# Patient Record
Sex: FEMALE | Race: BLACK OR AFRICAN AMERICAN | NOT HISPANIC OR LATINO | Employment: OTHER | URBAN - METROPOLITAN AREA
[De-identification: names, ages, dates, MRNs, and addresses within clinical notes are randomized per-mention and may not be internally consistent; named-entity substitution may affect disease eponyms.]

---

## 2017-01-09 ENCOUNTER — OFFICE VISIT (OUTPATIENT)
Dept: OPHTHALMOLOGY | Facility: CLINIC | Age: 72
End: 2017-01-09
Payer: MEDICARE

## 2017-01-09 DIAGNOSIS — H52.7 REFRACTIVE ERROR: ICD-10-CM

## 2017-01-09 DIAGNOSIS — Z13.5 GLAUCOMA SCREENING: ICD-10-CM

## 2017-01-09 DIAGNOSIS — H25.013 CATARACT CORTICAL, SENILE, BILATERAL: Primary | ICD-10-CM

## 2017-01-09 PROCEDURE — 92014 COMPRE OPH EXAM EST PT 1/>: CPT | Mod: S$GLB,,, | Performed by: OPTOMETRIST

## 2017-01-09 PROCEDURE — 99999 PR PBB SHADOW E&M-EST. PATIENT-LVL I: CPT | Mod: PBBFAC,,, | Performed by: OPTOMETRIST

## 2017-01-09 PROCEDURE — 92015 DETERMINE REFRACTIVE STATE: CPT | Mod: S$GLB,,, | Performed by: OPTOMETRIST

## 2017-01-09 NOTE — PROGRESS NOTES
HPI     Last MLC 12/29/2015  Cataract, nuclear, bilateral  Cataract cortical, senile, bilateral  Screening for glaucoma  RE  No visual complaints         Last edited by Fred Estrella MA on 1/9/2017  9:01 AM.         Assessment /Plan     For exam results, see Encounter Report.    Cataract cortical, senile, bilateral    Glaucoma screening    Refractive error      Mild to moderate cortical cataracts OU without complaint = discussed and will follow.  OH OK OU otherwise.  Spec Rx given.  RTC one year.

## 2017-01-12 ENCOUNTER — HOSPITAL ENCOUNTER (OUTPATIENT)
Dept: RADIOLOGY | Facility: HOSPITAL | Age: 72
Discharge: HOME | End: 2017-01-12
Attending: PODIATRIST
Payer: MEDICARE

## 2017-01-12 ENCOUNTER — OFFICE VISIT (OUTPATIENT)
Dept: PODIATRY | Facility: CLINIC | Age: 72
End: 2017-01-12
Payer: MEDICARE

## 2017-01-12 VITALS
DIASTOLIC BLOOD PRESSURE: 78 MMHG | HEART RATE: 80 BPM | SYSTOLIC BLOOD PRESSURE: 137 MMHG | BODY MASS INDEX: 38.2 KG/M2 | WEIGHT: 223.75 LBS | HEIGHT: 64 IN

## 2017-01-12 DIAGNOSIS — M76.821 POSTERIOR TIBIAL TENDON DYSFUNCTION, RIGHT: ICD-10-CM

## 2017-01-12 DIAGNOSIS — M79.671 RIGHT FOOT PAIN: ICD-10-CM

## 2017-01-12 DIAGNOSIS — G89.29 CHRONIC PAIN OF RIGHT ANKLE: ICD-10-CM

## 2017-01-12 DIAGNOSIS — M25.571 CHRONIC PAIN OF RIGHT ANKLE: ICD-10-CM

## 2017-01-12 DIAGNOSIS — M25.571 CHRONIC PAIN OF RIGHT ANKLE: Primary | ICD-10-CM

## 2017-01-12 DIAGNOSIS — G89.29 CHRONIC PAIN OF RIGHT ANKLE: Primary | ICD-10-CM

## 2017-01-12 PROCEDURE — 99203 OFFICE O/P NEW LOW 30 MIN: CPT | Mod: S$GLB,,, | Performed by: PODIATRIST

## 2017-01-12 PROCEDURE — 99999 PR PBB SHADOW E&M-EST. PATIENT-LVL III: CPT | Mod: PBBFAC,,, | Performed by: PODIATRIST

## 2017-01-12 PROCEDURE — 1160F RVW MEDS BY RX/DR IN RCRD: CPT | Mod: S$GLB,,, | Performed by: PODIATRIST

## 2017-01-12 PROCEDURE — 1157F ADVNC CARE PLAN IN RCRD: CPT | Mod: S$GLB,,, | Performed by: PODIATRIST

## 2017-01-12 PROCEDURE — 73610 X-RAY EXAM OF ANKLE: CPT | Mod: 26,RT,, | Performed by: RADIOLOGY

## 2017-01-12 PROCEDURE — 73630 X-RAY EXAM OF FOOT: CPT | Mod: TC,PO,RT

## 2017-01-12 PROCEDURE — 1125F AMNT PAIN NOTED PAIN PRSNT: CPT | Mod: S$GLB,,, | Performed by: PODIATRIST

## 2017-01-12 PROCEDURE — 1159F MED LIST DOCD IN RCRD: CPT | Mod: S$GLB,,, | Performed by: PODIATRIST

## 2017-01-12 PROCEDURE — 3078F DIAST BP <80 MM HG: CPT | Mod: S$GLB,,, | Performed by: PODIATRIST

## 2017-01-12 PROCEDURE — 3075F SYST BP GE 130 - 139MM HG: CPT | Mod: S$GLB,,, | Performed by: PODIATRIST

## 2017-01-12 PROCEDURE — 73630 X-RAY EXAM OF FOOT: CPT | Mod: 26,RT,, | Performed by: RADIOLOGY

## 2017-01-12 PROCEDURE — 73610 X-RAY EXAM OF ANKLE: CPT | Mod: TC,PO,RT

## 2017-01-12 RX ORDER — TRAMADOL HYDROCHLORIDE 50 MG/1
50 TABLET ORAL EVERY 6 HOURS PRN
Qty: 30 TABLET | Refills: 0 | Status: SHIPPED | OUTPATIENT
Start: 2017-01-12 | End: 2017-01-22

## 2017-01-12 RX ORDER — METHYLPREDNISOLONE 4 MG/1
4 TABLET ORAL DAILY
Qty: 1 PACKAGE | Refills: 0 | Status: SHIPPED | OUTPATIENT
Start: 2017-01-12 | End: 2017-02-16

## 2017-01-12 NOTE — MR AVS SNAPSHOT
[facility]  [address] [place] [address]  Phone: [phone]  Fax: [phone]                  Nathalie Membreno   2017 4:40 PM   Office Visit    Description:  Female : 1945   Provider:  Yaw Morales DPM   Department[facility]  [facility] - [facility]           Reason for Visit     Ankle Pain           Diagnoses this Visit        Comments    Chronic pain of right ankle    -  Primary     Right foot pain         Posterior tibial tendon dysfunction, right                To Do List           Future Appointments        Provider Department Dept Phone    2017 5:30 PM [facility] XR2 [facility] [phone]    2017 4:40 PM aYw Morales DPM [facility] [phone]    5/10/2017 8:20 AM Alice Booker MD [facility] [phone]      Goals (5 Years of Data)     None       These Medications        Disp Refills Start End    methylPREDNISolone (MEDROL DOSEPACK) 4 mg tablet 1 Package 0 2017     Take 1 tablet (4 mg total) by mouth once daily. Use as instructed on dose pack - Oral    Pharmacy: [facility] [address] [place], [place] [address] - [address] AT [facility] Ph #: [phone]       tramadol (ULTRAM) 50 mg tablet 30 tablet 0 2017    Take 1 tablet (50 mg total) by mouth every 6 (six) hours as needed for Pain. - Oral    Pharmacy: [facility] [id number]  [facility] [address] AT [facility] Ph #: [phone]         [facility]     [facility] Care [facility] -  Assistance  Registered nurses in the [facility] provide clinical advisement, health education, appointment booking, and other advisory services.  Call for this free service at [phone].             Medications           Message regarding Medications     Verify the changes and/or additions to your medication regime listed below are the same as discussed with your clinician today.  If any of these changes or  "additions are incorrect, please notify your healthcare provider.        START taking these NEW medications        Refills    methylPREDNISolone (MEDROL DOSEPACK) 4 mg tablet 0    Sig: Take 1 tablet (4 mg total) by mouth once daily. Use as instructed on dose pack    Class: Normal    Route: Oral    tramadol (ULTRAM) 50 mg tablet 0    Sig: Take 1 tablet (50 mg total) by mouth every 6 (six) hours as needed for Pain.    Class: Print    Route: Oral           Verify that the below list of medications is an accurate representation of the medications you are currently taking.  If none reported, the list may be blank. If incorrect, please contact your healthcare provider. Carry this list with you in case of emergency.           Current Medications     albuterol 90 mcg/actuation inhaler Inhale 2 puffs into the lungs every 4 (four) hours as needed for Wheezing.    ergocalciferol (VITAMIN D2) 50,000 unit Cap Take 1 capsule (50,000 Units total) by mouth every 7 days.    losartan (COZAAR) 100 MG tablet Take 1 tablet (100 mg total) by mouth once daily.    meloxicam (MOBIC) 7.5 MG tablet Take 1 tablet (7.5 mg total) by mouth daily as needed for Pain.    methylPREDNISolone (MEDROL DOSEPACK) 4 mg tablet Take 1 tablet (4 mg total) by mouth once daily. Use as instructed on dose pack    tramadol (ULTRAM) 50 mg tablet Take 1 tablet (50 mg total) by mouth every 6 (six) hours as needed for Pain.           Clinical Reference Information           Vital Signs - Last Recorded  Most recent update: 1/12/2017  4:51 PM by Lidia Snyder LPN    BP Pulse Ht Wt BMI    137/78 (BP Location: Right arm, Patient Position: Sitting, BP Method: Automatic) 80 5' 4" (1.626 m) 101.5 kg (223 lb 12.3 oz) 38.41 kg/m2      Blood Pressure          Most Recent Value    BP  137/78      Allergies as of 1/12/2017     Penicillins      Immunizations Administered on Date of Encounter - 1/12/2017     None      Orders Placed During Today's Visit     Future " Labs/Procedures Expected by Expires    X-Ray Ankle Complete Left  1/12/2017 1/12/2018    X-Ray Foot Complete Right  1/12/2017 1/12/2018      [facility] Sign-Up     Activating your [facility] account is as easy as 1-2-3!     1) Visit [email or web address], select Sign Up Now, enter this activation code and your date of birth, then select Next.  [email or web address]  Expires: 2/26/2017  5:13 PM      2) Create a username and password to use when you visit [facility] in the future and select a security question in case you lose your password and select Next.    3) Enter your e-mail address and click Sign Up!    Additional Information  If you have questions, please e-mail [email or web address] or call [phone] to talk to our [facility] staff. Remember, [facility] is NOT to be used for urgent needs. For medical emergencies, dial 911.

## 2017-01-18 NOTE — PROGRESS NOTES
SUBJECTIVE: This 71 year old female presents today complaing of painful swollen right foot.  Patient states pain and swelling just started in November. Patient states she went to ER and they told her it may be gout. Patient states she takes gout medication but this feel different from gout. Patient denies any injury to the foot. When asked were to indicate where the pain is, patient points to entire right ankle. Patient has no other pedial complaints at this time.     Chief Complaint   Patient presents with    Ankle Pain     Right ankle. Patient states the ankle is swollen and rates the current pain 7/10. Patient states she sprang her ankle in November and she went to the [facility] room but the pain is still present.     PCP:Dr. DANIEL Booker    Patient Active Problem List   Diagnosis    Essential hypertension    Pure hypercholesterolemia    GERD (gastroesophageal reflux disease)    Allergy    DDD (degenerative disc disease), lumbar    Vitamin D deficiency    Wrist pain    Tortuous aorta    Obesity, Class II, BMI 35-39.9, with comorbidity       Current Outpatient Prescriptions on File Prior to Visit   Medication Sig Dispense Refill    albuterol 90 mcg/actuation inhaler Inhale 2 puffs into the lungs every 4 (four) hours as needed for Wheezing. 1 each 0    ergocalciferol (VITAMIN D2) 50,000 unit Cap Take 1 capsule (50,000 Units total) by mouth every 7 days. 4 capsule 11    losartan (COZAAR) 100 MG tablet Take 1 tablet (100 mg total) by mouth once daily. 30 tablet 11    meloxicam (MOBIC) 7.5 MG tablet Take 1 tablet (7.5 mg total) by mouth daily as needed for Pain. 30 tablet 0     No current facility-administered medications on file prior to visit.        Review of patient's allergies indicates:   Allergen Reactions    Penicillins      Other reaction(s): Swelling       Past Surgical History   Procedure Laterality Date    Tubal ligation      Carpal tunnel release       b/l    Trigger finger release       "Foot surgery Right 05/02/2013     wart removal       Family History   Problem Relation Age of Onset    Hypertension Mother     Hypertension Father     Cancer Father      prostate    Cataracts Father     Glaucoma Father     Diabetes Father     Hypertension Sister     Diabetes Sister     Breast cancer Neg Hx     Colon cancer Neg Hx     Ovarian cancer Neg Hx     Thrombophilia Neg Hx        Social History     Social History    Marital status:      Spouse name: N/A    Number of children: 3    Years of education: N/A     Occupational History    Resident [facility] [employer]      Social History Main Topics    Smoking status: Never Smoker    Smokeless tobacco: Never Used    Alcohol use No    Drug use: No    Sexual activity: Not Currently     Other Topics Concern    Not on file     Social History Narrative    Live alone       Vitals:    01/12/17 1645   BP: 137/78   Pulse: 80   Weight: 101.5 kg (223 lb 12.3 oz)   Height: 5' 4" (1.626 m)   PainSc:   7   PainLoc: Ankle       Review of Systems   Constitution: Negative for chills, fever, weakness and malaise/fatigue.   Cardiovascular: Negative for chest pain, claudication and leg swelling.   Respiratory: Negative for cough and shortness of breath.   Skin: Negative for nail changes. Negative for dry skin, itching and rash.   Musculoskeletal: Positive for arthritis, joint pain and muscle weakness. Negative for back pain and muscle cramps.   Gastrointestinal: Negative for nausea and vomiting.   Neurological: Negative for numbness and paresthesias.   Psychiatric/Behavioral: Negative for altered mental status.     OBJECTIVE:   PHYSICAL EXAM: Apperance: Alert and orient in no distress,well developed, and with good attention to grooming and body habits  Patient presents ambulating in tennis shoes and ace wrap on right ankle.   LOWER EXTREMITIES EXAM:   VASCULAR: Dorsalis pedis pulses 2/4 bilateral and Posterior Tibial pulses 2/4 bilateral. Capillary fill " time <4 seconds bilateral. Mild edema observed right. Varicosities absent bilateral. Skin temperature of the lower extremities is warm to warm, proximal to distal. Hair growth WNL bilateral.  DERMATOLOGICAL: No skin rashes, subcutaneous nodules, lesions, or ulcers observed bilateral. (+) edema, (--) erythema, (--) increased temperature noted to right ankle/foot. Webspaces 1-4 clean, dry and without evidence of break in skin integrity right.   NEUROLOGICAL: Light touch, sharp-dull, proprioception all present and equal bilaterally.  MUSCULOSKELETAL: Muscle strength is 5/5 for foot inverters, everters, plantarflexors, and dorsiflexors. Muscle tone is normal. Pain on palpation of right medial and lateral ankle.     ASSESSMENT:  1. Posterior tibial dysfunction, right  2. Chronic ankle pain, right    TREATMENT/PLAN:  1. The patient was counseled regarding findings of my examination, my impressions, treatment options, results of diagnostic tests, and usual treatment plan.    2. I explained to the patient that etiologies and treatment options for gout including rest, elevation, diet control, NSAID's, long term gout medication, and/or injection therapy.   3. Ordered right foot/ankle x-ray.  4. Prescribed Medrol Dosepak to be taken as directed on package.   5. Patient instructed on adequate icing techniques. Patient should ice the affected area at least once per day x 10 minutes for 10 days . I advised the  patient that extra icing would also be beneficial to ensure adequate anti inflammatory effect.   6. Patient was fitted with short walking boot and instructed on proper usage. This should be worn daily for a minimum of 2 weeks at all times when ambulating. Patient instructed not to drive with walking boot.   7. Patient to return in 1 month.

## 2017-02-05 RX ORDER — LOSARTAN POTASSIUM 100 MG/1
TABLET ORAL
Qty: 30 TABLET | Refills: 11 | Status: SHIPPED | OUTPATIENT
Start: 2017-02-05 | End: 2017-02-16 | Stop reason: SDUPTHER

## 2017-02-16 ENCOUNTER — OFFICE VISIT (OUTPATIENT)
Dept: PODIATRY | Facility: CLINIC | Age: 72
End: 2017-02-16
Payer: MEDICARE

## 2017-02-16 VITALS
HEIGHT: 64 IN | SYSTOLIC BLOOD PRESSURE: 159 MMHG | DIASTOLIC BLOOD PRESSURE: 78 MMHG | WEIGHT: 220.25 LBS | HEART RATE: 75 BPM | BODY MASS INDEX: 37.6 KG/M2

## 2017-02-16 DIAGNOSIS — G89.29 CHRONIC PAIN OF RIGHT ANKLE: ICD-10-CM

## 2017-02-16 DIAGNOSIS — M76.821 POSTERIOR TIBIAL TENDON DYSFUNCTION, RIGHT: Primary | ICD-10-CM

## 2017-02-16 DIAGNOSIS — M25.571 CHRONIC PAIN OF RIGHT ANKLE: ICD-10-CM

## 2017-02-16 PROCEDURE — 1159F MED LIST DOCD IN RCRD: CPT | Mod: S$GLB,,, | Performed by: PODIATRIST

## 2017-02-16 PROCEDURE — 99213 OFFICE O/P EST LOW 20 MIN: CPT | Mod: S$GLB,,, | Performed by: PODIATRIST

## 2017-02-16 PROCEDURE — 1125F AMNT PAIN NOTED PAIN PRSNT: CPT | Mod: S$GLB,,, | Performed by: PODIATRIST

## 2017-02-16 PROCEDURE — 1160F RVW MEDS BY RX/DR IN RCRD: CPT | Mod: S$GLB,,, | Performed by: PODIATRIST

## 2017-02-16 PROCEDURE — 3078F DIAST BP <80 MM HG: CPT | Mod: S$GLB,,, | Performed by: PODIATRIST

## 2017-02-16 PROCEDURE — 3077F SYST BP >= 140 MM HG: CPT | Mod: S$GLB,,, | Performed by: PODIATRIST

## 2017-02-16 PROCEDURE — 99999 PR PBB SHADOW E&M-EST. PATIENT-LVL III: CPT | Mod: PBBFAC,,, | Performed by: PODIATRIST

## 2017-02-16 PROCEDURE — 1157F ADVNC CARE PLAN IN RCRD: CPT | Mod: S$GLB,,, | Performed by: PODIATRIST

## 2017-02-16 RX ORDER — ATORVASTATIN CALCIUM 20 MG/1
20 TABLET, FILM COATED ORAL DAILY
COMMUNITY
End: 2017-02-21 | Stop reason: SDUPTHER

## 2017-02-16 RX ORDER — DICLOFENAC SODIUM 10 MG/G
2 GEL TOPICAL 2 TIMES DAILY
Qty: 60 G | Refills: 1 | Status: SHIPPED | OUTPATIENT
Start: 2017-02-16 | End: 2024-02-02

## 2017-02-16 RX ORDER — LOSARTAN POTASSIUM 50 MG/1
50 TABLET ORAL DAILY
COMMUNITY
End: 2017-02-17

## 2017-02-16 RX ORDER — ASPIRIN 325 MG
325 TABLET ORAL DAILY
COMMUNITY
End: 2019-02-27

## 2017-02-16 NOTE — MR AVS SNAPSHOT
[facility]  [address] [facility] [facility] [place] [address]  Phone: [phone]  Fax: [phone]                  Nathalie Membreno   2017 4:40 PM   Office Visit    Description:  Female : 1945   Provider:  Yaw Morales DPM   [facility]  [facility] - [facility]           Reason for Visit     Ankle Pain           Diagnoses this Visit        Comments    Posterior tibial tendon dysfunction, right    -  Primary            To Do List           Future Appointments        Provider Department Dept Phone    2017 10:40 AM SHERLY Guevara [facility] [phone]    2017 4:40 PM Yaw Morales DPM [facility] [phone]    5/10/2017 8:20 AM Alice Booker MD [facility] [phone]      Goals (5 Years of Data)     None       These Medications        Disp Refills Start End    diclofenac sodium (VOLTAREN) 1 % Gel 60 g 1 2017     Apply 2 g topically 2 (two) times daily. - Topical    Pharmacy: [facility] [address] [place] [address] [place] [id number] [facility] Ph #: [phone]         [facility]     [facility] Care [facility] -  Assistance  Registered nurses in the [facility] provide clinical advisement, health education, appointment booking, and other advisory services.  Call for this free service at [phone].             Medications           Message regarding Medications     Verify the changes and/or additions to your medication regime listed below are the same as discussed with your clinician today.  If any of these changes or additions are incorrect, please notify your healthcare provider.        START taking these NEW medications        Refills    diclofenac sodium (VOLTAREN) 1 % Gel 1    Sig: Apply 2 g topically 2 (two) times daily.    Class: Normal    Route: Topical      STOP taking these medications     methylPREDNISolone (MEDROL DOSEPACK) 4 mg tablet Take 1 tablet (4 mg total) by mouth  "once daily. Use as instructed on dose pack           Verify that the below list of medications is an accurate representation of the medications you are currently taking.  If none reported, the list may be blank. If incorrect, please contact your healthcare provider. Carry this list with you in case of emergency.           Current Medications     albuterol 90 mcg/actuation inhaler Inhale 2 puffs into the lungs every 4 (four) hours as needed for Wheezing.    aspirin 325 MG tablet Take 325 mg by mouth once daily.    atorvastatin (LIPITOR) 20 MG tablet Take 20 mg by mouth once daily.    ergocalciferol (VITAMIN D2) 50,000 unit Cap Take 1 capsule (50,000 Units total) by mouth every 7 days.    losartan (COZAAR) 100 MG tablet Take 1 tablet (100 mg total) by mouth once daily.    losartan (COZAAR) 50 MG tablet Take 50 mg by mouth once daily.    meloxicam (MOBIC) 7.5 MG tablet Take 1 tablet (7.5 mg total) by mouth daily as needed for Pain.    diclofenac sodium (VOLTAREN) 1 % Gel Apply 2 g topically 2 (two) times daily.           Clinical Reference Information           Your Vitals Were     BP Pulse Height Weight BMI    159/78 (BP Location: Right arm, Patient Position: Sitting, BP Method: Automatic) 75 5' 4" (1.626 m) 99.9 kg (220 lb 3.8 oz) 37.8 kg/m2      Blood Pressure          Most Recent Value    BP  (!)  159/78      Allergies as of 2/16/2017     Penicillins      Immunizations Administered on Date of Encounter - 2/16/2017     None      [facility] Sign-Up     Activating your [facility] account is as easy as 1-2-3!     1) Visit [email or web address], select Sign Up Now, enter this activation code and your date of birth, then select Next.  [email or web address]  Expires: 2/26/2017  5:13 PM      2) Create a username and password to use when you visit [facility] in the future and select a security question in case you lose your password and select Next.    3) Enter your e-mail address and click Sign Up!    Additional Information  If you have " questions, please e-mail [email or web address] or call [phone] to talk to our [facility] staff. Remember, [facility] is NOT to be used for urgent needs. For medical emergencies, dial 911.         Language Assistance Services     ATTENTION: Language assistance services are available, free of charge. Please call [phone].      ATENCIÓN: Si habla español, tiene a newby disposición servicios gratuitos de asistencia lingüística. Llame al [phone].     CHÚ Ý: N?u b?n nói Ti?ng Vi?t, có các d?ch v? h? tr? ngôn ng? mi?n phí dành cho b?n. G?i s? [phone].         [facility] complies with applicable Federal civil rights laws and does not discriminate on the basis of race, color, national origin, age, disability, or sex.

## 2017-02-17 ENCOUNTER — HOSPITAL ENCOUNTER (OUTPATIENT)
Dept: RADIOLOGY | Facility: HOSPITAL | Age: 72
Discharge: HOME | End: 2017-02-17
Attending: INTERNAL MEDICINE
Payer: MEDICARE

## 2017-02-17 ENCOUNTER — OFFICE VISIT (OUTPATIENT)
Dept: INTERNAL MEDICINE | Facility: CLINIC | Age: 72
End: 2017-02-17
Payer: MEDICARE

## 2017-02-17 ENCOUNTER — TELEPHONE (OUTPATIENT)
Dept: INTERNAL MEDICINE | Facility: CLINIC | Age: 72
End: 2017-02-17

## 2017-02-17 VITALS
BODY MASS INDEX: 36.96 KG/M2 | OXYGEN SATURATION: 97 % | HEART RATE: 79 BPM | DIASTOLIC BLOOD PRESSURE: 98 MMHG | SYSTOLIC BLOOD PRESSURE: 138 MMHG | TEMPERATURE: 97 F | HEIGHT: 65 IN | WEIGHT: 221.81 LBS

## 2017-02-17 DIAGNOSIS — E55.9 VITAMIN D DEFICIENCY: ICD-10-CM

## 2017-02-17 DIAGNOSIS — R06.02 SOB (SHORTNESS OF BREATH): ICD-10-CM

## 2017-02-17 DIAGNOSIS — E78.00 PURE HYPERCHOLESTEROLEMIA: ICD-10-CM

## 2017-02-17 DIAGNOSIS — I10 ESSENTIAL HYPERTENSION: ICD-10-CM

## 2017-02-17 DIAGNOSIS — R51.9 HEADACHE, UNSPECIFIED HEADACHE TYPE: ICD-10-CM

## 2017-02-17 DIAGNOSIS — R42 DIZZINESS: ICD-10-CM

## 2017-02-17 DIAGNOSIS — G45.9 TRANSIENT CEREBRAL ISCHEMIA, UNSPECIFIED TYPE: Primary | ICD-10-CM

## 2017-02-17 DIAGNOSIS — G45.9 TRANSIENT CEREBRAL ISCHEMIA, UNSPECIFIED TYPE: ICD-10-CM

## 2017-02-17 PROCEDURE — 99499 UNLISTED E&M SERVICE: CPT | Mod: S$GLB,,, | Performed by: PHYSICIAN ASSISTANT

## 2017-02-17 PROCEDURE — 70450 CT HEAD/BRAIN W/O DYE: CPT | Mod: TC,PO

## 2017-02-17 PROCEDURE — 99214 OFFICE O/P EST MOD 30 MIN: CPT | Mod: S$GLB,,, | Performed by: PHYSICIAN ASSISTANT

## 2017-02-17 PROCEDURE — 99999 PR PBB SHADOW E&M-EST. PATIENT-LVL III: CPT | Mod: PBBFAC,,, | Performed by: PHYSICIAN ASSISTANT

## 2017-02-17 PROCEDURE — 70450 CT HEAD/BRAIN W/O DYE: CPT | Mod: 26,,, | Performed by: RADIOLOGY

## 2017-02-17 NOTE — MR AVS SNAPSHOT
[facility]  [address] [facility] [place] [address]  Phone: [phone]  Fax: [phone]                  Nathalie Membreno   2017 10:40 AM   Office Visit    Description:  Female : 1945   Provider:  SHERLY Guevara   Department:  [facility] Internal Medicine           Reason for Visit     Follow-up     Transient Ischemic Attack           Diagnoses this Visit        Comments    Transient cerebral ischemia, unspecified type    -  Primary     Pure hypercholesterolemia         Essential hypertension         Vitamin D deficiency         Headache, unspecified headache type         Dizziness         SOB (shortness of breath)                To Do List           Future Appointments        Provider Department Dept Phone    2017 1:10 PM [facility] CT1 LIMIT 500 LBS [facility] [phone]    2017 3:00 PM Alice Booker MD [facility] [phone]    2017 4:40 PM Yaw Morales DPM [facility] Pod[facility] [phone]    5/3/2017 8:10 AM LABORATORY, [facility] [phone]    5/10/2017 8:20 AM Alice Booker MD [facility] [phone]      Goals (5 Years of Data)     None      [facility]     [facility] Nurse Care Line -  Assistance  Registered nurses in the [facility] provide clinical advisement, health education, appointment booking, and other advisory services.  Call for this free service at [phone].             Medications           Message regarding Medications     Verify the changes and/or additions to your medication regime listed below are the same as discussed with your clinician today.  If any of these changes or additions are incorrect, please notify your healthcare provider.             Verify that the below list of medications is an accurate representation of the medications you are currently taking.  If none reported, the list may be blank. If incorrect, please  "contact your healthcare provider. Carry this list with you in case of emergency.           Current Medications     albuterol 90 mcg/actuation inhaler Inhale 2 puffs into the lungs every 4 (four) hours as needed for Wheezing.    aspirin 325 MG tablet Take 325 mg by mouth once daily.    atorvastatin (LIPITOR) 20 MG tablet Take 20 mg by mouth once daily.    diclofenac sodium (VOLTAREN) 1 % Gel Apply 2 g topically 2 (two) times daily.    ergocalciferol (VITAMIN D2) 50,000 unit Cap Take 1 capsule (50,000 Units total) by mouth every 7 days.    meloxicam (MOBIC) 7.5 MG tablet Take 1 tablet (7.5 mg total) by mouth daily as needed for Pain.    losartan (COZAAR) 100 MG tablet Take 1 tablet (100 mg total) by mouth once daily.           Clinical Reference Information           Your Vitals Were     BP Pulse Temp Height    138/98 (BP Location: Right arm, Patient Position: Sitting, BP Method: Manual) 79 96.8 °F (36 °C) (Tympanic) 5' 5" (1.651 m)    Weight SpO2 BMI    100.6 kg (221 lb 12.5 oz) 97% 36.91 kg/m2      Blood Pressure          Most Recent Value    BP  (!)  138/98      Allergies as of 2/17/2017     Penicillins      Immunizations Administered on Date of Encounter - 2/17/2017     None      Orders Placed During Today's Visit     Future Labs/Procedures Expected by Expires    CT Head Without Contrast  2/17/2017 2/17/2018    ALT (SGPT)  5/8/2017 4/18/2018    Lipid panel  5/8/2017 4/18/2018      [facility] Sign-Up     Activating your [facility] account is as easy as 1-2-3!     1) Visit [email or web address], select Sign Up Now, enter this activation code and your date of birth, then select Next.  [email or web address]  Expires: 2/26/2017  5:13 PM      2) Create a username and password to use when you visit [facility] in the future and select a security question in case you lose your password and select Next.    3) Enter your e-mail address and click Sign Up!    Additional Information  If you have questions, please e-mail [email or web address] or call [phone] to talk to our [facility] staff. Remember, [facility] is NOT to be used for urgent needs. For medical emergencies, dial 911.         Language Assistance Services     ATTENTION: Language assistance services are available, free of charge. Please call [phone].      ATENCIÓN: Si habla theresaañol, tiene a newby disposición servicios gratuitos de asistencia lingüística. Llame al [phone].     CHÚ Ý: N?u b?n nói Ti?ng Vi?t, có các d?ch v? h? tr? ngôn ng? mi?n phí dành cho b?n. G?i s? [phone].         [facility] - Internal Medicine complies with applicable Federal civil rights laws and does not discriminate on the basis of race, color, national origin, age, disability, or sex.

## 2017-02-17 NOTE — PROGRESS NOTES
"Transitional Care Note  Subjective:       Patient ID: Nathalie Membreno is a 71 y.o. female.  Chief Complaint: Follow-up and Transient Ischemic Attack    Family and/or Caretaker present at visit?  Yes, sister.  Diagnostic tests reviewed/disposition: I have reviewed all completed as well as pending diagnostic tests at the time of discharge.  Disease/illness education: Discussed with pt and sister.  Home health/community services discussion/referrals: Patient does not have home health established from hospital visit.  They do not need home health.  If needed, we will set up home health for the patient.   Establishment or re-establishment of referral orders for community resources: No other necessary community resources.   Discussion with other health care providers: Discussed case with pt's PCP Dr. Booker.     HPI Comments: 71 year old female presents to clinic with sister for [facility] f/u for TIA. She experienced L leg and L arm paralysis, L facial droop, and slurred speech 2/9/17. She reports her son is living with her currently (due to his house being flooded) and she was taken to [facility] via ambulance. Pt's sister lives about 5 minutes away from her as well. She reports being at [facility] 2/9/17 - 2/10/17 and hospital records were reviewed. Pt had an echocardiogram (normal EF), negative head CT, head and neck CTA that was neg for any flow-limiting stenosis or aneurysm. Pt also had normal head MRI and clear CXR. Telemetry found no arrhythmias and pt is currently wearing a cardiac monitor (X 2 weeks) and is scheduled to f/u with a cardiologist after she completes this. Pt was started on Lipitor 20mg QD and aspirin 325mg QD. Losartan was decreased from 100mg to 50mg QD due to "normal and only slightly elevated" BP at [facility]. Pt has not checked her BP since being discharged from [facility]. Pt's neurological status returned to baseline prior to hospital discharge. She reports onset of feeling weak, exertional " ""light-headed" dizziness, intermittent L posterior headache (lasts seconds each time), intermittent L eye blurriness, and random SOB since being discharged from the hospital. Pt reports no fever, chills, known injury, syncope, confusion, numbness/tingling, return of muscular weakness, current HA or vision changes, current SOB, or other medical complaints. Pt reports her mother passed away earlier this month and she had to miss the  due to being in the hospital. Pt also says her weekly vit D is expensive and would like to change this if possible.     Past Medical History:    Allergy                                                       Anemia                                                        Arthritis                                                       Comment:knees, hands, back    Cataract                                                      Dysphagia, unspecified(787.20)                                GERD (gastroesophageal reflux disease)                        History of carpal tunnel release of both wrists 3/10/2016     Hyperlipidemia                                                Hypertension                                                  LGSIL (low grade squamous intraepithelial dysp*                 Comment:HPV +    Obesity                                                       Stroke                                          2017        Review of Systems   Constitutional: Negative for chills and fever.   Eyes: Positive for visual disturbance.   Respiratory: Positive for shortness of breath. Negative for cough.    Cardiovascular: Negative for chest pain, palpitations and leg swelling.   Gastrointestinal: Negative for abdominal pain, nausea and vomiting.   Musculoskeletal: Negative for neck pain.   Skin: Negative for rash.   Neurological: Positive for dizziness and headaches. Negative for syncope, facial asymmetry, speech difficulty, weakness and numbness.   Psychiatric/Behavioral: Negative " for confusion.       Objective:      Physical Exam   Constitutional: She is oriented to person, place, and time. She appears well-developed and well-nourished. No distress.   HENT:   Head: Normocephalic and atraumatic.   Eyes: EOM are normal. No scleral icterus.   Neck: Neck supple.   Cardiovascular: Normal rate and regular rhythm.    Pulmonary/Chest: Effort normal and breath sounds normal. No respiratory distress. She has no decreased breath sounds. She has no wheezes. She has no rhonchi. She has no rales.   Musculoskeletal: Normal range of motion. She exhibits no edema.   Neurological: She is alert and oriented to person, place, and time. She has normal strength. No cranial nerve deficit or sensory deficit. She displays a negative Romberg sign. Coordination and gait normal.   NV fully intact   Skin: Skin is warm and dry. No rash noted.   Psychiatric: She has a normal mood and affect. Her speech is normal and behavior is normal. Thought content normal.       Assessment:       1. Transient cerebral ischemia, unspecified type    2. Pure hypercholesterolemia    3. Essential hypertension    4. Vitamin D deficiency    5. Headache, unspecified headache type    6. Dizziness    7. SOB (shortness of breath)        Plan:         1. Discussed case with pt's PCP Dr. Booker. Stat head CT today with review following.  2. Continue daily aspirin. Increase losartan to 100mg QD. Monitor BP and keep record. Continue aspirin and statin. May stop weekly Rx vit D and take OTC vit D3 2000U QD instead - pt to check prices.  3. ALT and lipid panel prior to upcoming May 2017 PCP visit.  4. Monitor sxs. RTC in one week for recheck sxs and BP. ER immediately if sxs worsen.

## 2017-02-17 NOTE — TELEPHONE ENCOUNTER
----- Message from SHERLY Guevara sent at 2/17/2017  2:00 PM CST -----  Head CT does not show any acute stroke.

## 2017-02-21 ENCOUNTER — OFFICE VISIT (OUTPATIENT)
Dept: INTERNAL MEDICINE | Facility: CLINIC | Age: 72
End: 2017-02-21
Payer: MEDICARE

## 2017-02-21 VITALS
TEMPERATURE: 97 F | WEIGHT: 220.88 LBS | OXYGEN SATURATION: 96 % | BODY MASS INDEX: 36.8 KG/M2 | DIASTOLIC BLOOD PRESSURE: 86 MMHG | HEART RATE: 80 BPM | HEIGHT: 65 IN | SYSTOLIC BLOOD PRESSURE: 120 MMHG

## 2017-02-21 DIAGNOSIS — E78.00 PURE HYPERCHOLESTEROLEMIA: Primary | ICD-10-CM

## 2017-02-21 DIAGNOSIS — I77.1 TORTUOUS AORTA: ICD-10-CM

## 2017-02-21 DIAGNOSIS — I10 ESSENTIAL HYPERTENSION: ICD-10-CM

## 2017-02-21 DIAGNOSIS — G45.9 TRANSIENT CEREBRAL ISCHEMIA, UNSPECIFIED TYPE: ICD-10-CM

## 2017-02-21 DIAGNOSIS — R51.9 HEADACHE, UNSPECIFIED HEADACHE TYPE: ICD-10-CM

## 2017-02-21 DIAGNOSIS — F51.01 PRIMARY INSOMNIA: ICD-10-CM

## 2017-02-21 PROCEDURE — 99213 OFFICE O/P EST LOW 20 MIN: CPT | Mod: S$GLB,,, | Performed by: INTERNAL MEDICINE

## 2017-02-21 PROCEDURE — 1159F MED LIST DOCD IN RCRD: CPT | Mod: S$GLB,,, | Performed by: INTERNAL MEDICINE

## 2017-02-21 PROCEDURE — 3074F SYST BP LT 130 MM HG: CPT | Mod: S$GLB,,, | Performed by: INTERNAL MEDICINE

## 2017-02-21 PROCEDURE — 1157F ADVNC CARE PLAN IN RCRD: CPT | Mod: S$GLB,,, | Performed by: INTERNAL MEDICINE

## 2017-02-21 PROCEDURE — 1160F RVW MEDS BY RX/DR IN RCRD: CPT | Mod: S$GLB,,, | Performed by: INTERNAL MEDICINE

## 2017-02-21 PROCEDURE — 99999 PR PBB SHADOW E&M-EST. PATIENT-LVL III: CPT | Mod: PBBFAC,,, | Performed by: INTERNAL MEDICINE

## 2017-02-21 PROCEDURE — 3079F DIAST BP 80-89 MM HG: CPT | Mod: S$GLB,,, | Performed by: INTERNAL MEDICINE

## 2017-02-21 PROCEDURE — 99499 UNLISTED E&M SERVICE: CPT | Mod: S$GLB,,, | Performed by: INTERNAL MEDICINE

## 2017-02-21 RX ORDER — HYDROXYZINE HYDROCHLORIDE 25 MG/1
25-50 TABLET, FILM COATED ORAL NIGHTLY PRN
Qty: 40 TABLET | Refills: 3 | Status: SHIPPED | OUTPATIENT
Start: 2017-02-21 | End: 2017-09-14

## 2017-02-21 RX ORDER — ATORVASTATIN CALCIUM 20 MG/1
20 TABLET, FILM COATED ORAL DAILY
Qty: 30 TABLET | Refills: 11 | Status: SHIPPED | OUTPATIENT
Start: 2017-02-21 | End: 2018-02-26 | Stop reason: SDUPTHER

## 2017-02-21 NOTE — PROGRESS NOTES
Patient, Nathalie Membreno (MRN #[id number]), presented with a recorded BMI of 36.76 kg/m^2 and a documented comorbidity(s):  - Hypertension  - Hyperlipidemia  to which the severe obesity is a contributing factor. This is consistent with the definition of severe obesity (BMI 35.0-35.9) with comorbidity (ICD-10 E66.01, Z68.35). The patient's severe obesity was monitored, evaluated, addressed and/or treated. This addendum to the medical record is made on 02/21/2017.

## 2017-02-21 NOTE — MR AVS SNAPSHOT
[facility]  [address] [place] [address]  Phone: [phone]  Fax: [phone]                  Nathalie Membreno   2017 3:00 PM   Office Visit    Description:  Female : 1945   Provider:  Alice Booker MD   Department:  [facility]           Reason for Visit     Follow-up           Diagnoses this Visit        Comments    Pure hypercholesterolemia    -  Primary     Essential hypertension         Tortuous aorta         Transient cerebral ischemia, unspecified type         Headache, unspecified headache type         Primary insomnia                To Do List           Future Appointments        Provider Department Dept Phone    3/21/2017 8:25 AM [facility], [facility] [phone]    3/28/2017 2:40 PM Alice Booker MD [facility] [phone]    2017 4:40 PM Yaw Morales DPM [facility] [phone]    5/3/2017 8:10 AM LAB[facility]ATORY, [facility] [phone]    5/10/2017 8:20 AM Alice Booker MD [facility] [phone]      Goals (5 Years of Data)     None      Follow-Up and Disposition     Return in about 1 month (around 3/21/2017).       These Medications        Disp Refills Start End    hydrOXYzine HCl (ATARAX) 25 MG tablet 40 tablet 3 2017     Take 1-2 tablets (25-50 mg total) by mouth nightly as needed (sleep). - Oral    Pharmacy: [facility] [address] [address] [place] - [address] AT [facility] Ph #: [phone]       atorvastatin (LIPITOR) 20 MG tablet 30 tablet 11 2017     Take 1 tablet (20 mg total) by mouth once daily. - Oral    Pharmacy: [facility] [address] [address] [place] - [email or web address] [facility] Ph #: [phone]         [facility] [place] On Call     [facility] -  Assistance  Registered nurses in the [facility] provide clinical advisement, health education,  appointment booking, and other advisory services.  Call for this free service at [phone].             Medications           Message regarding Medications     Verify the changes and/or additions to your medication regime listed below are the same as discussed with your clinician today.  If any of these changes or additions are incorrect, please notify your healthcare provider.        START taking these NEW medications        Refills    hydrOXYzine HCl (ATARAX) 25 MG tablet 3    Sig: Take 1-2 tablets (25-50 mg total) by mouth nightly as needed (sleep).    Class: Normal    Route: Oral      CHANGE how you are taking these medications     Start Taking Instead of    atorvastatin (LIPITOR) 20 MG tablet atorvastatin (LIPITOR) 20 MG tablet    Dosage:  Take 1 tablet (20 mg total) by mouth once daily. Dosage:  Take 20 mg by mouth once daily.    Reason for Change:  Reorder       STOP taking these medications     meloxicam (MOBIC) 7.5 MG tablet Take 1 tablet (7.5 mg total) by mouth daily as needed for Pain.           Verify that the below list of medications is an accurate representation of the medications you are currently taking.  If none reported, the list may be blank. If incorrect, please contact your healthcare provider. Carry this list with you in case of emergency.           Current Medications     albuterol 90 mcg/actuation inhaler Inhale 2 puffs into the lungs every 4 (four) hours as needed for Wheezing.    aspirin 325 MG tablet Take 325 mg by mouth once daily.    atorvastatin (LIPITOR) 20 MG tablet Take 1 tablet (20 mg total) by mouth once daily.    diclofenac sodium (VOLTAREN) 1 % Gel Apply 2 g topically 2 (two) times daily.    ergocalciferol (VITAMIN D2) 50,000 unit Cap Take 1 capsule (50,000 Units total) by mouth every 7 days.    losartan (COZAAR) 100 MG tablet Take 1 tablet (100 mg total) by mouth once daily.    hydrOXYzine HCl (ATARAX) 25 MG tablet Take 1-2 tablets (25-50 mg total) by mouth nightly as  "needed (sleep).           Clinical Reference Information           Your Vitals Were     BP Pulse Temp Height Weight SpO2    120/86 (BP Location: Right arm) 80 96.9 °F (36.1 °C) (Tympanic) 5' 5" (1.651 m) 100.2 kg (220 lb 14.4 oz) 96%    BMI                36.76 kg/m2          Blood Pressure          Most Recent Value    BP  120/86      Allergies as of 2/21/2017     Penicillins      Immunizations Administered on Date of Encounter - 2/21/2017     None      Orders Placed During Today's Visit     Future Labs/Procedures Expected by Expires    ALT (SGPT)  2/21/2017 4/22/2018    Lipid panel  2/21/2017 2/21/2018      [facility] Sign-Up     Activating your [facility] account is as easy as 1-2-3!     1) Visit [email or web address], select Sign Up Now, enter this activation code and your date of birth, then select Next.  [email or web address]  Expires: 2/26/2017  5:13 PM      2) Create a username and password to use when you visit [facility] in the future and select a security question in case you lose your password and select Next.    3) Enter your e-mail address and click Sign Up!    Additional Information  If you have questions, please e-mail [email or web address] or call [phone] to talk to our [facility] staff. Remember, [facility] is NOT to be used for urgent needs. For medical emergencies, dial 911.         Language Assistance Services     ATTENTION: Language assistance services are available, free of charge. Please call [phone].      ATENCIÓN: Si habla español, tiene a newby disposición servicios gratuitos de asistencia lingüística. Llame al [phone].     CHÚ Ý: N?u b?n nói Ti?ng Vi?t, có các d?ch v? h? tr? ngôn ng? mi?n phí dành cho b?n. G?i s? [phone].         [facility] complies with applicable Federal civil rights laws and does not discriminate on the basis of race, color, national origin, age, disability, or sex.        "

## 2017-02-21 NOTE — PROGRESS NOTES
"Subjective:       Patient ID: Nathalie Membreno is a 71 y.o. female.    Chief Complaint: Follow-up    HPI Comments: Here for follow up of medical problems and recent TIA.  17 CT head here normal.  Mom  17.  Missed her  due to TIA.  Sx were that she couldn't see and left arm numbness.  In hospital statin was started, ASA also.  No stomach pain.  Not doing significant exercise at this time.  Having trouble sleeping since mother passed.  Feels low and upset.  No cp/sob/palp.  BMs normal.    Updated/ annual due :  HM:  fluvax, 6/15 [id number],  booster [id number],  TDaP,  BMD rep 5y,  Cscope rep 5y, 10/14 MMG,  Eye Dr. Schrader,  HCV neg.        Review of Systems   Constitutional: Negative for chills, diaphoresis and fever.   Respiratory: Negative for cough and shortness of breath.    Cardiovascular: Negative for chest pain, palpitations and leg swelling.   Gastrointestinal: Negative for blood in stool, constipation, diarrhea, nausea and vomiting.   Genitourinary: Negative for dysuria, frequency and hematuria.   Psychiatric/Behavioral: The patient is nervous/anxious.        Objective:     Visit Vitals    /86 (BP Location: Right arm)    Pulse 80    Temp 96.9 °F (36.1 °C) (Tympanic)    Ht 5' 5" (1.651 m)    Wt 100.2 kg (220 lb 14.4 oz)    SpO2 96%    BMI 36.76 kg/m2       Physical Exam   Constitutional: She is oriented to person, place, and time. She appears well-developed.   HENT:   Mouth/Throat: Oropharynx is clear and moist.   Neck: Neck supple. Carotid bruit is not present. No thyroid mass present.   Cardiovascular: Normal rate, regular rhythm and intact distal pulses.  Exam reveals no gallop and no friction rub.    No murmur heard.  Pulmonary/Chest: Effort normal and breath sounds normal. She has no wheezes. She has no rales.   Abdominal: Soft. Bowel sounds are normal. She exhibits no mass. There is no hepatosplenomegaly. There is no tenderness. "   Musculoskeletal: She exhibits no edema.   Lymphadenopathy:     She has no cervical adenopathy.   Neurological: She is alert and oriented to person, place, and time.   Psychiatric: She has a normal mood and affect.       Assessment:       1. Pure hypercholesterolemia    2. Essential hypertension    3. Tortuous aorta    4. Transient cerebral ischemia, unspecified type    5. Headache, unspecified headache type    6. Primary insomnia        Plan:       Nathalie was seen today for follow-up.    Diagnoses and all orders for this visit:    Pure hypercholesterolemia- recheck lab 1 mo.  -     Lipid panel; Future  -     ALT (SGPT); Future  -     atorvastatin (LIPITOR) 20 MG tablet; Take 1 tablet (20 mg total) by mouth once daily.    Essential hypertension- monitor BPs at home for next visit.    Tortuous aorta- on statin and asa.    Transient cerebral ischemia, unspecified type- on statin and asa, to f/u with Card with heart monitor results.    Headache, unspecified headache type- now resolved.    Obesity- will need to work on diet and weight loss once anx/depression due to loss of mother is improved.    Primary insomnia since loss of mother:  -     hydrOXYzine HCl (ATARAX) 25 MG tablet; Take 1-2 tablets (25-50 mg total) by mouth nightly as needed (sleep).    RTC 1 mo.

## 2017-02-28 NOTE — PROGRESS NOTES
Subjective:     Patient ID: Nathalie Membreno is a 71 y.o. female.    Chief Complaint: Ankle Pain (right ankle, pt. is accompanied by her sister, pt. c/o her ankle huurting and being numb, pt. rated current pain at a 6)    Nathalie is a 71 y.o. female who presents to the podiatry clinic  with complaint of  right foot/ankle pain. Onset of the symptoms was several months ago. Precipitating event: none known. Current symptoms include: ability to bear weight, but with some pain and stiffness. Aggravating factors: standing and walking. Symptoms have progressed to a point and plateaued. Patient has had no prior foot problems. Patient states pain and swelling just started in November. Patient states she went to ER and they told her it may be gout. When asked were to indicate where the pain is, patient points to entire right ankle. Patients rates pain 6/10 on pain scale. Patient states she has has a slight stroke since her last visit. Patient states she is to see PCP with pending therapy for stroke. Patient states the walking boot did help until she had her stroke and has not been able to wear it due to the stroke.       Patient Active Problem List   Diagnosis    Essential hypertension    Pure hypercholesterolemia    GERD (gastroesophageal reflux disease)    Allergy    DDD (degenerative disc disease), lumbar    Vitamin D deficiency    Wrist pain    Tortuous aorta    Obesity, Class II, BMI 35-39.9, with comorbidity       Medication List with Changes/Refills   New Medications    DICLOFENAC SODIUM (VOLTAREN) 1 % GEL    Apply 2 g topically 2 (two) times daily.    HYDROXYZINE HCL (ATARAX) 25 MG TABLET    Take 1-2 tablets (25-50 mg total) by mouth nightly as needed (sleep).   Current Medications    ALBUTEROL 90 MCG/ACTUATION INHALER    Inhale 2 puffs into the lungs every 4 (four) hours as needed for Wheezing.    ASPIRIN 325 MG TABLET    Take 325 mg by mouth once daily.    ERGOCALCIFEROL (VITAMIN D2) 50,000 UNIT CAP     Take 1 capsule (50,000 Units total) by mouth every 7 days.    LOSARTAN (COZAAR) 100 MG TABLET    Take 1 tablet (100 mg total) by mouth once daily.   Changed and/or Refilled Medications    Modified Medication Previous Medication    ATORVASTATIN (LIPITOR) 20 MG TABLET atorvastatin (LIPITOR) 20 MG tablet       Take 1 tablet (20 mg total) by mouth once daily.    Take 20 mg by mouth once daily.   Discontinued Medications    LOSARTAN (COZAAR) 100 MG TABLET    TAKE 1 TABLET BY MOUTH EVERY DAY    LOSARTAN (COZAAR) 50 MG TABLET    Take 50 mg by mouth once daily.    MELOXICAM (MOBIC) 7.5 MG TABLET    Take 1 tablet (7.5 mg total) by mouth daily as needed for Pain.    METHYLPREDNISOLONE (MEDROL DOSEPACK) 4 MG TABLET    Take 1 tablet (4 mg total) by mouth once daily. Use as instructed on dose pack       Review of patient's allergies indicates:   Allergen Reactions    Penicillins      Other reaction(s): Swelling       Past Surgical History:   Procedure Laterality Date    CARPAL TUNNEL RELEASE      b/l    FOOT SURGERY Right 05/02/2013    wart removal    TRIGGER FINGER RELEASE      TUBAL LIGATION         Family History   Problem Relation Age of Onset    Hypertension Mother     Hypertension Father     Cancer Father      prostate    Cataracts Father     Glaucoma Father     Diabetes Father     Hypertension Sister     Diabetes Sister     Breast cancer Neg Hx     Colon cancer Neg Hx     Ovarian cancer Neg Hx     Thrombophilia Neg Hx        Social History     Social History    Marital status:      Spouse name: N/A    Number of children: 3    Years of education: N/A     Occupational History    [facility]ident [facility]      Social History Main Topics    Smoking status: Never Smoker    Smokeless tobacco: Never Used    Alcohol use No    Drug use: No    Sexual activity: Not Currently     Other Topics Concern    Not on file     Social History Narrative    Live alone       Vitals:    02/16/17 1646   BP:  "(!) 159/78   Pulse: 75   Weight: 99.9 kg (220 lb 3.8 oz)   Height: 5' 4" (1.626 m)   PainSc:   6     Review of Systems   Constitutional: Negative for chills and fever.   Respiratory: Negative for shortness of breath.    Cardiovascular: Negative for chest pain, palpitations, orthopnea, claudication and leg swelling.   Gastrointestinal: Negative for diarrhea, nausea and vomiting.   Musculoskeletal: Negative for joint pain.   Skin: Negative for rash.   Neurological: Positive for tingling and focal weakness. Negative for dizziness, sensory change and weakness.   Psychiatric/Behavioral: Negative.          Objective:      PHYSICAL EXAM: Apperance: Alert and orient in no distress,well developed, and with good attention to grooming and body habits  Patient presents ambulating in tennis shoes and ace wrap on right ankle.   LOWER EXTREMITIES EXAM:   VASCULAR: Dorsalis pedis pulses 2/4 bilateral and Posterior Tibial pulses 2/4 bilateral. Capillary fill time <4 seconds bilateral. Mild edema observed right. Varicosities absent bilateral. Skin temperature of the lower extremities is warm to warm, proximal to distal. Hair growth WNL bilateral.  DERMATOLOGICAL: No skin rashes, subcutaneous nodules, lesions, or ulcers observed bilateral. (+) edema, (--) erythema, (--) increased temperature noted to right ankle/foot. Webspaces 1-4 clean, dry and without evidence of break in skin integrity right.   NEUROLOGICAL: Light touch, sharp-dull, proprioception all present and equal bilaterally.  MUSCULOSKELETAL: Muscle strength is 5/5 for foot inverters, everters, plantarflexors, and dorsiflexors. Muscle tone is normal. Pain on palpation of right medial and lateral ankle.         Assessment:       Encounter Diagnoses   Name Primary?    Posterior tibial tendon dysfunction, right Yes    Chronic pain of right ankle          Plan:   Posterior tibial tendon dysfunction, right  -     diclofenac sodium (VOLTAREN) 1 % Gel; Apply 2 g topically 2 " (two) times daily.  Dispense: 60 g; Refill: 1    Chronic pain of right ankle      I counseled the patient on her conditions, their implications and medical management.  Discussed physical therapy for the right ankle to coincide with therapy for stroke. Patient argrees for conservative treatment such as physical therapy.   Prescription written for Voltaren gel to be applied to area twice daily.  The patient and I reviewed the types of shoes she should be wearing, my recommendation includes generally the best time of the day for a shoe fitting is the afternoon, shoes with a wide toe box, very good cushion, and tennis shoes with removable inner soles. The patient and I reviewed my recommendations for over-the-counter orthotic inserts.   Patient toe return in 6-8 weeks.               Yaw Morales DPM  [facility]

## 2017-03-21 ENCOUNTER — LAB VISIT (OUTPATIENT)
Dept: LAB | Facility: HOSPITAL | Age: 72
End: 2017-03-21
Attending: INTERNAL MEDICINE
Payer: MEDICARE

## 2017-03-21 DIAGNOSIS — E78.00 PURE HYPERCHOLESTEROLEMIA: ICD-10-CM

## 2017-03-21 LAB
ALT SERPL W/O P-5'-P-CCNC: 9 U/L
CHOLEST/HDLC SERPL: 2.7 {RATIO}
HDL/CHOLESTEROL RATIO: 37.2 %
HDLC SERPL-MCNC: 180 MG/DL
HDLC SERPL-MCNC: 67 MG/DL
LDLC SERPL CALC-MCNC: 100 MG/DL
NONHDLC SERPL-MCNC: 113 MG/DL
TRIGL SERPL-MCNC: 65 MG/DL

## 2017-03-21 PROCEDURE — 36415 COLL VENOUS BLD VENIPUNCTURE: CPT | Mod: PO

## 2017-03-21 PROCEDURE — 80061 LIPID PANEL: CPT

## 2017-03-21 PROCEDURE — 84460 ALANINE AMINO (ALT) (SGPT): CPT

## 2017-03-28 ENCOUNTER — OFFICE VISIT (OUTPATIENT)
Dept: INTERNAL MEDICINE | Facility: CLINIC | Age: 72
End: 2017-03-28
Payer: MEDICARE

## 2017-03-28 VITALS
HEART RATE: 84 BPM | TEMPERATURE: 98 F | BODY MASS INDEX: 37.61 KG/M2 | DIASTOLIC BLOOD PRESSURE: 80 MMHG | OXYGEN SATURATION: 97 % | WEIGHT: 225.75 LBS | HEIGHT: 65 IN | SYSTOLIC BLOOD PRESSURE: 132 MMHG

## 2017-03-28 DIAGNOSIS — I48.0 PAF (PAROXYSMAL ATRIAL FIBRILLATION): ICD-10-CM

## 2017-03-28 DIAGNOSIS — I10 ESSENTIAL HYPERTENSION: ICD-10-CM

## 2017-03-28 DIAGNOSIS — E55.9 VITAMIN D DEFICIENCY: Primary | ICD-10-CM

## 2017-03-28 DIAGNOSIS — E78.00 PURE HYPERCHOLESTEROLEMIA: ICD-10-CM

## 2017-03-28 DIAGNOSIS — F51.01 PRIMARY INSOMNIA: ICD-10-CM

## 2017-03-28 PROCEDURE — 3075F SYST BP GE 130 - 139MM HG: CPT | Mod: S$GLB,,, | Performed by: INTERNAL MEDICINE

## 2017-03-28 PROCEDURE — 3079F DIAST BP 80-89 MM HG: CPT | Mod: S$GLB,,, | Performed by: INTERNAL MEDICINE

## 2017-03-28 PROCEDURE — 99499 UNLISTED E&M SERVICE: CPT | Mod: S$GLB,,, | Performed by: INTERNAL MEDICINE

## 2017-03-28 PROCEDURE — 99999 PR PBB SHADOW E&M-EST. PATIENT-LVL III: CPT | Mod: PBBFAC,,, | Performed by: INTERNAL MEDICINE

## 2017-03-28 PROCEDURE — 99214 OFFICE O/P EST MOD 30 MIN: CPT | Mod: S$GLB,,, | Performed by: INTERNAL MEDICINE

## 2017-03-28 PROCEDURE — 1159F MED LIST DOCD IN RCRD: CPT | Mod: S$GLB,,, | Performed by: INTERNAL MEDICINE

## 2017-03-28 PROCEDURE — 1160F RVW MEDS BY RX/DR IN RCRD: CPT | Mod: S$GLB,,, | Performed by: INTERNAL MEDICINE

## 2017-03-28 PROCEDURE — 1157F ADVNC CARE PLAN IN RCRD: CPT | Mod: S$GLB,,, | Performed by: INTERNAL MEDICINE

## 2017-03-28 NOTE — MR AVS SNAPSHOT
[facility]  [address] [place] [address]  Phone: [phone]  Fax: [phone]                  Nathalie Membreno   3/28/2017 2:40 PM   Office Visit    Description:  Female : 1945   Provider:  Alice Booker MD   Department:  [facility]           Reason for Visit     Follow-up           Diagnoses this Visit        Comments    Vitamin D deficiency    -  Primary     Pure hypercholesterolemia         Essential hypertension         PAF (paroxysmal atrial fibrillation)         Primary insomnia                To Do List           Future Appointments        Provider Department Dept Phone    2017 4:40 PM Yaw Morales DPM [facility] [phone]    2017 1:20 PM Alice Booker MD [facility] [phone]      Goals (5 Years of Data)     None      Follow-Up and Disposition     Return in about 3 months (around 2017).    Follow-up and Disposition History      [facility]     [facility] On Call Nurse Care Line -  Assistance  Registered nurses in the [facility] On Call Center provide clinical advisement, health education, appointment booking, and other advisory services.  Call for this free service at [phone].             Medications           Message regarding Medications     Verify the changes and/or additions to your medication regime listed below are the same as discussed with your clinician today.  If any of these changes or additions are incorrect, please notify your healthcare provider.             Verify that the below list of medications is an accurate representation of the medications you are currently taking.  If none reported, the list may be blank. If incorrect, please contact your healthcare provider. Carry this list with you in case of emergency.           Current Medications     albuterol 90 mcg/actuation inhaler Inhale 2 puffs into the lungs every 4 (four) hours as needed for Wheezing.    aspirin  "325 MG tablet Take 325 mg by mouth once daily.    atorvastatin (LIPITOR) 20 MG tablet Take 1 tablet (20 mg total) by mouth once daily.    diclofenac sodium (VOLTAREN) 1 % Gel Apply 2 g topically 2 (two) times daily.    ergocalciferol (VITAMIN D2) 50,000 unit Cap Take 1 capsule (50,000 Units total) by mouth every 7 days.    hydrOXYzine HCl (ATARAX) 25 MG tablet Take 1-2 tablets (25-50 mg total) by mouth nightly as needed (sleep).    losartan (COZAAR) 100 MG tablet Take 1 tablet (100 mg total) by mouth once daily.           Clinical Reference Information           Your Vitals Were     BP Pulse Temp Height Weight SpO2    132/80 84 98.1 °F (36.7 °C) (Tympanic) 5' 5" (1.651 m) 102.4 kg (225 lb 12 oz) 97%    BMI                37.57 kg/m2          Blood Pressure          Most Recent Value    BP  132/80      Allergies as of 3/28/2017     Penicillins      Immunizations Administered on Date of Encounter - 3/28/2017     None      [facility] Sign-Up     Activating your [facility] account is as easy as 1-2-3!     1) Visit [email or web address], select Sign Up Now, enter this activation code and your date of birth, then select Next.  [email or web address]  Expires: 5/12/2017  2:51 PM      2) Create a username and password to use when you visit [facility] in the future and select a security question in case you lose your password and select Next.    3) Enter your e-mail address and click Sign Up!    Additional Information  If you have questions, please e-mail [email or web address] or call [phone] to talk to our [facility] staff. Remember, [facility] is NOT to be used for urgent needs. For medical emergencies, dial 911.         Language Assistance Services     ATTENTION: Language assistance services are available, free of charge. Please call [phone].      ATENCIÓN: Si habla español, tiene a newby disposición servicios gratuitos de asistencia lingüística. Llame al [phone].     DENAE Ý: N?u b?n nói Ti?ng Vi?t, có các d?ch v? h? tr? " tammie eaton? mi?n phí dành cho b?n. G?i s? [phone].         [facility] complies with applicable Federal civil rights laws and does not discriminate on the basis of race, color, national origin, age, disability, or sex.

## 2017-03-28 NOTE — PROGRESS NOTES
"Subjective:       Patient ID: Nathalie Membreno is a 71 y.o. female.    Chief Complaint: Follow-up    HPI Comments: Here for follow up of medical problems.  Sleeping well with hydroxyzine prn.  Stress better now.  Cards Dr. Weiss has referred to Dr. Nuno for EP treatment of Afib.  Tolerating lipitor.  Taking 2K vit D3 daily.  No cp/sob/palp.    Updated/ annual due 11/17:  HM: 11/16 fluvax, 6/15 [id number], 11/16 booster [id number], 9/14 TDaP, 7/09 BMD rep 5y, 11/14 Cscope rep 5y, 10/14 MMG, 1/17 Eye Dr. Schrader, 11/16 HCV neg.        Review of Systems   Constitutional: Negative for chills, diaphoresis and fever.   Respiratory: Negative for cough and shortness of breath.    Cardiovascular: Negative for chest pain, palpitations and leg swelling.   Gastrointestinal: Negative for blood in stool, constipation, diarrhea, nausea and vomiting.   Genitourinary: Negative for dysuria, frequency and hematuria.   Psychiatric/Behavioral: The patient is not nervous/anxious.        Objective:   /80  Pulse 84  Temp 98.1 °F (36.7 °C) (Tympanic)   Ht 5' 5" (1.651 m)  Wt 102.4 kg (225 lb 12 oz)  SpO2 97%  BMI 37.57 kg/m2    Physical Exam   Constitutional: She is oriented to person, place, and time. She appears well-developed.   HENT:   Mouth/Throat: Oropharynx is clear and moist.   Neck: Neck supple. Carotid bruit is not present. No thyroid mass present.   Cardiovascular: Normal rate, regular rhythm and intact distal pulses.  Exam reveals no gallop and no friction rub.    No murmur heard.  Pulmonary/Chest: Effort normal and breath sounds normal. She has no wheezes. She has no rales.   Abdominal: Soft. Bowel sounds are normal. She exhibits no mass. There is no hepatosplenomegaly. There is no tenderness.   Musculoskeletal: She exhibits no edema.   Lymphadenopathy:     She has no cervical adenopathy.   Neurological: She is alert and oriented to person, place, and time.   Psychiatric: She has a normal mood and affect.     " Results for KEYSHA ORTIZ (MRN [id number]) as of 3/28/2017 14:18   Ref. Range 11/9/2016 13:50 12/6/2016 15:53 1/12/2017 17:58 2/17/2017 13:10 3/21/2017 08:08   ALT Latest Ref Range: 10 - 44 U/L 12    9 (L)   Triglycerides Latest Ref Range: 30 - 150 mg/dL 104    65   Cholesterol Latest Ref Range: 120 - 199 mg/dL 236 (H)    180   HDL Latest Ref Range: 40 - 75 mg/dL 69    67   LDL Cholesterol Latest Ref Range: 63.0 - 159.0 mg/dL 146.2    100.0   Total Cholesterol/HDL Ratio Latest Ref Range: 2.0 - 5.0  3.4    2.7     Assessment:       1. Vitamin D deficiency    2. Pure hypercholesterolemia    3. Essential hypertension    4. PAF (paroxysmal atrial fibrillation)    5. Primary insomnia        Plan:       Keysha was seen today for follow-up.    Diagnoses and all orders for this visit:    Vitamin D deficiency- cont supplement.    Pure hypercholesterolemia- doing well on statin, cont.  Fax lab to Dr. Weiss.    Essential hypertension- stable at home.    Insomnia- doing well on rx, cont prn.    PAF (paroxysmal atrial fibrillation)- normal rhythm now- per EP.     RTC 3mo.

## 2017-05-09 ENCOUNTER — LAB VISIT (OUTPATIENT)
Dept: LAB | Facility: HOSPITAL | Age: 72
End: 2017-05-09
Attending: INTERNAL MEDICINE
Payer: MEDICARE

## 2017-05-09 DIAGNOSIS — E78.00 PURE HYPERCHOLESTEROLEMIA: ICD-10-CM

## 2017-05-09 LAB
ALT SERPL W/O P-5'-P-CCNC: 11 U/L
CHOLEST/HDLC SERPL: 2.9 {RATIO}
HDL/CHOLESTEROL RATIO: 34.3 %
HDLC SERPL-MCNC: 210 MG/DL
HDLC SERPL-MCNC: 72 MG/DL
LDLC SERPL CALC-MCNC: 119.6 MG/DL
NONHDLC SERPL-MCNC: 138 MG/DL
TRIGL SERPL-MCNC: 92 MG/DL

## 2017-05-09 PROCEDURE — 80061 LIPID PANEL: CPT | Mod: PO

## 2017-05-09 PROCEDURE — 84460 ALANINE AMINO (ALT) (SGPT): CPT | Mod: PO

## 2017-05-09 PROCEDURE — 36415 COLL VENOUS BLD VENIPUNCTURE: CPT | Mod: PO

## 2017-09-14 ENCOUNTER — OFFICE VISIT (OUTPATIENT)
Dept: INTERNAL MEDICINE | Facility: CLINIC | Age: 72
End: 2017-09-14
Payer: MEDICARE

## 2017-09-14 VITALS
WEIGHT: 230.38 LBS | BODY MASS INDEX: 38.38 KG/M2 | SYSTOLIC BLOOD PRESSURE: 132 MMHG | HEIGHT: 65 IN | DIASTOLIC BLOOD PRESSURE: 76 MMHG | OXYGEN SATURATION: 97 % | HEART RATE: 82 BPM

## 2017-09-14 DIAGNOSIS — I48.0 PAF (PAROXYSMAL ATRIAL FIBRILLATION): ICD-10-CM

## 2017-09-14 DIAGNOSIS — I77.1 TORTUOUS AORTA: ICD-10-CM

## 2017-09-14 DIAGNOSIS — M51.36 DDD (DEGENERATIVE DISC DISEASE), LUMBAR: ICD-10-CM

## 2017-09-14 DIAGNOSIS — Z86.73 HISTORY OF STROKE: ICD-10-CM

## 2017-09-14 DIAGNOSIS — E66.01 SEVERE OBESITY WITH BODY MASS INDEX (BMI) OF 35.0 TO 39.9 WITH COMORBIDITY: ICD-10-CM

## 2017-09-14 DIAGNOSIS — K21.9 GASTROESOPHAGEAL REFLUX DISEASE WITHOUT ESOPHAGITIS: ICD-10-CM

## 2017-09-14 DIAGNOSIS — I10 ESSENTIAL HYPERTENSION: ICD-10-CM

## 2017-09-14 DIAGNOSIS — G56.03 BILATERAL CARPAL TUNNEL SYNDROME: ICD-10-CM

## 2017-09-14 DIAGNOSIS — E78.00 PURE HYPERCHOLESTEROLEMIA: ICD-10-CM

## 2017-09-14 DIAGNOSIS — E55.9 VITAMIN D DEFICIENCY: ICD-10-CM

## 2017-09-14 DIAGNOSIS — Z00.00 ENCOUNTER FOR PREVENTIVE HEALTH EXAMINATION: Primary | ICD-10-CM

## 2017-09-14 PROCEDURE — 99499 UNLISTED E&M SERVICE: CPT | Mod: S$GLB,,, | Performed by: PHYSICIAN ASSISTANT

## 2017-09-14 PROCEDURE — 99999 PR PBB SHADOW E&M-EST. PATIENT-LVL III: CPT | Mod: PBBFAC,,, | Performed by: PHYSICIAN ASSISTANT

## 2017-09-14 PROCEDURE — G0439 PPPS, SUBSEQ VISIT: HCPCS | Mod: S$GLB,,, | Performed by: PHYSICIAN ASSISTANT

## 2017-09-14 RX ORDER — ACETAMINOPHEN 500 MG
1 TABLET ORAL DAILY
COMMUNITY

## 2017-09-14 NOTE — PROGRESS NOTES
"Nathalie Membreno presented for a  Medicare AWV and comprehensive Health Risk Assessment today. The following components were reviewed and updated:    · Medical history  · Family History  · Social history  · Allergies and Current Medications  · Health Risk Assessment  · Health Maintenance  · Care Team     ** See Completed Assessments for Annual Wellness Visit within the encounter summary.**       The following assessments were completed:  · Living Situation  · CAGE  · Depression Screening  · Timed Get Up and Go  · Whisper Test  · Cognitive Function Screening  · Nutrition Screening  · ADL Screening  · PAQ Screening    Vitals:    09/14/17 1003   BP: 132/76   BP Location: Left arm   Patient Position: Sitting   BP Method: X-Large (Manual)   Pulse: 82   SpO2: 97%   Weight: 104.5 kg (230 lb 6.1 oz)   Height: 5' 5" (1.651 m)     Body mass index is 38.34 kg/m².  Physical Exam   Constitutional: She appears well-nourished. No distress.   HENT:   Head: Normocephalic and atraumatic.   Eyes: Conjunctivae and EOM are normal. Right eye exhibits no discharge. Left eye exhibits no discharge.   Neck: Normal range of motion. Neck supple. No tracheal deviation present. No thyromegaly present.   Cardiovascular: Normal rate, regular rhythm and normal heart sounds.    No murmur heard.  Pulses:       Radial pulses are 2+ on the right side, and 2+ on the left side.   Pulmonary/Chest: Effort normal and breath sounds normal. No respiratory distress. She has no wheezes.   Abdominal: Soft. Bowel sounds are normal. She exhibits no distension. There is no tenderness. There is no rebound and no guarding.   Musculoskeletal: Normal range of motion. She exhibits no edema or tenderness.   Neurological: No cranial nerve deficit.   Grasp equal both hands, No tremors, or muscle fasciculations noted. Toes downgoing, Sensation intact to soft touch. Gait: No ataxia.    Skin: Skin is warm and dry. No rash noted. She is not diaphoretic. No erythema. No " pallor.   Psychiatric: She has a normal mood and affect. Her behavior is normal. Judgment and thought content normal.   Nursing note and vitals reviewed.        Diagnoses and health risks identified today and associated recommendations/orders:    1. Encounter for preventive health examination  Completed today    2. Essential hypertension  Stable. On Losartan. Continue current treatment plan as previously prescribed with your PCP.    3. PAF (paroxysmal atrial fibrillation)  Stable. Follows Dr. Mansfield, Dr. Nuno. Have defibrillator. Continue current treatment plan as previously prescribed with your cardiology.    4. Severe obesity with body mass index (BMI) of 35.0 to 39.9 with comorbidity  This problem is currently not controlled. Encourage wt loss, diet/ exercise.  Please follow up with your PCP as planned to discuss adjustments to your treatment plan.    5. DDD (degenerative disc disease), lumbar  L-MRI 8/4/15- Multilevel degenerative disk disease and facet arthropathy as above, most severe at the levels of L4-5 and L5-S1.  Followed Dr. Corona in the past with ESIs. Stable.  Continue to follow with your PCP.    6. Vitamin D deficiency  Stable. Takes Vit D. Continue current treatment plan as previously prescribed with your PCP    7. Pure hypercholesterolemia  Stable. On Atorvastatin. Continue current treatment plan as previously prescribed with your PCP.    8. History of stroke  Hx of CVA 2/2017. [facility] ED. CT head 2/17/17- There is no gross evidence to suggest an acute infarct.There is some basal ganglia calcification noted on the right.  There is an old lacunar type infarct seen involving the right basal ganglia.  Mild generalized cortical atrophy is noted. Continue follow PCP and cardiology.    9. 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 Pt reports not bother her. Continue follow with PCP, ortho as necessary.    10. Tortuous aorta  Colonoscopy 11/7/14- polyp, diverticulosis noted  Stable. Discussed need to continue control BP, lipids, glucose, diet/ exercise.    11. Gastroesophageal reflux disease without esophagitis  Stable. Continue current treatment plan as previously prescribed with your PCP    Provided Nathalie with a 5-10 year written screening schedule and personal prevention plan. Recommendations were developed using the USPSTF age appropriate recommendations. Education, counseling, and referrals were provided as needed. After Visit Summary printed and given to patient which includes a list of additional screenings\tests needed.  Continue to follow with your PCP as scheduled or sooner if necessary.    Andre Randhawa PA-C

## 2017-09-14 NOTE — PATIENT INSTRUCTIONS
Counseling and Referral of Other Preventative  (Italic type indicates deductible and co-insurance are waived)    Patient Name: Nathalie Membreno  Today's Date: 9/14/2017      SERVICE LIMITATIONS RECOMMENDATION    Vaccines    · Pneumococcal (once after 65)    · Influenza (annually)    · Hepatitis B (if medium/high risk)    · Prevnar 13      Hepatitis B medium/high risk factors:       - End-stage renal disease       - Hemophiliacs who received Factor VII or         IX concentrates       - Clients of institutions for the mentally             retarded       - Persons who live in the same house as          a HepB carrier       - Homosexual men       - Illicit injectable drug abusers     Pneumococcal: done 11/2016, 10/2011     Influenza:11/9/2016 Done, repeat in one year     Hepatitis B: N/A     Prevnar 13: done 6/2015    Mammogram (biennial age 50-74)  Annually (age 40 or over)  Last done 12/2016, recommend to repeat every 1  years    Pap (up to age 70 and after 70 if unknown history or abnormal study last 10 years)    N/A     The USPSTF recommends against screening for cervical cancer in women older than age 65 years who have had adequate prior screening and are not otherwise at high risk for cervical cancer.      Colorectal cancer screening (to age 75)    · Fecal occult blood test (annual)  · Flexible sigmoidoscopy (5y)  · Screening colonoscopy (10y)  · Barium enema   done 11/2014. Follow PCP and GI.    Diabetes self-management training (no USPSTF recommendations)  Requires referral by treating physician for patient with diabetes or renal disease. 10 hours of initial DSMT sessions of no less than 30 minutes each in a continuous 12-month period. 2 hours of follow-up DSMT in subsequent years.  N/A    Bone mass measurements (age 65 & older, biennial)  Requires diagnosis related to osteoporosis or estrogen deficiency. Biennial benefit unless patient has history of long-term glucocorticoid  Last done 10/2014, recommend  to repeat every 5  years    Glaucoma screening (no USPSTF recommendation)  Diabetes mellitus, family history   , age 50 or over    American, age 65 or over  Done this year, repeat every year. Dr. LOYD Schrader    Medical nutrition therapy for diabetes or renal disease (no recommended schedule)  Requires referral by treating physician for patient with diabetes or renal disease or kidney transplant within the past 3 years.  Can be provided in same year as diabetes self-management training (DSMT), and CMS recommends medical nutrition therapy take place after DSMT. Up to 3 hours for initial year and 2 hours in subsequent years.  N/A    Cardiovascular screening blood tests (every 5 years)  · Fasting lipid panel  Order as a panel if possible  Done this year, repeat every year    Diabetes screening tests (at least every 3 years, Medicare covers annually or at 6-month intervals for prediabetic patients)  · Fasting blood sugar (FBS) or glucose tolerance test (GTT)  Patient must be diagnosed with one of the following:       - Hypertension       - Dyslipidemia       - Obesity (BMI 30kg/m2)       - Previous elevated impaired FBS or GTT       ... or any two of the following:       - Overweight (BMI 25 but <30)       - Family history of diabetes       - Age 65 or older       - History of gestational diabetes or birth of baby weighing more than 9 pounds  follow PCP    HIV screening (annually for increased risk patients)  · HIV-1 and HIV-2 by EIA, or TYRA, rapid antibody test or oral mucosa transudate  Patients must be at increased risk for HIV infection per USPSTF guidelines or pregnant. Tests covered annually for patient at increased risk or as requested by the patient. Pregnant patients may receive up to 3 tests during pregnancy.  Risks discussed, screening is not recommended    Smoking cessation counseling (up to 8 sessions per year)  Patients must be asymptomatic of tobacco-related conditions to receive as  a preventative service.  Non-smoker    Subsequent annual wellness visit  At least 12 months since last AWV  Return in one year     The following information is provided to all patients.  This information is to help you find resources for any of the problems found today that may be affecting your health:                Living healthy guide: www.[facility].[place].[facility]      Understanding Diabetes: www.diabetes.org      Eating healthy: www.cdc.gov/healthyweight      CDC home safety checklist: www.cdc.gov/steadi/patient.html      Agency on Aging: www.goea.[place].[facility]      Alcoholics anonymous (AA): www.aa.org      Physical Activity: www.rogerio.nih.gov/nv8ydlu      Tobacco use: www.quitwithusla.org

## 2017-11-07 ENCOUNTER — OFFICE VISIT (OUTPATIENT)
Dept: INTERNAL MEDICINE | Facility: CLINIC | Age: 72
End: 2017-11-07
Payer: MEDICARE

## 2017-11-07 ENCOUNTER — TELEPHONE (OUTPATIENT)
Dept: INTERNAL MEDICINE | Facility: CLINIC | Age: 72
End: 2017-11-07

## 2017-11-07 ENCOUNTER — HOSPITAL ENCOUNTER (OUTPATIENT)
Dept: RADIOLOGY | Facility: HOSPITAL | Age: 72
Discharge: HOME | End: 2017-11-07
Attending: PHYSICIAN ASSISTANT
Payer: MEDICARE

## 2017-11-07 ENCOUNTER — OFFICE VISIT (OUTPATIENT)
Dept: PAIN MEDICINE | Facility: CLINIC | Age: 72
End: 2017-11-07
Payer: MEDICARE

## 2017-11-07 VITALS
HEIGHT: 65 IN | WEIGHT: 230.81 LBS | BODY MASS INDEX: 38.45 KG/M2 | SYSTOLIC BLOOD PRESSURE: 136 MMHG | TEMPERATURE: 97 F | HEART RATE: 85 BPM | DIASTOLIC BLOOD PRESSURE: 86 MMHG | OXYGEN SATURATION: 98 %

## 2017-11-07 VITALS
WEIGHT: 230 LBS | HEIGHT: 65 IN | DIASTOLIC BLOOD PRESSURE: 81 MMHG | BODY MASS INDEX: 38.32 KG/M2 | HEART RATE: 80 BPM | SYSTOLIC BLOOD PRESSURE: 147 MMHG | RESPIRATION RATE: 17 BRPM

## 2017-11-07 DIAGNOSIS — M51.26 HERNIATED LUMBAR INTERVERTEBRAL DISC: ICD-10-CM

## 2017-11-07 DIAGNOSIS — R20.2 BILATERAL LEG PARESTHESIA: ICD-10-CM

## 2017-11-07 DIAGNOSIS — I48.0 PAF (PAROXYSMAL ATRIAL FIBRILLATION): ICD-10-CM

## 2017-11-07 DIAGNOSIS — I10 ESSENTIAL HYPERTENSION: ICD-10-CM

## 2017-11-07 DIAGNOSIS — M54.5 CHRONIC LOW BACK PAIN, UNSPECIFIED BACK PAIN LATERALITY, WITH SCIATICA PRESENCE UNSPECIFIED: ICD-10-CM

## 2017-11-07 DIAGNOSIS — Z86.73 HISTORY OF STROKE: ICD-10-CM

## 2017-11-07 DIAGNOSIS — G57.93 NEUROPATHIC PAIN OF BOTH LEGS: ICD-10-CM

## 2017-11-07 DIAGNOSIS — R20.2 PARESTHESIA OF BOTH LOWER EXTREMITIES: ICD-10-CM

## 2017-11-07 DIAGNOSIS — G89.29 CHRONIC LOW BACK PAIN, UNSPECIFIED BACK PAIN LATERALITY, WITH SCIATICA PRESENCE UNSPECIFIED: ICD-10-CM

## 2017-11-07 DIAGNOSIS — R20.2 BILATERAL LEG PARESTHESIA: Primary | ICD-10-CM

## 2017-11-07 DIAGNOSIS — M47.816 LUMBAR SPONDYLOSIS: Primary | ICD-10-CM

## 2017-11-07 PROCEDURE — 99999 PR PBB SHADOW E&M-EST. PATIENT-LVL III: CPT | Mod: PBBFAC,,, | Performed by: ANESTHESIOLOGY

## 2017-11-07 PROCEDURE — 99999 PR PBB SHADOW E&M-EST. PATIENT-LVL V: CPT | Mod: PBBFAC,,, | Performed by: PHYSICIAN ASSISTANT

## 2017-11-07 PROCEDURE — 72110 X-RAY EXAM L-2 SPINE 4/>VWS: CPT | Mod: 26,,, | Performed by: RADIOLOGY

## 2017-11-07 PROCEDURE — 99499 UNLISTED E&M SERVICE: CPT | Mod: S$GLB,,, | Performed by: PHYSICIAN ASSISTANT

## 2017-11-07 PROCEDURE — 99214 OFFICE O/P EST MOD 30 MIN: CPT | Mod: S$GLB,,, | Performed by: PHYSICIAN ASSISTANT

## 2017-11-07 PROCEDURE — 72110 X-RAY EXAM L-2 SPINE 4/>VWS: CPT | Mod: TC,PO

## 2017-11-07 PROCEDURE — 99204 OFFICE O/P NEW MOD 45 MIN: CPT | Mod: S$GLB,,, | Performed by: ANESTHESIOLOGY

## 2017-11-07 RX ORDER — GABAPENTIN 300 MG/1
CAPSULE ORAL
Qty: 90 CAPSULE | Refills: 0 | Status: SHIPPED | OUTPATIENT
Start: 2017-11-07 | End: 2017-12-09

## 2017-11-07 NOTE — LETTER
November 7, 2017      SHERLY Guevara  [address] [place] [address]           [facility]  [address] [place] [address]  Phone: [phone]  Fax: [phone]          Patient: Nathalie Membreno   MR Number: [id number]   YOB: 1945   Date of Visit: 11/7/2017       Dear Cleo Lowe:    Thank you for referring Nathalie Membreno to me for evaluation. Attached you will find relevant portions of my assessment and plan of care.    If you have questions, please do not hesitate to call me. I look forward to following Nathalie Membreno along with you.    Sincerely,    Brayan Soriano MD    Enclosure  CC:  No Recipients    If you would like to receive this communication electronically, please contact [email or web address] or [phone] to request more information on [facility] access.    For providers and/or their staff who would like to refer a patient to [facility], please contact us through our one-stop-shop provider referral line, [facility], at [phone].    If you feel you have received this communication in error or would no longer like to receive these types of communications, please e-mail [email or web address]

## 2017-11-07 NOTE — PROGRESS NOTES
Subjective:       Patient ID: Nathalie Membreno is a 72 y.o. female.    Chief Complaint: Leg Problem (B)    72 year old female c/o intermittent discomfort to BLE X one week. Pt reports feeling as though something is crawling through her legs, mostly when she sits still and rests. She reports intermittent tingling/numbness to mid feet regions. She reports some lower back pain at times and reports seeing different spine doctors in the past with lower back injections. She also saw Dr. Corona for this, last time in 2015. She reports stopping caffeine with questionable change in sxs. She reports possible chills yesterday. She reports no fever, N/V, abd pain, tremors, incontinence, known injury, leg pain/swelling, rash, muscular weakness, CP, SOB, exertional sxs, claudication sxs, dizziness, headaches, or other medical complaints. Pt had head CT 2/2017 without acute pathology at that time. Pt has defibrillator and unable to have MRI. She is taking her prescribed medication as directed.    PCP: Dr. Booker    Patient Active Problem List:     Essential hypertension     Pure hypercholesterolemia     GERD (gastroesophageal reflux disease)     Allergy     DDD (degenerative disc disease), lumbar     Vitamin D deficiency     Bilateral carpal tunnel syndrome     Tortuous aorta     Severe obesity with body mass index (BMI) of 35.0 to 39.9 with comorbidity     PAF (paroxysmal atrial fibrillation)     History of stroke      Review of Systems   Constitutional: Negative for chills and fever.   HENT: Negative for trouble swallowing.    Eyes: Negative for visual disturbance.   Respiratory: Negative for cough and shortness of breath.    Cardiovascular: Negative for chest pain, palpitations and leg swelling.   Gastrointestinal: Negative for abdominal pain, nausea and vomiting.   Genitourinary: Negative for difficulty urinating.   Musculoskeletal: Positive for back pain. Negative for neck pain.   Skin: Negative for rash.  "  Neurological: Negative for dizziness, tremors, syncope, facial asymmetry, speech difficulty, weakness, numbness and headaches.   Psychiatric/Behavioral: Negative for confusion.       Objective:       Vitals:    11/07/17 0821   BP: 136/86   BP Location: Right arm   Patient Position: Sitting   BP Method: Large (Manual)   Pulse: 85   Temp: 96.8 °F (36 °C)   TempSrc: Tympanic   SpO2: 98%   Weight: 104.7 kg (230 lb 13.2 oz)   Height: 5' 5" (1.651 m)     Physical Exam   Constitutional: She is oriented to person, place, and time. She appears well-developed and well-nourished. No distress.   HENT:   Head: Normocephalic and atraumatic.   Eyes: EOM are normal. No scleral icterus.   Neck: Neck supple.   Cardiovascular: Normal rate and regular rhythm.    Pulmonary/Chest: Effort normal and breath sounds normal. No respiratory distress. She has no decreased breath sounds. She has no wheezes. She has no rhonchi. She has no rales.   Musculoskeletal: Normal range of motion. She exhibits no edema.   FROM spine without midline step-offs; diffuse mild lumbar TTP, worse on R; FROM BLEs; NV intact; 5+ strength; no rash or swelling   Neurological: She is alert and oriented to person, place, and time. No cranial nerve deficit.   Skin: Skin is warm and dry. No rash noted.   Psychiatric: She has a normal mood and affect. Her speech is normal and behavior is normal. Thought content normal.       L-spine MRI 8/2015: "Multilevel degenerative disk disease and facet arthropathy as above, most severe at the levels of L4-5 and L5-S1."  Assessment:       1. Bilateral leg paresthesia    2. Essential hypertension    3. Herniated lumbar intervertebral disc    4. Chronic low back pain, unspecified back pain laterality, with sciatica presence unspecified    5. History of stroke    6. PAF (paroxysmal atrial fibrillation)        Plan:         Nathalie was seen today for leg problem.    Diagnoses and all orders for this visit:    Bilateral leg " paresthesia  -     CBC auto differential; Future  -     Comprehensive metabolic panel; Future  -     TSH; Future  -     Vitamin B12; Future  -     Ambulatory referral to Pain Clinic  -     X-Ray Lumbar Spine Complete 5 View; Future  Monitor for new or worsening sxs. Recommend eval by pain management physician - appt scheduled for pt. ER if severe sxs occur.    Essential hypertension  -     Comprehensive metabolic panel; Future  Monitor BP and f/u with PCP as recommended.    Herniated lumbar intervertebral disc, Chronic low back pain  -     Ambulatory referral to Pain Clinic  -     X-Ray Lumbar Spine Complete 5 View; Future    History of stroke, PAF (paroxysmal atrial fibrillation)  Pt on aspirin and statin. F/u with cardiology as recommended. Currently asymptomatic.    F/u with PCP as scheduled in one month for health management. RTC sooner if needed.

## 2017-11-07 NOTE — TELEPHONE ENCOUNTER
----- Message from SHERLY Guevara sent at 11/7/2017 10:53 AM CST -----  Blood work normal so far. Thyroid and B12 levels still pending. Lower spine xrays show moderate to severe changes that may be exacerbating her leg sxs - f/u with pain clinic as discussed.

## 2017-11-07 NOTE — PROGRESS NOTES
Chief Pain Complaint:  Tingling in bilateral calf, shin and dorsum of foot      History of Present Illness:   Nathalie Membreno is a 72 y.o. female  who is presenting with a chief complaint of Tingling in bilateral calf, shin and dorsum of foot . The patient began experiencing this problem abruptly, and the pain has been gradually worsening over the past 2 week(s). The pain is described as tingling, electrical, as if ants are crawling beneath her skin and is located in the bilateral calf, shin and dorsum of foot. Pain is constant and lasts hours. The pain is nonradiating. The patient rates her pain a 6 out of ten and interferes with activities of daily living a 8 out of ten. Pain is exacerbated by nothing specific but worse at night, and is improved by nothing. Patient reports no prior trauma, no prior spinal surgery     - pertinent negatives: No fever, No chills, No weight loss, No bladder dysfunction, No bowel dysfunction, No extremity weakness, No saddle anesthesia  - pertinent positives: none    - medications, other therapies tried (physical therapy, injections):     >> NSAIDs and Tylenol    >> Has NOT previously undergone Physical Therapy    >> Has previously undergone spinal injection/s She had Left L4-L5, L5-S1 TFESI with Dr Corona on 9/2015. But she has not had her radiculopathic pain since. Current discomfort is not similar.        Imaging / Labs / Studies (reviewed on 11/7/2017):    Results for orders placed during the hospital encounter of 08/04/15   MRI Lumbar Spine Without Contrast    Narrative MRI LUMBAR SPINE    TECHNIQUE: MRI lumbar spine was performed without contrast on a 1.5T magnet. The following sequences were obtained: Localizer; sagittal T1, T2, STIR; axial T1 and T2.    COMPARISON: Radiographs dated 7/31/2015    FINDINGS:    There are 5 lumbar vertebrae.  There is minimal grade 1 anterolisthesis of L5 on S1 and L4 on L5.  Vertebral body heights are well-maintained.  There is disk  desiccation from L2-3 through L5-S1 with mild associated disk height loss. Marrow signal is   preserved.    Conus terminates at L1 and appears unremarkable. Limited evaluation of posterior abdominal structures demonstrates multiple fibroids within the uterus.  Paraspinal musculature is within normal limits.  Evaluation of sacroiliac joints is unremarkable.    L1-L2: No spinal canal stenosis or neuroforaminal narrowing.    L2-L3: Mild diffuse disk bulge.  Mild bilateral facet arthropathy with thickening of the ligamentum flavum.Mild effacement of the thecal sac. Moderate right and mild left neuroforaminal narrowing.    L3-L4: Diffuse disk bulge.  Mild bilateral facet arthropathy and ligamentum flavum thickening.Mild bilateral neural foraminal narrowing.  No spinal canal stenosis.    L4-L5: Severe bilateral facet arthropathy.  Slight uncovering of the intervertebral disk with superimposed left paracentral extrusion measuring approximately 6 mm AP with slight superior migration..  Mild thickening of the ligamentum flavum.There is mild   crowding of the left lateral recess with possible mass effect on the descending left L5 nerve root.  Mild left neural foraminal narrowing.    L5-S1: Severe bilateral facet arthropathy.  Slight uncovering of the intervertebral disk.Moderate left and mild right neuroforaminal narrowing.  No spinal canal stenosis.    Impression    Multilevel degenerative disk disease and facet arthropathy as above, most severe at the levels of L4-5 and L5-S1.      Electronically signed by: KENNETH ARZATE MD  Date:     08/04/15  Time:    09:34      .  Results for orders placed during the hospital encounter of 11/07/17   X-Ray Lumbar Spine Complete 5 View    Narrative Five views of the lumbar spine    Findings: The vertebral bodies demonstrate normal height.  There is mild grade 1 spondylo-listhesis of L4 on L5.  There is more moderate grade 1 spondylolisthesis of L5 on S1. Moderate disc space narrowing and  spondylosis present at the L4-L5 and L5-S1 levels. Moderate to severe facet arthropathy noted at L4-L5 and L5-S1 levels. No pars defects.    Impression  As above      Electronically signed by: LILI WALLACE D.O.  Date:     11/07/17  Time:    09:41        Results for orders placed during the hospital encounter of 06/30/15   X-Ray Lumbar Spine Ap And Lateral    Narrative Three views of the lumbar spine    Findings: The vertebral bodies demonstrate normal height.  There is a grade 1 spondylolisthesis of L4 on L5 and L5 on S1. There is mild disk space narrowing noted from the L3-4 through the L5-S1 levels. Prominent facet arthropathy is noted at L4-5 and   L5-S1 levels.    Impression  As above      Electronically signed by: LILI WALLACE D.O.  Date:     06/30/15  Time:    09:43      Results for orders placed during the hospital encounter of 07/31/15   X-Ray Lumbar Spine Ap Lateral w/Flex Ext    Narrative Findings: AP and lateral views with flexion and extension.  There is multilevel degenerative vertebral endplate spurring and facet arthropathy with narrowing of the L2-3 through L5-S1 disk spaces.  Mild degree of grade 1 anterolisthesis observed at L4-5   and L5-S1.  No compression fracture.    Impression  As above.      Electronically signed by: MEL COOK MD  Date:     07/31/15  Time:    09:22          Review of Systems:  CONSTITUTIONAL: patient denies any fever, chills, or weight loss  SKIN: patient denies any rash or itching  RESPIRATORY: patient denies having any shortness of breath  GASTROINTESTINAL: patient denies having any diarrhea, constipation, or bowel incontinence  GENITOURINARY: patient denies having any abnormal bladder function    MUSCULOSKELETAL:  - patient complains of the above noted pain/s (see chief pain complaint)    NEUROLOGICAL:   - pain as above  - strength in Lower extremities is intact, BILATERALLY  - sensation in Lower extremities is intact, BILATERALLY  - patient denies any loss  "of bowel or bladder control      PSYCHIATRIC: patient denies any change in mood    Other:  All other systems reviewed and are negative      Physical Exam:  BP (!) 147/81 (BP Location: Right arm, Patient Position: Sitting)   Pulse 80   Resp 17   Ht 5' 5" (1.651 m)   Wt 104.3 kg (230 lb)   BMI 38.27 kg/m²  (reviewed on 11/7/2017)  General: Alert and oriented, in no apparent distress.  Gait: normal gait.  Skin: No rashes, No discoloration, No obvious lesions  HEENT: Normocephalic, atraumatic. Pupils equal and round.  Cardiovascular: Regular rate and rhythm , no significant peripheral edema present  Respiratory: Without audible wheezing, without use of accessory muscles of respiration.    Musculoskeletal:      Lumbar Spine    - Pain on flexion of lumbar spine Absent  - Straight Leg Raise:  Absent    - Pain on extension of lumbar spine Present  - TTP over the lumbar facet joints Present L5-S1  - Lumbar facet loading Present Present    -Pain on palpation over the SI joint  Absent  - ARLEY: Absent      Neuro:    Strength:  LE R/L: HF: 5/5, HE: 5/5, KF: 5/5; KE: 5/5; FE: 5/5; FF: 5/5    Extremity Reflexes: Brisk and symmetric throughout.      Extremity Sensory: Sensation to pinprick and temperature symmetric. Proprioception intact.      Psych:  Mood and affect is appropriate      Assessment:    Nathalie Membreno is a 72 y.o. year old female who is presenting with   Encounter Diagnoses   Name Primary?    Lumbar spondylosis Yes    Neuropathic pain of both legs     Paresthesia of both lower extremities      The tingling in bilateral calf, shin and dorsum of foot is unlikely coming for the Lumbar spine as is does not follow a dermatomal pattern.     Plan:    1. Interventional: None at this time. Consider L3, L4, L5 MBB if axial lumbar pain worsens.     2. Pharmacologic: Start Gabapentin 300 mg PO Q hs with up titration     3. Diagnostic: Primary investigating source of bilateral  Paresthesias.     4. Follow up: 4 " weeks    30  minutes were spent in this encounter with more than 50% of the time used for counseling and review of the plan.  Imaging / studies reviewed, detailed above.  I discussed in detail the risks, benefits, and alternatives to any and all potential treatment options.  All questions and concerns were fully addressed today in clinic. Medical decision making moderate.    Thank you for the opportunity to assist in the care of this patient.    Best wishes,    Signed:    Brayan Soriano MD          Disclaimer:  This note may have been prepared using voice recognition software, it may have not been extensively proofed, as such there could be errors within the text such as sound alike errors.

## 2017-11-29 ENCOUNTER — TELEPHONE (OUTPATIENT)
Dept: INTERNAL MEDICINE | Facility: CLINIC | Age: 72
End: 2017-11-29

## 2017-11-29 DIAGNOSIS — Z12.39 BREAST CANCER SCREENING: Primary | ICD-10-CM

## 2017-12-09 ENCOUNTER — OFFICE VISIT (OUTPATIENT)
Dept: PAIN MEDICINE | Facility: CLINIC | Age: 72
End: 2017-12-09
Payer: MEDICARE

## 2017-12-09 ENCOUNTER — HOSPITAL ENCOUNTER (OUTPATIENT)
Dept: RADIOLOGY | Facility: HOSPITAL | Age: 72
Discharge: HOME | End: 2017-12-09
Attending: INTERNAL MEDICINE
Payer: MEDICARE

## 2017-12-09 VITALS
BODY MASS INDEX: 38.32 KG/M2 | BODY MASS INDEX: 38.32 KG/M2 | RESPIRATION RATE: 17 BRPM | WEIGHT: 230 LBS | SYSTOLIC BLOOD PRESSURE: 158 MMHG | DIASTOLIC BLOOD PRESSURE: 84 MMHG | HEIGHT: 65 IN | HEART RATE: 73 BPM | WEIGHT: 230 LBS | HEIGHT: 65 IN

## 2017-12-09 DIAGNOSIS — M79.2 NEUROPATHIC PAIN: ICD-10-CM

## 2017-12-09 DIAGNOSIS — R20.2 PARESTHESIA OF BOTH LOWER EXTREMITIES: ICD-10-CM

## 2017-12-09 DIAGNOSIS — Z12.39 BREAST CANCER SCREENING: ICD-10-CM

## 2017-12-09 DIAGNOSIS — M47.816 LUMBAR SPONDYLOSIS: Primary | ICD-10-CM

## 2017-12-09 PROCEDURE — 77067 SCR MAMMO BI INCL CAD: CPT | Mod: TC,PO

## 2017-12-09 PROCEDURE — 99213 OFFICE O/P EST LOW 20 MIN: CPT | Mod: S$GLB,,, | Performed by: ANESTHESIOLOGY

## 2017-12-09 PROCEDURE — 77067 SCR MAMMO BI INCL CAD: CPT | Mod: 26,,, | Performed by: RADIOLOGY

## 2017-12-09 PROCEDURE — 99999 PR PBB SHADOW E&M-EST. PATIENT-LVL III: CPT | Mod: PBBFAC,,, | Performed by: ANESTHESIOLOGY

## 2017-12-09 RX ORDER — GABAPENTIN 300 MG/1
300 CAPSULE ORAL NIGHTLY
Qty: 30 CAPSULE | Refills: 2 | Status: SHIPPED | OUTPATIENT
Start: 2017-12-09 | End: 2019-06-21 | Stop reason: SDUPTHER

## 2017-12-09 NOTE — PROGRESS NOTES
Chief Pain Complaint:  Tingling in bilateral calf, shin and dorsum of foot    Interval History: The patient was last seen 11/7/2017. At that visit the plan was to Start Gabapentin 300 mg PO Q hs. The patient reports that  she is/was better following the last visit. The changes lasted 2 weeks. The changes have continued through this visit. He tingling is much better. She is tolerating Gabapentin well. No sedation, or grogginess.     History of Present Illness:   Nathalie Membreno is a 72 y.o. female  who is presenting with a chief complaint of Tingling in bilateral calf, shin and dorsum of foot . The patient began experiencing this problem abruptly, and the pain has been gradually worsening over the past 2 week(s). The pain is described as tingling, electrical, as if ants are crawling beneath her skin and is located in the bilateral calf, shin and dorsum of foot. Pain is constant and lasts hours. The pain is nonradiating. The patient rates her pain a 6 out of ten and interferes with activities of daily living a 8 out of ten. Pain is exacerbated by nothing specific but worse at night, and is improved by nothing. Patient reports no prior trauma, no prior spinal surgery     - pertinent negatives: No fever, No chills, No weight loss, No bladder dysfunction, No bowel dysfunction, No extremity weakness, No saddle anesthesia  - pertinent positives: none    - medications, other therapies tried (physical therapy, injections):     >> NSAIDs and Tylenol    >> Has NOT previously undergone Physical Therapy    >> Has previously undergone spinal injection/s She had Left L4-L5, L5-S1 TFESI with Dr Corona on 9/2015. But she has not had her radiculopathic pain since. Current discomfort is not similar.        Imaging / Labs / Studies (reviewed on 12/9/2017):    Results for orders placed during the hospital encounter of 08/04/15   MRI Lumbar Spine Without Contrast    Narrative MRI LUMBAR SPINE    TECHNIQUE: MRI lumbar spine was  performed without contrast on a 1.5T magnet. The following sequences were obtained: Localizer; sagittal T1, T2, STIR; axial T1 and T2.    COMPARISON: Radiographs dated 7/31/2015    FINDINGS:    There are 5 lumbar vertebrae.  There is minimal grade 1 anterolisthesis of L5 on S1 and L4 on L5.  Vertebral body heights are well-maintained.  There is disk desiccation from L2-3 through L5-S1 with mild associated disk height loss. Marrow signal is   preserved.    Conus terminates at L1 and appears unremarkable. Limited evaluation of posterior abdominal structures demonstrates multiple fibroids within the uterus.  Paraspinal musculature is within normal limits.  Evaluation of sacroiliac joints is unremarkable.    L1-L2: No spinal canal stenosis or neuroforaminal narrowing.    L2-L3: Mild diffuse disk bulge.  Mild bilateral facet arthropathy with thickening of the ligamentum flavum.Mild effacement of the thecal sac. Moderate right and mild left neuroforaminal narrowing.    L3-L4: Diffuse disk bulge.  Mild bilateral facet arthropathy and ligamentum flavum thickening.Mild bilateral neural foraminal narrowing.  No spinal canal stenosis.    L4-L5: Severe bilateral facet arthropathy.  Slight uncovering of the intervertebral disk with superimposed left paracentral extrusion measuring approximately 6 mm AP with slight superior migration..  Mild thickening of the ligamentum flavum.There is mild   crowding of the left lateral recess with possible mass effect on the descending left L5 nerve root.  Mild left neural foraminal narrowing.    L5-S1: Severe bilateral facet arthropathy.  Slight uncovering of the intervertebral disk.Moderate left and mild right neuroforaminal narrowing.  No spinal canal stenosis.    Impression    Multilevel degenerative disk disease and facet arthropathy as above, most severe at the levels of L4-5 and L5-S1.      Electronically signed by: KENNETH ARZATE MD  Date:     08/04/15  Time:    09:34      .  Results  for orders placed during the hospital encounter of 11/07/17   X-Ray Lumbar Spine Complete 5 View    Narrative Five views of the lumbar spine    Findings: The vertebral bodies demonstrate normal height.  There is mild grade 1 spondylo-listhesis of L4 on L5.  There is more moderate grade 1 spondylolisthesis of L5 on S1. Moderate disc space narrowing and spondylosis present at the L4-L5 and L5-S1 levels. Moderate to severe facet arthropathy noted at L4-L5 and L5-S1 levels. No pars defects.    Impression  As above      Electronically signed by: LILI WALLACE D.O.  Date:     11/07/17  Time:    09:41        Results for orders placed during the hospital encounter of 06/30/15   X-Ray Lumbar Spine Ap And Lateral    Narrative Three views of the lumbar spine    Findings: The vertebral bodies demonstrate normal height.  There is a grade 1 spondylolisthesis of L4 on L5 and L5 on S1. There is mild disk space narrowing noted from the L3-4 through the L5-S1 levels. Prominent facet arthropathy is noted at L4-5 and   L5-S1 levels.    Impression  As above      Electronically signed by: LILI WALLACE D.O.  Date:     06/30/15  Time:    09:43      Results for orders placed during the hospital encounter of 07/31/15   X-Ray Lumbar Spine Ap Lateral w/Flex Ext    Narrative Findings: AP and lateral views with flexion and extension.  There is multilevel degenerative vertebral endplate spurring and facet arthropathy with narrowing of the L2-3 through L5-S1 disk spaces.  Mild degree of grade 1 anterolisthesis observed at L4-5   and L5-S1.  No compression fracture.    Impression  As above.      Electronically signed by: MEL COOK MD  Date:     07/31/15  Time:    09:22          Review of Systems:  CONSTITUTIONAL: patient denies any fever, chills, or weight loss  SKIN: patient denies any rash or itching  RESPIRATORY: patient denies having any shortness of breath  GASTROINTESTINAL: patient denies having any diarrhea, constipation, or  "bowel incontinence  GENITOURINARY: patient denies having any abnormal bladder function    MUSCULOSKELETAL:  - patient complains of the above noted pain/s (see chief pain complaint)    NEUROLOGICAL:   - pain as above  - strength in Lower extremities is intact, BILATERALLY  - sensation in Lower extremities is intact, BILATERALLY  - patient denies any loss of bowel or bladder control      PSYCHIATRIC: patient denies any change in mood    Other:  All other systems reviewed and are negative      Physical Exam:  BP (!) 158/84 (BP Location: Right arm, Patient Position: Sitting)   Pulse 73   Resp 17   Ht 5' 5" (1.651 m)   Wt 104.3 kg (230 lb)   BMI 38.27 kg/m²  (reviewed on 12/9/2017)  General: Alert and oriented, in no apparent distress.  Gait: normal gait.  Skin: No rashes, No discoloration, No obvious lesions  HEENT: Normocephalic, atraumatic. Pupils equal and round.  Cardiovascular: Regular rate and rhythm , no significant peripheral edema present  Respiratory: Without audible wheezing, without use of accessory muscles of respiration.    Musculoskeletal:      Lumbar Spine    - Pain on flexion of lumbar spine Absent  - Straight Leg Raise:  Absent    - Pain on extension of lumbar spine Present  - TTP over the lumbar facet joints Present L5-S1  - Lumbar facet loading Present Present    -Pain on palpation over the SI joint  Absent  - ARLEY: Absent      Neuro:    Strength:  LE R/L: HF: 5/5, HE: 5/5, KF: 5/5; KE: 5/5; FE: 5/5; FF: 5/5    Extremity Reflexes: Brisk and symmetric throughout.      Extremity Sensory: Sensation to pinprick and temperature symmetric. Proprioception intact.      Psych:  Mood and affect is appropriate      Assessment:    Nathalie Membreno is a 72 y.o. year old female who is presenting with   Encounter Diagnoses   Name Primary?    Lumbar spondylosis Yes    Neuropathic pain     Paresthesia of both lower extremities      The tingling in bilateral calf, shin and dorsum of foot is unlikely " coming for the Lumbar spine as is does not follow a dermatomal pattern.     Plan:    1. Interventional: None at this time.    2. Pharmacologic: Continue Gabapentin 300 mg PO Q hs.     3. Diagnostic: Primary investigating source of bilateral paresthesias.     4. Follow up: PRN.     30  minutes were spent in this encounter with more than 50% of the time used for counseling and review of the plan.  Imaging / studies reviewed, detailed above.  I discussed in detail the risks, benefits, and alternatives to any and all potential treatment options.  All questions and concerns were fully addressed today in clinic. Medical decision making moderate.    Thank you for the opportunity to assist in the care of this patient.    Best wishes,    Signed:    Brayan Soriano MD          Disclaimer:  This note may have been prepared using voice recognition software, it may have not been extensively proofed, as such there could be errors within the text such as sound alike errors.

## 2017-12-21 ENCOUNTER — OFFICE VISIT (OUTPATIENT)
Dept: INTERNAL MEDICINE | Facility: CLINIC | Age: 72
End: 2017-12-21
Payer: MEDICARE

## 2017-12-21 VITALS
DIASTOLIC BLOOD PRESSURE: 82 MMHG | SYSTOLIC BLOOD PRESSURE: 138 MMHG | HEART RATE: 84 BPM | OXYGEN SATURATION: 96 % | BODY MASS INDEX: 38.09 KG/M2 | HEIGHT: 65 IN | TEMPERATURE: 96 F | WEIGHT: 228.63 LBS

## 2017-12-21 DIAGNOSIS — E66.01 SEVERE OBESITY WITH BODY MASS INDEX (BMI) OF 35.0 TO 39.9 WITH COMORBIDITY: ICD-10-CM

## 2017-12-21 DIAGNOSIS — Z78.0 ASYMPTOMATIC POSTMENOPAUSAL STATE: ICD-10-CM

## 2017-12-21 DIAGNOSIS — K59.00 CONSTIPATION, UNSPECIFIED CONSTIPATION TYPE: ICD-10-CM

## 2017-12-21 DIAGNOSIS — E55.9 VITAMIN D DEFICIENCY: ICD-10-CM

## 2017-12-21 DIAGNOSIS — Z00.00 ENCOUNTER FOR PREVENTIVE HEALTH EXAMINATION: Primary | ICD-10-CM

## 2017-12-21 DIAGNOSIS — I10 ESSENTIAL HYPERTENSION: ICD-10-CM

## 2017-12-21 DIAGNOSIS — I77.1 TORTUOUS AORTA: ICD-10-CM

## 2017-12-21 DIAGNOSIS — I48.0 PAF (PAROXYSMAL ATRIAL FIBRILLATION): ICD-10-CM

## 2017-12-21 DIAGNOSIS — K21.9 GASTROESOPHAGEAL REFLUX DISEASE WITHOUT ESOPHAGITIS: ICD-10-CM

## 2017-12-21 DIAGNOSIS — E78.00 PURE HYPERCHOLESTEROLEMIA: ICD-10-CM

## 2017-12-21 PROCEDURE — 99397 PER PM REEVAL EST PAT 65+ YR: CPT | Mod: S$GLB,,, | Performed by: INTERNAL MEDICINE

## 2017-12-21 PROCEDURE — 99999 PR PBB SHADOW E&M-EST. PATIENT-LVL III: CPT | Mod: PBBFAC,,, | Performed by: INTERNAL MEDICINE

## 2017-12-21 PROCEDURE — 99499 UNLISTED E&M SERVICE: CPT | Mod: S$GLB,,, | Performed by: INTERNAL MEDICINE

## 2017-12-21 RX ORDER — ALBUTEROL SULFATE 90 UG/1
2 AEROSOL, METERED RESPIRATORY (INHALATION) EVERY 4 HOURS PRN
Qty: 1 EACH | Refills: 6 | Status: SHIPPED | OUTPATIENT
Start: 2017-12-21

## 2017-12-21 RX ORDER — POLYETHYLENE GLYCOL 3350 17 G/17G
17 POWDER, FOR SOLUTION ORAL DAILY
Qty: 238 G | Refills: 6 | Status: SHIPPED | OUTPATIENT
Start: 2017-12-21 | End: 2019-01-30

## 2017-12-21 NOTE — PROGRESS NOTES
"Subjective:       Patient ID: Nathalie Membreno is a 72 y.o. female.    Chief Complaint: Follow-up    Here for f/u medical problems and preventive exam.  Energy good.  BP at home 135-140/82.  Walking daily. Taking vit D.  No f/c/sw/cough.  No cp/sob/palp.  BMs slow, dulcolax prn.  Urine normal.    HM: 12/17 today fluvax, 6/15 [id number], 11/16 booster [id number], 9/14 TDaP, 7/09 BMD rep 5y, 11/14 Cscope rep 5y, 12/17 MMG, 1/17 Eye Dr. Schrader, 11/16 HCV neg.            Review of Systems   Constitutional: Negative for appetite change, chills, diaphoresis and fever.   HENT: Negative for congestion, ear pain, rhinorrhea, sinus pressure and sore throat.    Respiratory: Negative for cough, chest tightness and shortness of breath.    Cardiovascular: Negative for chest pain and palpitations.   Gastrointestinal: Negative for blood in stool, constipation, diarrhea, nausea and vomiting.   Genitourinary: Negative for dysuria, frequency, hematuria, menstrual problem, urgency and vaginal discharge.   Musculoskeletal: Negative for arthralgias.   Skin: Negative for rash.   Neurological: Negative for dizziness and headaches.   Psychiatric/Behavioral: Negative for sleep disturbance. The patient is not nervous/anxious.        Objective:   /82   Pulse 84   Temp 96.1 °F (35.6 °C) (Tympanic)   Ht 5' 5" (1.651 m)   Wt 103.7 kg (228 lb 9.9 oz)   SpO2 96%   BMI 38.04 kg/m²     Physical Exam   Constitutional: She is oriented to person, place, and time. She appears well-developed and well-nourished.   HENT:   Right Ear: External ear normal. Tympanic membrane is not injected.   Left Ear: External ear normal. Tympanic membrane is not injected.   Mouth/Throat: Oropharynx is clear and moist.   Eyes: Conjunctivae are normal.   Neck: Normal range of motion. Neck supple. No thyromegaly present.   Cardiovascular: Normal rate, regular rhythm and intact distal pulses.  Exam reveals no gallop and no friction rub.    No murmur " heard.  Pulmonary/Chest: Effort normal and breath sounds normal. She has no wheezes. She has no rales. Right breast exhibits no mass, no nipple discharge, no skin change and no tenderness. Left breast exhibits no mass, no nipple discharge, no skin change and no tenderness.   Abdominal: Soft. Bowel sounds are normal. She exhibits no mass. There is no tenderness.   Musculoskeletal: She exhibits no edema.   Lymphadenopathy:     She has no cervical adenopathy.        Right axillary: No lateral adenopathy present.        Left axillary: No lateral adenopathy present.  Neurological: She is alert and oriented to person, place, and time.   Skin: Skin is warm. No rash noted.   Psychiatric: She has a normal mood and affect.         Results for orders placed or performed in visit on 11/07/17   CBC auto differential   Result Value Ref Range    WBC 5.48 3.90 - 12.70 K/uL    RBC 4.32 4.00 - 5.40 M/uL    Hemoglobin 13.3 12.0 - 16.0 g/dL    Hematocrit 40.8 37.0 - 48.5 %    MCV 94 82 - 98 fL    MCH 30.8 27.0 - 31.0 pg    MCHC 32.6 32.0 - 36.0 g/dL    RDW 14.3 11.5 - 14.5 %    Platelets 237 150 - 350 K/uL    MPV 12.1 9.2 - 12.9 fL    Gran # 2.7 1.8 - 7.7 K/uL    Lymph # 2.2 1.0 - 4.8 K/uL    Mono # 0.3 0.3 - 1.0 K/uL    Eos # 0.3 0.0 - 0.5 K/uL    Baso # 0.01 0.00 - 0.20 K/uL    Gran% 48.8 38.0 - 73.0 %    Lymph% 40.5 18.0 - 48.0 %    Mono% 5.8 4.0 - 15.0 %    Eosinophil% 4.7 0.0 - 8.0 %    Basophil% 0.2 0.0 - 1.9 %    Differential Method Automated    Comprehensive metabolic panel   Result Value Ref Range    Sodium 141 136 - 145 mmol/L    Potassium 4.4 3.5 - 5.1 mmol/L    Chloride 107 95 - 110 mmol/L    CO2 25 23 - 29 mmol/L    Glucose 82 70 - 110 mg/dL    BUN, Bld 17 8 - 23 mg/dL    Creatinine 0.9 0.5 - 1.4 mg/dL    Calcium 9.4 8.7 - 10.5 mg/dL    Total Protein 7.8 6.0 - 8.4 g/dL    Albumin 3.5 3.5 - 5.2 g/dL    Total Bilirubin 0.5 0.1 - 1.0 mg/dL    Alkaline Phosphatase 102 55 - 135 U/L    AST 16 10 - 40 U/L    ALT 11 10 - 44 U/L     Anion Gap 9 8 - 16 mmol/L    eGFR if African American >60 >60 mL/min/1.73 m^2    eGFR if non African American >60 >60 mL/min/1.73 m^2   TSH   Result Value Ref Range    TSH 1.314 0.400 - 4.000 uIU/mL   Vitamin B12   Result Value Ref Range    Vitamin B-12 633 210 - 950 pg/mL     Results for KEYSHA ORTIZ (MRN [id number]) as of 12/21/2017 08:40   Ref. Range 5/9/2017 10:39   Cholesterol Latest Ref Range: 120 - 199 mg/dL 210 (H)   HDL Latest Ref Range: 40 - 75 mg/dL 72   LDL Cholesterol Latest Ref Range: 63.0 - 159.0 mg/dL 119.6   Total Cholesterol/HDL Ratio Latest Ref Range: 2.0 - 5.0  2.9   Triglycerides Latest Ref Range: 30 - 150 mg/dL 92     Assessment:       1. Encounter for preventive health examination    2. Essential hypertension    3. Gastroesophageal reflux disease without esophagitis    4. PAF (paroxysmal atrial fibrillation)    5. Pure hypercholesterolemia    6. Severe obesity with body mass index (BMI) of 35.0 to 39.9 with comorbidity    7. Tortuous aorta    8. Vitamin D deficiency    9. Asymptomatic postmenopausal state    10. Constipation, unspecified constipation type        Plan:       Keysha was seen today for follow-up.    Diagnoses and all orders for this visit:    Encounter for preventive health examination- fluvax.  -     DXA Bone Density Spine And Hip; Future    Essential hypertension- stable on rx.    Gastroesophageal reflux disease without esophagitis    PAF (paroxysmal atrial fibrillation)/ Pure hypercholesterolemia/Tortuous aorta - on statin and ASA, sees outside Cardiologist.    Severe obesity with body mass index (BMI) of 35.0 to 39.9 with comorbidity- continue exercise.    Vitamin D deficiency- cont supplement.    Constipation, unspecified constipation type  -     polyethylene glycol (GLYCOLAX) 17 gram/dose powder; Take 17 g by mouth once daily.    RTC 6mo.

## 2018-01-10 RX ORDER — LOSARTAN POTASSIUM 100 MG/1
TABLET ORAL
Qty: 90 TABLET | Refills: 7 | Status: SHIPPED | OUTPATIENT
Start: 2018-01-10 | End: 2019-02-25 | Stop reason: SDUPTHER

## 2018-02-26 DIAGNOSIS — E78.00 PURE HYPERCHOLESTEROLEMIA: ICD-10-CM

## 2018-02-26 RX ORDER — ATORVASTATIN CALCIUM 20 MG/1
TABLET, FILM COATED ORAL
Qty: 30 TABLET | Refills: 11 | Status: SHIPPED | OUTPATIENT
Start: 2018-02-26 | End: 2019-05-31

## 2018-04-25 ENCOUNTER — PES CALL (OUTPATIENT)
Dept: ADMINISTRATIVE | Facility: CLINIC | Age: 73
End: 2018-04-25

## 2018-05-09 ENCOUNTER — OFFICE VISIT (OUTPATIENT)
Dept: OBSTETRICS AND GYNECOLOGY | Facility: CLINIC | Age: 73
End: 2018-05-09
Payer: MEDICARE

## 2018-05-09 VITALS
DIASTOLIC BLOOD PRESSURE: 76 MMHG | BODY MASS INDEX: 38.75 KG/M2 | HEIGHT: 65 IN | SYSTOLIC BLOOD PRESSURE: 144 MMHG | WEIGHT: 232.56 LBS

## 2018-05-09 DIAGNOSIS — N76.0 ACUTE VAGINITIS: ICD-10-CM

## 2018-05-09 DIAGNOSIS — N89.8 VAGINAL DISCHARGE: Primary | ICD-10-CM

## 2018-05-09 DIAGNOSIS — L29.2 VULVAR ITCHING: ICD-10-CM

## 2018-05-09 PROCEDURE — 3078F DIAST BP <80 MM HG: CPT | Mod: CPTII,S$GLB,, | Performed by: OBSTETRICS & GYNECOLOGY

## 2018-05-09 PROCEDURE — 99999 PR PBB SHADOW E&M-EST. PATIENT-LVL III: CPT | Mod: PBBFAC,,, | Performed by: OBSTETRICS & GYNECOLOGY

## 2018-05-09 PROCEDURE — 99204 OFFICE O/P NEW MOD 45 MIN: CPT | Mod: S$GLB,,, | Performed by: OBSTETRICS & GYNECOLOGY

## 2018-05-09 PROCEDURE — 87210 SMEAR WET MOUNT SALINE/INK: CPT | Mod: QW,S$GLB,, | Performed by: OBSTETRICS & GYNECOLOGY

## 2018-05-09 PROCEDURE — 87491 CHLMYD TRACH DNA AMP PROBE: CPT

## 2018-05-09 PROCEDURE — 87070 CULTURE OTHR SPECIMN AEROBIC: CPT

## 2018-05-09 PROCEDURE — 3077F SYST BP >= 140 MM HG: CPT | Mod: CPTII,S$GLB,, | Performed by: OBSTETRICS & GYNECOLOGY

## 2018-05-09 RX ORDER — NYSTATIN AND TRIAMCINOLONE ACETONIDE 100000; 1 [USP'U]/G; MG/G
CREAM TOPICAL
Qty: 30 G | Refills: 1 | Status: SHIPPED | OUTPATIENT
Start: 2018-05-09 | End: 2019-05-09

## 2018-05-09 RX ORDER — CHOLECALCIFEROL (VITAMIN D3) 25 MCG
TABLET ORAL
COMMUNITY
End: 2019-05-31

## 2018-05-09 RX ORDER — HYDROXYZINE PAMOATE 25 MG/1
25 CAPSULE ORAL
COMMUNITY
End: 2024-02-02

## 2018-05-09 NOTE — PROGRESS NOTES
"  Subjective:       Patient ID: Nathalie Membreno is a 72 y.o. female.    Chief Complaint:  Vaginal Discharge      History of Present Illness  HPI  73 yo  presents as new pt c/o vaginal discharge & vulvar itching, often in inguinal creases. No bleeding/pain. Not sexually active but reports lives w/ a "friend" & unsure if he did any thing to her    Past Medical History:   Diagnosis Date    Allergy     Anemia     Arthritis     knees, hands, back    Cataract     Dysphagia, unspecified(967.20)     GERD (gastroesophageal reflux disease)     History of carpal tunnel release of both wrists 3/10/2016    Hyperlipidemia     Hypertension     LGSIL (low grade squamous intraepithelial dysplasia)     HPV +    Obesity     PAF (paroxysmal atrial fibrillation) 3/28/2017    Stroke 2017       Past Surgical History:   Procedure Laterality Date    CARDIAC DEFIBRILLATOR PLACEMENT  2017    Dr. Nuno    CARPAL TUNNEL RELEASE      b/l    FOOT SURGERY Right 2013    wart removal    TRIGGER FINGER RELEASE      TUBAL LIGATION         Family History   Problem Relation Age of Onset    Hypertension Mother     Hypertension Father     Cancer Father         prostate    Cataracts Father     Glaucoma Father     Diabetes Father     Hypertension Sister     Diabetes Sister     Breast cancer Neg Hx     Colon cancer Neg Hx     Ovarian cancer Neg Hx     Thrombophilia Neg Hx        Social History     Social History    Marital status:      Spouse name: N/A    Number of children: 3    Years of education: N/A     Occupational History    Resident [facility] [employer]      Social History Main Topics    Smoking status: Never Smoker    Smokeless tobacco: Never Used    Alcohol use No    Drug use: No    Sexual activity: Not Currently     Partners: Male     Birth control/ protection: None     Other Topics Concern    None     Social History Narrative    Live alone       Current Outpatient " "Prescriptions   Medication Sig Dispense Refill    albuterol 90 mcg/actuation inhaler Inhale 2 puffs into the lungs every 4 (four) hours as needed for Wheezing. 1 each 6    aspirin 325 MG tablet Take 325 mg by mouth once daily.      atorvastatin (LIPITOR) 20 MG tablet TAKE 1 TABLET(20 MG) BY MOUTH EVERY DAY 30 tablet 11    cholecalciferol, vitamin D3, (VITAMIN D3) 2,000 unit Cap Take 1 capsule by mouth once daily.      diclofenac sodium (VOLTAREN) 1 % Gel Apply 2 g topically 2 (two) times daily. 60 g 1    hydrOXYzine pamoate (VISTARIL) 25 MG Cap Take 25 mg by mouth.      polyethylene glycol (GLYCOLAX) 17 gram/dose powder Take 17 g by mouth once daily. 238 g 6    vitamin D 1000 units Tab Take by mouth.      gabapentin (NEURONTIN) 300 MG capsule Take 1 capsule (300 mg total) by mouth every evening. Take 45 min to 1 hour before bed as may cause drowsiness 30 capsule 2    nystatin-triamcinolone (MYCOLOG II) cream Apply to affected area 2 times daily 30 g 1     No current facility-administered medications for this visit.        Review of patient's allergies indicates:   Allergen Reactions    Penicillins Swelling     Generalized swelling  Other reaction(s): Swelling     Vitals:    18 1536   BP: (!) 144/76   Weight: 105.5 kg (232 lb 9.4 oz)   Height: 5' 5" (1.651 m)     GYN & OB History  No LMP recorded. Patient is postmenopausal.   Date of Last Pap: 2014    OB History    Para Term  AB Living   4 3 3   1 3   SAB TAB Ectopic Multiple Live Births   1              # Outcome Date GA Lbr Gar[place]/2nd Weight Sex Delivery Anes PTL Lv   4 SAB            3 Term            2 Term            1 Term                   Review of Systems  Review of Systems   All other systems reviewed and are negative.         Objective:    Physical Exam:   Constitutional: She is oriented to person, place, and time. She appears well-developed and well-nourished.        Pulmonary/Chest: Effort normal.        Abdominal: Soft. " She exhibits no distension. There is no tenderness.     Genitourinary: Vagina normal and uterus normal.   Genitourinary Comments: Normal external genitalia. Left buttock with excoriation. No hyperkeratosis. Atrophic vaginal mucosa, normal cervix, normal bimanual               Neurological: She is alert and oriented to person, place, and time.    Skin: Skin is warm and dry.    Psychiatric: She has a normal mood and affect. Her behavior is normal.        wet prep wbcs    Assessment:        1. Vaginal discharge    2. Vulvar itching    3. Acute vaginitis          Plan:      1. Genital culture, geovani/chlam  2. mycolog

## 2018-05-13 LAB
C TRACH DNA SPEC QL NAA+PROBE: NOT DETECTED
N GONORRHOEA DNA SPEC QL NAA+PROBE: NOT DETECTED

## 2018-05-14 LAB — BACTERIA GENITAL AEROBE CULT: NORMAL

## 2018-05-21 ENCOUNTER — APPOINTMENT (OUTPATIENT)
Dept: RADIOLOGY | Facility: CLINIC | Age: 73
End: 2018-05-21
Attending: INTERNAL MEDICINE
Payer: MEDICARE

## 2018-05-21 DIAGNOSIS — Z78.0 ASYMPTOMATIC POSTMENOPAUSAL STATE: ICD-10-CM

## 2018-05-21 DIAGNOSIS — Z00.00 ENCOUNTER FOR PREVENTIVE HEALTH EXAMINATION: ICD-10-CM

## 2018-05-21 PROCEDURE — 77080 DXA BONE DENSITY AXIAL: CPT | Mod: 26,,, | Performed by: RADIOLOGY

## 2018-05-21 PROCEDURE — 77080 DXA BONE DENSITY AXIAL: CPT | Mod: TC,PO

## 2018-06-19 NOTE — PROGRESS NOTES
"Subjective:      Patient ID: Nathalie Membreno is a 72 y.o. female.    Chief Complaint: Follow-up (6 month)      HPI  Here for follow up of medical problems.  BPs at home 130-140/ 73-83.  BMs good with miralax.  Walking regularly.  No f/c/sw/cough.  No cp/sob/palp.  Taking vit D.  Takes albuterol prn during winter months only.  5/18 BMD normal.      Updated/ annual due 12/18:  HM: 12/17 fluvax, 6/15 [id number], 11/16 booster [id number], 9/14 TDaP, 5/18 BMD rep 5y, 11/14 Cscope rep 5y, 12/17 MMG/ 5/18 Gyn Dr. Gomes, 1/17 Eye Dr. Schrader, 11/16 HCV neg, Card Dr. Garcia? At [facility].     Review of Systems   Constitutional: Negative for chills, diaphoresis and fever.   Respiratory: Negative for cough and shortness of breath.    Cardiovascular: Negative for chest pain, palpitations and leg swelling.   Gastrointestinal: Negative for blood in stool, constipation, diarrhea, nausea and vomiting.   Genitourinary: Negative for dysuria, frequency and hematuria.   Psychiatric/Behavioral: The patient is not nervous/anxious.          Objective:   /78   Pulse 86   Temp 98 °F (36.7 °C) (Tympanic)   Ht 5' 5" (1.651 m)   Wt 107.2 kg (236 lb 5.3 oz)   SpO2 98%   BMI 39.33 kg/m²     Physical Exam   Constitutional: She is oriented to person, place, and time. She appears well-developed.   HENT:   Mouth/Throat: Oropharynx is clear and moist.   Neck: Neck supple. Carotid bruit is not present. No thyroid mass present.   Cardiovascular: Normal rate, regular rhythm and intact distal pulses.  Exam reveals no gallop and no friction rub.    No murmur heard.  Pulmonary/Chest: Effort normal and breath sounds normal. She has no wheezes. She has no rales.   Abdominal: Soft. Bowel sounds are normal. She exhibits no mass. There is no hepatosplenomegaly. There is no tenderness.   Musculoskeletal: She exhibits no edema.   Lymphadenopathy:     She has no cervical adenopathy.   Neurological: She is alert and oriented to person, place, and time. "   Psychiatric: She has a normal mood and affect.           Assessment:       1. Essential hypertension    2. History of stroke    3. PAF (paroxysmal atrial fibrillation)    4. Tortuous aorta    5. Vitamin D deficiency    6. Constipation, unspecified constipation type    7. Preventive measure    8. Pure hypercholesterolemia          Plan:     Essential hypertension- stable at home, cont to monitor.    History of stroke- on statin and ASA.    PAF (paroxysmal atrial fibrillation)- on ASA only.      Tortuous aorta- on statin and ASA.    Vitamin D deficiency- recheck 6mo.  -     Vitamin D; Future    Constipation, unspecified constipation type- doing well on miralax.    Preventive measure- due in 6mo.  -     CBC auto differential; Future; Expected date: 06/22/2018  -     Comprehensive metabolic panel; Future; Expected date: 06/22/2018  -     Lipid panel; Future; Expected date: 06/22/2018  -     TSH; Future; Expected date: 06/22/2018  -     Vitamin D; Future    Pure hypercholesterolemia- on statin.

## 2018-06-22 ENCOUNTER — OFFICE VISIT (OUTPATIENT)
Dept: INTERNAL MEDICINE | Facility: CLINIC | Age: 73
End: 2018-06-22
Payer: MEDICARE

## 2018-06-22 VITALS
BODY MASS INDEX: 39.37 KG/M2 | HEART RATE: 86 BPM | TEMPERATURE: 98 F | SYSTOLIC BLOOD PRESSURE: 135 MMHG | DIASTOLIC BLOOD PRESSURE: 78 MMHG | OXYGEN SATURATION: 98 % | HEIGHT: 65 IN | WEIGHT: 236.31 LBS

## 2018-06-22 DIAGNOSIS — E78.00 PURE HYPERCHOLESTEROLEMIA: ICD-10-CM

## 2018-06-22 DIAGNOSIS — Z29.9 PREVENTIVE MEASURE: ICD-10-CM

## 2018-06-22 DIAGNOSIS — Z86.73 HISTORY OF STROKE: ICD-10-CM

## 2018-06-22 DIAGNOSIS — I48.0 PAF (PAROXYSMAL ATRIAL FIBRILLATION): ICD-10-CM

## 2018-06-22 DIAGNOSIS — I77.1 TORTUOUS AORTA: ICD-10-CM

## 2018-06-22 DIAGNOSIS — E55.9 VITAMIN D DEFICIENCY: ICD-10-CM

## 2018-06-22 DIAGNOSIS — K59.00 CONSTIPATION, UNSPECIFIED CONSTIPATION TYPE: ICD-10-CM

## 2018-06-22 DIAGNOSIS — I10 ESSENTIAL HYPERTENSION: Primary | ICD-10-CM

## 2018-06-22 PROCEDURE — 3075F SYST BP GE 130 - 139MM HG: CPT | Mod: CPTII,S$GLB,, | Performed by: INTERNAL MEDICINE

## 2018-06-22 PROCEDURE — 99499 UNLISTED E&M SERVICE: CPT | Mod: HCNC,S$GLB,, | Performed by: INTERNAL MEDICINE

## 2018-06-22 PROCEDURE — 99214 OFFICE O/P EST MOD 30 MIN: CPT | Mod: S$GLB,,, | Performed by: INTERNAL MEDICINE

## 2018-06-22 PROCEDURE — 3078F DIAST BP <80 MM HG: CPT | Mod: CPTII,S$GLB,, | Performed by: INTERNAL MEDICINE

## 2018-06-22 PROCEDURE — 99999 PR PBB SHADOW E&M-EST. PATIENT-LVL III: CPT | Mod: PBBFAC,,, | Performed by: INTERNAL MEDICINE

## 2018-08-15 ENCOUNTER — TELEPHONE (OUTPATIENT)
Dept: INTERNAL MEDICINE | Facility: CLINIC | Age: 73
End: 2018-08-15

## 2018-08-15 NOTE — TELEPHONE ENCOUNTER
----- Message from Joellen Cheney sent at 8/15/2018 10:47 AM CDT -----  Contact: Pt  Pt called and stated she needed to speak to the nurse. She stated that she needs an order for a nebulizer machine. She can be reached at [phone].    Thanks,  TF

## 2018-12-19 ENCOUNTER — LAB VISIT (OUTPATIENT)
Dept: LAB | Facility: HOSPITAL | Age: 73
End: 2018-12-19
Attending: INTERNAL MEDICINE
Payer: MEDICARE

## 2018-12-19 DIAGNOSIS — E78.00 PURE HYPERCHOLESTEROLEMIA: ICD-10-CM

## 2018-12-19 DIAGNOSIS — Z29.9 PREVENTIVE MEASURE: ICD-10-CM

## 2018-12-19 DIAGNOSIS — E55.9 VITAMIN D DEFICIENCY: ICD-10-CM

## 2018-12-19 LAB
25(OH)D3+25(OH)D2 SERPL-MCNC: 17 NG/ML
ALBUMIN SERPL BCP-MCNC: 3.2 G/DL
ALP SERPL-CCNC: 89 U/L
ALT SERPL W/O P-5'-P-CCNC: 13 U/L
ANION GAP SERPL CALC-SCNC: 6 MMOL/L
AST SERPL-CCNC: 16 U/L
BASOPHILS # BLD AUTO: 0.03 K/UL
BASOPHILS NFR BLD: 0.4 %
BILIRUB SERPL-MCNC: 0.4 MG/DL
BUN SERPL-MCNC: 18 MG/DL
CALCIUM SERPL-MCNC: 9.3 MG/DL
CHLORIDE SERPL-SCNC: 107 MMOL/L
CHOLEST SERPL-MCNC: 208 MG/DL
CHOLEST/HDLC SERPL: 3.5 {RATIO}
CO2 SERPL-SCNC: 26 MMOL/L
CREAT SERPL-MCNC: 1 MG/DL
DIFFERENTIAL METHOD: ABNORMAL
EOSINOPHIL # BLD AUTO: 0.2 K/UL
EOSINOPHIL NFR BLD: 3.5 %
ERYTHROCYTE [DISTWIDTH] IN BLOOD BY AUTOMATED COUNT: 14.6 %
EST. GFR  (AFRICAN AMERICAN): >60 ML/MIN/1.73 M^2
EST. GFR  (NON AFRICAN AMERICAN): 56.4 ML/MIN/1.73 M^2
GLUCOSE SERPL-MCNC: 101 MG/DL
HCT VFR BLD AUTO: 39.4 %
HDLC SERPL-MCNC: 59 MG/DL
HDLC SERPL: 28.4 %
HGB BLD-MCNC: 12.5 G/DL
IMM GRANULOCYTES # BLD AUTO: 0.02 K/UL
IMM GRANULOCYTES NFR BLD AUTO: 0.3 %
LDLC SERPL CALC-MCNC: 132.2 MG/DL
LYMPHOCYTES # BLD AUTO: 2.3 K/UL
LYMPHOCYTES NFR BLD: 33.8 %
MCH RBC QN AUTO: 31 PG
MCHC RBC AUTO-ENTMCNC: 31.7 G/DL
MCV RBC AUTO: 98 FL
MONOCYTES # BLD AUTO: 0.5 K/UL
MONOCYTES NFR BLD: 7.5 %
NEUTROPHILS # BLD AUTO: 3.7 K/UL
NEUTROPHILS NFR BLD: 54.5 %
NONHDLC SERPL-MCNC: 149 MG/DL
NRBC BLD-RTO: 0 /100 WBC
PLATELET # BLD AUTO: 211 K/UL
PMV BLD AUTO: 13.7 FL
POTASSIUM SERPL-SCNC: 4.6 MMOL/L
PROT SERPL-MCNC: 7.3 G/DL
RBC # BLD AUTO: 4.03 M/UL
SODIUM SERPL-SCNC: 139 MMOL/L
TRIGL SERPL-MCNC: 84 MG/DL
TSH SERPL DL<=0.005 MIU/L-ACNC: 1.4 UIU/ML
WBC # BLD AUTO: 6.83 K/UL

## 2018-12-19 PROCEDURE — 36415 COLL VENOUS BLD VENIPUNCTURE: CPT | Mod: HCNC,PO

## 2018-12-19 PROCEDURE — 85025 COMPLETE CBC W/AUTO DIFF WBC: CPT | Mod: HCNC

## 2018-12-19 PROCEDURE — 80053 COMPREHEN METABOLIC PANEL: CPT | Mod: HCNC

## 2018-12-19 PROCEDURE — 84443 ASSAY THYROID STIM HORMONE: CPT | Mod: HCNC

## 2018-12-19 PROCEDURE — 82306 VITAMIN D 25 HYDROXY: CPT | Mod: HCNC

## 2018-12-19 PROCEDURE — 80061 LIPID PANEL: CPT | Mod: HCNC

## 2019-01-16 NOTE — PROGRESS NOTES
"Subjective:      Patient ID: Nathalie Membreno is a 73 y.o. female.    Chief Complaint: Follow-up (6mo )      HPI  Here for f/u medical problems and preventive exam.  Not checking BPs lately at home.  BMs slow unless eats oatmeal.  No f/c/sw/cough.  No cp/sob/palp.  Urine normal.  Taking vit D.  Sleeping ok.    HM: 10/18 fluvax, 6/15 [id number], 11/16 booster [id number], 9/14 TDaP, 5/18 BMD rep 5y, 11/14 Cscope rep 5y, 12/17 MMG, 5/18 Gyn Dr. Gomes, 1/17 Eye Dr. Schrader, 11/16 HCV neg, Card Dr. Garcia? At [facility].     Review of Systems   Constitutional: Negative for appetite change, chills, diaphoresis and fever.   HENT: Negative for congestion, ear pain, rhinorrhea, sinus pressure and sore throat.    Respiratory: Negative for cough, chest tightness and shortness of breath.    Cardiovascular: Negative for chest pain and palpitations.   Gastrointestinal: Negative for blood in stool, constipation, diarrhea, nausea and vomiting.   Genitourinary: Negative for dysuria, frequency, hematuria, menstrual problem, urgency and vaginal discharge.   Musculoskeletal: Negative for arthralgias.   Skin: Negative for rash.   Neurological: Negative for dizziness and headaches.   Psychiatric/Behavioral: Negative for sleep disturbance. The patient is not nervous/anxious.          Objective:   BP (!) 142/88 (BP Location: Right arm, Patient Position: Sitting, BP Method: Medium (Manual))   Pulse 80   Temp 96.8 °F (36 °C) (Tympanic)   Ht 5' 5" (1.651 m)   Wt 105.8 kg (233 lb 4 oz)   SpO2 99%   BMI 38.81 kg/m²     Physical Exam   Constitutional: She is oriented to person, place, and time. She appears well-developed and well-nourished.   HENT:   Right Ear: External ear normal. Tympanic membrane is not injected.   Left Ear: External ear normal. Tympanic membrane is not injected.   Mouth/Throat: Oropharynx is clear and moist.   Eyes: Conjunctivae are normal.   Neck: Normal range of motion. Neck supple. No thyromegaly present. "   Cardiovascular: Normal rate, regular rhythm and intact distal pulses. Exam reveals no gallop and no friction rub.   No murmur heard.  Pulmonary/Chest: Effort normal and breath sounds normal. She has no wheezes. She has no rales. Right breast exhibits no mass, no nipple discharge, no skin change and no tenderness. Left breast exhibits no mass, no nipple discharge, no skin change and no tenderness.   Abdominal: Soft. Bowel sounds are normal. She exhibits no mass. There is no tenderness.   Musculoskeletal: She exhibits no edema.   Lymphadenopathy:     She has no cervical adenopathy.        Right axillary: No lateral adenopathy present.        Left axillary: No lateral adenopathy present.  Neurological: She is alert and oriented to person, place, and time.   Skin: Skin is warm. No rash noted.   Psychiatric: She has a normal mood and affect.       Results for orders placed or performed in visit on 12/19/18   CBC auto differential   Result Value Ref Range    WBC 6.83 3.90 - 12.70 K/uL    RBC 4.03 4.00 - 5.40 M/uL    Hemoglobin 12.5 12.0 - 16.0 g/dL    Hematocrit 39.4 37.0 - 48.5 %    MCV 98 82 - 98 fL    MCH 31.0 27.0 - 31.0 pg    MCHC 31.7 (L) 32.0 - 36.0 g/dL    RDW 14.6 (H) 11.5 - 14.5 %    Platelets 211 150 - 350 K/uL    MPV 13.7 (H) 9.2 - 12.9 fL    Immature Granulocytes 0.3 0.0 - 0.5 %    Gran # (ANC) 3.7 1.8 - 7.7 K/uL    Immature Grans (Abs) 0.02 0.00 - 0.04 K/uL    Lymph # 2.3 1.0 - 4.8 K/uL    Mono # 0.5 0.3 - 1.0 K/uL    Eos # 0.2 0.0 - 0.5 K/uL    Baso # 0.03 0.00 - 0.20 K/uL    nRBC 0 0 /100 WBC    Gran% 54.5 38.0 - 73.0 %    Lymph% 33.8 18.0 - 48.0 %    Mono% 7.5 4.0 - 15.0 %    Eosinophil% 3.5 0.0 - 8.0 %    Basophil% 0.4 0.0 - 1.9 %    Differential Method Automated    Comprehensive metabolic panel   Result Value Ref Range    Sodium 139 136 - 145 mmol/L    Potassium 4.6 3.5 - 5.1 mmol/L    Chloride 107 95 - 110 mmol/L    CO2 26 23 - 29 mmol/L    Glucose 101 70 - 110 mg/dL    BUN, Bld 18 8 - 23 mg/dL     Creatinine 1.0 0.5 - 1.4 mg/dL    Calcium 9.3 8.7 - 10.5 mg/dL    Total Protein 7.3 6.0 - 8.4 g/dL    Albumin 3.2 (L) 3.5 - 5.2 g/dL    Total Bilirubin 0.4 0.1 - 1.0 mg/dL    Alkaline Phosphatase 89 55 - 135 U/L    AST 16 10 - 40 U/L    ALT 13 10 - 44 U/L    Anion Gap 6 (L) 8 - 16 mmol/L    eGFR if African American >60.0 >60 mL/min/1.73 m^2    eGFR if non  56.4 (A) >60 mL/min/1.73 m^2   Lipid panel   Result Value Ref Range    Cholesterol 208 (H) 120 - 199 mg/dL    Triglycerides 84 30 - 150 mg/dL    HDL 59 40 - 75 mg/dL    LDL Cholesterol 132.2 63.0 - 159.0 mg/dL    HDL/Chol Ratio 28.4 20.0 - 50.0 %    Total Cholesterol/HDL Ratio 3.5 2.0 - 5.0    Non-HDL Cholesterol 149 mg/dL   TSH   Result Value Ref Range    TSH 1.400 0.400 - 4.000 uIU/mL   Vitamin D   Result Value Ref Range    Vit D, 25-Hydroxy 17 (L) 30 - 96 ng/mL         Assessment:       1. Encounter for preventive health examination    2. Vitamin D deficiency    3. Tortuous aorta    4. Severe obesity with body mass index (BMI) of 35.0 to 39.9 with comorbidity    5. Pure hypercholesterolemia    6. PAF (paroxysmal atrial fibrillation)    7. History of stroke    8. Essential hypertension    9. Encounter for screening mammogram for malignant neoplasm of breast           Plan:     Encounter for preventive health examination- Discussed pt needs to get Shingrix vaccination at pharmacy.  -     Mammo Digital Screening Bilat; Future; Expected date: 01/30/2019    Vitamin D deficiency - cont supplement.    Severe obesity with body mass index (BMI) of 35.0 to 39.9 with comorbidity    Pure hypercholesterolemia- cont statin.    PAF (paroxysmal atrial fibrillation)- on Dig, BB, xarelto.    History of stroke, Tortuous aorta- cont statin and ASA.    Essential hypertension- monitor BPs for RTC 1mo.    RTC 6 mo.

## 2019-01-30 ENCOUNTER — OFFICE VISIT (OUTPATIENT)
Dept: INTERNAL MEDICINE | Facility: CLINIC | Age: 74
End: 2019-01-30
Payer: MEDICARE

## 2019-01-30 VITALS
HEIGHT: 65 IN | OXYGEN SATURATION: 99 % | SYSTOLIC BLOOD PRESSURE: 142 MMHG | HEART RATE: 80 BPM | WEIGHT: 233.25 LBS | DIASTOLIC BLOOD PRESSURE: 88 MMHG | TEMPERATURE: 97 F | BODY MASS INDEX: 38.86 KG/M2

## 2019-01-30 DIAGNOSIS — E78.00 PURE HYPERCHOLESTEROLEMIA: ICD-10-CM

## 2019-01-30 DIAGNOSIS — E55.9 VITAMIN D DEFICIENCY: ICD-10-CM

## 2019-01-30 DIAGNOSIS — I77.1 TORTUOUS AORTA: ICD-10-CM

## 2019-01-30 DIAGNOSIS — I48.0 PAF (PAROXYSMAL ATRIAL FIBRILLATION): ICD-10-CM

## 2019-01-30 DIAGNOSIS — E66.01 SEVERE OBESITY WITH BODY MASS INDEX (BMI) OF 35.0 TO 39.9 WITH COMORBIDITY: ICD-10-CM

## 2019-01-30 DIAGNOSIS — I10 ESSENTIAL HYPERTENSION: ICD-10-CM

## 2019-01-30 DIAGNOSIS — Z00.00 ENCOUNTER FOR PREVENTIVE HEALTH EXAMINATION: Primary | ICD-10-CM

## 2019-01-30 DIAGNOSIS — Z12.31 ENCOUNTER FOR SCREENING MAMMOGRAM FOR MALIGNANT NEOPLASM OF BREAST: ICD-10-CM

## 2019-01-30 DIAGNOSIS — Z86.73 HISTORY OF STROKE: ICD-10-CM

## 2019-01-30 PROCEDURE — 99999 PR PBB SHADOW E&M-EST. PATIENT-LVL IV: CPT | Mod: PBBFAC,HCNC,, | Performed by: INTERNAL MEDICINE

## 2019-01-30 PROCEDURE — 99397 PER PM REEVAL EST PAT 65+ YR: CPT | Mod: HCNC,S$GLB,, | Performed by: INTERNAL MEDICINE

## 2019-01-30 PROCEDURE — 3079F DIAST BP 80-89 MM HG: CPT | Mod: HCNC,CPTII,S$GLB, | Performed by: INTERNAL MEDICINE

## 2019-01-30 PROCEDURE — 3079F PR MOST RECENT DIASTOLIC BLOOD PRESSURE 80-89 MM HG: ICD-10-PCS | Mod: HCNC,CPTII,S$GLB, | Performed by: INTERNAL MEDICINE

## 2019-01-30 PROCEDURE — 3077F SYST BP >= 140 MM HG: CPT | Mod: HCNC,CPTII,S$GLB, | Performed by: INTERNAL MEDICINE

## 2019-01-30 PROCEDURE — 99397 PR PREVENTIVE VISIT,EST,65 & OVER: ICD-10-PCS | Mod: HCNC,S$GLB,, | Performed by: INTERNAL MEDICINE

## 2019-01-30 PROCEDURE — 3077F PR MOST RECENT SYSTOLIC BLOOD PRESSURE >= 140 MM HG: ICD-10-PCS | Mod: HCNC,CPTII,S$GLB, | Performed by: INTERNAL MEDICINE

## 2019-01-30 PROCEDURE — 99999 PR PBB SHADOW E&M-EST. PATIENT-LVL IV: ICD-10-PCS | Mod: PBBFAC,HCNC,, | Performed by: INTERNAL MEDICINE

## 2019-01-30 RX ORDER — RIVAROXABAN 20 MG/1
TABLET, FILM COATED ORAL
Refills: 3 | COMMUNITY
Start: 2018-10-23 | End: 2019-09-16

## 2019-01-30 RX ORDER — LOSARTAN POTASSIUM 100 MG/1
TABLET ORAL
Refills: 4 | COMMUNITY
Start: 2018-11-28 | End: 2019-05-31

## 2019-01-30 RX ORDER — DIGOXIN 125 UG/1
TABLET ORAL
Refills: 11 | COMMUNITY
Start: 2018-10-27 | End: 2019-05-31

## 2019-01-30 RX ORDER — METOPROLOL SUCCINATE 50 MG/1
50 TABLET, EXTENDED RELEASE ORAL
COMMUNITY
Start: 2018-07-29 | End: 2019-02-27 | Stop reason: DRUGHIGH

## 2019-02-25 RX ORDER — LOSARTAN POTASSIUM 100 MG/1
TABLET ORAL
Qty: 90 TABLET | Refills: 0 | Status: SHIPPED | OUTPATIENT
Start: 2019-02-25 | End: 2019-05-31

## 2019-02-27 ENCOUNTER — HOSPITAL ENCOUNTER (OUTPATIENT)
Dept: RADIOLOGY | Facility: HOSPITAL | Age: 74
Discharge: HOME | End: 2019-02-27
Attending: INTERNAL MEDICINE
Payer: MEDICARE

## 2019-02-27 ENCOUNTER — OFFICE VISIT (OUTPATIENT)
Dept: INTERNAL MEDICINE | Facility: CLINIC | Age: 74
End: 2019-02-27
Payer: MEDICARE

## 2019-02-27 VITALS
OXYGEN SATURATION: 98 % | SYSTOLIC BLOOD PRESSURE: 160 MMHG | BODY MASS INDEX: 38.05 KG/M2 | HEART RATE: 62 BPM | HEIGHT: 65 IN | DIASTOLIC BLOOD PRESSURE: 82 MMHG | TEMPERATURE: 99 F | WEIGHT: 228.38 LBS

## 2019-02-27 VITALS — WEIGHT: 233 LBS | BODY MASS INDEX: 38.82 KG/M2 | HEIGHT: 65 IN

## 2019-02-27 DIAGNOSIS — I10 ESSENTIAL HYPERTENSION: Primary | ICD-10-CM

## 2019-02-27 DIAGNOSIS — Z86.73 HISTORY OF STROKE: ICD-10-CM

## 2019-02-27 DIAGNOSIS — Z12.31 ENCOUNTER FOR SCREENING MAMMOGRAM FOR MALIGNANT NEOPLASM OF BREAST: ICD-10-CM

## 2019-02-27 DIAGNOSIS — E78.00 PURE HYPERCHOLESTEROLEMIA: ICD-10-CM

## 2019-02-27 DIAGNOSIS — I48.92 ATRIAL FLUTTER WITH RAPID VENTRICULAR RESPONSE: ICD-10-CM

## 2019-02-27 DIAGNOSIS — E55.9 VITAMIN D DEFICIENCY: ICD-10-CM

## 2019-02-27 DIAGNOSIS — I77.1 TORTUOUS AORTA: ICD-10-CM

## 2019-02-27 DIAGNOSIS — Z00.00 ENCOUNTER FOR PREVENTIVE HEALTH EXAMINATION: ICD-10-CM

## 2019-02-27 PROCEDURE — 3077F SYST BP >= 140 MM HG: CPT | Mod: HCNC,CPTII,S$GLB, | Performed by: PHYSICIAN ASSISTANT

## 2019-02-27 PROCEDURE — 77067 SCR MAMMO BI INCL CAD: CPT | Mod: 26,HCNC,, | Performed by: RADIOLOGY

## 2019-02-27 PROCEDURE — 77067 SCR MAMMO BI INCL CAD: CPT | Mod: TC,HCNC

## 2019-02-27 PROCEDURE — 3077F PR MOST RECENT SYSTOLIC BLOOD PRESSURE >= 140 MM HG: ICD-10-PCS | Mod: HCNC,CPTII,S$GLB, | Performed by: PHYSICIAN ASSISTANT

## 2019-02-27 PROCEDURE — 99213 OFFICE O/P EST LOW 20 MIN: CPT | Mod: HCNC,S$GLB,, | Performed by: PHYSICIAN ASSISTANT

## 2019-02-27 PROCEDURE — 3079F PR MOST RECENT DIASTOLIC BLOOD PRESSURE 80-89 MM HG: ICD-10-PCS | Mod: HCNC,CPTII,S$GLB, | Performed by: PHYSICIAN ASSISTANT

## 2019-02-27 PROCEDURE — 77067 MAMMO DIGITAL SCREENING BILAT WITH CAD: ICD-10-PCS | Mod: 26,HCNC,, | Performed by: RADIOLOGY

## 2019-02-27 PROCEDURE — 99999 PR PBB SHADOW E&M-EST. PATIENT-LVL IV: CPT | Mod: PBBFAC,HCNC,, | Performed by: PHYSICIAN ASSISTANT

## 2019-02-27 PROCEDURE — 3079F DIAST BP 80-89 MM HG: CPT | Mod: HCNC,CPTII,S$GLB, | Performed by: PHYSICIAN ASSISTANT

## 2019-02-27 PROCEDURE — 99999 PR PBB SHADOW E&M-EST. PATIENT-LVL IV: ICD-10-PCS | Mod: PBBFAC,HCNC,, | Performed by: PHYSICIAN ASSISTANT

## 2019-02-27 PROCEDURE — 99213 PR OFFICE/OUTPT VISIT, EST, LEVL III, 20-29 MIN: ICD-10-PCS | Mod: HCNC,S$GLB,, | Performed by: PHYSICIAN ASSISTANT

## 2019-02-27 PROCEDURE — 1101F PT FALLS ASSESS-DOCD LE1/YR: CPT | Mod: HCNC,CPTII,S$GLB, | Performed by: PHYSICIAN ASSISTANT

## 2019-02-27 PROCEDURE — 1101F PR PT FALLS ASSESS DOC 0-1 FALLS W/OUT INJ PAST YR: ICD-10-PCS | Mod: HCNC,CPTII,S$GLB, | Performed by: PHYSICIAN ASSISTANT

## 2019-02-27 RX ORDER — METOPROLOL SUCCINATE 100 MG/1
100 TABLET, EXTENDED RELEASE ORAL DAILY
Qty: 90 TABLET | Refills: 0 | Status: SHIPPED | OUTPATIENT
Start: 2019-02-27 | End: 2019-05-31

## 2019-02-27 NOTE — PROGRESS NOTES
"Subjective:       Patient ID: Nathalie Membreno is a 73 y.o. female.    Chief Complaint: Follow-up (BP)    73 year old female presents to clinic for one month f/u from last PCP visit for HTN. BP at last PCP visit was 142/88. BP at last cardiology appt one month ago was 152/88. Pt reports she has been taking her losartan 100mg QD and metoprolol 50mg QD as prescribed, and home BP since last PCP visit has been 145-161/80s. She reports no CP, SOB, edema, exertional sxs, or other medical complaints.    PCP: Dr. Booker    Patient Active Problem List:     Essential hypertension     Pure hypercholesterolemia     GERD (gastroesophageal reflux disease)     Allergy     DDD (degenerative disc disease), lumbar     Vitamin D deficiency     Bilateral carpal tunnel syndrome     Tortuous aorta     Severe obesity with body mass index (BMI) of 35.0 to 39.9 with comorbidity     PAF (paroxysmal atrial fibrillation)     History of stroke      Review of Systems   Constitutional: Negative for chills and fever.   Respiratory: Negative for cough and shortness of breath.    Cardiovascular: Negative for chest pain, palpitations and leg swelling.   Gastrointestinal: Negative for nausea and vomiting.   Skin: Negative for rash.   Neurological: Negative for dizziness, weakness, numbness and headaches.   Psychiatric/Behavioral: Negative for confusion.       Objective:       Vitals:    02/27/19 1010   BP: (!) 160/82   BP Location: Left arm   Patient Position: Sitting   BP Method: Large (Manual)   Pulse: 62   Temp: 98.8 °F (37.1 °C)   TempSrc: Tympanic   SpO2: 98%   Weight: 103.6 kg (228 lb 6.3 oz)   Height: 5' 5" (1.651 m)     Physical Exam   Constitutional: She is oriented to person, place, and time. She appears well-developed and well-nourished. No distress.   HENT:   Head: Normocephalic and atraumatic.   Eyes: EOM are normal. No scleral icterus.   Neck: Neck supple.   Cardiovascular: Normal rate and regular rhythm.   Pulmonary/Chest: Effort " normal and breath sounds normal. No respiratory distress. She has no decreased breath sounds. She has no wheezes. She has no rhonchi. She has no rales.   Musculoskeletal: Normal range of motion. She exhibits no edema.   Neurological: She is alert and oriented to person, place, and time. No cranial nerve deficit.   Skin: Skin is warm and dry. No rash noted.   Psychiatric: She has a normal mood and affect. Her speech is normal and behavior is normal. Thought content normal.       Component      Latest Ref Rng & Units 12/19/2018   WBC      3.90 - 12.70 K/uL 6.83   RBC      4.00 - 5.40 M/uL 4.03   Hemoglobin      12.0 - 16.0 g/dL 12.5   Hematocrit      37.0 - 48.5 % 39.4   MCV      82 - 98 fL 98   MCH      27.0 - 31.0 pg 31.0   MCHC      32.0 - 36.0 g/dL 31.7 (L)   RDW      11.5 - 14.5 % 14.6 (H)   Platelets      150 - 350 K/uL 211   MPV      9.2 - 12.9 fL 13.7 (H)   Immature Granulocytes      0.0 - 0.5 % 0.3   Gran # (ANC)      1.8 - 7.7 K/uL 3.7   Immature Grans (Abs)      0.00 - 0.04 K/uL 0.02   Lymph #      1.0 - 4.8 K/uL 2.3   Mono #      0.3 - 1.0 K/uL 0.5   Eos #      0.0 - 0.5 K/uL 0.2   Baso #      0.00 - 0.20 K/uL 0.03   nRBC      0 /100 WBC 0   Gran%      38.0 - 73.0 % 54.5   Lymph%      18.0 - 48.0 % 33.8   Mono%      4.0 - 15.0 % 7.5   Eosinophil%      0.0 - 8.0 % 3.5   Basophil%      0.0 - 1.9 % 0.4   Differential Method       Automated   Sodium      136 - 145 mmol/L 139   Potassium      3.5 - 5.1 mmol/L 4.6   Chloride      95 - 110 mmol/L 107   CO2      23 - 29 mmol/L 26   Glucose      70 - 110 mg/dL 101   BUN, Bld      8 - 23 mg/dL 18   Creatinine      0.5 - 1.4 mg/dL 1.0   Calcium      8.7 - 10.5 mg/dL 9.3   Total Protein      6.0 - 8.4 g/dL 7.3   Albumin      3.5 - 5.2 g/dL 3.2 (L)   Total Bilirubin      0.1 - 1.0 mg/dL 0.4   Alkaline Phosphatase      55 - 135 U/L 89   AST      10 - 40 U/L 16   ALT      10 - 44 U/L 13   Anion Gap      8 - 16 mmol/L 6 (L)   eGFR if African American      >60 mL/min/1.73  m:2 >60.0   eGFR if non African American      >60 mL/min/1.73 m:2 56.4 (A)   Cholesterol      120 - 199 mg/dL 208 (H)   Triglycerides      30 - 150 mg/dL 84   HDL      40 - 75 mg/dL 59   LDL Cholesterol      63.0 - 159.0 mg/dL 132.2   HDL/Chol Ratio      20.0 - 50.0 % 28.4   Total Cholesterol/HDL Ratio      2.0 - 5.0 3.5   Non-HDL Cholesterol      mg/dL 149   TSH      0.400 - 4.000 uIU/mL 1.400   Vit D, 25-Hydroxy      30 - 96 ng/mL 17 (L)     Assessment:       1. Essential hypertension    2. Pure hypercholesterolemia    3. Atrial flutter with rapid ventricular response    4. Tortuous aorta    5. History of stroke    6. Vitamin D deficiency        Plan:         Nathalie was seen today for follow-up.    Diagnoses and all orders for this visit:    Essential hypertension  -     metoprolol succinate (TOPROL-XL) 100 MG 24 hr tablet; Take 1 tablet (100 mg total) by mouth once daily.  -     NURSING COMMUNICATION: Create [employer] Account  -     Hypertension Digital Medicine (HDMP) Enrollment Order  -     Hypertension Digital Medicine ([facility]): Assign Onboarding Questionnaires  Continue losartan 100mg QD. Increase metoprolol from 50mg QD to 100mg QD. Monitor BP and heart rate closely as discussed. Pt to consider registering for Digital Medicine Program. RTC in one month, sooner if needed.    Pure hypercholesterolemia    Atrial flutter with rapid ventricular response    Tortuous aorta    History of stroke    Vitamin D deficiency    Follow-up with your PCP as scheduled in 5 months and specialists as recommended for health management.

## 2019-03-20 NOTE — PROGRESS NOTES
"Subjective:      Patient ID: Nathalie Membreno is a 73 y.o. female.    Chief Complaint: Follow-up (1 month follow up )      HPI  Here for follow up of medical problems.  Didn't check BPs.  Walking more lately, lost 5# purposefully.  Off sodas.  No cp/sob/palp.    Updated/ annual due 1/20:  HM: 10/18 fluvax, 6/15 [id number], 11/16 booster [id number], 9/14 TDaP, 5/18 BMD rep 5y, 11/14 Cscope rep 5y, 2/19 MMG, 5/18 Gyn Dr. Gomes, 1/17 Eye Dr. Schrader, 11/16 HCV neg, Card Dr. Garcia? At [facility].     Review of Systems   Constitutional: Negative for chills, diaphoresis and fever.   Respiratory: Negative for cough and shortness of breath.    Cardiovascular: Negative for chest pain, palpitations and leg swelling.   Gastrointestinal: Negative for blood in stool, constipation, diarrhea, nausea and vomiting.   Genitourinary: Negative for dysuria, frequency and hematuria.   Psychiatric/Behavioral: The patient is not nervous/anxious.          Objective:   BP (!) 160/90 (BP Location: Right arm, Patient Position: Sitting, BP Method: Medium (Manual))   Pulse 71   Temp 98.2 °F (36.8 °C) (Oral)   Ht 5' 5" (1.651 m)   Wt 103.6 kg (228 lb 6.3 oz)   SpO2 97%   BMI 38.01 kg/m²     Physical Exam   Constitutional: She is oriented to person, place, and time. She appears well-developed.   HENT:   Mouth/Throat: Oropharynx is clear and moist.   Neck: Neck supple. Carotid bruit is not present. No thyroid mass present.   Cardiovascular: Normal rate, regular rhythm and intact distal pulses. Exam reveals no gallop and no friction rub.   No murmur heard.  Pulmonary/Chest: Effort normal and breath sounds normal. She has no wheezes. She has no rales.   Abdominal: Soft. Bowel sounds are normal. She exhibits no mass. There is no hepatosplenomegaly. There is no tenderness.   Musculoskeletal: She exhibits no edema.   Lymphadenopathy:     She has no cervical adenopathy.   Neurological: She is alert and oriented to person, place, and time. "   Psychiatric: She has a normal mood and affect.           Assessment:       1. Essential hypertension    2. Atrial flutter with rapid ventricular response          Plan:     Essential hypertension- question control.  Get sister's cuff and monitor.  RTC 2mo.    Atrial flutter with rapid ventricular response- on Dig, BB, carlos manuel.

## 2019-04-02 ENCOUNTER — OFFICE VISIT (OUTPATIENT)
Dept: INTERNAL MEDICINE | Facility: CLINIC | Age: 74
End: 2019-04-02
Payer: MEDICARE

## 2019-04-02 VITALS
SYSTOLIC BLOOD PRESSURE: 146 MMHG | DIASTOLIC BLOOD PRESSURE: 94 MMHG | OXYGEN SATURATION: 97 % | WEIGHT: 230.19 LBS | HEART RATE: 67 BPM | BODY MASS INDEX: 38.35 KG/M2 | HEIGHT: 65 IN | TEMPERATURE: 98 F

## 2019-04-02 DIAGNOSIS — I77.1 TORTUOUS AORTA: ICD-10-CM

## 2019-04-02 DIAGNOSIS — E78.00 PURE HYPERCHOLESTEROLEMIA: ICD-10-CM

## 2019-04-02 DIAGNOSIS — Z91.89 POTENTIAL FOR COGNITIVE IMPAIRMENT: ICD-10-CM

## 2019-04-02 DIAGNOSIS — I48.92 ATRIAL FLUTTER WITH RAPID VENTRICULAR RESPONSE: ICD-10-CM

## 2019-04-02 DIAGNOSIS — K57.30 DIVERTICULOSIS OF LARGE INTESTINE WITHOUT HEMORRHAGE: ICD-10-CM

## 2019-04-02 DIAGNOSIS — Z86.73 HISTORY OF STROKE: ICD-10-CM

## 2019-04-02 DIAGNOSIS — Z00.00 ENCOUNTER FOR PREVENTIVE HEALTH EXAMINATION: Primary | ICD-10-CM

## 2019-04-02 DIAGNOSIS — E66.01 SEVERE OBESITY WITH BODY MASS INDEX (BMI) OF 35.0 TO 39.9 WITH COMORBIDITY: ICD-10-CM

## 2019-04-02 DIAGNOSIS — E55.9 VITAMIN D DEFICIENCY: ICD-10-CM

## 2019-04-02 DIAGNOSIS — I10 ESSENTIAL HYPERTENSION: ICD-10-CM

## 2019-04-02 DIAGNOSIS — M51.36 DDD (DEGENERATIVE DISC DISEASE), LUMBAR: ICD-10-CM

## 2019-04-02 DIAGNOSIS — R20.2 PARESTHESIA OF BOTH LOWER EXTREMITIES: ICD-10-CM

## 2019-04-02 DIAGNOSIS — Z86.010 HISTORY OF COLON POLYPS: ICD-10-CM

## 2019-04-02 DIAGNOSIS — M79.2 NEUROPATHIC PAIN: ICD-10-CM

## 2019-04-02 PROBLEM — Z86.0100 HISTORY OF COLON POLYPS: Status: ACTIVE | Noted: 2019-04-02

## 2019-04-02 PROCEDURE — 3080F PR MOST RECENT DIASTOLIC BLOOD PRESSURE >= 90 MM HG: ICD-10-PCS | Mod: HCNC,CPTII,S$GLB, | Performed by: PHYSICIAN ASSISTANT

## 2019-04-02 PROCEDURE — G0439 PR MEDICARE ANNUAL WELLNESS SUBSEQUENT VISIT: ICD-10-PCS | Mod: HCNC,S$GLB,, | Performed by: PHYSICIAN ASSISTANT

## 2019-04-02 PROCEDURE — 3080F DIAST BP >= 90 MM HG: CPT | Mod: HCNC,CPTII,S$GLB, | Performed by: PHYSICIAN ASSISTANT

## 2019-04-02 PROCEDURE — 99499 RISK ADDL DX/OHS AUDIT: ICD-10-PCS | Mod: HCNC,S$GLB,, | Performed by: PHYSICIAN ASSISTANT

## 2019-04-02 PROCEDURE — G0439 PPPS, SUBSEQ VISIT: HCPCS | Mod: HCNC,S$GLB,, | Performed by: PHYSICIAN ASSISTANT

## 2019-04-02 PROCEDURE — 3077F SYST BP >= 140 MM HG: CPT | Mod: HCNC,CPTII,S$GLB, | Performed by: PHYSICIAN ASSISTANT

## 2019-04-02 PROCEDURE — 99999 PR PBB SHADOW E&M-EST. PATIENT-LVL IV: CPT | Mod: PBBFAC,HCNC,, | Performed by: PHYSICIAN ASSISTANT

## 2019-04-02 PROCEDURE — 99499 UNLISTED E&M SERVICE: CPT | Mod: HCNC,S$GLB,, | Performed by: PHYSICIAN ASSISTANT

## 2019-04-02 PROCEDURE — 3077F PR MOST RECENT SYSTOLIC BLOOD PRESSURE >= 140 MM HG: ICD-10-PCS | Mod: HCNC,CPTII,S$GLB, | Performed by: PHYSICIAN ASSISTANT

## 2019-04-02 PROCEDURE — 99999 PR PBB SHADOW E&M-EST. PATIENT-LVL IV: ICD-10-PCS | Mod: PBBFAC,HCNC,, | Performed by: PHYSICIAN ASSISTANT

## 2019-04-02 NOTE — PROGRESS NOTES
"Nathalie Membreno presented for a  Medicare AWV and comprehensive Health Risk Assessment today. The following components were reviewed and updated:    · Medical history  · Family History  · Social history  · Allergies and Current Medications  · Health Risk Assessment  · Health Maintenance  · Care Team     ** See Completed Assessments for Annual Wellness Visit within the encounter summary.**       The following assessments were completed:  · Living Situation  · CAGE  · Depression Screening  · Timed Get Up and Go  · Whisper Test  · Cognitive Function Screening  · Nutrition Screening  · ADL Screening  · PAQ Screening    Vitals:    04/02/19 0913   BP: (!) 146/94   BP Location: Left arm   Patient Position: Sitting   BP Method: Large (Manual)   Pulse: 67   Temp: 97.9 °F (36.6 °C)   TempSrc: Tympanic   SpO2: 97%   Weight: 104.4 kg (230 lb 2.6 oz)   Height: 5' 5" (1.651 m)     Body mass index is 38.3 kg/m².     Physical Exam   Constitutional: She is oriented to person, place, and time. She appears well-developed and well-nourished. No distress.   HENT:   Head: Normocephalic and atraumatic.   Eyes: EOM are normal. No scleral icterus.   Neck: Neck supple.   Cardiovascular: Normal rate and regular rhythm.   Pulmonary/Chest: Effort normal and breath sounds normal. No stridor. No respiratory distress. She has no decreased breath sounds. She has no wheezes. She has no rhonchi. She has no rales.   Musculoskeletal: Normal range of motion.   Neurological: She is alert and oriented to person, place, and time.   Skin: Skin is warm and dry. No rash noted.   Psychiatric: She has a normal mood and affect. Her speech is normal and behavior is normal. Thought content normal.         Diagnoses and health risks identified today and associated recommendations/orders:    1. Encounter for preventive health examination    2. Atrial flutter with rapid ventricular response  Stable. Pt taking digoxin, metoprolol, and Xarelto. Continue current " treatment plan as prescribed by your PCP and cardiologists Glen[facility] Castle and f/u with them for further management.    3. Tortuous aorta  Stable. CXR 7/9/10. Pt taking Lipitor and Xarelto. Continue current treatment plan as prescribed by your PCP and cardiologists Glen[facility] Castle and f/u with them for further management.    4. Severe obesity with body mass index (BMI) of 35.0 to 39.9 with comorbidity  Not currently controlled. F/u with your PCP to discuss adjustments to your treatment plan.    5. DDD (degenerative disc disease), lumbar; 6. Neuropathic pain; 7. Paresthesia of both lower extremities  Stable. L-spine xrays 11/7/17. Pt s/p JANET. Pt taking Neurontin. Continue current treatment plan as prescribed by your PCP and pain management Dr. Soriano and f/u with them for further management.    8. Essential hypertension  BP elevated today. Pt taking metoprolol and losartan. F/u with PCP and cardiologists for management.    9. Pure hypercholesterolemia  Stable. Pt taking Lipitor as well as Xarelto. Continue current treatment plan as prescribed by your PCP and cardiologists Glen[facility] Castle and f/u with them for further management.  Component      Latest Ref Rng & Units 12/19/2018 5/9/2017   Cholesterol      120 - 199 mg/dL 208 (H) 210 (H)   Triglycerides      30 - 150 mg/dL 84 92   HDL      40 - 75 mg/dL 59 72   LDL Cholesterol      63.0 - 159.0 mg/dL 132.2 119.6   HDL/Chol Ratio      20.0 - 50.0 % 28.4 34.3   Total Cholesterol/HDL Ratio      2.0 - 5.0 3.5 2.9   Non-HDL Cholesterol      mg/dL 149 138     10. History of stroke  Stable. CVA 2/2017 - was seen at [facility] [place]. Pt taking Lipitor and Xarelto. Continue current treatment plan as prescribed by your PCP and cardiologists Glen[facility] Castle and f/u with them for further management.    11. Vitamin D deficiency  Not currently controlled. Pt taking vit D supplement. F/u with your PCP for further management.  Component      Latest Ref Rng &  Units 12/19/2018 11/9/2016   Vit D, 25-Hydroxy      30 - 96 ng/mL 17 (L) 27 (L)     12. Diverticulosis of large intestine without hemorrhage  Stable. Colonoscopy 11/7/14. Pt without sxs currently. Continue current treatment plan as prescribed by your PCP and f/u with your PCP for further management.    13. History of colon polyps  Stable. Colonoscopy 11/7/14. Continue current treatment plan as prescribed by your PCP and f/u with your PCP for further management.    14. Potential for cognitive impairment  Pt with abnormal score of 4 on Cognitive Function Screening today - pt antonia clock correctly but could not recall any of the 3 words. This a new problem identified during clinic visit today. Follow-up with PCP for evaluation and treatment plan.    Please obtain any medical records from past / present outside medical providers and give to PCP for review and further medical recommendations.    Provided Nathalie with a 5-10 year written screening schedule and personal prevention plan. Recommendations were developed using the USPSTF age appropriate recommendations. Education, counseling, and referrals were provided as needed. After Visit Summary printed and given to patient which includes a list of additional screenings\tests needed.    Follow up in about 1 year (around 4/2/2020) for HRA. F/u with PCP Dr. Booker as scheduled 4/3/19 and specialists as recommended for health management.    SHERLY Lerner

## 2019-04-02 NOTE — PATIENT INSTRUCTIONS
Counseling and Referral of Other Preventative  (Italic type indicates deductible and co-insurance are waived)    Patient Name: Nathalie Membreno  Today's Date: 4/2/2019    Health Maintenance       Date Due Completion Date    Zoster Vaccine 07/18/2006 ---    Influenza Vaccine 08/01/2018 12/21/2017    Override on 11/26/2012: Done    Colonoscopy 11/07/2019 11/7/2014    Lipid Panel 12/19/2019 12/19/2018    High Dose Statin 01/30/2020 1/30/2019    Mammogram 02/27/2021 2/27/2019    Override on 3/12/2012: Done    DEXA SCAN 05/21/2023 5/21/2018    Override on 7/21/2009: Done (normal; repeat in 5 years)    TETANUS VACCINE 09/23/2024 9/23/2014        No orders of the defined types were placed in this encounter.    The following information is provided to all patients.  This information is to help you find resources for any of the problems found today that may be affecting your health:                Living healthy guide: www.[facility].[place].gov      Understanding Diabetes: www.diabetes.org      Eating healthy: www.cdc.gov/healthyweight      CDC home safety checklist: www.cdc.gov/steadi/patient.html      Agency on Aging: www.goea.[place].gov      Alcoholics anonymous (AA): www.aa.org      Physical Activity: www.rogerio.nih.gov/tl9bwmc      Tobacco use: www.quitwithusla.org

## 2019-04-03 ENCOUNTER — OFFICE VISIT (OUTPATIENT)
Dept: INTERNAL MEDICINE | Facility: CLINIC | Age: 74
End: 2019-04-03
Payer: MEDICARE

## 2019-04-03 VITALS
HEIGHT: 65 IN | OXYGEN SATURATION: 97 % | BODY MASS INDEX: 38.05 KG/M2 | HEART RATE: 71 BPM | DIASTOLIC BLOOD PRESSURE: 90 MMHG | WEIGHT: 228.38 LBS | SYSTOLIC BLOOD PRESSURE: 160 MMHG | TEMPERATURE: 98 F

## 2019-04-03 DIAGNOSIS — I48.92 ATRIAL FLUTTER WITH RAPID VENTRICULAR RESPONSE: ICD-10-CM

## 2019-04-03 DIAGNOSIS — I10 ESSENTIAL HYPERTENSION: Primary | ICD-10-CM

## 2019-04-03 PROCEDURE — 1101F PR PT FALLS ASSESS DOC 0-1 FALLS W/OUT INJ PAST YR: ICD-10-PCS | Mod: HCNC,CPTII,S$GLB, | Performed by: INTERNAL MEDICINE

## 2019-04-03 PROCEDURE — 99213 OFFICE O/P EST LOW 20 MIN: CPT | Mod: HCNC,S$GLB,, | Performed by: INTERNAL MEDICINE

## 2019-04-03 PROCEDURE — 3080F DIAST BP >= 90 MM HG: CPT | Mod: HCNC,CPTII,S$GLB, | Performed by: INTERNAL MEDICINE

## 2019-04-03 PROCEDURE — 3077F PR MOST RECENT SYSTOLIC BLOOD PRESSURE >= 140 MM HG: ICD-10-PCS | Mod: HCNC,CPTII,S$GLB, | Performed by: INTERNAL MEDICINE

## 2019-04-03 PROCEDURE — 3080F PR MOST RECENT DIASTOLIC BLOOD PRESSURE >= 90 MM HG: ICD-10-PCS | Mod: HCNC,CPTII,S$GLB, | Performed by: INTERNAL MEDICINE

## 2019-04-03 PROCEDURE — 99213 PR OFFICE/OUTPT VISIT, EST, LEVL III, 20-29 MIN: ICD-10-PCS | Mod: HCNC,S$GLB,, | Performed by: INTERNAL MEDICINE

## 2019-04-03 PROCEDURE — 1101F PT FALLS ASSESS-DOCD LE1/YR: CPT | Mod: HCNC,CPTII,S$GLB, | Performed by: INTERNAL MEDICINE

## 2019-04-03 PROCEDURE — 3077F SYST BP >= 140 MM HG: CPT | Mod: HCNC,CPTII,S$GLB, | Performed by: INTERNAL MEDICINE

## 2019-04-03 PROCEDURE — 99999 PR PBB SHADOW E&M-EST. PATIENT-LVL III: ICD-10-PCS | Mod: PBBFAC,HCNC,, | Performed by: INTERNAL MEDICINE

## 2019-04-03 PROCEDURE — 99999 PR PBB SHADOW E&M-EST. PATIENT-LVL III: CPT | Mod: PBBFAC,HCNC,, | Performed by: INTERNAL MEDICINE

## 2019-05-25 RX ORDER — LOSARTAN POTASSIUM 100 MG/1
TABLET ORAL
Qty: 90 TABLET | Refills: 0 | Status: SHIPPED | OUTPATIENT
Start: 2019-05-25 | End: 2019-08-28 | Stop reason: SDUPTHER

## 2019-05-30 DIAGNOSIS — I10 ESSENTIAL HYPERTENSION: ICD-10-CM

## 2019-05-31 RX ORDER — DIGOXIN 125 MCG
125 TABLET ORAL
COMMUNITY
Start: 2018-07-29 | End: 2019-07-29

## 2019-05-31 RX ORDER — ALBUTEROL SULFATE 0.83 MG/ML
2.5 SOLUTION RESPIRATORY (INHALATION) EVERY 4 HOURS PRN
COMMUNITY
Start: 2018-07-29 | End: 2021-01-28 | Stop reason: SDUPTHER

## 2019-05-31 RX ORDER — METOPROLOL SUCCINATE 50 MG/1
50 TABLET, EXTENDED RELEASE ORAL
COMMUNITY
Start: 2018-07-29 | End: 2019-06-21

## 2019-05-31 RX ORDER — ATORVASTATIN CALCIUM 20 MG/1
20 TABLET, FILM COATED ORAL
COMMUNITY
End: 2019-09-16

## 2019-05-31 RX ORDER — METOPROLOL SUCCINATE 100 MG/1
TABLET, EXTENDED RELEASE ORAL
Qty: 90 TABLET | Refills: 3 | Status: SHIPPED | OUTPATIENT
Start: 2019-05-31 | End: 2020-07-11 | Stop reason: SDUPTHER

## 2019-06-04 ENCOUNTER — TELEPHONE (OUTPATIENT)
Dept: FAMILY MEDICINE | Facility: CLINIC | Age: 74
End: 2019-06-04

## 2019-06-04 NOTE — TELEPHONE ENCOUNTER
----- Message from Jo aNir sent at 6/4/2019 11:54 AM CDT -----  Contact: self/[phone]  Type:  Patient Returning Call    Who Called:Nathalie Membreno    Who Left Message for Patient:nurse  Does the patient know what this is regarding?:change appt  Would the patient rather a call back or a response via [facility]? Call back   Best Call Back Number:[phone]  Additional Information:

## 2019-06-21 ENCOUNTER — OFFICE VISIT (OUTPATIENT)
Dept: FAMILY MEDICINE | Facility: CLINIC | Age: 74
End: 2019-06-21
Payer: MEDICARE

## 2019-06-21 VITALS
OXYGEN SATURATION: 97 % | HEART RATE: 81 BPM | TEMPERATURE: 98 F | HEIGHT: 65 IN | WEIGHT: 226.63 LBS | BODY MASS INDEX: 37.76 KG/M2 | SYSTOLIC BLOOD PRESSURE: 170 MMHG | DIASTOLIC BLOOD PRESSURE: 92 MMHG

## 2019-06-21 DIAGNOSIS — K59.01 SLOW TRANSIT CONSTIPATION: ICD-10-CM

## 2019-06-21 DIAGNOSIS — I10 ESSENTIAL HYPERTENSION: Primary | ICD-10-CM

## 2019-06-21 DIAGNOSIS — M51.36 DDD (DEGENERATIVE DISC DISEASE), LUMBAR: ICD-10-CM

## 2019-06-21 DIAGNOSIS — Z29.9 PREVENTIVE MEASURE: ICD-10-CM

## 2019-06-21 DIAGNOSIS — I48.92 ATRIAL FLUTTER WITH RAPID VENTRICULAR RESPONSE: ICD-10-CM

## 2019-06-21 PROCEDURE — 99999 PR PBB SHADOW E&M-EST. PATIENT-LVL III: CPT | Mod: PBBFAC,HCNC,, | Performed by: INTERNAL MEDICINE

## 2019-06-21 PROCEDURE — 1101F PR PT FALLS ASSESS DOC 0-1 FALLS W/OUT INJ PAST YR: ICD-10-PCS | Mod: HCNC,CPTII,S$GLB, | Performed by: INTERNAL MEDICINE

## 2019-06-21 PROCEDURE — 90632 HEPATITIS A VACCINE ADULT IM: ICD-10-PCS | Mod: HCNC,S$GLB,, | Performed by: INTERNAL MEDICINE

## 2019-06-21 PROCEDURE — 99213 PR OFFICE/OUTPT VISIT, EST, LEVL III, 20-29 MIN: ICD-10-PCS | Mod: HCNC,25,S$GLB, | Performed by: INTERNAL MEDICINE

## 2019-06-21 PROCEDURE — 3078F DIAST BP <80 MM HG: CPT | Mod: HCNC,CPTII,S$GLB, | Performed by: INTERNAL MEDICINE

## 2019-06-21 PROCEDURE — 90471 IMMUNIZATION ADMIN: CPT | Mod: HCNC,S$GLB,, | Performed by: INTERNAL MEDICINE

## 2019-06-21 PROCEDURE — 1101F PT FALLS ASSESS-DOCD LE1/YR: CPT | Mod: HCNC,CPTII,S$GLB, | Performed by: INTERNAL MEDICINE

## 2019-06-21 PROCEDURE — 99213 OFFICE O/P EST LOW 20 MIN: CPT | Mod: HCNC,25,S$GLB, | Performed by: INTERNAL MEDICINE

## 2019-06-21 PROCEDURE — 3077F SYST BP >= 140 MM HG: CPT | Mod: HCNC,CPTII,S$GLB, | Performed by: INTERNAL MEDICINE

## 2019-06-21 PROCEDURE — 90632 HEPA VACCINE ADULT IM: CPT | Mod: HCNC,S$GLB,, | Performed by: INTERNAL MEDICINE

## 2019-06-21 PROCEDURE — 3078F PR MOST RECENT DIASTOLIC BLOOD PRESSURE < 80 MM HG: ICD-10-PCS | Mod: HCNC,CPTII,S$GLB, | Performed by: INTERNAL MEDICINE

## 2019-06-21 PROCEDURE — 3077F PR MOST RECENT SYSTOLIC BLOOD PRESSURE >= 140 MM HG: ICD-10-PCS | Mod: HCNC,CPTII,S$GLB, | Performed by: INTERNAL MEDICINE

## 2019-06-21 PROCEDURE — 90471 HEPATITIS A VACCINE ADULT IM: ICD-10-PCS | Mod: HCNC,S$GLB,, | Performed by: INTERNAL MEDICINE

## 2019-06-21 PROCEDURE — 99999 PR PBB SHADOW E&M-EST. PATIENT-LVL III: ICD-10-PCS | Mod: PBBFAC,HCNC,, | Performed by: INTERNAL MEDICINE

## 2019-06-21 RX ORDER — POLYETHYLENE GLYCOL 3350 17 G/17G
17 POWDER, FOR SOLUTION ORAL DAILY
Qty: 510 G | Refills: 6 | Status: SHIPPED | OUTPATIENT
Start: 2019-06-21 | End: 2020-07-11 | Stop reason: SDUPTHER

## 2019-06-21 RX ORDER — AMLODIPINE BESYLATE 5 MG/1
5 TABLET ORAL DAILY
Qty: 30 TABLET | Refills: 11 | Status: SHIPPED | OUTPATIENT
Start: 2019-06-21 | End: 2020-06-11 | Stop reason: SDUPTHER

## 2019-06-21 RX ORDER — POLYETHYLENE GLYCOL 3350 17 G/17G
POWDER, FOR SOLUTION ORAL DAILY
COMMUNITY

## 2019-06-21 RX ORDER — GABAPENTIN 300 MG/1
300 CAPSULE ORAL NIGHTLY
Qty: 90 CAPSULE | Refills: 3 | Status: SHIPPED | OUTPATIENT
Start: 2019-06-21 | End: 2020-04-13

## 2019-06-21 NOTE — PROGRESS NOTES
"Subjective:      Patient ID: Nathalie Membreno is a 73 y.o. female.    Chief Complaint: Follow-up      HPI  Here for follow up of medical problems.  BPs 160-175/80-90.  Has no smartphone for HTN dig program.  Gabapentin helps back pain, but out of it lately.  BMs better with miralax, but takes on and off.  No b/b.  No cp/sob/palp.    Updated/ annual due 1/20:  HM: 10/18 fluvax, 6/19 today HAV, 6/15 [id number], 11/16 booster [id number], 9/14 TDaP, 5/18 BMD rep 5y, 11/14 Cscope rep 5y, 2/19 MMG, 5/18 Gyn Dr. Gomes, 1/17 Eye Dr. Schrader, 11/16 HCV neg, Card Dr. Garcia? At [facility].     Review of Systems   Constitutional: Negative for chills, diaphoresis and fever.   Respiratory: Negative for cough and shortness of breath.    Cardiovascular: Negative for chest pain, palpitations and leg swelling.   Gastrointestinal: Negative for blood in stool, constipation, diarrhea, nausea and vomiting.   Genitourinary: Negative for dysuria, frequency and hematuria.   Psychiatric/Behavioral: The patient is not nervous/anxious.          Objective:   BP (!) 170/92   Pulse 81   Temp 97.7 °F (36.5 °C) (Oral)   Ht 5' 5" (1.651 m)   Wt 102.8 kg (226 lb 10.1 oz)   SpO2 97%   BMI 37.71 kg/m²     Physical Exam   Constitutional: She is oriented to person, place, and time. She appears well-developed.   HENT:   Mouth/Throat: Oropharynx is clear and moist.   Neck: Neck supple. Carotid bruit is not present. No thyroid mass present.   Cardiovascular: Normal rate, regular rhythm and intact distal pulses. Exam reveals no gallop and no friction rub.   No murmur heard.  Pulmonary/Chest: Effort normal and breath sounds normal. She has no wheezes. She has no rales.   Abdominal: Soft. Bowel sounds are normal. She exhibits no mass. There is no hepatosplenomegaly. There is no tenderness.   Musculoskeletal: She exhibits no edema.   Lymphadenopathy:     She has no cervical adenopathy.   Neurological: She is alert and oriented to person, place, and time. "   Psychiatric: She has a normal mood and affect.           Assessment:       1. Essential hypertension    2. Atrial flutter with rapid ventricular response    3. DDD (degenerative disc disease), lumbar    4. Preventive measure    5. Slow transit constipation          Plan:     Essential hypertension- cont losartan, atenolol, add amlodipine 5mg daily, poss add hctz next visit.  RTC 1mo.  -     amLODIPine (NORVASC) 5 MG tablet; Take 1 tablet (5 mg total) by mouth once daily.  Dispense: 30 tablet; Refill: 11    Atrial flutter with rapid ventricular response- stable on Dig/bb.    DDD (degenerative disc disease), lumbar  -     gabapentin (NEURONTIN) 300 MG capsule; Take 1 capsule (300 mg total) by mouth every evening. Take 45 min to 1 hour before bed as may cause drowsiness  Dispense: 90 capsule; Refill: 3    Preventive measure  -     Hepatitis A Vaccine (Adult) (IM)    Other orders/ constipation.  -     polyethylene glycol (GLYCOLAX) 17 gram/dose powder; Take 17 g by mouth once daily.  Dispense: 510 g; Refill: 6

## 2019-07-08 ENCOUNTER — OFFICE VISIT (OUTPATIENT)
Dept: OPHTHALMOLOGY | Facility: CLINIC | Age: 74
End: 2019-07-08
Payer: MEDICARE

## 2019-07-08 DIAGNOSIS — H52.4 PRESBYOPIA: ICD-10-CM

## 2019-07-08 DIAGNOSIS — H52.203 HYPEROPIC ASTIGMATISM OF BOTH EYES: ICD-10-CM

## 2019-07-08 DIAGNOSIS — H25.13 CATARACT, NUCLEAR SCLEROTIC SENILE, BILATERAL: ICD-10-CM

## 2019-07-08 DIAGNOSIS — H25.013 CATARACT CORTICAL, SENILE, BILATERAL: Primary | ICD-10-CM

## 2019-07-08 DIAGNOSIS — Z13.5 GLAUCOMA SCREENING: ICD-10-CM

## 2019-07-08 PROCEDURE — 92014 PR EYE EXAM, EST PATIENT,COMPREHESV: ICD-10-PCS | Mod: HCNC,S$GLB,, | Performed by: OPTOMETRIST

## 2019-07-08 PROCEDURE — 92015 DETERMINE REFRACTIVE STATE: CPT | Mod: HCNC,S$GLB,, | Performed by: OPTOMETRIST

## 2019-07-08 PROCEDURE — 92015 PR REFRACTION: ICD-10-PCS | Mod: HCNC,S$GLB,, | Performed by: OPTOMETRIST

## 2019-07-08 PROCEDURE — 99999 PR PBB SHADOW E&M-EST. PATIENT-LVL II: ICD-10-PCS | Mod: PBBFAC,HCNC,, | Performed by: OPTOMETRIST

## 2019-07-08 PROCEDURE — 99999 PR PBB SHADOW E&M-EST. PATIENT-LVL II: CPT | Mod: PBBFAC,HCNC,, | Performed by: OPTOMETRIST

## 2019-07-08 PROCEDURE — 92014 COMPRE OPH EXAM EST PT 1/>: CPT | Mod: HCNC,S$GLB,, | Performed by: OPTOMETRIST

## 2019-07-08 NOTE — PROGRESS NOTES
HPI     Last [facility] exam 01/09/2017  Cataract cortical senile, bilateral  Screening for glaucoma  RE  Needs updated Rx glasses broke     Last edited by Fred Estrella MA on 7/8/2019 10:08 AM. (History)            Assessment /Plan     For exam results, see Encounter Report.    Cataract cortical, senile, bilateral    Cataract, nuclear sclerotic senile, bilateral    Glaucoma screening    Hyperopic astigmatism of both eyes    Presbyopia      Mild to moderate NS/cortical cataracts OU without complaint = discussed and will follow.  OH OK OU otherwise.  Spec Rx given.  RTC one year.

## 2019-08-22 LAB
CHOLEST SERPL-MSCNC: 224 MG/DL (ref 0–200)
HDLC SERPL-MCNC: 63 MG/DL
LDLC SERPL CALC-MCNC: 142 MG/DL
NON HDL CHOL (CALC): 161
TRIGLYCERIDE (LIPID PAN): 96

## 2019-08-28 RX ORDER — LOSARTAN POTASSIUM 100 MG/1
TABLET ORAL
Qty: 90 TABLET | Refills: 3 | Status: SHIPPED | OUTPATIENT
Start: 2019-08-28 | End: 2020-08-10 | Stop reason: SDUPTHER

## 2019-09-13 ENCOUNTER — PATIENT OUTREACH (OUTPATIENT)
Dept: ADMINISTRATIVE | Facility: HOSPITAL | Age: 74
End: 2019-09-13

## 2019-09-13 NOTE — PROGRESS NOTES
Health maintenance reviewed.  Care Everywhere checked. Immunizations reviewed and reconciled.  Labs from Care Everywhere enter edited.

## 2019-09-16 RX ORDER — DIPHENHYDRAMINE HCL 25 MG
25 CAPSULE ORAL EVERY 6 HOURS PRN
COMMUNITY
End: 2023-08-01

## 2019-09-16 RX ORDER — DIGOXIN 125 MCG
TABLET ORAL
COMMUNITY
Start: 2019-08-07 | End: 2021-05-05 | Stop reason: SDUPTHER

## 2019-09-16 RX ORDER — ATORVASTATIN CALCIUM 40 MG/1
TABLET, FILM COATED ORAL
Refills: 5 | COMMUNITY
Start: 2019-08-30 | End: 2020-07-28 | Stop reason: SDUPTHER

## 2019-09-26 ENCOUNTER — TELEPHONE (OUTPATIENT)
Dept: FAMILY MEDICINE | Facility: CLINIC | Age: 74
End: 2019-09-26

## 2019-09-26 NOTE — TELEPHONE ENCOUNTER
Pt called about radiating pain from her neck to her left arm. Pt informed to go to the ER. Pt voiced understanding.

## 2019-10-03 ENCOUNTER — TELEPHONE (OUTPATIENT)
Dept: FAMILY MEDICINE | Facility: CLINIC | Age: 74
End: 2019-10-03

## 2019-10-03 NOTE — TELEPHONE ENCOUNTER
----- Message from Julidaiana Contreras sent at 10/3/2019  1:54 PM CDT -----  Contact: Self  Type:  Sooner Apoointment Request    Caller is requesting a sooner appointment.  Caller declined first available appointment listed below.  Caller will not accept being placed on the waitlist and is requesting a message be sent to doctor.  Name of Caller:Nathalie  When is the first available appointment?11/18/19  Symptoms: ER f/u//pain in left shoulder  Would the patient rather a call back or a response via [facility]? call  Best Call Back Number:[phone]  Additional Information:

## 2019-10-07 NOTE — PROGRESS NOTES
"Subjective:      Patient ID: Nathalie Membreno is a 74 y.o. female.    Chief Complaint: left shoulder pain      HPI  Here for follow up of medical problems and f/u of 9/26/19 [facility] ER visit for left shoulder/ back of neck pain.      CT CERVICAL SPINE WO CONTRAST        CLINICAL INDICATION: neck pain with radiation into left arm        COMPARISON: None        TECHNIQUE: A CT scan of the cervical spine was performed without IV contrast. Sagittal and coronal reconstructions were performed. Automated exposure control was used for dose reduction.        FINDINGS: No acute fracture or prevertebral soft tissue swelling. Vertebral body heights and alignment are maintained. Multilevel degenerative disc space narrowing and spondylosis. Multilevel facet osteoarthritis. There is resultant narrowing of the thecal sac at C2-3, C3-4, C4-5, C5-6. There is also bilateral neuroforaminal encroachment at these levels.        Impression: No acute findings. Multilevel degenerative changes.    POWERSCRIBE 360       Given antiinflammatory injection, medrol dose pack, heat.  Still a nagging pain.  Not taking anything except tylenol- helps some.  Numbness has resolved.      Updated/ annual due 1/20:  HM: 10/18 fluvax, 6/19 HAV, 6/15 [id number], 11/16 booster [id number], 9/14 TDaP, 5/18 BMD rep 5y, 11/14 Cscope rep 5y, 2/19 MMG, 5/18 Gyn Dr. Gomes, 1/17 Eye Dr. Schrader, 11/16 HCV neg, Card Dr. Garcia? At [facility].     Review of Systems   Constitutional: Negative for chills, diaphoresis and fever.   Respiratory: Negative for cough and shortness of breath.    Cardiovascular: Negative for chest pain, palpitations and leg swelling.   Gastrointestinal: Negative for blood in stool, constipation, diarrhea, nausea and vomiting.   Genitourinary: Negative for dysuria, frequency and hematuria.   Psychiatric/Behavioral: The patient is not nervous/anxious.          Objective:   Pulse 73   Temp 98.7 °F (37.1 °C) (Tympanic)   Ht 5' 4" (1.626 m)   Wt 100.4 " kg (221 lb 5.5 oz)   SpO2 97%   BMI 37.99 kg/m²     Physical Exam   Constitutional: She is oriented to person, place, and time. She appears well-developed.   HENT:   Mouth/Throat: Oropharynx is clear and moist.   Neck: Neck supple. Carotid bruit is not present. No thyroid mass present.   Cardiovascular: Normal rate, regular rhythm and intact distal pulses. Exam reveals no gallop and no friction rub.   No murmur heard.  Pulmonary/Chest: Effort normal and breath sounds normal. She has no wheezes. She has no rales.   Abdominal: Soft. Bowel sounds are normal. She exhibits no mass. There is no hepatosplenomegaly. There is no tenderness.   Musculoskeletal: She exhibits no edema.   Lymphadenopathy:     She has no cervical adenopathy.   Neurological: She is alert and oriented to person, place, and time.   Psychiatric: She has a normal mood and affect.           Assessment:       1. DDD (degenerative disc disease), cervical    2. Preventive measure    3. Essential hypertension    4. Vitamin D deficiency          Plan:     DDD (degenerative disc disease), cervical- -     Ambulatory referral to Physical Medicine Rehab    Preventive measure- get fluvax at pharm.  RTC 3 mo with labs.  -     CBC auto differential; Future; Expected date: 10/09/2019  -     Comprehensive metabolic panel; Future; Expected date: 10/09/2019  -     Lipid panel; Future; Expected date: 10/09/2019  -     TSH; Future; Expected date: 10/09/2019  -     Vitamin D; Future    Essential hypertension- adeq control, cont to monitor.    Vitamin D deficiency  -     Vitamin D; Future

## 2019-10-09 ENCOUNTER — OFFICE VISIT (OUTPATIENT)
Dept: FAMILY MEDICINE | Facility: CLINIC | Age: 74
End: 2019-10-09
Payer: MEDICARE

## 2019-10-09 VITALS
BODY MASS INDEX: 37.78 KG/M2 | WEIGHT: 221.31 LBS | TEMPERATURE: 99 F | HEIGHT: 64 IN | HEART RATE: 73 BPM | OXYGEN SATURATION: 97 %

## 2019-10-09 DIAGNOSIS — Z29.9 PREVENTIVE MEASURE: ICD-10-CM

## 2019-10-09 DIAGNOSIS — E55.9 VITAMIN D DEFICIENCY: ICD-10-CM

## 2019-10-09 DIAGNOSIS — I10 ESSENTIAL HYPERTENSION: ICD-10-CM

## 2019-10-09 DIAGNOSIS — M50.30 DDD (DEGENERATIVE DISC DISEASE), CERVICAL: Primary | ICD-10-CM

## 2019-10-09 PROCEDURE — 99213 OFFICE O/P EST LOW 20 MIN: CPT | Mod: HCNC,S$GLB,, | Performed by: INTERNAL MEDICINE

## 2019-10-09 PROCEDURE — 99213 PR OFFICE/OUTPT VISIT, EST, LEVL III, 20-29 MIN: ICD-10-PCS | Mod: HCNC,S$GLB,, | Performed by: INTERNAL MEDICINE

## 2019-10-09 PROCEDURE — 99999 PR PBB SHADOW E&M-EST. PATIENT-LVL III: ICD-10-PCS | Mod: PBBFAC,HCNC,, | Performed by: INTERNAL MEDICINE

## 2019-10-09 PROCEDURE — 1101F PT FALLS ASSESS-DOCD LE1/YR: CPT | Mod: HCNC,CPTII,S$GLB, | Performed by: INTERNAL MEDICINE

## 2019-10-09 PROCEDURE — 1101F PR PT FALLS ASSESS DOC 0-1 FALLS W/OUT INJ PAST YR: ICD-10-PCS | Mod: HCNC,CPTII,S$GLB, | Performed by: INTERNAL MEDICINE

## 2019-10-09 PROCEDURE — 99999 PR PBB SHADOW E&M-EST. PATIENT-LVL III: CPT | Mod: PBBFAC,HCNC,, | Performed by: INTERNAL MEDICINE

## 2019-10-09 RX ORDER — ACETAMINOPHEN 500 MG
500 TABLET ORAL EVERY 6 HOURS PRN
COMMUNITY
End: 2024-02-02

## 2020-01-14 NOTE — PROGRESS NOTES
"Subjective:      Patient ID: Nathalie Membreno is a 74 y.o. female.    Chief Complaint: Annual Exam      HPI  Here for f/u medical problems and preventive exam.  Energy good.  Active daily.  No f/c/sw/cough.  No cp/sob/palp.  Was checked by Cards last week, for sob, now cardiac device was "fixed."  BMs normal.  Urine normal.  Taking vit D.  No NSAIDS, only takes tylenol prn.    HM: 10/19 fluvax, 6/19 HAV, 6/15 [id number], 11/16 booster [id number], 9/14 TDaP, 5/18 BMD rep 5y, 11/14 Cscope rep 5y, 2/19 MMG, 5/18 Gyn Dr. Gomes, 1/17 Eye Dr. Schrader, 11/16 HCV neg, Card Dr. Garcia? At [facility].     Review of Systems   Constitutional: Negative for appetite change, chills, diaphoresis and fever.   HENT: Negative for congestion, ear pain, rhinorrhea, sinus pressure and sore throat.    Respiratory: Negative for cough, chest tightness and shortness of breath.    Cardiovascular: Negative for chest pain and palpitations.   Gastrointestinal: Negative for blood in stool, constipation, diarrhea, nausea and vomiting.   Genitourinary: Negative for dysuria, frequency, hematuria, menstrual problem, urgency and vaginal discharge.   Musculoskeletal: Negative for arthralgias.   Skin: Negative for rash.   Neurological: Negative for dizziness and headaches.   Psychiatric/Behavioral: Negative for sleep disturbance. The patient is not nervous/anxious.          Objective:   /68 (BP Location: Left arm, Patient Position: Sitting, BP Method: Medium (Manual))   Pulse 70   Temp 97.7 °F (36.5 °C) (Temporal)   Ht 5' 4" (1.626 m)   Wt 101.8 kg (224 lb 6.9 oz)   SpO2 98%   BMI 38.52 kg/m²     Physical Exam   Constitutional: She is oriented to person, place, and time. She appears well-developed and well-nourished.   HENT:   Right Ear: External ear normal. Tympanic membrane is not injected.   Left Ear: External ear normal. Tympanic membrane is not injected.   Mouth/Throat: Oropharynx is clear and moist.   Eyes: Conjunctivae are normal. "   Neck: Normal range of motion. Neck supple. No thyromegaly present.   Cardiovascular: Normal rate, regular rhythm and intact distal pulses. Exam reveals no gallop and no friction rub.   No murmur heard.  Pulmonary/Chest: Effort normal and breath sounds normal. She has no wheezes. She has no rales. Right breast exhibits no mass, no nipple discharge, no skin change and no tenderness. Left breast exhibits no mass, no nipple discharge, no skin change and no tenderness.   Abdominal: Soft. Bowel sounds are normal. She exhibits no mass. There is no tenderness.   Musculoskeletal: She exhibits no edema.   Lymphadenopathy:     She has no cervical adenopathy.        Right axillary: No lateral adenopathy present.        Left axillary: No lateral adenopathy present.  Neurological: She is alert and oriented to person, place, and time.   Skin: Skin is warm. No rash noted.   Psychiatric: She has a normal mood and affect.       Results for orders placed or performed in visit on 01/21/20   CBC auto differential   Result Value Ref Range    WBC 6.26 3.90 - 12.70 K/uL    RBC 4.66 4.00 - 5.40 M/uL    Hemoglobin 14.1 12.0 - 16.0 g/dL    Hematocrit 45.9 37.0 - 48.5 %    Mean Corpuscular Volume 99 (H) 82 - 98 fL    Mean Corpuscular Hemoglobin 30.3 27.0 - 31.0 pg    Mean Corpuscular Hemoglobin Conc 30.7 (L) 32.0 - 36.0 g/dL    RDW 14.4 11.5 - 14.5 %    Platelets 210 150 - 350 K/uL    MPV 13.8 (H) 9.2 - 12.9 fL    Immature Granulocytes 0.3 0.0 - 0.5 %    Gran # (ANC) 3.3 1.8 - 7.7 K/uL    Immature Grans (Abs) 0.02 0.00 - 0.04 K/uL    Lymph # 2.3 1.0 - 4.8 K/uL    Mono # 0.4 0.3 - 1.0 K/uL    Eos # 0.2 0.0 - 0.5 K/uL    Baso # 0.03 0.00 - 0.20 K/uL    nRBC 0 0 /100 WBC    Gran% 53.1 38.0 - 73.0 %    Lymph% 36.6 18.0 - 48.0 %    Mono% 6.5 4.0 - 15.0 %    Eosinophil% 3.0 0.0 - 8.0 %    Basophil% 0.5 0.0 - 1.9 %    Differential Method Automated    Comprehensive metabolic panel   Result Value Ref Range    Sodium 142 136 - 145 mmol/L    Potassium  4.4 3.5 - 5.1 mmol/L    Chloride 105 95 - 110 mmol/L    CO2 27 23 - 29 mmol/L    Glucose 98 70 - 110 mg/dL    BUN, Bld 12 8 - 23 mg/dL    Creatinine 1.1 0.5 - 1.4 mg/dL    Calcium 9.6 8.7 - 10.5 mg/dL    Total Protein 7.9 6.0 - 8.4 g/dL    Albumin 3.7 3.5 - 5.2 g/dL    Total Bilirubin 0.7 0.1 - 1.0 mg/dL    Alkaline Phosphatase 112 55 - 135 U/L    AST 15 10 - 40 U/L    ALT 13 10 - 44 U/L    Anion Gap 10 8 - 16 mmol/L    eGFR if African American 57.6 (A) >60 mL/min/1.73 m^2    eGFR if non African American 49.9 (A) >60 mL/min/1.73 m^2   Lipid panel   Result Value Ref Range    Cholesterol 172 120 - 199 mg/dL    Triglycerides 91 30 - 150 mg/dL    HDL 58 40 - 75 mg/dL    LDL Cholesterol 95.8 63.0 - 159.0 mg/dL    Hdl/Cholesterol Ratio 33.7 20.0 - 50.0 %    Total Cholesterol/HDL Ratio 3.0 2.0 - 5.0    Non-HDL Cholesterol 114 mg/dL   TSH   Result Value Ref Range    TSH 1.477 0.400 - 4.000 uIU/mL   Vitamin D   Result Value Ref Range    Vit D, 25-Hydroxy 24 (L) 30 - 96 ng/mL         Assessment:       1. Encounter for preventive health examination    2. Vitamin D deficiency    3. Other hyperlipidemia    4. Severe obesity with body mass index (BMI) of 35.0 to 39.9 with comorbidity    5. Essential hypertension    6. Gastroesophageal reflux disease without esophagitis    7. Atrial flutter with rapid ventricular response    8. Screen for colon cancer    9. Encounter for screening mammogram for malignant neoplasm of breast     10. CKD (chronic kidney disease) stage 3, GFR 30-59 ml/min          Plan:     Encounter for preventive health examination- utd.  Discussed pt needs to get Shingrix vaccination at pharmacy.  -     Mammo Digital Screening Bilat; Future; Expected date: 01/28/2020    Vitamin D deficiency- increase to double on the weekends.    Other hyperlipidemia- doing well, cont statin.    Severe obesity with body mass index (BMI) of 35.0 to 39.9 with comorbidity    Essential hypertension- stable, cont rx.    Gastroesophageal  reflux disease without esophagitis    Atrial flutter with rapid ventricular response- per Cards Dr. Weiss.    Screen for colon cancer  -     Case request GI: COLONOSCOPY    Encounter for screening mammogram for malignant neoplasm of breast   -     Mammo Digital Screening Bilat; Future; Expected date: 01/28/2020    CKD (chronic kidney disease) stage 3, GFR 30-59 ml/min- recheck 6mo.  -     Basic metabolic panel; Future; Expected date: 01/28/2020

## 2020-01-21 ENCOUNTER — LAB VISIT (OUTPATIENT)
Dept: LAB | Facility: HOSPITAL | Age: 75
End: 2020-01-21
Attending: INTERNAL MEDICINE
Payer: MEDICARE

## 2020-01-21 DIAGNOSIS — E55.9 VITAMIN D DEFICIENCY: ICD-10-CM

## 2020-01-21 DIAGNOSIS — I10 ESSENTIAL HYPERTENSION: ICD-10-CM

## 2020-01-21 DIAGNOSIS — Z29.9 PREVENTIVE MEASURE: ICD-10-CM

## 2020-01-21 LAB
ALBUMIN SERPL BCP-MCNC: 3.7 G/DL (ref 3.5–5.2)
ALP SERPL-CCNC: 112 U/L (ref 55–135)
ALT SERPL W/O P-5'-P-CCNC: 13 U/L (ref 10–44)
ANION GAP SERPL CALC-SCNC: 10 MMOL/L (ref 8–16)
AST SERPL-CCNC: 15 U/L (ref 10–40)
BASOPHILS # BLD AUTO: 0.03 K/UL (ref 0–0.2)
BASOPHILS NFR BLD: 0.5 % (ref 0–1.9)
BILIRUB SERPL-MCNC: 0.7 MG/DL (ref 0.1–1)
BUN SERPL-MCNC: 12 MG/DL (ref 8–23)
CALCIUM SERPL-MCNC: 9.6 MG/DL (ref 8.7–10.5)
CHLORIDE SERPL-SCNC: 105 MMOL/L (ref 95–110)
CHOLEST SERPL-MCNC: 172 MG/DL (ref 120–199)
CHOLEST/HDLC SERPL: 3 {RATIO} (ref 2–5)
CO2 SERPL-SCNC: 27 MMOL/L (ref 23–29)
CREAT SERPL-MCNC: 1.1 MG/DL (ref 0.5–1.4)
DIFFERENTIAL METHOD: ABNORMAL
EOSINOPHIL # BLD AUTO: 0.2 K/UL (ref 0–0.5)
EOSINOPHIL NFR BLD: 3 % (ref 0–8)
ERYTHROCYTE [DISTWIDTH] IN BLOOD BY AUTOMATED COUNT: 14.4 % (ref 11.5–14.5)
EST. GFR  (AFRICAN AMERICAN): 57.6 ML/MIN/1.73 M^2
EST. GFR  (NON AFRICAN AMERICAN): 49.9 ML/MIN/1.73 M^2
GLUCOSE SERPL-MCNC: 98 MG/DL (ref 70–110)
HCT VFR BLD AUTO: 45.9 % (ref 37–48.5)
HDLC SERPL-MCNC: 58 MG/DL (ref 40–75)
HDLC SERPL: 33.7 % (ref 20–50)
HGB BLD-MCNC: 14.1 G/DL (ref 12–16)
IMM GRANULOCYTES # BLD AUTO: 0.02 K/UL (ref 0–0.04)
IMM GRANULOCYTES NFR BLD AUTO: 0.3 % (ref 0–0.5)
LDLC SERPL CALC-MCNC: 95.8 MG/DL (ref 63–159)
LYMPHOCYTES # BLD AUTO: 2.3 K/UL (ref 1–4.8)
LYMPHOCYTES NFR BLD: 36.6 % (ref 18–48)
MCH RBC QN AUTO: 30.3 PG (ref 27–31)
MCHC RBC AUTO-ENTMCNC: 30.7 G/DL (ref 32–36)
MCV RBC AUTO: 99 FL (ref 82–98)
MONOCYTES # BLD AUTO: 0.4 K/UL (ref 0.3–1)
MONOCYTES NFR BLD: 6.5 % (ref 4–15)
NEUTROPHILS # BLD AUTO: 3.3 K/UL (ref 1.8–7.7)
NEUTROPHILS NFR BLD: 53.1 % (ref 38–73)
NONHDLC SERPL-MCNC: 114 MG/DL
NRBC BLD-RTO: 0 /100 WBC
PLATELET # BLD AUTO: 210 K/UL (ref 150–350)
PMV BLD AUTO: 13.8 FL (ref 9.2–12.9)
POTASSIUM SERPL-SCNC: 4.4 MMOL/L (ref 3.5–5.1)
PROT SERPL-MCNC: 7.9 G/DL (ref 6–8.4)
RBC # BLD AUTO: 4.66 M/UL (ref 4–5.4)
SODIUM SERPL-SCNC: 142 MMOL/L (ref 136–145)
TRIGL SERPL-MCNC: 91 MG/DL (ref 30–150)
TSH SERPL DL<=0.005 MIU/L-ACNC: 1.48 UIU/ML (ref 0.4–4)
WBC # BLD AUTO: 6.26 K/UL (ref 3.9–12.7)

## 2020-01-21 PROCEDURE — 36415 COLL VENOUS BLD VENIPUNCTURE: CPT | Mod: HCNC,PO

## 2020-01-21 PROCEDURE — 85025 COMPLETE CBC W/AUTO DIFF WBC: CPT | Mod: HCNC

## 2020-01-21 PROCEDURE — 80061 LIPID PANEL: CPT | Mod: HCNC

## 2020-01-21 PROCEDURE — 82306 VITAMIN D 25 HYDROXY: CPT | Mod: HCNC

## 2020-01-21 PROCEDURE — 80053 COMPREHEN METABOLIC PANEL: CPT | Mod: HCNC

## 2020-01-21 PROCEDURE — 84443 ASSAY THYROID STIM HORMONE: CPT | Mod: HCNC

## 2020-01-22 LAB — 25(OH)D3+25(OH)D2 SERPL-MCNC: 24 NG/ML (ref 30–96)

## 2020-01-28 ENCOUNTER — OFFICE VISIT (OUTPATIENT)
Dept: FAMILY MEDICINE | Facility: CLINIC | Age: 75
End: 2020-01-28
Payer: MEDICARE

## 2020-01-28 VITALS
HEIGHT: 64 IN | TEMPERATURE: 98 F | WEIGHT: 224.44 LBS | SYSTOLIC BLOOD PRESSURE: 126 MMHG | OXYGEN SATURATION: 98 % | BODY MASS INDEX: 38.32 KG/M2 | HEART RATE: 70 BPM | DIASTOLIC BLOOD PRESSURE: 68 MMHG

## 2020-01-28 DIAGNOSIS — E66.01 SEVERE OBESITY WITH BODY MASS INDEX (BMI) OF 35.0 TO 39.9 WITH COMORBIDITY: ICD-10-CM

## 2020-01-28 DIAGNOSIS — K21.9 GASTROESOPHAGEAL REFLUX DISEASE WITHOUT ESOPHAGITIS: ICD-10-CM

## 2020-01-28 DIAGNOSIS — E55.9 VITAMIN D DEFICIENCY: ICD-10-CM

## 2020-01-28 DIAGNOSIS — Z00.00 ENCOUNTER FOR PREVENTIVE HEALTH EXAMINATION: Primary | ICD-10-CM

## 2020-01-28 DIAGNOSIS — E78.49 OTHER HYPERLIPIDEMIA: ICD-10-CM

## 2020-01-28 DIAGNOSIS — N18.30 CKD (CHRONIC KIDNEY DISEASE) STAGE 3, GFR 30-59 ML/MIN: ICD-10-CM

## 2020-01-28 DIAGNOSIS — Z12.11 SCREEN FOR COLON CANCER: ICD-10-CM

## 2020-01-28 DIAGNOSIS — I10 ESSENTIAL HYPERTENSION: ICD-10-CM

## 2020-01-28 DIAGNOSIS — Z12.31 ENCOUNTER FOR SCREENING MAMMOGRAM FOR MALIGNANT NEOPLASM OF BREAST: ICD-10-CM

## 2020-01-28 DIAGNOSIS — I48.92 ATRIAL FLUTTER WITH RAPID VENTRICULAR RESPONSE: ICD-10-CM

## 2020-01-28 PROCEDURE — 99999 PR PBB SHADOW E&M-EST. PATIENT-LVL III: CPT | Mod: PBBFAC,HCNC,, | Performed by: INTERNAL MEDICINE

## 2020-01-28 PROCEDURE — 99499 RISK ADDL DX/OHS AUDIT: ICD-10-PCS | Mod: S$GLB,,, | Performed by: INTERNAL MEDICINE

## 2020-01-28 PROCEDURE — 3078F PR MOST RECENT DIASTOLIC BLOOD PRESSURE < 80 MM HG: ICD-10-PCS | Mod: HCNC,CPTII,S$GLB, | Performed by: INTERNAL MEDICINE

## 2020-01-28 PROCEDURE — 3074F SYST BP LT 130 MM HG: CPT | Mod: HCNC,CPTII,S$GLB, | Performed by: INTERNAL MEDICINE

## 2020-01-28 PROCEDURE — 99499 UNLISTED E&M SERVICE: CPT | Mod: S$GLB,,, | Performed by: INTERNAL MEDICINE

## 2020-01-28 PROCEDURE — 99397 PER PM REEVAL EST PAT 65+ YR: CPT | Mod: HCNC,S$GLB,, | Performed by: INTERNAL MEDICINE

## 2020-01-28 PROCEDURE — 99999 PR PBB SHADOW E&M-EST. PATIENT-LVL III: ICD-10-PCS | Mod: PBBFAC,HCNC,, | Performed by: INTERNAL MEDICINE

## 2020-01-28 PROCEDURE — 99397 PR PREVENTIVE VISIT,EST,65 & OVER: ICD-10-PCS | Mod: HCNC,S$GLB,, | Performed by: INTERNAL MEDICINE

## 2020-01-28 PROCEDURE — 3074F PR MOST RECENT SYSTOLIC BLOOD PRESSURE < 130 MM HG: ICD-10-PCS | Mod: HCNC,CPTII,S$GLB, | Performed by: INTERNAL MEDICINE

## 2020-01-28 PROCEDURE — 3078F DIAST BP <80 MM HG: CPT | Mod: HCNC,CPTII,S$GLB, | Performed by: INTERNAL MEDICINE

## 2020-01-29 ENCOUNTER — TELEPHONE (OUTPATIENT)
Dept: ENDOSCOPY | Facility: HOSPITAL | Age: 75
End: 2020-01-29

## 2020-01-29 NOTE — TELEPHONE ENCOUNTER
Attempted to schedule procedure with patient, no answer, and per recording unable to leave a message.

## 2020-02-28 ENCOUNTER — HOSPITAL ENCOUNTER (OUTPATIENT)
Dept: RADIOLOGY | Facility: HOSPITAL | Age: 75
Discharge: HOME | End: 2020-02-28
Attending: INTERNAL MEDICINE
Payer: MEDICARE

## 2020-02-28 VITALS — WEIGHT: 224.44 LBS | HEIGHT: 64 IN | BODY MASS INDEX: 38.32 KG/M2

## 2020-02-28 DIAGNOSIS — Z00.00 ENCOUNTER FOR PREVENTIVE HEALTH EXAMINATION: ICD-10-CM

## 2020-02-28 DIAGNOSIS — Z12.31 ENCOUNTER FOR SCREENING MAMMOGRAM FOR MALIGNANT NEOPLASM OF BREAST: ICD-10-CM

## 2020-02-28 PROCEDURE — 77067 SCR MAMMO BI INCL CAD: CPT | Mod: 26,HCNC,, | Performed by: RADIOLOGY

## 2020-02-28 PROCEDURE — 77067 SCR MAMMO BI INCL CAD: CPT | Mod: TC,HCNC

## 2020-02-28 PROCEDURE — 77067 MAMMO DIGITAL SCREENING BILAT WITH CAD: ICD-10-PCS | Mod: 26,HCNC,, | Performed by: RADIOLOGY

## 2020-03-03 ENCOUNTER — HOSPITAL ENCOUNTER (OUTPATIENT)
Dept: RADIOLOGY | Facility: HOSPITAL | Age: 75
Discharge: HOME | End: 2020-03-03
Attending: INTERNAL MEDICINE
Payer: MEDICARE

## 2020-03-03 DIAGNOSIS — R92.8 ABNORMAL MAMMOGRAM: ICD-10-CM

## 2020-03-03 PROCEDURE — 77061 BREAST TOMOSYNTHESIS UNI: CPT | Mod: 26,HCNC,, | Performed by: RADIOLOGY

## 2020-03-03 PROCEDURE — 77065 MAMMO DIGITAL DIAGNOSTIC RIGHT WITH TOMOSYNTHESIS_CAD: ICD-10-PCS | Mod: 26,HCNC,, | Performed by: RADIOLOGY

## 2020-03-03 PROCEDURE — 77065 DX MAMMO INCL CAD UNI: CPT | Mod: TC,HCNC

## 2020-03-03 PROCEDURE — 77065 DX MAMMO INCL CAD UNI: CPT | Mod: 26,HCNC,, | Performed by: RADIOLOGY

## 2020-03-03 PROCEDURE — 77061 MAMMO DIGITAL DIAGNOSTIC RIGHT WITH TOMOSYNTHESIS_CAD: ICD-10-PCS | Mod: 26,HCNC,, | Performed by: RADIOLOGY

## 2020-04-13 RX ORDER — GABAPENTIN 300 MG/1
CAPSULE ORAL
Qty: 90 CAPSULE | Refills: 3 | Status: SHIPPED | OUTPATIENT
Start: 2020-04-13 | End: 2021-06-23 | Stop reason: SDUPTHER

## 2020-05-18 ENCOUNTER — PES CALL (OUTPATIENT)
Dept: ADMINISTRATIVE | Facility: CLINIC | Age: 75
End: 2020-05-18

## 2020-06-16 ENCOUNTER — TELEPHONE (OUTPATIENT)
Dept: GASTROENTEROLOGY | Facility: CLINIC | Age: 75
End: 2020-06-16

## 2020-07-07 ENCOUNTER — PATIENT OUTREACH (OUTPATIENT)
Dept: ADMINISTRATIVE | Facility: HOSPITAL | Age: 75
End: 2020-07-07

## 2020-07-07 DIAGNOSIS — Z86.73 HISTORY OF STROKE: Primary | ICD-10-CM

## 2020-07-07 NOTE — PROGRESS NOTES
PreVisit Chart Audit Performed    updated with recent information     Colonoscopy Ordered   OPCM Ordered   Pt states she does not have a cell phone for the Dig Med Program       Ashley HARP LPN Care Coordinator  Care Coordination Department  [facility]  [phone]

## 2020-07-08 ENCOUNTER — OUTPATIENT CASE MANAGEMENT (OUTPATIENT)
Dept: ADMINISTRATIVE | Facility: OTHER | Age: 75
End: 2020-07-08

## 2020-07-08 NOTE — PROGRESS NOTES
Outpatient Care Management  Initial Patient Assessment    Patient: Nathalie Membreno  MRN: [id number]  Date of Service: 07/08/2020  Completed by: Divina Wolff RN  Referral Date: 07/07/2020  Program: Case Management (High Risk)    Reason for Visit   Patient presents with    [facility] Enrollment Call    Initial Assessment    PHQ-9    Plan Of Care       Brief Summary: This 73 y/o female was referred to [facility] by her PCP related to hx of CVA. Other diagnoses on her problem list include DDD, HTN, a flutter, obesity and diverticulosis. Upon phone contact with Mrs Membreno, [facility] program was explained and she agreed to participate in the program. She reports she lives alone in her own home. She does not use any type of assistive device for ambulation and has had no falls in the past 12 months. She takes all her meds out of the bottles as prescribed. She initially reports she does not follow any type of diet restrictions, but when questioned about low Na and low cholesterol restrictions, she reports she tries to adhere to both of these. She does not know what her most concerning health issue is currently. This CM advised her PCP referred her to [facility] for diagnosis of hx of CVA so will plan to send her some educational literature related to this. She advised she has no hand rails at steps up to front porch and no grab bars around her bathtub and feels it would be beneficial to her to have both of these. Advised this CM will refer her to the [facility] LCSW to assist with accessing any community resources that may be available to provide these. Advised this CM's mail out will include an advanced directive packet per her request, as well as a [employer] OTC booklet as she is not currently accessing that benefit and does not have an order booklet. The [employer] COVID meal benefit was explained and contact number was provided as pt expressed desire to access this meal benefit. Advised this CM will follow up in about a week and a half to  see if she has rec'd mailed literature and has any questions about it. She voiced understanding and agreement with this plan for follow up.     Assessment Documentation     OPCM Initial Assessment    Involvement of Care  Do I have permission to speak with other family members about your care?: Yes (Comment: Son, Almas)  Assessment completed by: Patient  Patient Reported Insurance  Verified current insurance plan: [employer] Medicare Advantage  [employer] benefits discussed: Mail Order Pharmacy, OTC prescription discounts, Transportation, Silver Sneakers, Well Dine (Comment: Also provided pt with COVID meal delivery number. )  Current Health Status  Patient Health Rating  Compared to other people your age, how would you rate your health?: Fair  Patient Reported Labs & Vitals  Any patient reported labs and/or vitals?: No  Social Determinants  Advanced Care Planning  Do you have any of the following?: None  If yes, do we have a copy?: N/A  If no, would the patient like Advance Directive resources?: Yes  Advanced Care Planning resources provided?: Yes  Is Advanced Care Planning an area of need?: Yes  Support  Caregiver presence?: No  Present activity level: not limited in any of these ways  Who takes you to your medical appointments?: patient  Housing  Living arrangements: alone  Number of people in home: 1  Type of residence: single family home  Own or rent?: own  Permanent residence?: yes  Does the patient's residence have any of the following?: more than 2 stairs to enter the residence (Comment: Has no handrails at steps to front porch and no grab bars around tub.)  Is housing an area of need?: yes (Comment: Will refer to [facility] for accessing any available home modification resources. )  Access to [employer]  Does the patient have access to any of the following devices or technologies?: none  Clinical Assessment  Medication Adherence  How does the patient obtain their medications?: patient drives  How many days  a week do you miss medications?: never  Do you use a pill box or medication chart to help you manage your medications?: no  Do you sometimes have difficulty refilling your medications?: no  Medication reconciliation completed?: No  Is medication adherence an area of need?: No  *Active medication list was reviewed and reconciled with patient and/or caregiver:   Nutritional Status  Diet: low sodium, low fat, low cholesterol  Change in appetite?: no  Dentition: N/A  Is nutrition an area of need?: no  Labs  Do you have regular lab work to monitor your medications?: no  Where do you get your lab work done?: n/a  Is lab adherence an area of need?: no  PHQ Depression Screen  Does patient's PHQ Depression Screening indicate a barrier to meeting self-care needs?: No  Cognitive/Behavioral Health  Alert and oriented?: yes  Difficulty thinking?: no  Requires prompting?: no  Requires assistance for routine expression?: no  Is Cognitive/Behavioral health an area of need?: no  Culture/Congregation  Does patient have cultural or Yazidi beliefs that may impact ability to access healthcare?: no  Communication  Language preference: English   needed?: no  Hearing problems?: no  Decreased vision?: yes (Comment: Has R eye cataract that needs extraction. )  Legally blind?: no  Vision assistance: glasses  Is Communication an area of need?: no  Health Literacy  Preferred learning method: reading materials  How often do you need to have someone help you read instructions, pamphlets, or other written material from your doctor or pharmacy?: never  Is there a Health Literacy need?: no  Activities of Daily Living  Ambulation: independent  Dressing: independent  Bathing: independent  Transfers: independent  Toileting: independent  Feeding: independent  Cleaning home/chores: independent  Telephone use: independent  Shopping/attending doctors' appointments: independent  Paying bills: independent  Taking meds: independent  Climbing stairs:  assistance required  Fall Risk  Patient mobility status: Ambulatory  Number of falls in the past 12 months: 0  Fall risk?: No  Equipment/Current Services  Equipment/supplies used in home: hearing or visual assistive devices  Current services: n/a  Is Equipment/Supplies/Services an area of need?: no  Community & Government Programs  Support: none  [facility] Office of Aging and Adult Services: N/A  Community Resource Assessment  Based on the assessment of needs: Patient is in need of community resources (Comment: Will refer to LCSW to help access any available home modification resources. )  hospitals  consulted to assist with the following: other (see comment) (Comment: Will refer to LCSW to help access any available home modification resources. )  Completion of Initial Assessment  Is the Initial Assessment Complete at this time?: yes         Problem List and History     Patient Active Problem List   Diagnosis    Essential hypertension    Other hyperlipidemia    GERD (gastroesophageal reflux disease)    Allergy    DDD (degenerative disc disease), lumbar    Vitamin D deficiency    Bilateral carpal tunnel syndrome    Tortuous aorta    Severe obesity with body mass index (BMI) of 35.0 to 39.9 with comorbidity    Atrial flutter with rapid ventricular response    History of stroke    Diverticulosis of large intestine without hemorrhage    History of colon polyps    Slow transit constipation       Reviewed Active Problem List with patient and/or Caregiver. The following were identified as areas of need: cerebrovascular disease    Medical History:  Reviewed medical history with patient and/or caregiver    Social History:  Reviewed social history with patient and/or caregiver    Complex Care Plan    Care plan was discussed and completed today with input from patient and/or caregiver.      Patient Instructions     Instructions were provided via the [employer] patient resources and are available for the patient to  view on the patient portal, if active.    Next steps: Follow up in around 10 days. Review mailed literature and answer any questions she has related to it. Divina Wolff RN    No follow-ups on file.    Todays OPCM Self-Management Care Plan was developed with the patients/caregivers input and was based on identified barriers from todays assessment.  Goals were written today with the patient/caregiver and the patient has agreed to work towards these goals to improve his/her overall well-being. Patient verbalized understanding of the care plan, goals, and all of today's instructions. Encouraged patient/caregiver to communicate with his/her physician and health care team about health conditions and the treatment plan.  Provided my contact information today and encouraged patient/caregiver to call me with any questions as needed.

## 2020-07-08 NOTE — LETTER
July 8, 2020    Nathalie DONNA Temi  [address] [place] [address]             [facility]  [address]  [place] [address] Welcome to [facility]s Complex Care Management Program.  It was a pleasure talking with you today.  My name is Divina Wolff. I look forward to working with you as your .  My goal is to help you function at the healthiest and highest level possible.  You can contact me directly at [phone].    As an [facility] patient with [employer], some of the services we may be able to provide include:      Development of an individualized care plan with a Registered Nurse    Connection with a    Connection with available resources and services     Coordinate communication among your care team members    Provide coaching and education    Help you understand your doctors treatment plan   Help you obtain information about your insurance coverage.     All services provided by [facility]s Complex Care Managers and other care team members are coordinated with and communicated to your primary care team.    As part of your enrollment, you will be receiving education materials and more information about these services in your [facility] account, by phone or through the mail.  If you do not wish to participate or receive information, please contact our office at [phone].      Sincerely,        Divina Wolff RN, CCM [facility]   Out-patient RN Complex Care Manager

## 2020-07-08 NOTE — PATIENT INSTRUCTIONS
Stroke and Heart Disease  Every part of your body, including your heart and your brain, needs oxygen to work. Oxygen is carried in the blood. Blood vessels called arteries carry oxygen-rich blood throughout the body. Both heart attack and stroke are due to problems in the arteries. The same factors that cause heart disease can make you more likely to have a stroke.  · Heart attack. A heart attack is caused by blockage in an artery that carries blood to the heart muscle. If blood is blocked, that part of the heart muscle is damaged or dies.  · Stroke. If an artery supplying the brain is blocked, a stroke may result. This is called an ischemic stroke. It is caused by a piece of plaque breaking loose from an artery (such as a carotid artery in the neck) or from the heart and lodging in the brain. A stroke caused by the rupture of a weakened blood vessel is called a hemorrhagic stroke.  Both heart attack and stroke are medical emergencies that can lead to serious health problems. They can even be fatal.      Healthy artery  A healthy artery is a tube with flexible walls and a smooth inner lining. Blood flows freely through it.  Unhealthy artery  Artery problems start when the inner lining gets damaged. This is often due to risk factors such as smoking and high blood pressure. These can make the artery walls stiff. Plaque, a fatty mix of cholesterol and other material, forms in the lining. This narrows the channel. Plaque can break, restricting blood flow further. It can also cause a blood clot to form. A blood clot may block the arterys channel completely.   Reducing your risk  Making changes that make your arteries healthier will help lower your risk for both heart attack and stroke. If you have heart disease, you may need to work on a few aspects of your lifestyle. But remember that the things that are good for your arteries, heart, and brain are also good for the rest of your body.  Your health care provider will  work with you to modify lifestyle factors as needed to help prevent progression of atherosclerotic cardiovascular disease. This can lead to heart attack or stroke. Factors you may need to work on include:  · Diet. Your health care provider will give you information on dietary changes that you may need to make based on your situation. Your provider may recommend that you see a registered dietitian for help with diet changes. Changes may include:  ¨ Reducing fat and cholesterol intake  ¨ Reducing sodium (salt) intake, especially if you have high blood pressure  ¨ Increasing your intake of fresh vegetables and fruits  ¨ Eating lean proteins, such as fish, poultry, and legumes (beans and peas) and eating less red meat and processed meats  ¨ Using low- or no-fat diary products  ¨ Using vegetable and nut oils in limited amounts  ¨ Limiting sweets and processed foods such as chips, cookies, and baked goods  · Physical activity. Your health care provider may recommend that you increase your physical activity if you have not been as active as possible. Depending on your situation, your provider may advise you to include moderate to vigorous intensity activity for at least 40 minutes each day for at least 3 to 4 days per week. Examples of moderate to vigorous activity include:  ¨ Walking at a brisk pace, about 3 to 4 miles per hour  ¨ Jogging or running  ¨ Swimming or water aerobics  ¨ Hiking  ¨ Dancing  ¨ Martial arts  ¨ Tennis  ¨ Riding a bike or a stationary bike  · Weight management. If you are overweight or obese, your health care provider will work with you to lose weight and lower your BMI (body mass image) to a normal or near-normal level. Making dietary changes and increasing physical activity can help.  · Smoking. If you smoke, break the smoking habit. Enroll in a stop-smoking program to improve your chances of success.  · Stress. Learn stress management techniques to help you deal with stress in your home and work  life.  Date Last Reviewed: 6/8/2015  © 8729-6003 [employer]. [address] [place] [address], [place] [address]. All rights reserved. This information is not intended as a substitute for professional medical care. Always follow your healthcare professional's instructions.        Stroke: Taking Medications  Your doctor has given you medications to reduce the risk of a stroke. But they wont help unless you take them as prescribed. This sheet explains why and how to take your medications.     Be sure to refill prescriptions before they run out.     How your medications help you  · They make you feel better so you can do more things you enjoy.  · They keep your blood from clotting, which helps to prevent stroke.  Types of medications  Many types of medications can help prevent stroke. You may be prescribed 1 or more of the following:  · Anticoagulant (blood thinning) medications help prevent blood clots from forming. If you take a blood thinner, you may need regular blood tests.  · Antiplatelets, such as aspirin, are prescribed for many stroke patients. They make blood clots less likely to form. Aspirin is available over the counter.    · Blood pressure medications help lower high blood pressure. You may need to take more than one blood pressure medication..   · Cholesterol-lowering drugs make plaque less likely to build up in your artery walls, which can decrease the risk of stroke.  · Heart medications can treat certain heart problems that increase your risk of stroke.  · Diabetes medications adjust blood sugar levels. This can prevent problems that lead to stroke.  Know which medications you take  To help keep my blood from clotting,  I take:__________________________________________  To keep my blood pressure lower so its easier for my heart to pump,  I take:__________________________________________  Medication tips  Below are tips for taking medication. Keep in mind that most medications need to be taken  every day -- even when you feel fine. Ask your doctor if you need to avoid certain foods or alcohol. Also mention if you have problems affording medication.  · Have a routine. Take medication at the same time each day. Use reminders to help stay on track. Some people find using a pill box to organize medications helpful for this.   · Take all your medications. Some medications work best when used with others. Dont take one type and skip another.  · Plan ahead. Refill prescriptions before they run out. Be sure to take medications along if you travel.  · Never change your dosage or stop taking medication on your own. And if you miss a pill, dont take 2 the next time.  · Tell your doctor if any medication causes side effects. Your doctor may change your dose or prescribe a new medication.  · Carry a list of your medications. Bring the list to appointments with health care providers.  For family and friends  Medications can play a key role in preventing stroke. This is especially true for people who have already experienced stroke or transient ischemic attack (TIA). To provide support:  · Make sure your loved one knows how the medications work and when to take them. Check often to ensure theyre taken as directed.  · Know whether any medication reacts with certain foods or alcohol.  · Watch for side effects. Call the doctor if any medication causes excess bruising, nosebleeds, dizziness, or blurred vision.  Call your doctor   Contact your doctor right away if you:  · Have side effects, such as dizziness, nausea, muscle cramps, headache, coughing, swelling, or a skin rash.  · Are gaining weight.  · Miss a dose of any of your medications for a prolonged length of time.   Date Last Reviewed: 6/8/2015  © 7170-1100 [employer]. [address] [place] [address]. All rights reserved. This information is not intended as a substitute for professional medical care. Always follow your healthcare  professional's instructions.        What Is Ischemic Stroke?  The brain needs a constant supply of blood to work. During a stroke, blood stops flowing to part of the brain. The affected area is damaged. Its functions are harmed or even lost. Most strokes are caused by a blockage in a blood vessel that supplies the brain. They can also occur if a blood vessel in the brain ruptures (bursts open).     The carotid arteries carry blood from the heart to the brain.   From the heart to the brain  The heart is a pump. It sends oxygen-rich blood out through blood vessels called arteries. If an artery between the heart and the brain is blocked, the brain cant get enough oxygen. Some artery blockages are caused by fatty deposits (plaque). Arteries can also be blocked by blood clots. Some clots form on the plaque. Others can form in the heart -- especially in people with atrial fibrillation, an irregular heart rhythm. If a piece of plaque or clot breaks off and enters the bloodstream, it can flow to the brain and cause a stroke.  How a stroke occurs  Ischemic stroke occurs when an artery that supplies the brain is greatly narrowed or blocked. This can be caused by a buildup of plaque. It can also occur when small pieces of plaque or blood clot (called emboli) break off from the blood vessel or heart into the bloodstream. The emboli flow in the blood until they get stuck in a small blood vessel in the brain.  Healthy Arteries   Damaged Arteries     Healthy arteries. In a healthy artery, the lining of the artery wall is smooth. This lets blood flow freely from the heart to the rest of the body. The brain gets all the blood it needs to function well.  Damaged arteries. High blood pressure, cigarette smoking, or other problems can roughen artery walls. This allows plaque to build up in the walls. Blood clots may also form on the plaque. This can narrow the artery and limit blood flow.   Date Last Reviewed: 6/8/2015  © 8026-4012  [employer]. [address] [place] [place] [address], [place] [address]. All rights reserved. This information is not intended as a substitute for professional medical care. Always follow your healthcare professional's instructions.

## 2020-07-15 ENCOUNTER — OUTPATIENT CASE MANAGEMENT (OUTPATIENT)
Dept: ADMINISTRATIVE | Facility: OTHER | Age: 75
End: 2020-07-15

## 2020-07-15 NOTE — PROGRESS NOTES
Summary: HANSA received referral from OPCM RN for assistance with home modifications for safety. Phoned patient, completed assessment and plan of care. Patient is a 73 yo  female who lives alone in a home which she owns. She has multiple co-morbidities including hx of CVA, DDD, HTN, a flutter, obesity and diverticulosis. Patient has a monthly income of $916 and receives $19 in SNAP benefits. Prior to the COVID-19 restrictions, she attended [facility] services. She has accessed food services, utility assistance and home repairs through the [facility]. Recently she was referred for assistance with goldy leaks by the [facility]. She is unsure of the program but she completed the application process and a goldy company came to her home but she has not heard from them.  agreed to research the program for her. Discussed need for hand rails and grab bars in the bathroom. Provided information about services through [facility], information to be mailed to the patient.   Patient stated that she does not have an advance directives, information previously mailed to her by OPCM RN; however, she has not received the information. Patient agreed to discuss after receiving information.   Patient agreed to follow up next week regarding resources for home modification.     Outpatient Care Management   - High Risk Patient Assessment    Patient: Nathalie Membreno  MRN:  [id number]  Date of Service:  7/15/2020  Completed by:  Betzy Petersen LCSW  Referral Date: 07/07/2020  Program: Case Management (High Risk)    Reason for Visit   Patient presents with    Social Work Assessment - High Risk     7/15/20    Plan Of Care     7/25/20       Patient Summary     [facility] Social Work Assessment (High Risk)    Involvement of Care  Do I have permission to speak with other family members about your care?: Yes (Comment: sisterPatricia, [phone])  Assessment completed by: Patient  Cognitive  Which of the  following can you state?: Name, Date of birth, Address, Year, President  Cognitive barriers?: No  General  Marital status:   Current employment status: Retired and not working  Support  Level of Caregiver support: Member independent and does not need caregiver assistance  Support system: Children  Is the caregiver reporting burnout?: No  Support Barriers?: No  Advanced Care Planning  Do you have any of the following?: None  If yes, do we have a copy?: N/A  If no, would you like Advance Directive resources?: Yes (Comment: Mailed by OPCM RN)  Advance Care Planning resources provided?: Yes  Is Advance Care Planning an area of need?: Yes  Financial  Current medical coverage: SNP  Have you applied for government assistance programs?: Yes  Are you unable to pay any of the following?: Utilities, Food  Gross monthly income: 916  Other assets: COA, SNAP  Financial Support Barriers?: Yes  Safety  Significant change in functioning?: Disease progression  Safety barriers?: Yes  Special safety issues: Housing   History  Do you or your spouse currently or formerly serve in the ?: No  Disaster Plan  Established evacuation plan?: Yes  [place] residence: [place]  Evacuation location: with family  Registered for evacuation?: No  Disaster plan:  (Comment: NA)  Ability to evacuate: Able to ride bus  Mental Health Status  Emotional status: Telephonic/Unable to assess  Have you recenetly lost a loved one?: No  Psychiatric diagnosis: None  Current mental health treatment: No  Would you like mental health resources?: No  Current symptoms: None  Mental/Behavioral/Environmental risk: None  Mental Health Barriers?: No  Addictive Behaviors  Current alcohol consumption?: No  Current substance abuse?: No  Gambling habits?: No  Was the PHQ depression screening completed?: Yes  Was the GOYO-7 completed?: No  Resources  Support: Senior Centers  Food: Government food assistance (see comment) (Comment: SNAP)  Housing: Home  modifications/repairs         Complex Care Plan     Care plan was discussed and completed today with input from patient and/or caregiver.        Patient Instructions     Follow up in about 1 week (around 7/22/2020) for follow up on home repair resources and education about advanced directives. .    Todays OPCM Self-Management Care Plan was developed with the patients/caregivers input and was based on identified barriers from todays assessment.  Goals were written today with the patient/caregiver and the patient has agreed to work towards these goals to improve his/her overall well-being. Patient verbalized understanding of the care plan, goals, and all of today's instructions. Encouraged patient/caregiver to communicate with his/her physician and health care team about health conditions and the treatment plan.  Provided my contact information today and encouraged patient/caregiver to call me with any questions as needed.

## 2020-07-20 ENCOUNTER — OUTPATIENT CASE MANAGEMENT (OUTPATIENT)
Dept: ADMINISTRATIVE | Facility: OTHER | Age: 75
End: 2020-07-20

## 2020-07-20 NOTE — PROGRESS NOTES
Outpatient Care Management  Plan of Care Follow Up Visit    Patient: Nathalie Membreno  MRN: [id number]  Date of Service: 07/20/2020  Completed by: Divina Wolff RN  Referral Date: 07/07/2020  Program: Case Management (High Risk)    Reason for Visit   Patient presents with    Update Plan Of Care       Brief Summary: Phone contact with pt. She has not yet rec'd mailed literature, but states she will review it when it arrives. Reviewed upcoming appts and she is aware of all with plans to attend. Advised mail out includes educational literature about cerebrovascular disease related to her hx of CVA. Advised this CM will follow up next week and she agreed to this follow up plan.     Patient Summary     Involvement of Care:  Do I have permission to speak with other family members about your care?   Yes, son, Almas    Patient Reported Labs & Vitals:  1.  Any Patient Reported Labs & Vitals?   No  2.  Patient Reported Blood Pressure:     3.  Patient Reported Pulse:     4.  Patient Reported Weight (Kg):     5.  Patient Reported Blood Glucose (mg/dl):       Medical and social history was reviewed with patient and/or caregiver.     Clinical Assessment     Reviewed and provided basic information on available community resources for mental health, transportation, wellness resources, and palliative care programs with patient and/or caregiver.     Complex Care Plan     Care plan was discussed and completed today with input from patient and/or caregiver.    Patient Instructions     Instructions were provided via the [facility] patient resources and are available for the patient to view on the patient portal.    Next Steps: Follow up next week. Review care plan tasks related to cerebrovascular disease and complete medication reconciliation. Divina Wolff RN    No follow-ups on file.    Todays OPC Self-Management Care Plan was developed with the patients/caregivers input and was based on identified barriers from todays  assessment.  Goals were written today with the patient/caregiver and the patient has agreed to work towards these goals to improve his/her overall well-being. Patient verbalized understanding of the care plan, goals, and all of today's instructions. Encouraged patient/caregiver to communicate with his/her physician and health care team about health conditions and the treatment plan.  Provided my contact information today and encouraged patient/caregiver to call me with any questions as needed.

## 2020-07-21 ENCOUNTER — OFFICE VISIT (OUTPATIENT)
Dept: FAMILY MEDICINE | Facility: CLINIC | Age: 75
End: 2020-07-21
Payer: MEDICARE

## 2020-07-21 ENCOUNTER — LAB VISIT (OUTPATIENT)
Dept: LAB | Facility: HOSPITAL | Age: 75
End: 2020-07-21
Attending: INTERNAL MEDICINE
Payer: MEDICARE

## 2020-07-21 VITALS
TEMPERATURE: 98 F | BODY MASS INDEX: 38.2 KG/M2 | HEIGHT: 64 IN | DIASTOLIC BLOOD PRESSURE: 78 MMHG | WEIGHT: 223.75 LBS | OXYGEN SATURATION: 98 % | SYSTOLIC BLOOD PRESSURE: 148 MMHG | HEART RATE: 60 BPM

## 2020-07-21 DIAGNOSIS — Z86.73 HISTORY OF TIA (TRANSIENT ISCHEMIC ATTACK): ICD-10-CM

## 2020-07-21 DIAGNOSIS — E66.01 SEVERE OBESITY WITH BODY MASS INDEX (BMI) OF 35.0 TO 39.9 WITH COMORBIDITY: ICD-10-CM

## 2020-07-21 DIAGNOSIS — I48.92 ATRIAL FLUTTER WITH RAPID VENTRICULAR RESPONSE: ICD-10-CM

## 2020-07-21 DIAGNOSIS — I10 ESSENTIAL HYPERTENSION: ICD-10-CM

## 2020-07-21 DIAGNOSIS — E55.9 VITAMIN D DEFICIENCY: ICD-10-CM

## 2020-07-21 DIAGNOSIS — K21.9 GASTROESOPHAGEAL REFLUX DISEASE WITHOUT ESOPHAGITIS: ICD-10-CM

## 2020-07-21 DIAGNOSIS — Z00.00 ENCOUNTER FOR PREVENTIVE HEALTH EXAMINATION: Primary | ICD-10-CM

## 2020-07-21 DIAGNOSIS — E78.49 OTHER HYPERLIPIDEMIA: ICD-10-CM

## 2020-07-21 DIAGNOSIS — Z95.810 PRESENCE OF CARDIAC DEFIBRILLATOR: ICD-10-CM

## 2020-07-21 DIAGNOSIS — M48.061 SPINAL STENOSIS OF LUMBAR REGION, UNSPECIFIED WHETHER NEUROGENIC CLAUDICATION PRESENT: ICD-10-CM

## 2020-07-21 DIAGNOSIS — N18.30 CKD (CHRONIC KIDNEY DISEASE) STAGE 3, GFR 30-59 ML/MIN: ICD-10-CM

## 2020-07-21 DIAGNOSIS — Z86.010 HISTORY OF COLON POLYPS: ICD-10-CM

## 2020-07-21 DIAGNOSIS — I77.1 TORTUOUS AORTA: ICD-10-CM

## 2020-07-21 DIAGNOSIS — K59.01 SLOW TRANSIT CONSTIPATION: ICD-10-CM

## 2020-07-21 PROCEDURE — G0439 PR MEDICARE ANNUAL WELLNESS SUBSEQUENT VISIT: ICD-10-PCS | Mod: HCNC,S$GLB,, | Performed by: NURSE PRACTITIONER

## 2020-07-21 PROCEDURE — 36415 COLL VENOUS BLD VENIPUNCTURE: CPT | Mod: HCNC,PO

## 2020-07-21 PROCEDURE — G0439 PPPS, SUBSEQ VISIT: HCPCS | Mod: HCNC,S$GLB,, | Performed by: NURSE PRACTITIONER

## 2020-07-21 PROCEDURE — 99499 UNLISTED E&M SERVICE: CPT | Mod: S$GLB,,, | Performed by: NURSE PRACTITIONER

## 2020-07-21 PROCEDURE — 3077F PR MOST RECENT SYSTOLIC BLOOD PRESSURE >= 140 MM HG: ICD-10-PCS | Mod: HCNC,CPTII,S$GLB, | Performed by: NURSE PRACTITIONER

## 2020-07-21 PROCEDURE — 99499 RISK ADDL DX/OHS AUDIT: ICD-10-PCS | Mod: S$GLB,,, | Performed by: NURSE PRACTITIONER

## 2020-07-21 PROCEDURE — 80048 BASIC METABOLIC PNL TOTAL CA: CPT | Mod: HCNC

## 2020-07-21 PROCEDURE — 3078F PR MOST RECENT DIASTOLIC BLOOD PRESSURE < 80 MM HG: ICD-10-PCS | Mod: HCNC,CPTII,S$GLB, | Performed by: NURSE PRACTITIONER

## 2020-07-21 PROCEDURE — 99999 PR PBB SHADOW E&M-EST. PATIENT-LVL IV: CPT | Mod: PBBFAC,HCNC,, | Performed by: NURSE PRACTITIONER

## 2020-07-21 PROCEDURE — 99999 PR PBB SHADOW E&M-EST. PATIENT-LVL IV: ICD-10-PCS | Mod: PBBFAC,HCNC,, | Performed by: NURSE PRACTITIONER

## 2020-07-21 PROCEDURE — 3078F DIAST BP <80 MM HG: CPT | Mod: HCNC,CPTII,S$GLB, | Performed by: NURSE PRACTITIONER

## 2020-07-21 PROCEDURE — 3077F SYST BP >= 140 MM HG: CPT | Mod: HCNC,CPTII,S$GLB, | Performed by: NURSE PRACTITIONER

## 2020-07-21 NOTE — Clinical Note
Your patient was seen today for a HRA visit.  I have included a copy of my visit note, please review the note and feel free to contact me with any questions.   Thank you for allowing me to participate in the care of your patients.   Nicole Fortune NP

## 2020-07-21 NOTE — PATIENT INSTRUCTIONS
Counseling and Referral of Other Preventative  (Italic type indicates deductible and co-insurance are waived)    Patient Name: Nathalie Membreno  Today's Date: 7/21/2020    Health Maintenance       Date Due Completion Date    Shingles Vaccine (1 of 2) 07/18/1996 ---    Colorectal Cancer Screening 11/07/2019 11/7/2014    Influenza Vaccine (1) 09/01/2020 10/10/2019    Lipid Panel 01/21/2021 1/21/2020    High Dose Statin 07/21/2021 7/21/2020    Mammogram 03/03/2022 3/3/2020    Override on 3/12/2012: Done    DEXA SCAN 05/21/2023 5/21/2018    Override on 7/21/2009: Done (normal; repeat in 5 years)    TETANUS VACCINE 09/23/2024 9/23/2014        No orders of the defined types were placed in this encounter.    The following information is provided to all patients.  This information is to help you find resources for any of the problems found today that may be affecting your health:                Living healthy guide: www.[facility].[place].gov      Understanding Diabetes: www.diabetes.org      Eating healthy: www.cdc.gov/healthyweight      CDC home safety checklist: www.cdc.gov/steadi/patient.html      Agency on Aging: www.goea.[place].gov      Alcoholics anonymous (AA): www.aa.org      Physical Activity: www.rogerio.nih.gov/nc3yphm      Tobacco use: www.quitwithusla.org

## 2020-07-21 NOTE — PROGRESS NOTES
"  Nathalie Membreno presented for a  Medicare AWV and comprehensive Health Risk Assessment today. The following components were reviewed and updated:    · Medical history  · Family History  · Social history  · Allergies and Current Medications  · Health Risk Assessment  · Health Maintenance  · Care Team     ** See Completed Assessments for Annual Wellness Visit within the encounter summary.**         The following assessments were completed:  · Living Situation  · CAGE  · Depression Screening  · Timed Get Up and Go  · Whisper Test  · Cognitive Function Screening  · Nutrition Screening  · ADL Screening  · PAQ Screening        Vitals:    07/21/20 0837   BP: (!) 148/78   Pulse: 60   Temp: 97.5 °F (36.4 °C)   SpO2: 98%   Weight: 101.5 kg (223 lb 12.3 oz)   Height: 5' 4" (1.626 m)     Body mass index is 38.41 kg/m².  Physical Exam  Vitals signs and nursing note reviewed.   Constitutional:       Appearance: Normal appearance. She is well-developed.   HENT:      Head: Normocephalic and atraumatic.   Eyes:      Pupils: Pupils are equal, round, and reactive to light.   Neck:      Vascular: No carotid bruit.   Cardiovascular:      Rate and Rhythm: Normal rate and regular rhythm.      Pulses: Normal pulses.      Heart sounds: Normal heart sounds. No murmur. No gallop.    Pulmonary:      Effort: Pulmonary effort is normal.      Breath sounds: Normal breath sounds.   Abdominal:      General: Bowel sounds are normal. There is no distension.      Palpations: Abdomen is soft.      Tenderness: There is no abdominal tenderness.   Musculoskeletal: Normal range of motion.         General: No tenderness.   Skin:     General: Skin is warm and dry.   Neurological:      Mental Status: She is alert.      Motor: No abnormal muscle tone.   Psychiatric:         Speech: Speech normal.         Behavior: Behavior normal.         Thought Content: Thought content normal.         Judgment: Judgment normal.               Diagnoses and health risks " identified today and associated recommendations/orders:    1. Encounter for preventive health examination  Completed     2. Atrial flutter with rapid ventricular response  Chronic and Stable  With Defibrillator, Digoxin, Xarelto and Metopolol.   Continue current treatment plan as previously prescribed with your cardiologists -Glenys Weiss and Yaakov     3. Presence of cardiac defibrillator  Stable and controlled . See # 1  Continue current treatment plan as previously prescribed with your cardiolologist     4. Essential hypertension  Chronic and Stable on Norvasc and Metoprolol. Slightly elevated - no BP meds taken due to fastin  Continue current treatment plan as previously prescribed with your PCP  5. Other hyperlipidemia  Chronic and Stable on Liptor. Continue current treatment plan as previously prescribed with your PCP    6. Tortuous aorta  Chronic and Stable on Liptor. Continue current treatment plan as previously prescribed with your PCP    7. Severe obesity with body mass index (BMI) of 35.0 to 39.9 with comorbidity  Chronic and Ongoing. Pt has loss over  10 bs in last 6 monthsx. Encouage to continue diet  Continue current treatment plan as previously prescribed with your PCP    8. History of TIA (transient ischemic attack)  DX 2017- pt had negative CT scan 2/17/ 2017  resolved s/s of CVA   Chronic and Stable blood pressure and cholestrol Continue current treatment plan as previously prescribed with your PCP    9. History of colon polyps  Chronic - DUE for colonoscopy 1/ 2020. Pt deciceded to hold off due to COVID 19. Discuss with PCP of next visit    10. Gastroesophageal reflux disease without esophagitis  Chronic and Stable. Continue current treatment plan as previously prescribed with your PCP    11. Spinal stenosis of lumbar region, unspecified whether neurogenic claudication present  Chronic and Stable on Gabepentin. Continue current treatment plan as previously prescribed with your PCP    12. Vitamin  D deficiency  Chronic and Stable. Continue current treatment plan as previously prescribed with your PCP    13. Slow transit constipation  Chronic and Stable on Miralax . Continue current treatment plan as previously prescribed with your PCP    Provided Nathalie with a 5-10 year written screening schedule and personal prevention plan. Recommendations were developed using the USPSTF age appropriate recommendations. Education, counseling, and referrals were provided as needed. After Visit Summary printed and given to patient which includes a list of additional screenings\tests needed.    I offered to discuss end of life issues, including information on how to make advance directives that the patient could use to name someone who would make medical decisions on their behalf if they became too ill to make themselves.  _X_Patient is interested, I provided paper work and offered to discuss.    Follow up in about 1 year (around 7/21/2021).    Nicole Fortune NP

## 2020-07-22 LAB
ANION GAP SERPL CALC-SCNC: 7 MMOL/L (ref 8–16)
BUN SERPL-MCNC: 13 MG/DL (ref 8–23)
CALCIUM SERPL-MCNC: 9.3 MG/DL (ref 8.7–10.5)
CHLORIDE SERPL-SCNC: 105 MMOL/L (ref 95–110)
CO2 SERPL-SCNC: 28 MMOL/L (ref 23–29)
CREAT SERPL-MCNC: 1 MG/DL (ref 0.5–1.4)
EST. GFR  (AFRICAN AMERICAN): >60 ML/MIN/1.73 M^2
EST. GFR  (NON AFRICAN AMERICAN): 55.6 ML/MIN/1.73 M^2
GLUCOSE SERPL-MCNC: 73 MG/DL (ref 70–110)
POTASSIUM SERPL-SCNC: 4.6 MMOL/L (ref 3.5–5.1)
SODIUM SERPL-SCNC: 140 MMOL/L (ref 136–145)

## 2020-07-23 ENCOUNTER — OUTPATIENT CASE MANAGEMENT (OUTPATIENT)
Dept: ADMINISTRATIVE | Facility: OTHER | Age: 75
End: 2020-07-23

## 2020-07-23 NOTE — PROGRESS NOTES
Summary:   Spoke with KIERSTEN George,  for [employer], confirmed that [employer] had partnered with the Mayor to sign up senior citizens for the [facility] [employer] program. Phoned patient and confirmed the program which will be completing her home repair.  Patient stated that she has not received mail from [facility], Encouraged her to call [facility] to request grab bar and ramp installation.  Reviewed ACP, information provided also in AWV. Patient verbalized no questions about the documents.   Patient agreed to follow up in two weeks to ensure receipt of resource and answer questions about referral process.   Outpatient Care Management   - Care Plan Follow Up    Patient: Nathalie Membreno  MRN:  [id number]  Date of Service:  7/23/2020  Completed by:  Betzy Petersen [facility]  Referral Date: 07/07/2020  Program: Case Management (High Risk)    Reason for Visit   Patient presents with    Update Plan Of Care     7/23/20       Complex Care Plan     Care plan was discussed and completed today with input from patient and/or caregiver.        Patient Instructions     Follow up in about 2 weeks (around 8/6/2020) for Review of home repair resource..    Todays OPCM Self-Management Care Plan was developed with the patients/caregivers input and was based on identified barriers from todays assessment.  Goals were written today with the patient/caregiver and the patient has agreed to work towards these goals to improve his/her overall well-being. Patient verbalized understanding of the care plan, goals, and all of today's instructions. Encouraged patient/caregiver to communicate with his/her physician and health care team about health conditions and the treatment plan.  Provided my contact information today and encouraged patient/caregiver to call me with any questions as needed.

## 2020-07-27 ENCOUNTER — PATIENT OUTREACH (OUTPATIENT)
Dept: ADMINISTRATIVE | Facility: OTHER | Age: 75
End: 2020-07-27

## 2020-07-27 NOTE — PROGRESS NOTES
"Subjective:      Patient ID: Nathalie Membreno is a 75 y.o. female.    Chief Complaint: Follow-up      HPI  Here for follow up of medical problems.  Staying safe and secluded.  No f/c/sw/cough.  No cp/sob/palp.  BMs good with miralax prn.  No black or blood.  No dysuria.  Taking vit D.  Occas wheezing with nasal drainage.  Has albuterol neb rx, but no nebulizer.      Updated/ annual due 1/21:  HM: 10/19 fluvax, 6/19 HAV, 6/15 [id number], 11/16 booster [id number], 9/14 TDaP, 5/18 BMD rep 5y, 11/14 Cscope rep 5y, 2/20 MMG, 5/18 Gyn Dr. Gomes, 7/20 today Eye Dr. Fiore, 11/16 HCV neg, Card [facility].     Review of Systems   Constitutional: Negative for chills, diaphoresis and fever.   Respiratory: Negative for cough and shortness of breath.    Cardiovascular: Negative for chest pain, palpitations and leg swelling.   Gastrointestinal: Negative for blood in stool, constipation, diarrhea, nausea and vomiting.   Genitourinary: Negative for dysuria, frequency and hematuria.   Psychiatric/Behavioral: The patient is not nervous/anxious.          Objective:   /82   Pulse 77   Temp 98.6 °F (37 °C) (Temporal)   Ht 5' 4" (1.626 m)   Wt 101 kg (222 lb 10.6 oz)   SpO2 97%   BMI 38.22 kg/m²     Physical Exam  Constitutional:       Appearance: She is well-developed.   Neck:      Musculoskeletal: Neck supple.      Thyroid: No thyroid mass.      Vascular: No carotid bruit.   Cardiovascular:      Rate and Rhythm: Normal rate and regular rhythm.      Heart sounds: No murmur. No friction rub. No gallop.    Pulmonary:      Effort: Pulmonary effort is normal.      Breath sounds: Normal breath sounds. No wheezing or rales.   Abdominal:      General: Bowel sounds are normal.      Palpations: Abdomen is soft. There is no mass.      Tenderness: There is no abdominal tenderness.   Lymphadenopathy:      Cervical: No cervical adenopathy.   Neurological:      Mental Status: She is alert and oriented to person, place, and " time.       Results for KEYSHA ORTIZ (MRN [id number]) as of 7/28/2020 13:26   Ref. Range 1/21/2020 09:23 2/28/2020 09:59 3/3/2020 09:11 7/21/2020 09:30   Sodium Latest Ref Range: 136 - 145 mmol/L 142   140   Potassium Latest Ref Range: 3.5 - 5.1 mmol/L 4.4   4.6   Chloride Latest Ref Range: 95 - 110 mmol/L 105   105   CO2 Latest Ref Range: 23 - 29 mmol/L 27   28   Anion Gap Latest Ref Range: 8 - 16 mmol/L 10   7 (L)   BUN, Bld Latest Ref Range: 8 - 23 mg/dL 12   13   Creatinine Latest Ref Range: 0.5 - 1.4 mg/dL 1.1   1.0   eGFR if non African American Latest Ref Range: >60 mL/min/1.73 m^2 49.9 (A)   55.6 (A)   eGFR if African American Latest Ref Range: >60 mL/min/1.73 m^2 57.6 (A)   >60.0   Glucose Latest Ref Range: 70 - 110 mg/dL 98   73   Calcium Latest Ref Range: 8.7 - 10.5 mg/dL 9.6   9.3       Assessment:       1. Essential hypertension    2. Atrial flutter with rapid ventricular response    3. History of TIA (transient ischemic attack)    4. Slow transit constipation    5. Vitamin D deficiency    6. Other hyperlipidemia    7. Severe obesity with body mass index (BMI) of 35.0 to 39.9 with comorbidity    8. Preventive measure    9. Chronic anticoagulation    10. Mild intermittent asthma without complication          Plan:     Essential hypertension- stable, cont rx.    Atrial flutter with rapid ventricular response, Chronic anticoagulation- cont meds, Cards.  -     Digoxin level; Future; Expected date: 07/28/2020    History of TIA (transient ischemic attack)    Slow transit constipation- cont mirlax prn.    Vitamin D deficiency- cont supp, recheck 6mo.  -     Vitamin D; Future    Other hyperlipidemia- cont statin, recheck 6mo.    Severe obesity with body mass index (BMI) of 35.0 to 39.9 with comorbidity    Preventive measure- due in 6mo.  -     CBC auto differential; Future; Expected date: 07/28/2020  -     Comprehensive metabolic panel; Future; Expected date: 07/28/2020  -     Lipid Panel; Future;  Expected date: 07/28/2020  -     TSH; Future; Expected date: 07/28/2020  -     Vitamin D; Future  -     Digoxin level; Future; Expected date: 07/28/2020        Other orders  -     atorvastatin (LIPITOR) 40 MG tablet; TK 1 T PO Q NIGHT  Dispense: 90 tablet; Refill: 5

## 2020-07-27 NOTE — PROGRESS NOTES
Requested updates within Care Everywhere.  Patient's chart was reviewed for overdue CHRISTI topics.  Immunizations reconciled.    Eye exam scheduled 7/28/20.

## 2020-07-28 ENCOUNTER — OFFICE VISIT (OUTPATIENT)
Dept: FAMILY MEDICINE | Facility: CLINIC | Age: 75
End: 2020-07-28
Payer: MEDICARE

## 2020-07-28 ENCOUNTER — OFFICE VISIT (OUTPATIENT)
Dept: OPHTHALMOLOGY | Facility: CLINIC | Age: 75
End: 2020-07-28
Payer: MEDICARE

## 2020-07-28 VITALS
HEIGHT: 64 IN | DIASTOLIC BLOOD PRESSURE: 82 MMHG | OXYGEN SATURATION: 97 % | HEART RATE: 77 BPM | WEIGHT: 222.69 LBS | TEMPERATURE: 99 F | BODY MASS INDEX: 38.02 KG/M2 | SYSTOLIC BLOOD PRESSURE: 132 MMHG

## 2020-07-28 DIAGNOSIS — J45.20 MILD INTERMITTENT ASTHMA WITHOUT COMPLICATION: ICD-10-CM

## 2020-07-28 DIAGNOSIS — E78.49 OTHER HYPERLIPIDEMIA: ICD-10-CM

## 2020-07-28 DIAGNOSIS — Z86.73 HISTORY OF TIA (TRANSIENT ISCHEMIC ATTACK): ICD-10-CM

## 2020-07-28 DIAGNOSIS — I48.92 ATRIAL FLUTTER WITH RAPID VENTRICULAR RESPONSE: ICD-10-CM

## 2020-07-28 DIAGNOSIS — K59.01 SLOW TRANSIT CONSTIPATION: ICD-10-CM

## 2020-07-28 DIAGNOSIS — Z29.9 PREVENTIVE MEASURE: ICD-10-CM

## 2020-07-28 DIAGNOSIS — E55.9 VITAMIN D DEFICIENCY: ICD-10-CM

## 2020-07-28 DIAGNOSIS — E66.01 SEVERE OBESITY WITH BODY MASS INDEX (BMI) OF 35.0 TO 39.9 WITH COMORBIDITY: ICD-10-CM

## 2020-07-28 DIAGNOSIS — Z79.01 CHRONIC ANTICOAGULATION: ICD-10-CM

## 2020-07-28 DIAGNOSIS — H52.4 HYPEROPIA WITH PRESBYOPIA, BILATERAL: ICD-10-CM

## 2020-07-28 DIAGNOSIS — H52.03 HYPEROPIA WITH PRESBYOPIA, BILATERAL: ICD-10-CM

## 2020-07-28 DIAGNOSIS — H25.13 NUCLEAR SCLEROSIS, BILATERAL: Primary | ICD-10-CM

## 2020-07-28 DIAGNOSIS — H10.10 SEASONAL ALLERGIC CONJUNCTIVITIS: ICD-10-CM

## 2020-07-28 DIAGNOSIS — I10 ESSENTIAL HYPERTENSION: Primary | ICD-10-CM

## 2020-07-28 DIAGNOSIS — H25.013 CORTICAL AGE-RELATED CATARACT OF BOTH EYES: ICD-10-CM

## 2020-07-28 PROCEDURE — 3075F SYST BP GE 130 - 139MM HG: CPT | Mod: HCNC,CPTII,S$GLB, | Performed by: INTERNAL MEDICINE

## 2020-07-28 PROCEDURE — 99214 OFFICE O/P EST MOD 30 MIN: CPT | Mod: HCNC,S$GLB,, | Performed by: INTERNAL MEDICINE

## 2020-07-28 PROCEDURE — 3079F DIAST BP 80-89 MM HG: CPT | Mod: HCNC,CPTII,S$GLB, | Performed by: INTERNAL MEDICINE

## 2020-07-28 PROCEDURE — 99214 PR OFFICE/OUTPT VISIT, EST, LEVL IV, 30-39 MIN: ICD-10-PCS | Mod: HCNC,S$GLB,, | Performed by: INTERNAL MEDICINE

## 2020-07-28 PROCEDURE — 3079F PR MOST RECENT DIASTOLIC BLOOD PRESSURE 80-89 MM HG: ICD-10-PCS | Mod: HCNC,CPTII,S$GLB, | Performed by: INTERNAL MEDICINE

## 2020-07-28 PROCEDURE — 1159F PR MEDICATION LIST DOCUMENTED IN MEDICAL RECORD: ICD-10-PCS | Mod: HCNC,S$GLB,, | Performed by: INTERNAL MEDICINE

## 2020-07-28 PROCEDURE — 92015 DETERMINE REFRACTIVE STATE: CPT | Mod: HCNC,S$GLB,, | Performed by: OPTOMETRIST

## 2020-07-28 PROCEDURE — 1126F AMNT PAIN NOTED NONE PRSNT: CPT | Mod: HCNC,S$GLB,, | Performed by: INTERNAL MEDICINE

## 2020-07-28 PROCEDURE — 3008F PR BODY MASS INDEX (BMI) DOCUMENTED: ICD-10-PCS | Mod: HCNC,CPTII,S$GLB, | Performed by: INTERNAL MEDICINE

## 2020-07-28 PROCEDURE — 99999 PR PBB SHADOW E&M-EST. PATIENT-LVL IV: CPT | Mod: PBBFAC,HCNC,, | Performed by: INTERNAL MEDICINE

## 2020-07-28 PROCEDURE — 99999 PR PBB SHADOW E&M-EST. PATIENT-LVL IV: ICD-10-PCS | Mod: PBBFAC,HCNC,, | Performed by: INTERNAL MEDICINE

## 2020-07-28 PROCEDURE — 1159F MED LIST DOCD IN RCRD: CPT | Mod: HCNC,S$GLB,, | Performed by: INTERNAL MEDICINE

## 2020-07-28 PROCEDURE — 1101F PT FALLS ASSESS-DOCD LE1/YR: CPT | Mod: HCNC,CPTII,S$GLB, | Performed by: INTERNAL MEDICINE

## 2020-07-28 PROCEDURE — 92015 PR REFRACTION: ICD-10-PCS | Mod: HCNC,S$GLB,, | Performed by: OPTOMETRIST

## 2020-07-28 PROCEDURE — 1126F PR PAIN SEVERITY QUANTIFIED, NO PAIN PRESENT: ICD-10-PCS | Mod: HCNC,S$GLB,, | Performed by: INTERNAL MEDICINE

## 2020-07-28 PROCEDURE — 99999 PR PBB SHADOW E&M-EST. PATIENT-LVL III: CPT | Mod: PBBFAC,HCNC,, | Performed by: OPTOMETRIST

## 2020-07-28 PROCEDURE — 92014 COMPRE OPH EXAM EST PT 1/>: CPT | Mod: HCNC,S$GLB,, | Performed by: OPTOMETRIST

## 2020-07-28 PROCEDURE — 1101F PR PT FALLS ASSESS DOC 0-1 FALLS W/OUT INJ PAST YR: ICD-10-PCS | Mod: HCNC,CPTII,S$GLB, | Performed by: INTERNAL MEDICINE

## 2020-07-28 PROCEDURE — 99999 PR PBB SHADOW E&M-EST. PATIENT-LVL III: ICD-10-PCS | Mod: PBBFAC,HCNC,, | Performed by: OPTOMETRIST

## 2020-07-28 PROCEDURE — 3008F BODY MASS INDEX DOCD: CPT | Mod: HCNC,CPTII,S$GLB, | Performed by: INTERNAL MEDICINE

## 2020-07-28 PROCEDURE — 92014 PR EYE EXAM, EST PATIENT,COMPREHESV: ICD-10-PCS | Mod: HCNC,S$GLB,, | Performed by: OPTOMETRIST

## 2020-07-28 PROCEDURE — 3075F PR MOST RECENT SYSTOLIC BLOOD PRESS GE 130-139MM HG: ICD-10-PCS | Mod: HCNC,CPTII,S$GLB, | Performed by: INTERNAL MEDICINE

## 2020-07-28 RX ORDER — ATORVASTATIN CALCIUM 40 MG/1
TABLET, FILM COATED ORAL
Qty: 90 TABLET | Refills: 5 | Status: SHIPPED | OUTPATIENT
Start: 2020-07-28 | End: 2021-06-23 | Stop reason: SDUPTHER

## 2020-07-28 NOTE — PROGRESS NOTES
HPI     Eye Exam     Comments: Yearly              Comments     Patient last visit with [facility] on 07/08/2019.  HPI    Any vision changes since last exam: No  Eye pain: No  Other ocular symptoms: Itchy Eyes due to allergies, not using any drops    Do you wear currently wear glasses or contacts? Glasses    Interested in contacts today? No    Do you plan on getting new glasses today? Yes, if needed              Last edited by Cecy Spencer on 7/28/2020  2:58 PM. (History)            Assessment /Plan     For exam results, see Encounter Report.    Nuclear sclerosis, bilateral  Cortical age-related cataract of both eyes  Surgery is not indicated at this time.   Monitor 12 months.    Seasonal allergic conjunctivitis  Recommended pataday qd OU    Hyperopia with presbyopia, bilateral  Eyeglass Final Rx     Eyeglass Final Rx       Sphere Add    Right +1.50 +2.75    Left +1.50 +2.75    Expiration Date: 7/29/2021                RTC 1 yr for dilated eye exam or PRN if any problems.   Discussed above and answered questions.

## 2020-07-31 ENCOUNTER — OUTPATIENT CASE MANAGEMENT (OUTPATIENT)
Dept: ADMINISTRATIVE | Facility: OTHER | Age: 75
End: 2020-07-31

## 2020-07-31 ENCOUNTER — TELEPHONE (OUTPATIENT)
Dept: FAMILY MEDICINE | Facility: CLINIC | Age: 75
End: 2020-07-31

## 2020-07-31 DIAGNOSIS — J45.20 MILD INTERMITTENT ASTHMA WITHOUT COMPLICATION: Primary | ICD-10-CM

## 2020-07-31 NOTE — PROGRESS NOTES
Outpatient Care Management  Plan of Care Follow Up Visit    Patient: Nathalie Membreno  MRN: [id number]  Date of Service: 07/31/2020  Completed by: Divina Wolff RN  Referral Date: 07/07/2020  Program: Case Management (High Risk)    Reason for Visit   Patient presents with    Update Plan Of Care       Brief Summary: Phone contact today for follow up. She has not yet rec'd mailed literature sent by this CM. Advised this CM will resend it after next contact if not yet rec'd. She saw her PCP last week and advised her she has meds for a nebulizer, but has no nebulizer. She reports she does not know if her doctor ordered her one, but if not she would like to get one. She is currently using her inhaler about twice a day and thinks she could benefit from being able to use a nebulizer. Advised this CM will message her PCP to advise of her request. Some cerebrovascular disease tasks were addressed as documented in care plan. Advised this CM will follow up in two weeks and continue to address unmet tasks. She voiced understanding and agreement with this plan.     Patient Summary     Involvement of Care:  Do I have permission to speak with other family members about your care?   Yes, son    Patient Reported Labs & Vitals:  1.  Any Patient Reported Labs & Vitals?    No  2.  Patient Reported Blood Pressure:     3.  Patient Reported Pulse:     4.  Patient Reported Weight (Kg):     5.  Patient Reported Blood Glucose (mg/dl):       Medical and social history was reviewed with patient and/or caregiver.     Clinical Assessment     Reviewed and provided basic information on available community resources for mental health, transportation, wellness resources, and palliative care programs with patient and/or caregiver.     Complex Care Plan     Care plan was discussed and completed today with input from patient and/or caregiver.    Patient Instructions     Instructions were provided via the [employer] patient resources and are  available for the patient to view on the patient portal.    Next Steps: Review mailed literature and continue to address unmet care plan tasks. Divina Wolff RN    No follow-ups on file.    Todays OPCM Self-Management Care Plan was developed with the patients/caregivers input and was based on identified barriers from todays assessment.  Goals were written today with the patient/caregiver and the patient has agreed to work towards these goals to improve his/her overall well-being. Patient verbalized understanding of the care plan, goals, and all of today's instructions. Encouraged patient/caregiver to communicate with his/her physician and health care team about health conditions and the treatment plan.  Provided my contact information today and encouraged patient/caregiver to call me with any questions as needed.

## 2020-07-31 NOTE — TELEPHONE ENCOUNTER
----- Message from Divina Wolff RN sent at 7/31/2020  2:03 PM CDT -----  Ms Membreno advised she has nebulizer meds, but no nebulizer and requested that I message you to ask if you will order one for her. She uses an inhaler twice a day and thinks she could benefit from also using a nebulizer. Thank you.     Kindest Regards,     Divina Wolff RN, [facility]  Outpatient Case Management  [phone]  Ext [phone]  [email or web address]

## 2020-08-06 ENCOUNTER — OUTPATIENT CASE MANAGEMENT (OUTPATIENT)
Dept: ADMINISTRATIVE | Facility: OTHER | Age: 75
End: 2020-08-06

## 2020-08-06 NOTE — PROGRESS NOTES
1st Attempt to complete SW follow-up for Outpatient Care Management; left message requesting return call.  LCSW will reattempt at a later date.

## 2020-08-13 NOTE — PROGRESS NOTES
Outpatient Care Management   - Care Plan Follow Up    Patient: Nathalie Membreno  MRN:  [id number]  Date of Service:  8/13/2020  Completed by:  Betzy Petersen LCSW  Referral Date: 07/07/2020  Program: Case Management (High Risk)    Reason for Visit   Patient presents with    OPCM SW First Follow-up Attempt     8/6/20    Update Plan Of Care     8/13/20       Brief Summary: Phoned patient. Patient confirmed receipt of resources. She stated that she had questions about AD but was on her way to her cardiologist (out of network). She said that she will call back to discuss this afternoon.     Complex Care Plan     Care plan was discussed and completed today with input from patient and/or caregiver.    Patient Instructions     Instructions were provided via the [employer] patient [employer] and are available for the patient to view on the patient portal.    Follow up in about 7 days (around 8/20/2020) for Answer questions about AD..    Todays OPCM Self-Management Care Plan was developed with the patients/caregivers input and was based on identified barriers from todays assessment.  Goals were written today with the patient/caregiver and the patient has agreed to work towards these goals to improve his/her overall well-being. Patient verbalized understanding of the care plan, goals, and all of today's instructions. Encouraged patient/caregiver to communicate with his/her physician and health care team about health conditions and the treatment plan.  Provided my contact information today and encouraged patient/caregiver to call me with any questions as needed.

## 2020-08-14 ENCOUNTER — OUTPATIENT CASE MANAGEMENT (OUTPATIENT)
Dept: ADMINISTRATIVE | Facility: OTHER | Age: 75
End: 2020-08-14

## 2020-08-14 NOTE — PROGRESS NOTES
Outpatient Care Management  Plan of Care Follow Up Visit    Patient: Nathalie Membreno  MRN: [id number]  Date of Service: 08/14/2020  Completed by: Divina Wolff RN  Referral Date: 07/07/2020  Program: Case Management (High Risk)    Reason for Visit   Patient presents with    Update Plan Of Care       Brief Summary: Rec'd return call from pt and she advised she has not rec'd nebulizer. Noted on chart review her PCP ordered one on 7/31/20. Phone contact was made with [facility] who advised they are able to access the order and will contact pt to set up delivery of the nebulizer. Phoned pt back to advise. Discussed several care plan tasks related to cerebrovascular disease and she advised she has read over mailed literature and has no questions related to it. She advised she tries to eat heart healthy foods by baking and broiling and cooking at home rather than eating out. Advised this CM will follow up in two weeks to see if she has rec'd nebulizer and will plan to review remaining care plan tasks. She voiced understanding and agreement with this plan for follow up.     Patient Summary     Involvement of Care:  Do I have permission to speak with other family members about your care?   Yes, son, Almas    Patient Reported Labs & Vitals:  1.  Any Patient Reported Labs & Vitals?   No  2.  Patient Reported Blood Pressure:     3.  Patient Reported Pulse:     4.  Patient Reported Weight (Kg):     5.  Patient Reported Blood Glucose (mg/dl):       Medical and social history was reviewed with patient and/or caregiver.     Clinical Assessment     Reviewed and provided basic information on available community resources for mental health, transportation, wellness resources, and palliative care programs with patient and/or caregiver.     Complex Care Plan     Care plan was discussed and completed today with input from patient and/or caregiver.    Patient Instructions     Instructions were provided via the [employer] patient  resources and are available for the patient to view on the patient portal.    Next Steps: Review remaining care plan tasks and confirm delivery of nebulizer. Divina Wolff RN    No follow-ups on file.    Todays OPC Self-Management Care Plan was developed with the patients/caregivers input and was based on identified barriers from todays assessment.  Goals were written today with the patient/caregiver and the patient has agreed to work towards these goals to improve his/her overall well-being. Patient verbalized understanding of the care plan, goals, and all of today's instructions. Encouraged patient/caregiver to communicate with his/her physician and health care team about health conditions and the treatment plan.  Provided my contact information today and encouraged patient/caregiver to call me with any questions as needed.

## 2020-08-20 ENCOUNTER — OUTPATIENT CASE MANAGEMENT (OUTPATIENT)
Dept: ADMINISTRATIVE | Facility: OTHER | Age: 75
End: 2020-08-20

## 2020-08-28 ENCOUNTER — OUTPATIENT CASE MANAGEMENT (OUTPATIENT)
Dept: ADMINISTRATIVE | Facility: OTHER | Age: 75
End: 2020-08-28

## 2020-08-28 NOTE — PROGRESS NOTES
Outpatient Care Management  Plan of Care Follow Up Visit    Patient: Nathalie Membreno  MRN: [id number]  Date of Service: 08/28/2020  Completed by: Divina Wolff RN  Referral Date: 07/07/2020  Program: Case Management (High Risk)    No chief complaint on file.      Brief Summary: Phone contact today for follow up. She has rec'd nebulizer recently ordered by PCP. She continues to try to follow a heart healthy diet. Reviewed foods to choose and those to avoid as well as food prep methods like grilling and baking that are healthier options than frying.She reports she has been feeling good recently and trying to make good decisions about her health. Advised this CM will follow up in 4 weeks and will plan to d/c from OPCM at that time if no new problems. She voiced understanding and agreement with this follow up plan.     Patient Summary     Involvement of Care:  Do I have permission to speak with other family members about your care?   Yes, son, Almas    Patient Reported Labs & Vitals:  1.  Any Patient Reported Labs & Vitals?   No  2.  Patient Reported Blood Pressure:     3.  Patient Reported Pulse:     4.  Patient Reported Weight (Kg):     5.  Patient Reported Blood Glucose (mg/dl):       Medical and social history was reviewed with patient and/or caregiver.     Clinical Assessment     Reviewed and provided basic information on available community resources for mental health, transportation, wellness resources, and palliative care programs with patient and/or caregiver.     Complex Care Plan     Care plan was discussed and completed today with input from patient and/or caregiver.    Patient Instructions     Instructions were provided via the [employer] patient resources and are available for the patient to view on the patient portal.    Next Steps: Review remaining care plan tasks and d/c from OPCM if no new problems or concerns. Divina Wolff RN      Todays OPCM Self-Management Care Plan was developed with the  patients/caregivers input and was based on identified barriers from todays assessment.  Goals were written today with the patient/caregiver and the patient has agreed to work towards these goals to improve his/her overall well-being. Patient verbalized understanding of the care plan, goals, and all of today's instructions. Encouraged patient/caregiver to communicate with his/her physician and health care team about health conditions and the treatment plan.  Provided my contact information today and encouraged patient/caregiver to call me with any questions as needed.

## 2020-08-28 NOTE — PROGRESS NOTES
Outpatient Care Management   - Care Plan Follow Up    Patient: Nathalie Membreno  MRN:  [id number]  Date of Service:  8/28/2020  Completed by:  Betzy Petersen LCSW  Referral Date: 07/07/2020  Program: Case Management (High Risk)    Reason for Visit   Patient presents with    OPCM SW First Follow-up Attempt     8/20/20    Update Plan Of Care     8/28/20    Case Closure     8/28/20       Brief Summary: Phoned patient. Reviewed resources for grab bars in the home. Noted that patient had indicated that she had questions about Advanced Directives. She stated that she has not completed a living will but had no specific questions. Provided information about ACP.   Patient indicated that she has no further needs, agreed to case closure. Encouraged her to notify OPCM RN if she has needs or to call LCSW. Patient agreed.   In basket message sent to OPCM RN.     Complex Care Plan     Care plan was discussed and completed today with input from patient and/or caregiver.    Patient Instructions     No follow-ups on file.    Todays OPCM Self-Management Care Plan was developed with the patients/caregivers input and was based on identified barriers from todays assessment.  Goals were written today with the patient/caregiver and the patient has agreed to work towards these goals to improve his/her overall well-being. Patient verbalized understanding of the care plan, goals, and all of today's instructions. Encouraged patient/caregiver to communicate with his/her physician and health care team about health conditions and the treatment plan.  Provided my contact information today and encouraged patient/caregiver to call me with any questions as needed.

## 2020-09-25 ENCOUNTER — OUTPATIENT CASE MANAGEMENT (OUTPATIENT)
Dept: ADMINISTRATIVE | Facility: OTHER | Age: 75
End: 2020-09-25

## 2020-09-29 ENCOUNTER — PATIENT MESSAGE (OUTPATIENT)
Dept: OTHER | Facility: OTHER | Age: 75
End: 2020-09-29

## 2020-10-01 NOTE — PROGRESS NOTES
Outpatient Care Management  Plan of Care Follow Up Visit    Patient: Nathalie Membreno  MRN: [id number]  Date of Service: 09/25/2020  Completed by: Divina Wolff RN  Referral Date: 07/07/2020  Program: Case Management (High Risk)    Reason for Visit   Patient presents with    Update Plan Of Care    Case Closure       Brief Summary: Phone contact with pt today for follow up and d/c from OPCM. Reviewed remaining care plan tasks as documented on care plan. Discussed mailed literature related to cerebrovascular disease and she advised she has read literature and denies having any questions related to it. She reports she is trying to eat healthy following a low Na and low cholesterol diet to help reduce her risk of further CVA's. Advised this CM will d/c from OPCM today related to goals met. She voiced understanding and agreement with plan to d/c.    Patient Summary     Involvement of Care:  Do I have permission to speak with other family members about your care?   Yes, mother    Patient Reported Labs & Vitals:  1.  Any Patient Reported Labs & Vitals?   No  2.  Patient Reported Blood Pressure:     3.  Patient Reported Pulse:     4.  Patient Reported Weight (Kg):     5.  Patient Reported Blood Glucose (mg/dl):       Medical and social history was reviewed with patient and/or caregiver.     Clinical Assessment     Reviewed and provided basic information on available community resources for mental health, transportation, wellness resources, and palliative care programs with patient and/or caregiver.     Complex Care Plan     Care plan was discussed and completed today with input from patient and/or caregiver.    Patient Instructions     Instructions were provided via the [employer] patient resources and are available for the patient to view on the patient portal.    Next Steps: D/C from OPCM today related to goals met. Divina Wolff RN    Follow up in about 1 week (around 10/2/2020) for address care plan  tasks.    Todays OPCM Self-Management Care Plan was developed with the patients/caregivers input and was based on identified barriers from todays assessment.  Goals were written today with the patient/caregiver and the patient has agreed to work towards these goals to improve his/her overall well-being. Patient verbalized understanding of the care plan, goals, and all of today's instructions. Encouraged patient/caregiver to communicate with his/her physician and health care team about health conditions and the treatment plan.  Provided my contact information today and encouraged patient/caregiver to call me with any questions as needed.

## 2020-10-16 ENCOUNTER — TELEPHONE (OUTPATIENT)
Dept: OBSTETRICS AND GYNECOLOGY | Facility: CLINIC | Age: 75
End: 2020-10-16

## 2020-10-16 ENCOUNTER — PATIENT OUTREACH (OUTPATIENT)
Dept: ADMINISTRATIVE | Facility: OTHER | Age: 75
End: 2020-10-16

## 2020-10-16 ENCOUNTER — OFFICE VISIT (OUTPATIENT)
Dept: OBSTETRICS AND GYNECOLOGY | Facility: CLINIC | Age: 75
End: 2020-10-16
Payer: MEDICARE

## 2020-10-16 VITALS
WEIGHT: 222.44 LBS | DIASTOLIC BLOOD PRESSURE: 80 MMHG | BODY MASS INDEX: 37.98 KG/M2 | HEIGHT: 64 IN | SYSTOLIC BLOOD PRESSURE: 140 MMHG

## 2020-10-16 DIAGNOSIS — N95.0 PMB (POSTMENOPAUSAL BLEEDING): Primary | ICD-10-CM

## 2020-10-16 PROCEDURE — 99999 PR PBB SHADOW E&M-EST. PATIENT-LVL IV: CPT | Mod: PBBFAC,HCNC,, | Performed by: OBSTETRICS & GYNECOLOGY

## 2020-10-16 PROCEDURE — 99214 OFFICE O/P EST MOD 30 MIN: CPT | Mod: S$PBB,HCNC,, | Performed by: OBSTETRICS & GYNECOLOGY

## 2020-10-16 PROCEDURE — 99999 PR PBB SHADOW E&M-EST. PATIENT-LVL IV: ICD-10-PCS | Mod: PBBFAC,HCNC,, | Performed by: OBSTETRICS & GYNECOLOGY

## 2020-10-16 PROCEDURE — 99214 OFFICE O/P EST MOD 30 MIN: CPT | Mod: PBBFAC,HCNC | Performed by: OBSTETRICS & GYNECOLOGY

## 2020-10-16 PROCEDURE — 99214 PR OFFICE/OUTPT VISIT, EST, LEVL IV, 30-39 MIN: ICD-10-PCS | Mod: S$PBB,HCNC,, | Performed by: OBSTETRICS & GYNECOLOGY

## 2020-10-16 NOTE — TELEPHONE ENCOUNTER
----- Message from Raymond Rodriguez sent at 10/16/2020 11:53 AM CDT -----  .Type:  Patient Returning Call    Who Called: KEYSHA ORTIZ   Who Left Message for Patient: nurse   Does the patient know what this is regarding?:   Would the patient rather a call back or a response via [facility]? Call   Best Call Back Number: [phone] (home)    Additional Information:

## 2020-10-16 NOTE — PROGRESS NOTES
Subjective:       Patient ID: Nathalie Membreno is a 75 y.o. female.    Chief Complaint:  Vaginal Bleeding      History of Present Illness  HPI  Postmenopausal Bleeding  Patient complains of vaginal bleeding. She has been menopausal for over 20 years. Currently on no HRT. Bleeding is described as spotting and has occurred for 4 days. Bleeding symptoms have since stopped.  Other menopausal symptoms include: none. Workup to date: none.  Pt is not sexually active.  Denies history of Gyn malignancy or dysplasia.    Menstrual History:  OB History        4    Para   3    Term   3            AB   1    Living   3       SAB   1    TAB        Ectopic        Multiple        Live Births                    No LMP recorded. Patient is postmenopausal.         GYN & OB History  No LMP recorded. Patient is postmenopausal.   Date of Last Pap: 2014    OB History    Para Term  AB Living   4 3 3   1 3   SAB TAB Ectopic Multiple Live Births   1              # Outcome Date GA Lbr Garland/2nd Weight Sex Delivery Anes PTL Lv   4 SAB            3 Term            2 Term            1 Term                Review of Systems  Review of Systems   Constitutional: Positive for appetite change. Negative for activity change, chills, fatigue, fever and unexpected weight change.   Respiratory: Negative for shortness of breath.    Cardiovascular: Negative for chest pain, palpitations and leg swelling.   Gastrointestinal: Positive for constipation. Negative for abdominal pain, bloating, blood in stool, diarrhea, nausea and vomiting.   Genitourinary: Positive for postmenopausal bleeding. Negative for dysuria, flank pain, frequency, genital sores, hematuria, hot flashes, pelvic pain, urgency, vaginal bleeding, vaginal discharge, vaginal pain, urinary incontinence, vaginal dryness and vaginal odor.   Musculoskeletal: Negative for back pain.   Integumentary:  Negative for breast mass, nipple discharge, breast skin changes  and breast tenderness.   Neurological: Negative for syncope and headaches.   Breast: Negative for asymmetry, lump, mass, mastodynia, nipple discharge, skin changes and tenderness          Objective:    Physical Exam:   Constitutional: She is oriented to person, place, and time. She appears well-developed and well-nourished. No distress.    HENT:   Head: Normocephalic and atraumatic.    Eyes: Pupils are equal, round, and reactive to light. EOM are normal.    Neck: Normal range of motion.    Cardiovascular: Normal rate, regular rhythm and normal heart sounds.     Pulmonary/Chest: Effort normal and breath sounds normal.        Abdominal: Soft. Bowel sounds are normal. She exhibits no distension. There is no abdominal tenderness.     Genitourinary:    Vagina and uterus normal.      Pelvic exam was performed with patient supine.   There is no rash, tenderness, lesion or injury on the right labia. There is no rash, tenderness, lesion or injury on the left labia. Uterus is not deviated, not enlarged and not tender. Cervix is normal. Right adnexum displays no mass, no tenderness and no fullness. Left adnexum displays no mass, no tenderness and no fullness. No erythema, tenderness or bleeding in the vagina.    No foreign body in the vagina.      No signs of injury in the vagina.   Cervix exhibits no motion tenderness, no discharge and no friability. negative for vaginal discharge          Musculoskeletal: Normal range of motion and moves all extremeties. No tenderness or edema.       Neurological: She is alert and oriented to person, place, and time.    Skin: Skin is warm and dry.    Psychiatric: She has a normal mood and affect. Her behavior is normal. Thought content normal.          Assessment:        1. PMB (postmenopausal bleeding)             Plan:      PMB (postmenopausal bleeding)  -     US Pelvis Comp with Transvag NON-OB (xpd; Future; Expected date: 10/16/2020  -     Pt is currently on Xarelto.  No evidence of  bleeding on exam today and exam was otherwise unremarkable.  Pt with an isolated episode of spotting.  Pt was counseled on possible etiologies.  Will await ultrasound results for further recommendations.      Follow up in about 6 weeks (around 11/27/2020).

## 2020-10-16 NOTE — PROGRESS NOTES
Chart Reviewed  Care Everywhere updated  Immunizations reconciled  Health Maintenance updated  Ordered:  Upcoming:      
- - -

## 2020-10-16 NOTE — TELEPHONE ENCOUNTER
Patient stated that she had a message on her voicemail at home that stated that it was time for her appointment but she had just left her appointment.  I informed the patient that someone may have just called her house on accident prior to her appointment this morning.  She voiced understanding.

## 2020-10-19 ENCOUNTER — HOSPITAL ENCOUNTER (OUTPATIENT)
Dept: RADIOLOGY | Facility: HOSPITAL | Age: 75
Discharge: HOME | End: 2020-10-19
Attending: OBSTETRICS & GYNECOLOGY
Payer: MEDICARE

## 2020-10-19 DIAGNOSIS — N95.0 PMB (POSTMENOPAUSAL BLEEDING): ICD-10-CM

## 2020-10-19 PROCEDURE — 76830 TRANSVAGINAL US NON-OB: CPT | Mod: TC,HCNC

## 2020-10-19 PROCEDURE — 76830 TRANSVAGINAL US NON-OB: CPT | Mod: 26,HCNC,, | Performed by: RADIOLOGY

## 2020-10-19 PROCEDURE — 76856 US PELVIS COMP WITH TRANSVAG NON-OB (XPD): ICD-10-PCS | Mod: 26,HCNC,, | Performed by: RADIOLOGY

## 2020-10-19 PROCEDURE — 76830 US PELVIS COMP WITH TRANSVAG NON-OB (XPD): ICD-10-PCS | Mod: 26,HCNC,, | Performed by: RADIOLOGY

## 2020-10-19 PROCEDURE — 76856 US EXAM PELVIC COMPLETE: CPT | Mod: 26,HCNC,, | Performed by: RADIOLOGY

## 2020-11-18 ENCOUNTER — PATIENT OUTREACH (OUTPATIENT)
Dept: ADMINISTRATIVE | Facility: HOSPITAL | Age: 75
End: 2020-11-18

## 2020-11-18 NOTE — PROGRESS NOTES
SHANTAL HTN REPORT: I spoke with pt that does take a home BP reading. Pt would like for me to call her back later today.

## 2020-11-23 ENCOUNTER — PATIENT OUTREACH (OUTPATIENT)
Dept: ADMINISTRATIVE | Facility: OTHER | Age: 75
End: 2020-11-23

## 2020-11-24 ENCOUNTER — OFFICE VISIT (OUTPATIENT)
Dept: OBSTETRICS AND GYNECOLOGY | Facility: CLINIC | Age: 75
End: 2020-11-24
Payer: MEDICARE

## 2020-11-24 VITALS
SYSTOLIC BLOOD PRESSURE: 124 MMHG | BODY MASS INDEX: 38.73 KG/M2 | HEIGHT: 64 IN | WEIGHT: 226.88 LBS | DIASTOLIC BLOOD PRESSURE: 76 MMHG

## 2020-11-24 DIAGNOSIS — N95.0 PMB (POSTMENOPAUSAL BLEEDING): Primary | ICD-10-CM

## 2020-11-24 PROCEDURE — 99212 OFFICE O/P EST SF 10 MIN: CPT | Mod: HCNC,S$GLB,, | Performed by: OBSTETRICS & GYNECOLOGY

## 2020-11-24 PROCEDURE — 3288F PR FALLS RISK ASSESSMENT DOCUMENTED: ICD-10-PCS | Mod: HCNC,CPTII,S$GLB, | Performed by: OBSTETRICS & GYNECOLOGY

## 2020-11-24 PROCEDURE — 1101F PT FALLS ASSESS-DOCD LE1/YR: CPT | Mod: HCNC,CPTII,S$GLB, | Performed by: OBSTETRICS & GYNECOLOGY

## 2020-11-24 PROCEDURE — 1159F PR MEDICATION LIST DOCUMENTED IN MEDICAL RECORD: ICD-10-PCS | Mod: HCNC,S$GLB,, | Performed by: OBSTETRICS & GYNECOLOGY

## 2020-11-24 PROCEDURE — 1126F PR PAIN SEVERITY QUANTIFIED, NO PAIN PRESENT: ICD-10-PCS | Mod: HCNC,S$GLB,, | Performed by: OBSTETRICS & GYNECOLOGY

## 2020-11-24 PROCEDURE — 99212 PR OFFICE/OUTPT VISIT, EST, LEVL II, 10-19 MIN: ICD-10-PCS | Mod: HCNC,S$GLB,, | Performed by: OBSTETRICS & GYNECOLOGY

## 2020-11-24 PROCEDURE — 3074F SYST BP LT 130 MM HG: CPT | Mod: HCNC,CPTII,S$GLB, | Performed by: OBSTETRICS & GYNECOLOGY

## 2020-11-24 PROCEDURE — 3078F PR MOST RECENT DIASTOLIC BLOOD PRESSURE < 80 MM HG: ICD-10-PCS | Mod: HCNC,CPTII,S$GLB, | Performed by: OBSTETRICS & GYNECOLOGY

## 2020-11-24 PROCEDURE — 3074F PR MOST RECENT SYSTOLIC BLOOD PRESSURE < 130 MM HG: ICD-10-PCS | Mod: HCNC,CPTII,S$GLB, | Performed by: OBSTETRICS & GYNECOLOGY

## 2020-11-24 PROCEDURE — 1159F MED LIST DOCD IN RCRD: CPT | Mod: HCNC,S$GLB,, | Performed by: OBSTETRICS & GYNECOLOGY

## 2020-11-24 PROCEDURE — 1126F AMNT PAIN NOTED NONE PRSNT: CPT | Mod: HCNC,S$GLB,, | Performed by: OBSTETRICS & GYNECOLOGY

## 2020-11-24 PROCEDURE — 99999 PR PBB SHADOW E&M-EST. PATIENT-LVL IV: CPT | Mod: PBBFAC,HCNC,, | Performed by: OBSTETRICS & GYNECOLOGY

## 2020-11-24 PROCEDURE — 3008F BODY MASS INDEX DOCD: CPT | Mod: HCNC,CPTII,S$GLB, | Performed by: OBSTETRICS & GYNECOLOGY

## 2020-11-24 PROCEDURE — 3078F DIAST BP <80 MM HG: CPT | Mod: HCNC,CPTII,S$GLB, | Performed by: OBSTETRICS & GYNECOLOGY

## 2020-11-24 PROCEDURE — 99999 PR PBB SHADOW E&M-EST. PATIENT-LVL IV: ICD-10-PCS | Mod: PBBFAC,HCNC,, | Performed by: OBSTETRICS & GYNECOLOGY

## 2020-11-24 PROCEDURE — 1101F PR PT FALLS ASSESS DOC 0-1 FALLS W/OUT INJ PAST YR: ICD-10-PCS | Mod: HCNC,CPTII,S$GLB, | Performed by: OBSTETRICS & GYNECOLOGY

## 2020-11-24 PROCEDURE — 3008F PR BODY MASS INDEX (BMI) DOCUMENTED: ICD-10-PCS | Mod: HCNC,CPTII,S$GLB, | Performed by: OBSTETRICS & GYNECOLOGY

## 2020-11-24 PROCEDURE — 3288F FALL RISK ASSESSMENT DOCD: CPT | Mod: HCNC,CPTII,S$GLB, | Performed by: OBSTETRICS & GYNECOLOGY

## 2020-11-24 NOTE — PROGRESS NOTES
Subjective:       Patient ID: Nathalie Membreno is a 75 y.o. female.    Chief Complaint:  Follow-up (ultrasound)      History of Present Illness  HPI  Pt is here for follow up.  Reports no complaints today.  No bleeding since her last visit.  Doing well.    GYN & OB History  No LMP recorded. Patient is postmenopausal.   Date of Last Pap: No result found    OB History    Para Term  AB Living   4 3 3   1 3   SAB TAB Ectopic Multiple Live Births   1              # Outcome Date GA Lbr Garland/2nd Weight Sex Delivery Anes PTL Lv   4 SAB            3 Term            2 Term            1 Term                Review of Systems  Review of Systems   Constitutional: Negative for activity change, appetite change, chills, fatigue, fever and unexpected weight change.   Respiratory: Negative for shortness of breath.    Cardiovascular: Negative for chest pain, palpitations and leg swelling.   Gastrointestinal: Negative for abdominal pain, bloating, blood in stool, constipation, diarrhea, nausea and vomiting.   Genitourinary: Negative for dysuria, flank pain, frequency, genital sores, hematuria, pelvic pain, urgency, vaginal bleeding, vaginal discharge, vaginal pain, urinary incontinence, vaginal dryness and vaginal odor.   Musculoskeletal: Negative for back pain.   Neurological: Negative for syncope and headaches.           Objective:    Physical Exam:   Constitutional: She is oriented to person, place, and time. She appears well-developed and well-nourished. No distress.                           Neurological: She is alert and oriented to person, place, and time.     Psychiatric: She has a normal mood and affect. Her behavior is normal. Thought content normal.          US Pelvis Comp with Transvag NON-OB (xpd  Narrative: EXAMINATION:  US PELVIS COMP WITH TRANSVAG NON-OB (XPD)    CLINICAL HISTORY:  Postmenopausal bleeding    TECHNIQUE:  Transabdominal sonography of the pelvis was performed, followed by transvaginal  sonography to better evaluate the uterus and ovaries.    COMPARISON:  None.    FINDINGS:  Uterus:    Size: 12.7 x 6.4 x 7.9 cm    Masses: Large fundal/body region fibroid noted measuring 6.3 x 6.0 x 6.6 cm.    Endometrium: Not visualized in this postmenopausal patient.    Right ovary: Visualized on endovaginal exam only.    Size: 3.5 x 1.9 x 2.3 cm    Appearance: Normal    Vascular flow: Normal.    Left ovary: Visualized on transabdominal exam only.    Size: 2.9 x 2.8 x 2.5 cm    Appearance: Normal    Vascular Flow: Normal.    Free Fluid:    None.  Impression: Prominent uterus with large body/fundal fibroid measuring 7.9 cm maximum diameter.    Nonvisualization of the endometrium.    Ovaries normal in symmetric in appearance with right visualized on endovaginal exam only and left on transabdominal exam.  See above.    Follow-up and or further evaluation as warranted.    Electronically signed by: Nicolas Carrizales MD  Date:    10/19/2020  Time:    15:08         Assessment:        1. PMB (postmenopausal bleeding)             Plan:      PMB (postmenopausal bleeding)  -     US Pelvis Comp with Transvag NON-OB (xpd; Future; Expected date: 11/24/2020  -     Pt counseled on ultrasound findings.  Isolated bleeding episode likely related to large fibroid and Xarelto use.  EMS was not visualized and symptoms have resolved.  Recommend monitoring of symptoms and repeat ultrasound in 6 months.  Would advise EMB if symptoms recur.  Pt voiced understanding.      Follow up in about 6 months (around 5/24/2021).

## 2020-12-11 ENCOUNTER — PATIENT MESSAGE (OUTPATIENT)
Dept: OTHER | Facility: OTHER | Age: 75
End: 2020-12-11

## 2021-01-21 ENCOUNTER — LAB VISIT (OUTPATIENT)
Dept: LAB | Facility: HOSPITAL | Age: 76
End: 2021-01-21
Attending: INTERNAL MEDICINE
Payer: MEDICAID

## 2021-01-21 DIAGNOSIS — E55.9 VITAMIN D DEFICIENCY: ICD-10-CM

## 2021-01-21 DIAGNOSIS — Z29.9 PREVENTIVE MEASURE: ICD-10-CM

## 2021-01-21 DIAGNOSIS — E78.49 OTHER HYPERLIPIDEMIA: ICD-10-CM

## 2021-01-21 DIAGNOSIS — I48.92 ATRIAL FLUTTER WITH RAPID VENTRICULAR RESPONSE: ICD-10-CM

## 2021-01-21 LAB
25(OH)D3+25(OH)D2 SERPL-MCNC: 22 NG/ML (ref 30–96)
ALBUMIN SERPL BCP-MCNC: 3.6 G/DL (ref 3.5–5.2)
ALP SERPL-CCNC: 97 U/L (ref 55–135)
ALT SERPL W/O P-5'-P-CCNC: 14 U/L (ref 10–44)
ANION GAP SERPL CALC-SCNC: 11 MMOL/L (ref 8–16)
AST SERPL-CCNC: 17 U/L (ref 10–40)
BASOPHILS # BLD AUTO: 0.05 K/UL (ref 0–0.2)
BASOPHILS NFR BLD: 0.7 % (ref 0–1.9)
BILIRUB SERPL-MCNC: 0.6 MG/DL (ref 0.1–1)
BUN SERPL-MCNC: 14 MG/DL (ref 8–23)
CALCIUM SERPL-MCNC: 9.4 MG/DL (ref 8.7–10.5)
CHLORIDE SERPL-SCNC: 104 MMOL/L (ref 95–110)
CHOLEST SERPL-MCNC: 186 MG/DL (ref 120–199)
CHOLEST/HDLC SERPL: 3.2 {RATIO} (ref 2–5)
CO2 SERPL-SCNC: 24 MMOL/L (ref 23–29)
CREAT SERPL-MCNC: 1 MG/DL (ref 0.5–1.4)
DIFFERENTIAL METHOD: ABNORMAL
DIGOXIN SERPL-MCNC: 0.3 NG/ML (ref 0.8–2)
EOSINOPHIL # BLD AUTO: 0.2 K/UL (ref 0–0.5)
EOSINOPHIL NFR BLD: 3.2 % (ref 0–8)
ERYTHROCYTE [DISTWIDTH] IN BLOOD BY AUTOMATED COUNT: 14.7 % (ref 11.5–14.5)
EST. GFR  (AFRICAN AMERICAN): >60 ML/MIN/1.73 M^2
EST. GFR  (NON AFRICAN AMERICAN): 55.6 ML/MIN/1.73 M^2
GLUCOSE SERPL-MCNC: 92 MG/DL (ref 70–110)
HCT VFR BLD AUTO: 45.6 % (ref 37–48.5)
HDLC SERPL-MCNC: 59 MG/DL (ref 40–75)
HDLC SERPL: 31.7 % (ref 20–50)
HGB BLD-MCNC: 13.6 G/DL (ref 12–16)
IMM GRANULOCYTES # BLD AUTO: 0.03 K/UL (ref 0–0.04)
IMM GRANULOCYTES NFR BLD AUTO: 0.4 % (ref 0–0.5)
LDLC SERPL CALC-MCNC: 108.4 MG/DL (ref 63–159)
LYMPHOCYTES # BLD AUTO: 3 K/UL (ref 1–4.8)
LYMPHOCYTES NFR BLD: 42.7 % (ref 18–48)
MCH RBC QN AUTO: 30 PG (ref 27–31)
MCHC RBC AUTO-ENTMCNC: 29.8 G/DL (ref 32–36)
MCV RBC AUTO: 101 FL (ref 82–98)
MONOCYTES # BLD AUTO: 0.5 K/UL (ref 0.3–1)
MONOCYTES NFR BLD: 6.7 % (ref 4–15)
NEUTROPHILS # BLD AUTO: 3.2 K/UL (ref 1.8–7.7)
NEUTROPHILS NFR BLD: 46.3 % (ref 38–73)
NONHDLC SERPL-MCNC: 127 MG/DL
NRBC BLD-RTO: 0 /100 WBC
PLATELET # BLD AUTO: 222 K/UL (ref 150–350)
PMV BLD AUTO: 14.1 FL (ref 9.2–12.9)
POTASSIUM SERPL-SCNC: 4.3 MMOL/L (ref 3.5–5.1)
PROT SERPL-MCNC: 7.6 G/DL (ref 6–8.4)
RBC # BLD AUTO: 4.53 M/UL (ref 4–5.4)
SODIUM SERPL-SCNC: 139 MMOL/L (ref 136–145)
TRIGL SERPL-MCNC: 93 MG/DL (ref 30–150)
TSH SERPL DL<=0.005 MIU/L-ACNC: 1.93 UIU/ML (ref 0.4–4)
WBC # BLD AUTO: 6.98 K/UL (ref 3.9–12.7)

## 2021-01-21 PROCEDURE — 36415 COLL VENOUS BLD VENIPUNCTURE: CPT | Mod: PO

## 2021-01-21 PROCEDURE — 82306 VITAMIN D 25 HYDROXY: CPT

## 2021-01-21 PROCEDURE — 85025 COMPLETE CBC W/AUTO DIFF WBC: CPT

## 2021-01-21 PROCEDURE — 80061 LIPID PANEL: CPT

## 2021-01-21 PROCEDURE — 80162 ASSAY OF DIGOXIN TOTAL: CPT

## 2021-01-21 PROCEDURE — 80053 COMPREHEN METABOLIC PANEL: CPT

## 2021-01-21 PROCEDURE — 84443 ASSAY THYROID STIM HORMONE: CPT

## 2021-01-28 ENCOUNTER — OFFICE VISIT (OUTPATIENT)
Dept: FAMILY MEDICINE | Facility: CLINIC | Age: 76
End: 2021-01-28
Payer: MEDICARE

## 2021-01-28 VITALS
BODY MASS INDEX: 38.99 KG/M2 | HEIGHT: 64 IN | OXYGEN SATURATION: 99 % | SYSTOLIC BLOOD PRESSURE: 126 MMHG | DIASTOLIC BLOOD PRESSURE: 78 MMHG | TEMPERATURE: 98 F | HEART RATE: 73 BPM | WEIGHT: 228.38 LBS

## 2021-01-28 DIAGNOSIS — I10 ESSENTIAL HYPERTENSION: Primary | ICD-10-CM

## 2021-01-28 DIAGNOSIS — J30.1 SEASONAL ALLERGIC RHINITIS DUE TO POLLEN: ICD-10-CM

## 2021-01-28 DIAGNOSIS — E66.01 SEVERE OBESITY WITH BODY MASS INDEX (BMI) OF 35.0 TO 39.9 WITH COMORBIDITY: ICD-10-CM

## 2021-01-28 DIAGNOSIS — E78.49 OTHER HYPERLIPIDEMIA: ICD-10-CM

## 2021-01-28 DIAGNOSIS — I77.1 TORTUOUS AORTA: ICD-10-CM

## 2021-01-28 DIAGNOSIS — Z00.00 ENCOUNTER FOR PREVENTIVE HEALTH EXAMINATION: ICD-10-CM

## 2021-01-28 DIAGNOSIS — E55.9 VITAMIN D DEFICIENCY: ICD-10-CM

## 2021-01-28 DIAGNOSIS — Z79.01 CHRONIC ANTICOAGULATION: ICD-10-CM

## 2021-01-28 DIAGNOSIS — Z12.11 SCREEN FOR COLON CANCER: ICD-10-CM

## 2021-01-28 DIAGNOSIS — J45.20 MILD INTERMITTENT ASTHMA WITHOUT COMPLICATION: ICD-10-CM

## 2021-01-28 DIAGNOSIS — I48.92 ATRIAL FLUTTER WITH RAPID VENTRICULAR RESPONSE: ICD-10-CM

## 2021-01-28 DIAGNOSIS — K59.01 SLOW TRANSIT CONSTIPATION: ICD-10-CM

## 2021-01-28 PROCEDURE — 99214 OFFICE O/P EST MOD 30 MIN: CPT | Mod: S$GLB,,, | Performed by: INTERNAL MEDICINE

## 2021-01-28 PROCEDURE — 1101F PR PT FALLS ASSESS DOC 0-1 FALLS W/OUT INJ PAST YR: ICD-10-PCS | Mod: CPTII,S$GLB,, | Performed by: INTERNAL MEDICINE

## 2021-01-28 PROCEDURE — 99499 RISK ADDL DX/OHS AUDIT: ICD-10-PCS | Mod: S$GLB,,, | Performed by: INTERNAL MEDICINE

## 2021-01-28 PROCEDURE — 99499 UNLISTED E&M SERVICE: CPT | Mod: S$GLB,,, | Performed by: INTERNAL MEDICINE

## 2021-01-28 PROCEDURE — 1159F PR MEDICATION LIST DOCUMENTED IN MEDICAL RECORD: ICD-10-PCS | Mod: S$GLB,,, | Performed by: INTERNAL MEDICINE

## 2021-01-28 PROCEDURE — 99999 PR PBB SHADOW E&M-EST. PATIENT-LVL IV: ICD-10-PCS | Mod: PBBFAC,,, | Performed by: INTERNAL MEDICINE

## 2021-01-28 PROCEDURE — 1125F AMNT PAIN NOTED PAIN PRSNT: CPT | Mod: S$GLB,,, | Performed by: INTERNAL MEDICINE

## 2021-01-28 PROCEDURE — 1125F PR PAIN SEVERITY QUANTIFIED, PAIN PRESENT: ICD-10-PCS | Mod: S$GLB,,, | Performed by: INTERNAL MEDICINE

## 2021-01-28 PROCEDURE — 3078F DIAST BP <80 MM HG: CPT | Mod: CPTII,S$GLB,, | Performed by: INTERNAL MEDICINE

## 2021-01-28 PROCEDURE — 3074F SYST BP LT 130 MM HG: CPT | Mod: CPTII,S$GLB,, | Performed by: INTERNAL MEDICINE

## 2021-01-28 PROCEDURE — 3288F PR FALLS RISK ASSESSMENT DOCUMENTED: ICD-10-PCS | Mod: CPTII,S$GLB,, | Performed by: INTERNAL MEDICINE

## 2021-01-28 PROCEDURE — 3008F BODY MASS INDEX DOCD: CPT | Mod: CPTII,S$GLB,, | Performed by: INTERNAL MEDICINE

## 2021-01-28 PROCEDURE — 3078F PR MOST RECENT DIASTOLIC BLOOD PRESSURE < 80 MM HG: ICD-10-PCS | Mod: CPTII,S$GLB,, | Performed by: INTERNAL MEDICINE

## 2021-01-28 PROCEDURE — 1101F PT FALLS ASSESS-DOCD LE1/YR: CPT | Mod: CPTII,S$GLB,, | Performed by: INTERNAL MEDICINE

## 2021-01-28 PROCEDURE — 3008F PR BODY MASS INDEX (BMI) DOCUMENTED: ICD-10-PCS | Mod: CPTII,S$GLB,, | Performed by: INTERNAL MEDICINE

## 2021-01-28 PROCEDURE — 1159F MED LIST DOCD IN RCRD: CPT | Mod: S$GLB,,, | Performed by: INTERNAL MEDICINE

## 2021-01-28 PROCEDURE — 3288F FALL RISK ASSESSMENT DOCD: CPT | Mod: CPTII,S$GLB,, | Performed by: INTERNAL MEDICINE

## 2021-01-28 PROCEDURE — 99999 PR PBB SHADOW E&M-EST. PATIENT-LVL IV: CPT | Mod: PBBFAC,,, | Performed by: INTERNAL MEDICINE

## 2021-01-28 PROCEDURE — 3074F PR MOST RECENT SYSTOLIC BLOOD PRESSURE < 130 MM HG: ICD-10-PCS | Mod: CPTII,S$GLB,, | Performed by: INTERNAL MEDICINE

## 2021-01-28 PROCEDURE — 99214 PR OFFICE/OUTPT VISIT, EST, LEVL IV, 30-39 MIN: ICD-10-PCS | Mod: S$GLB,,, | Performed by: INTERNAL MEDICINE

## 2021-01-28 RX ORDER — FLUTICASONE PROPIONATE 50 MCG
2 SPRAY, SUSPENSION (ML) NASAL DAILY
Qty: 16 G | Refills: 6 | Status: SHIPPED | OUTPATIENT
Start: 2021-01-28 | End: 2021-06-23 | Stop reason: SDUPTHER

## 2021-01-28 RX ORDER — ALBUTEROL SULFATE 0.83 MG/ML
2.5 SOLUTION RESPIRATORY (INHALATION) EVERY 4 HOURS PRN
Qty: 1 BOX | Refills: 6 | Status: SHIPPED | OUTPATIENT
Start: 2021-01-28 | End: 2021-06-23 | Stop reason: SDUPTHER

## 2021-02-24 ENCOUNTER — TELEPHONE (OUTPATIENT)
Dept: ENDOSCOPY | Facility: HOSPITAL | Age: 76
End: 2021-02-24

## 2021-02-24 DIAGNOSIS — Z13.9 SCREENING PROCEDURE: Primary | ICD-10-CM

## 2021-02-24 RX ORDER — SODIUM, POTASSIUM,MAG SULFATES 17.5-3.13G
1 SOLUTION, RECONSTITUTED, ORAL ORAL DAILY
Qty: 1 KIT | Refills: 0 | Status: SHIPPED | OUTPATIENT
Start: 2021-02-24 | End: 2021-02-26

## 2021-03-23 ENCOUNTER — TELEPHONE (OUTPATIENT)
Dept: ENDOSCOPY | Facility: HOSPITAL | Age: 76
End: 2021-03-23

## 2021-03-26 ENCOUNTER — TELEPHONE (OUTPATIENT)
Dept: ENDOSCOPY | Facility: HOSPITAL | Age: 76
End: 2021-03-26

## 2021-03-28 ENCOUNTER — LAB VISIT (OUTPATIENT)
Dept: OTOLARYNGOLOGY | Facility: CLINIC | Age: 76
End: 2021-03-28
Payer: MEDICARE

## 2021-03-28 DIAGNOSIS — Z13.9 SCREENING PROCEDURE: ICD-10-CM

## 2021-03-28 PROCEDURE — U0005 INFEC AGEN DETEC AMPLI PROBE: HCPCS | Performed by: INTERNAL MEDICINE

## 2021-03-28 PROCEDURE — U0003 INFECTIOUS AGENT DETECTION BY NUCLEIC ACID (DNA OR RNA); SEVERE ACUTE RESPIRATORY SYNDROME CORONAVIRUS 2 (SARS-COV-2) (CORONAVIRUS DISEASE [COVID-19]), AMPLIFIED PROBE TECHNIQUE, MAKING USE OF HIGH THROUGHPUT TECHNOLOGIES AS DESCRIBED BY CMS-2020-01-R: HCPCS | Performed by: INTERNAL MEDICINE

## 2021-03-29 LAB — SARS-COV-2 RNA RESP QL NAA+PROBE: NOT DETECTED

## 2021-03-31 ENCOUNTER — HOSPITAL ENCOUNTER (OUTPATIENT)
Facility: HOSPITAL | Age: 76
Discharge: HOME | End: 2021-03-31
Attending: INTERNAL MEDICINE | Admitting: INTERNAL MEDICINE
Payer: MEDICARE

## 2021-03-31 ENCOUNTER — ANESTHESIA (OUTPATIENT)
Dept: ENDOSCOPY | Facility: HOSPITAL | Age: 76
End: 2021-03-31
Payer: MEDICARE

## 2021-03-31 ENCOUNTER — ANESTHESIA EVENT (OUTPATIENT)
Dept: ENDOSCOPY | Facility: HOSPITAL | Age: 76
End: 2021-03-31
Payer: MEDICARE

## 2021-03-31 DIAGNOSIS — Z86.010 HISTORY OF COLON POLYPS: Primary | ICD-10-CM

## 2021-03-31 PROCEDURE — 88305 TISSUE EXAM BY PATHOLOGIST: CPT | Mod: 59 | Performed by: PATHOLOGY

## 2021-03-31 PROCEDURE — 45385 COLONOSCOPY W/LESION REMOVAL: CPT | Performed by: INTERNAL MEDICINE

## 2021-03-31 PROCEDURE — 45380 COLONOSCOPY AND BIOPSY: CPT

## 2021-03-31 PROCEDURE — 88305 TISSUE EXAM BY PATHOLOGIST: ICD-10-PCS | Mod: 26,,, | Performed by: PATHOLOGY

## 2021-03-31 PROCEDURE — 37000009 HC ANESTHESIA EA ADD 15 MINS: Performed by: INTERNAL MEDICINE

## 2021-03-31 PROCEDURE — 27201089 HC SNARE, DISP (ANY): Performed by: INTERNAL MEDICINE

## 2021-03-31 PROCEDURE — 45385 PR COLONOSCOPY,REMV LESN,SNARE: ICD-10-PCS | Mod: PT,,, | Performed by: INTERNAL MEDICINE

## 2021-03-31 PROCEDURE — 63600175 PHARM REV CODE 636 W HCPCS: Performed by: NURSE ANESTHETIST, CERTIFIED REGISTERED

## 2021-03-31 PROCEDURE — 25000003 PHARM REV CODE 250: Performed by: NURSE ANESTHETIST, CERTIFIED REGISTERED

## 2021-03-31 PROCEDURE — 37000008 HC ANESTHESIA 1ST 15 MINUTES: Performed by: INTERNAL MEDICINE

## 2021-03-31 PROCEDURE — 45385 COLONOSCOPY W/LESION REMOVAL: CPT | Mod: PT,,, | Performed by: INTERNAL MEDICINE

## 2021-03-31 PROCEDURE — 27201012 HC FORCEPS, HOT/COLD, DISP

## 2021-03-31 PROCEDURE — 88305 TISSUE EXAM BY PATHOLOGIST: CPT | Mod: 26,,, | Performed by: PATHOLOGY

## 2021-03-31 RX ORDER — SODIUM CHLORIDE 9 MG/ML
INJECTION, SOLUTION INTRAVENOUS CONTINUOUS
Status: DISCONTINUED | OUTPATIENT
Start: 2021-03-31 | End: 2021-03-31 | Stop reason: HOSPADM

## 2021-03-31 RX ORDER — LIDOCAINE HYDROCHLORIDE 10 MG/ML
INJECTION, SOLUTION EPIDURAL; INFILTRATION; INTRACAUDAL; PERINEURAL
Status: DISCONTINUED | OUTPATIENT
Start: 2021-03-31 | End: 2021-03-31

## 2021-03-31 RX ORDER — PROPOFOL 10 MG/ML
VIAL (ML) INTRAVENOUS
Status: DISCONTINUED | OUTPATIENT
Start: 2021-03-31 | End: 2021-03-31

## 2021-03-31 RX ADMIN — PROPOFOL 30 MG: 10 INJECTION, EMULSION INTRAVENOUS at 09:03

## 2021-03-31 RX ADMIN — LIDOCAINE HYDROCHLORIDE 50 MG: 10 INJECTION, SOLUTION EPIDURAL; INFILTRATION; INTRACAUDAL; PERINEURAL at 09:03

## 2021-03-31 RX ADMIN — SODIUM CHLORIDE, SODIUM LACTATE, POTASSIUM CHLORIDE, AND CALCIUM CHLORIDE: .6; .31; .03; .02 INJECTION, SOLUTION INTRAVENOUS at 09:03

## 2021-03-31 RX ADMIN — PROPOFOL 80 MG: 10 INJECTION, EMULSION INTRAVENOUS at 09:03

## 2021-03-31 RX ADMIN — PROPOFOL 30 MG: 10 INJECTION, EMULSION INTRAVENOUS at 10:03

## 2021-04-01 VITALS
TEMPERATURE: 97 F | BODY MASS INDEX: 39.09 KG/M2 | OXYGEN SATURATION: 98 % | WEIGHT: 227.75 LBS | HEART RATE: 61 BPM | SYSTOLIC BLOOD PRESSURE: 161 MMHG | RESPIRATION RATE: 20 BRPM | DIASTOLIC BLOOD PRESSURE: 80 MMHG

## 2021-04-06 LAB
FINAL PATHOLOGIC DIAGNOSIS: NORMAL
GROSS: NORMAL
Lab: NORMAL

## 2021-04-08 ENCOUNTER — TELEPHONE (OUTPATIENT)
Dept: GASTROENTEROLOGY | Facility: CLINIC | Age: 76
End: 2021-04-08

## 2021-05-03 ENCOUNTER — PES CALL (OUTPATIENT)
Dept: ADMINISTRATIVE | Facility: CLINIC | Age: 76
End: 2021-05-03

## 2021-05-05 RX ORDER — DIGOXIN 125 MCG
0.12 TABLET ORAL DAILY
Qty: 90 TABLET | Refills: 2 | Status: SHIPPED | OUTPATIENT
Start: 2021-05-05 | End: 2021-06-23 | Stop reason: SDUPTHER

## 2021-05-17 ENCOUNTER — TELEPHONE (OUTPATIENT)
Dept: RADIOLOGY | Facility: HOSPITAL | Age: 76
End: 2021-05-17

## 2021-05-18 ENCOUNTER — HOSPITAL ENCOUNTER (OUTPATIENT)
Dept: RADIOLOGY | Facility: HOSPITAL | Age: 76
Discharge: HOME | End: 2021-05-18
Attending: OBSTETRICS & GYNECOLOGY
Payer: MEDICARE

## 2021-05-18 DIAGNOSIS — N95.0 PMB (POSTMENOPAUSAL BLEEDING): ICD-10-CM

## 2021-05-18 PROCEDURE — 76856 US EXAM PELVIC COMPLETE: CPT | Mod: 26,,, | Performed by: RADIOLOGY

## 2021-05-18 PROCEDURE — 76856 US PELVIS COMP WITH TRANSVAG NON-OB (XPD): ICD-10-PCS | Mod: 26,,, | Performed by: RADIOLOGY

## 2021-05-18 PROCEDURE — 76856 US EXAM PELVIC COMPLETE: CPT | Mod: TC

## 2021-05-18 PROCEDURE — 76830 US PELVIS COMP WITH TRANSVAG NON-OB (XPD): ICD-10-PCS | Mod: 26,,, | Performed by: RADIOLOGY

## 2021-05-18 PROCEDURE — 76830 TRANSVAGINAL US NON-OB: CPT | Mod: 26,,, | Performed by: RADIOLOGY

## 2021-05-20 DIAGNOSIS — I10 ESSENTIAL HYPERTENSION: ICD-10-CM

## 2021-05-20 RX ORDER — METOPROLOL SUCCINATE 100 MG/1
100 TABLET, EXTENDED RELEASE ORAL DAILY
Qty: 90 TABLET | Refills: 3 | Status: SHIPPED | OUTPATIENT
Start: 2021-05-20 | End: 2021-06-23 | Stop reason: SDUPTHER

## 2021-05-24 ENCOUNTER — PATIENT OUTREACH (OUTPATIENT)
Dept: ADMINISTRATIVE | Facility: OTHER | Age: 76
End: 2021-05-24

## 2021-05-24 DIAGNOSIS — Z12.31 ENCOUNTER FOR SCREENING MAMMOGRAM FOR MALIGNANT NEOPLASM OF BREAST: Primary | ICD-10-CM

## 2021-05-25 ENCOUNTER — OFFICE VISIT (OUTPATIENT)
Dept: OBSTETRICS AND GYNECOLOGY | Facility: CLINIC | Age: 76
End: 2021-05-25
Payer: MEDICARE

## 2021-05-25 VITALS
HEIGHT: 64 IN | DIASTOLIC BLOOD PRESSURE: 70 MMHG | WEIGHT: 231.06 LBS | BODY MASS INDEX: 39.45 KG/M2 | SYSTOLIC BLOOD PRESSURE: 138 MMHG

## 2021-05-25 DIAGNOSIS — D21.9 FIBROIDS: ICD-10-CM

## 2021-05-25 DIAGNOSIS — N95.0 PMB (POSTMENOPAUSAL BLEEDING): Primary | ICD-10-CM

## 2021-05-25 PROCEDURE — 1126F AMNT PAIN NOTED NONE PRSNT: CPT | Mod: S$GLB,,, | Performed by: OBSTETRICS & GYNECOLOGY

## 2021-05-25 PROCEDURE — 1159F PR MEDICATION LIST DOCUMENTED IN MEDICAL RECORD: ICD-10-PCS | Mod: S$GLB,,, | Performed by: OBSTETRICS & GYNECOLOGY

## 2021-05-25 PROCEDURE — 99999 PR PBB SHADOW E&M-EST. PATIENT-LVL IV: CPT | Mod: PBBFAC,,, | Performed by: OBSTETRICS & GYNECOLOGY

## 2021-05-25 PROCEDURE — 3008F PR BODY MASS INDEX (BMI) DOCUMENTED: ICD-10-PCS | Mod: CPTII,S$GLB,, | Performed by: OBSTETRICS & GYNECOLOGY

## 2021-05-25 PROCEDURE — 1101F PT FALLS ASSESS-DOCD LE1/YR: CPT | Mod: CPTII,S$GLB,, | Performed by: OBSTETRICS & GYNECOLOGY

## 2021-05-25 PROCEDURE — 1126F PR PAIN SEVERITY QUANTIFIED, NO PAIN PRESENT: ICD-10-PCS | Mod: S$GLB,,, | Performed by: OBSTETRICS & GYNECOLOGY

## 2021-05-25 PROCEDURE — 99212 PR OFFICE/OUTPT VISIT, EST, LEVL II, 10-19 MIN: ICD-10-PCS | Mod: S$GLB,,, | Performed by: OBSTETRICS & GYNECOLOGY

## 2021-05-25 PROCEDURE — 1159F MED LIST DOCD IN RCRD: CPT | Mod: S$GLB,,, | Performed by: OBSTETRICS & GYNECOLOGY

## 2021-05-25 PROCEDURE — 99999 PR PBB SHADOW E&M-EST. PATIENT-LVL IV: ICD-10-PCS | Mod: PBBFAC,,, | Performed by: OBSTETRICS & GYNECOLOGY

## 2021-05-25 PROCEDURE — 99212 OFFICE O/P EST SF 10 MIN: CPT | Mod: S$GLB,,, | Performed by: OBSTETRICS & GYNECOLOGY

## 2021-05-25 PROCEDURE — 1101F PR PT FALLS ASSESS DOC 0-1 FALLS W/OUT INJ PAST YR: ICD-10-PCS | Mod: CPTII,S$GLB,, | Performed by: OBSTETRICS & GYNECOLOGY

## 2021-05-25 PROCEDURE — 3288F PR FALLS RISK ASSESSMENT DOCUMENTED: ICD-10-PCS | Mod: CPTII,S$GLB,, | Performed by: OBSTETRICS & GYNECOLOGY

## 2021-05-25 PROCEDURE — 3288F FALL RISK ASSESSMENT DOCD: CPT | Mod: CPTII,S$GLB,, | Performed by: OBSTETRICS & GYNECOLOGY

## 2021-05-25 PROCEDURE — 3008F BODY MASS INDEX DOCD: CPT | Mod: CPTII,S$GLB,, | Performed by: OBSTETRICS & GYNECOLOGY

## 2021-05-25 RX ORDER — MISOPROSTOL 200 UG/1
TABLET ORAL
Qty: 4 TABLET | Refills: 0 | Status: SHIPPED | OUTPATIENT
Start: 2021-05-25 | End: 2021-07-23

## 2021-06-23 DIAGNOSIS — J30.1 SEASONAL ALLERGIC RHINITIS DUE TO POLLEN: ICD-10-CM

## 2021-06-23 DIAGNOSIS — J45.20 MILD INTERMITTENT ASTHMA WITHOUT COMPLICATION: ICD-10-CM

## 2021-06-23 DIAGNOSIS — I10 ESSENTIAL HYPERTENSION: ICD-10-CM

## 2021-06-23 RX ORDER — ATORVASTATIN CALCIUM 40 MG/1
TABLET, FILM COATED ORAL
Qty: 90 TABLET | Refills: 5 | Status: SHIPPED | OUTPATIENT
Start: 2021-06-23 | End: 2022-08-17

## 2021-06-23 RX ORDER — LOSARTAN POTASSIUM 100 MG/1
100 TABLET ORAL DAILY
Qty: 90 TABLET | Refills: 3 | Status: SHIPPED | OUTPATIENT
Start: 2021-06-23 | End: 2022-06-30

## 2021-06-23 RX ORDER — FLUTICASONE PROPIONATE 50 MCG
2 SPRAY, SUSPENSION (ML) NASAL DAILY
Qty: 16 G | Refills: 6 | Status: SHIPPED | OUTPATIENT
Start: 2021-06-23

## 2021-06-23 RX ORDER — GABAPENTIN 300 MG/1
300 CAPSULE ORAL NIGHTLY
Qty: 90 CAPSULE | Refills: 3 | Status: SHIPPED | OUTPATIENT
Start: 2021-06-23 | End: 2022-09-14

## 2021-06-23 RX ORDER — AMLODIPINE BESYLATE 5 MG/1
5 TABLET ORAL DAILY
Qty: 30 TABLET | Refills: 11 | Status: SHIPPED | OUTPATIENT
Start: 2021-06-23 | End: 2022-08-01

## 2021-06-23 RX ORDER — METOPROLOL SUCCINATE 100 MG/1
100 TABLET, EXTENDED RELEASE ORAL DAILY
Qty: 90 TABLET | Refills: 3 | Status: SHIPPED | OUTPATIENT
Start: 2021-06-23 | End: 2022-06-30

## 2021-06-23 RX ORDER — ALBUTEROL SULFATE 0.83 MG/ML
2.5 SOLUTION RESPIRATORY (INHALATION) EVERY 4 HOURS PRN
Qty: 1 BOX | Refills: 6 | Status: SHIPPED | OUTPATIENT
Start: 2021-06-23 | End: 2022-06-23

## 2021-06-23 RX ORDER — DIGOXIN 125 MCG
0.12 TABLET ORAL DAILY
Qty: 90 TABLET | Refills: 2 | Status: SHIPPED | OUTPATIENT
Start: 2021-06-23 | End: 2022-02-02

## 2021-07-01 ENCOUNTER — PATIENT MESSAGE (OUTPATIENT)
Dept: ADMINISTRATIVE | Facility: OTHER | Age: 76
End: 2021-07-01

## 2021-07-16 ENCOUNTER — PES CALL (OUTPATIENT)
Dept: ADMINISTRATIVE | Facility: CLINIC | Age: 76
End: 2021-07-16

## 2021-07-21 ENCOUNTER — PES CALL (OUTPATIENT)
Dept: ADMINISTRATIVE | Facility: CLINIC | Age: 76
End: 2021-07-21

## 2021-07-23 ENCOUNTER — OFFICE VISIT (OUTPATIENT)
Dept: FAMILY MEDICINE | Facility: CLINIC | Age: 76
End: 2021-07-23
Payer: MEDICARE

## 2021-07-23 VITALS
HEIGHT: 64 IN | WEIGHT: 230.81 LBS | RESPIRATION RATE: 20 BRPM | SYSTOLIC BLOOD PRESSURE: 158 MMHG | HEART RATE: 63 BPM | BODY MASS INDEX: 39.4 KG/M2 | OXYGEN SATURATION: 100 % | TEMPERATURE: 98 F | DIASTOLIC BLOOD PRESSURE: 67 MMHG

## 2021-07-23 DIAGNOSIS — J45.20 MILD INTERMITTENT ASTHMA WITHOUT COMPLICATION: ICD-10-CM

## 2021-07-23 DIAGNOSIS — E78.49 OTHER HYPERLIPIDEMIA: ICD-10-CM

## 2021-07-23 DIAGNOSIS — I48.92 ATRIAL FLUTTER WITH RAPID VENTRICULAR RESPONSE: ICD-10-CM

## 2021-07-23 DIAGNOSIS — E66.01 SEVERE OBESITY WITH BODY MASS INDEX (BMI) OF 35.0 TO 39.9 WITH COMORBIDITY: ICD-10-CM

## 2021-07-23 DIAGNOSIS — Z86.73 HISTORY OF TIA (TRANSIENT ISCHEMIC ATTACK): ICD-10-CM

## 2021-07-23 DIAGNOSIS — K21.9 GASTROESOPHAGEAL REFLUX DISEASE WITHOUT ESOPHAGITIS: ICD-10-CM

## 2021-07-23 DIAGNOSIS — E55.9 VITAMIN D DEFICIENCY: ICD-10-CM

## 2021-07-23 DIAGNOSIS — I77.1 TORTUOUS AORTA: ICD-10-CM

## 2021-07-23 DIAGNOSIS — Z00.00 ENCOUNTER FOR PREVENTIVE HEALTH EXAMINATION: Primary | ICD-10-CM

## 2021-07-23 DIAGNOSIS — Z86.010 HISTORY OF COLON POLYPS: ICD-10-CM

## 2021-07-23 DIAGNOSIS — Z95.810 PRESENCE OF CARDIAC DEFIBRILLATOR: ICD-10-CM

## 2021-07-23 DIAGNOSIS — M48.061 SPINAL STENOSIS OF LUMBAR REGION, UNSPECIFIED WHETHER NEUROGENIC CLAUDICATION PRESENT: ICD-10-CM

## 2021-07-23 DIAGNOSIS — I10 ESSENTIAL HYPERTENSION: ICD-10-CM

## 2021-07-23 PROBLEM — R93.89 ENDOMETRIAL THICKENING ON ULTRASOUND: Status: ACTIVE | Noted: 2021-07-23

## 2021-07-23 PROCEDURE — G9920 PR SCREENING AND NEGATIVE: ICD-10-PCS | Mod: CPTII,S$GLB,, | Performed by: NURSE PRACTITIONER

## 2021-07-23 PROCEDURE — 99999 PR PBB SHADOW E&M-EST. PATIENT-LVL V: ICD-10-PCS | Mod: PBBFAC,,, | Performed by: NURSE PRACTITIONER

## 2021-07-23 PROCEDURE — 99999 PR PBB SHADOW E&M-EST. PATIENT-LVL V: CPT | Mod: PBBFAC,,, | Performed by: NURSE PRACTITIONER

## 2021-07-23 PROCEDURE — 99215 OFFICE O/P EST HI 40 MIN: CPT | Mod: PBBFAC,PO | Performed by: NURSE PRACTITIONER

## 2021-07-23 PROCEDURE — G9920 SCRNING PERF AND NEGATIVE: HCPCS | Mod: CPTII,S$GLB,, | Performed by: NURSE PRACTITIONER

## 2021-07-23 PROCEDURE — G0439 PPPS, SUBSEQ VISIT: HCPCS | Mod: S$GLB,,, | Performed by: NURSE PRACTITIONER

## 2021-07-23 PROCEDURE — G0439 PR MEDICARE ANNUAL WELLNESS SUBSEQUENT VISIT: ICD-10-PCS | Mod: S$GLB,,, | Performed by: NURSE PRACTITIONER

## 2021-07-23 RX ORDER — MEDROXYPROGESTERONE ACETATE 10 MG/1
100 TABLET ORAL DAILY
COMMUNITY
Start: 2021-07-01 | End: 2022-12-22

## 2021-07-28 ENCOUNTER — OFFICE VISIT (OUTPATIENT)
Dept: FAMILY MEDICINE | Facility: CLINIC | Age: 76
End: 2021-07-28
Payer: MEDICARE

## 2021-07-28 VITALS
TEMPERATURE: 98 F | WEIGHT: 230.06 LBS | HEIGHT: 64 IN | BODY MASS INDEX: 39.28 KG/M2 | OXYGEN SATURATION: 98 % | DIASTOLIC BLOOD PRESSURE: 65 MMHG | HEART RATE: 85 BPM | SYSTOLIC BLOOD PRESSURE: 129 MMHG

## 2021-07-28 DIAGNOSIS — E66.01 SEVERE OBESITY WITH BODY MASS INDEX (BMI) OF 35.0 TO 39.9 WITH COMORBIDITY: ICD-10-CM

## 2021-07-28 DIAGNOSIS — Z29.9 PREVENTIVE MEASURE: ICD-10-CM

## 2021-07-28 DIAGNOSIS — I48.92 ATRIAL FLUTTER WITH RAPID VENTRICULAR RESPONSE: ICD-10-CM

## 2021-07-28 DIAGNOSIS — J45.20 MILD INTERMITTENT ASTHMA WITHOUT COMPLICATION: ICD-10-CM

## 2021-07-28 DIAGNOSIS — E78.49 OTHER HYPERLIPIDEMIA: ICD-10-CM

## 2021-07-28 DIAGNOSIS — N95.0 POSTMENOPAUSAL BLEEDING: ICD-10-CM

## 2021-07-28 DIAGNOSIS — I10 ESSENTIAL HYPERTENSION: Primary | ICD-10-CM

## 2021-07-28 DIAGNOSIS — Z79.01 CHRONIC ANTICOAGULATION: ICD-10-CM

## 2021-07-28 DIAGNOSIS — K21.9 GASTROESOPHAGEAL REFLUX DISEASE WITHOUT ESOPHAGITIS: ICD-10-CM

## 2021-07-28 DIAGNOSIS — K59.01 SLOW TRANSIT CONSTIPATION: ICD-10-CM

## 2021-07-28 DIAGNOSIS — E55.9 VITAMIN D DEFICIENCY: ICD-10-CM

## 2021-07-28 PROCEDURE — 99999 PR PBB SHADOW E&M-EST. PATIENT-LVL V: CPT | Mod: PBBFAC,,, | Performed by: INTERNAL MEDICINE

## 2021-07-28 PROCEDURE — 99999 PR PBB SHADOW E&M-EST. PATIENT-LVL V: ICD-10-PCS | Mod: PBBFAC,,, | Performed by: INTERNAL MEDICINE

## 2021-07-28 PROCEDURE — 99214 OFFICE O/P EST MOD 30 MIN: CPT | Mod: S$GLB,,, | Performed by: INTERNAL MEDICINE

## 2021-07-28 PROCEDURE — 99214 PR OFFICE/OUTPT VISIT, EST, LEVL IV, 30-39 MIN: ICD-10-PCS | Mod: S$GLB,,, | Performed by: INTERNAL MEDICINE

## 2021-07-28 PROCEDURE — 99215 OFFICE O/P EST HI 40 MIN: CPT | Mod: PBBFAC,PO | Performed by: INTERNAL MEDICINE

## 2021-08-23 ENCOUNTER — HOSPITAL ENCOUNTER (OUTPATIENT)
Dept: RADIOLOGY | Facility: HOSPITAL | Age: 76
Discharge: HOME | End: 2021-08-23
Attending: INTERNAL MEDICINE
Payer: MEDICARE

## 2021-08-23 ENCOUNTER — OFFICE VISIT (OUTPATIENT)
Dept: FAMILY MEDICINE | Facility: CLINIC | Age: 76
End: 2021-08-23
Payer: MEDICARE

## 2021-08-23 VITALS
RESPIRATION RATE: 18 BRPM | WEIGHT: 232.13 LBS | SYSTOLIC BLOOD PRESSURE: 110 MMHG | HEART RATE: 77 BPM | TEMPERATURE: 98 F | BODY MASS INDEX: 39.85 KG/M2 | DIASTOLIC BLOOD PRESSURE: 70 MMHG | OXYGEN SATURATION: 99 %

## 2021-08-23 DIAGNOSIS — Z01.818 PRE-OP EXAMINATION: ICD-10-CM

## 2021-08-23 DIAGNOSIS — Z01.818 PRE-OP EXAMINATION: Primary | ICD-10-CM

## 2021-08-23 DIAGNOSIS — D21.9 FIBROID TUMOR: ICD-10-CM

## 2021-08-23 PROCEDURE — 1101F PR PT FALLS ASSESS DOC 0-1 FALLS W/OUT INJ PAST YR: ICD-10-PCS | Mod: CPTII,S$GLB,, | Performed by: INTERNAL MEDICINE

## 2021-08-23 PROCEDURE — 3074F SYST BP LT 130 MM HG: CPT | Mod: CPTII,S$GLB,, | Performed by: INTERNAL MEDICINE

## 2021-08-23 PROCEDURE — 99999 PR PBB SHADOW E&M-EST. PATIENT-LVL V: ICD-10-PCS | Mod: PBBFAC,,, | Performed by: INTERNAL MEDICINE

## 2021-08-23 PROCEDURE — 99213 OFFICE O/P EST LOW 20 MIN: CPT | Mod: S$GLB,,, | Performed by: INTERNAL MEDICINE

## 2021-08-23 PROCEDURE — 3288F FALL RISK ASSESSMENT DOCD: CPT | Mod: CPTII,S$GLB,, | Performed by: INTERNAL MEDICINE

## 2021-08-23 PROCEDURE — 1101F PT FALLS ASSESS-DOCD LE1/YR: CPT | Mod: CPTII,S$GLB,, | Performed by: INTERNAL MEDICINE

## 2021-08-23 PROCEDURE — 1159F PR MEDICATION LIST DOCUMENTED IN MEDICAL RECORD: ICD-10-PCS | Mod: CPTII,S$GLB,, | Performed by: INTERNAL MEDICINE

## 2021-08-23 PROCEDURE — 3074F PR MOST RECENT SYSTOLIC BLOOD PRESSURE < 130 MM HG: ICD-10-PCS | Mod: CPTII,S$GLB,, | Performed by: INTERNAL MEDICINE

## 2021-08-23 PROCEDURE — 3078F PR MOST RECENT DIASTOLIC BLOOD PRESSURE < 80 MM HG: ICD-10-PCS | Mod: CPTII,S$GLB,, | Performed by: INTERNAL MEDICINE

## 2021-08-23 PROCEDURE — 71046 XR CHEST PA AND LATERAL: ICD-10-PCS | Mod: 26,,, | Performed by: RADIOLOGY

## 2021-08-23 PROCEDURE — 1126F PR PAIN SEVERITY QUANTIFIED, NO PAIN PRESENT: ICD-10-PCS | Mod: CPTII,S$GLB,, | Performed by: INTERNAL MEDICINE

## 2021-08-23 PROCEDURE — 1126F AMNT PAIN NOTED NONE PRSNT: CPT | Mod: CPTII,S$GLB,, | Performed by: INTERNAL MEDICINE

## 2021-08-23 PROCEDURE — 71046 X-RAY EXAM CHEST 2 VIEWS: CPT | Mod: TC,FY,PO

## 2021-08-23 PROCEDURE — 99999 PR PBB SHADOW E&M-EST. PATIENT-LVL V: CPT | Mod: PBBFAC,,, | Performed by: INTERNAL MEDICINE

## 2021-08-23 PROCEDURE — 99213 PR OFFICE/OUTPT VISIT, EST, LEVL III, 20-29 MIN: ICD-10-PCS | Mod: S$GLB,,, | Performed by: INTERNAL MEDICINE

## 2021-08-23 PROCEDURE — 3078F DIAST BP <80 MM HG: CPT | Mod: CPTII,S$GLB,, | Performed by: INTERNAL MEDICINE

## 2021-08-23 PROCEDURE — 71046 X-RAY EXAM CHEST 2 VIEWS: CPT | Mod: 26,,, | Performed by: RADIOLOGY

## 2021-08-23 PROCEDURE — 3288F PR FALLS RISK ASSESSMENT DOCUMENTED: ICD-10-PCS | Mod: CPTII,S$GLB,, | Performed by: INTERNAL MEDICINE

## 2021-08-23 PROCEDURE — 1159F MED LIST DOCD IN RCRD: CPT | Mod: CPTII,S$GLB,, | Performed by: INTERNAL MEDICINE

## 2021-09-07 ENCOUNTER — TELEPHONE (OUTPATIENT)
Dept: FAMILY MEDICINE | Facility: CLINIC | Age: 76
End: 2021-09-07

## 2022-01-07 ENCOUNTER — HOSPITAL ENCOUNTER (OUTPATIENT)
Dept: RADIOLOGY | Facility: HOSPITAL | Age: 77
Discharge: HOME | End: 2022-01-07
Attending: INTERNAL MEDICINE
Payer: MEDICARE

## 2022-01-07 VITALS — WEIGHT: 232.13 LBS | BODY MASS INDEX: 39.63 KG/M2 | HEIGHT: 64 IN

## 2022-01-07 DIAGNOSIS — Z12.31 ENCOUNTER FOR SCREENING MAMMOGRAM FOR MALIGNANT NEOPLASM OF BREAST: ICD-10-CM

## 2022-01-07 PROCEDURE — 77063 BREAST TOMOSYNTHESIS BI: CPT | Mod: 26,HCNC,, | Performed by: RADIOLOGY

## 2022-01-07 PROCEDURE — 77063 MAMMO DIGITAL SCREENING BILAT WITH TOMO: ICD-10-PCS | Mod: 26,HCNC,, | Performed by: RADIOLOGY

## 2022-01-07 PROCEDURE — 77063 BREAST TOMOSYNTHESIS BI: CPT | Mod: TC,HCNC

## 2022-01-07 PROCEDURE — 77067 MAMMO DIGITAL SCREENING BILAT WITH TOMO: ICD-10-PCS | Mod: 26,HCNC,, | Performed by: RADIOLOGY

## 2022-01-07 PROCEDURE — 77067 SCR MAMMO BI INCL CAD: CPT | Mod: 26,HCNC,, | Performed by: RADIOLOGY

## 2022-01-18 NOTE — PROGRESS NOTES
"Subjective:      Patient ID: Nathalie Membreno is a 76 y.o. female.    Chief Complaint: Follow-up      HPI  Here for f/u medical problems and preventive exam.  BP good range at home.  Active daily, walking.  NO f/c/sw/cough.  No cp/sob/palp.  BMs normal, no black or blood.  Legs feel jumpy at night, has to get up and move them around to get relief.  Gabapentin does help, but makes too sleepy.    HM: 10/21 fluvax, 3/21 covid vaccines, 6/19 HAV, 6/15 [id number], 11/16 booster [id number], 9/14 TDaP, 5/18 BMD rep 5y, 3/21 Cscope rep 3y, 1/22 MMG, 10/20 Gyn Dr. Holden, 7/20 Eye Dr. Fiore, 11/16 HCV neg, Card Dr. Neal at [facility].     Review of Systems   Constitutional: Negative for appetite change, chills, diaphoresis and fever.   HENT: Negative for congestion, ear pain, rhinorrhea, sinus pressure and sore throat.    Respiratory: Negative for cough, chest tightness and shortness of breath.    Cardiovascular: Negative for chest pain and palpitations.   Gastrointestinal: Negative for blood in stool, constipation, diarrhea, nausea and vomiting.   Genitourinary: Negative for dysuria, frequency, hematuria, menstrual problem, urgency and vaginal discharge.   Musculoskeletal: Negative for arthralgias.   Skin: Negative for rash.   Neurological: Negative for dizziness and headaches.   Psychiatric/Behavioral: Negative for sleep disturbance. The patient is not nervous/anxious.          Objective:   /78 (BP Location: Left arm, Patient Position: Sitting)   Pulse 62   Temp 98 °F (36.7 °C) (Temporal)   Resp 18   Ht 5' 4" (1.626 m)   Wt 102 kg (224 lb 13.9 oz)   SpO2 97%   BMI 38.60 kg/m²     Physical Exam  Constitutional:       Appearance: She is well-developed and well-nourished.   HENT:      Right Ear: External ear normal. Tympanic membrane is not injected.      Left Ear: External ear normal. Tympanic membrane is not injected.      Mouth/Throat:      Mouth: Oropharynx is clear and moist.   Eyes:      " Conjunctiva/sclera: Conjunctivae normal.   Neck:      Thyroid: No thyromegaly.   Cardiovascular:      Rate and Rhythm: Normal rate and regular rhythm.      Pulses: Intact distal pulses.      Heart sounds: No murmur heard.  No friction rub. No gallop.    Pulmonary:      Effort: Pulmonary effort is normal.      Breath sounds: Normal breath sounds. No wheezing or rales.   Abdominal:      General: Bowel sounds are normal.      Palpations: Abdomen is soft. There is no mass.      Tenderness: There is no abdominal tenderness.   Musculoskeletal:         General: No edema.      Cervical back: Normal range of motion and neck supple.   Lymphadenopathy:      Cervical: No cervical adenopathy.   Skin:     General: Skin is warm.      Findings: No rash.   Neurological:      Mental Status: She is alert and oriented to person, place, and time.   Psychiatric:         Mood and Affect: Mood and affect normal.       Results for orders placed or performed in visit on 01/25/22   CBC Auto Differential   Result Value Ref Range    WBC 7.08 3.90 - 12.70 K/uL    RBC 4.63 4.00 - 5.40 M/uL    Hemoglobin 13.6 12.0 - 16.0 g/dL    Hematocrit 44.8 37.0 - 48.5 %    MCV 97 82 - 98 fL    MCH 29.4 27.0 - 31.0 pg    MCHC 30.4 (L) 32.0 - 36.0 g/dL    RDW 16.5 (H) 11.5 - 14.5 %    Platelets 239 150 - 450 K/uL    MPV 13.7 (H) 9.2 - 12.9 fL    Immature Granulocytes 0.3 0.0 - 0.5 %    Gran # (ANC) 3.8 1.8 - 7.7 K/uL    Immature Grans (Abs) 0.02 0.00 - 0.04 K/uL    Lymph # 2.4 1.0 - 4.8 K/uL    Mono # 0.6 0.3 - 1.0 K/uL    Eos # 0.2 0.0 - 0.5 K/uL    Baso # 0.06 0.00 - 0.20 K/uL    nRBC 0 0 /100 WBC    Gran % 52.9 38.0 - 73.0 %    Lymph % 34.2 18.0 - 48.0 %    Mono % 8.8 4.0 - 15.0 %    Eosinophil % 3.0 0.0 - 8.0 %    Basophil % 0.8 0.0 - 1.9 %    Differential Method Automated    Comprehensive Metabolic Panel   Result Value Ref Range    Sodium 139 136 - 145 mmol/L    Potassium 4.3 3.5 - 5.1 mmol/L    Chloride 103 95 - 110 mmol/L    CO2 26 23 - 29 mmol/L     Glucose 89 70 - 110 mg/dL    BUN 12 8 - 23 mg/dL    Creatinine 0.9 0.5 - 1.4 mg/dL    Calcium 9.8 8.7 - 10.5 mg/dL    Total Protein 8.1 6.0 - 8.4 g/dL    Albumin 3.5 3.5 - 5.2 g/dL    Total Bilirubin 1.1 (H) 0.1 - 1.0 mg/dL    Alkaline Phosphatase 89 55 - 135 U/L    AST 15 10 - 40 U/L    ALT 12 10 - 44 U/L    Anion Gap 10 8 - 16 mmol/L    eGFR if African American >60.0 >60 mL/min/1.73 m^2    eGFR if non African American >60.0 >60 mL/min/1.73 m^2   Lipid Panel   Result Value Ref Range    Cholesterol 214 (H) 120 - 199 mg/dL    Triglycerides 90 30 - 150 mg/dL    HDL 63 40 - 75 mg/dL    LDL Cholesterol 133.0 63.0 - 159.0 mg/dL    HDL/Cholesterol Ratio 29.4 20.0 - 50.0 %    Total Cholesterol/HDL Ratio 3.4 2.0 - 5.0    Non-HDL Cholesterol 151 mg/dL   TSH   Result Value Ref Range    TSH 2.034 0.400 - 4.000 uIU/mL   Vitamin D   Result Value Ref Range    Vit D, 25-Hydroxy 32 30 - 96 ng/mL           Assessment:       1. Essential hypertension    2. Other hyperlipidemia    3. Mild intermittent asthma without complication    4. Atrial flutter with rapid ventricular response    5. Chronic anticoagulation    6. Severe obesity with body mass index (BMI) of 35.0 to 39.9 with comorbidity    7. Vitamin D deficiency    8. Encounter for preventive health examination    9. RLS (restless legs syndrome)          Plan:     Essential hypertension- stable, cont rx.    Other hyperlipidemia- cont statin.    Mild intermittent asthma without complication- doing well lately, no recent albuterol needed.    Atrial flutter with rapid ventricular response, Chronic anticoagulation- f/w Cards Dr. Weiss.    Severe obesity with body mass index (BMI) of 35.0 to 39.9 with comorbidity    Vitamin D deficiency    Encounter for preventive health examination- utd.    RLS (restless legs syndrome)  -     rOPINIRole (REQUIP) 0.5 MG tablet; Take 1-2 tablets (0.5-1 mg total) by mouth every evening.  Dispense: 60 tablet; Refill: 11    Let me know how doing in 1  week.  RTC 4mo.

## 2022-01-25 ENCOUNTER — LAB VISIT (OUTPATIENT)
Dept: LAB | Facility: HOSPITAL | Age: 77
End: 2022-01-25
Attending: INTERNAL MEDICINE
Payer: MEDICARE

## 2022-01-25 DIAGNOSIS — Z29.9 PREVENTIVE MEASURE: ICD-10-CM

## 2022-01-25 DIAGNOSIS — E55.9 VITAMIN D DEFICIENCY: ICD-10-CM

## 2022-01-25 DIAGNOSIS — E78.49 OTHER HYPERLIPIDEMIA: ICD-10-CM

## 2022-01-25 LAB
25(OH)D3+25(OH)D2 SERPL-MCNC: 32 NG/ML (ref 30–96)
ALBUMIN SERPL BCP-MCNC: 3.5 G/DL (ref 3.5–5.2)
ALP SERPL-CCNC: 89 U/L (ref 55–135)
ALT SERPL W/O P-5'-P-CCNC: 12 U/L (ref 10–44)
ANION GAP SERPL CALC-SCNC: 10 MMOL/L (ref 8–16)
AST SERPL-CCNC: 15 U/L (ref 10–40)
BASOPHILS # BLD AUTO: 0.06 K/UL (ref 0–0.2)
BASOPHILS NFR BLD: 0.8 % (ref 0–1.9)
BILIRUB SERPL-MCNC: 1.1 MG/DL (ref 0.1–1)
BUN SERPL-MCNC: 12 MG/DL (ref 8–23)
CALCIUM SERPL-MCNC: 9.8 MG/DL (ref 8.7–10.5)
CHLORIDE SERPL-SCNC: 103 MMOL/L (ref 95–110)
CHOLEST SERPL-MCNC: 214 MG/DL (ref 120–199)
CHOLEST/HDLC SERPL: 3.4 {RATIO} (ref 2–5)
CO2 SERPL-SCNC: 26 MMOL/L (ref 23–29)
CREAT SERPL-MCNC: 0.9 MG/DL (ref 0.5–1.4)
DIFFERENTIAL METHOD: ABNORMAL
EOSINOPHIL # BLD AUTO: 0.2 K/UL (ref 0–0.5)
EOSINOPHIL NFR BLD: 3 % (ref 0–8)
ERYTHROCYTE [DISTWIDTH] IN BLOOD BY AUTOMATED COUNT: 16.5 % (ref 11.5–14.5)
EST. GFR  (AFRICAN AMERICAN): >60 ML/MIN/1.73 M^2
EST. GFR  (NON AFRICAN AMERICAN): >60 ML/MIN/1.73 M^2
GLUCOSE SERPL-MCNC: 89 MG/DL (ref 70–110)
HCT VFR BLD AUTO: 44.8 % (ref 37–48.5)
HDLC SERPL-MCNC: 63 MG/DL (ref 40–75)
HDLC SERPL: 29.4 % (ref 20–50)
HGB BLD-MCNC: 13.6 G/DL (ref 12–16)
IMM GRANULOCYTES # BLD AUTO: 0.02 K/UL (ref 0–0.04)
IMM GRANULOCYTES NFR BLD AUTO: 0.3 % (ref 0–0.5)
LDLC SERPL CALC-MCNC: 133 MG/DL (ref 63–159)
LYMPHOCYTES # BLD AUTO: 2.4 K/UL (ref 1–4.8)
LYMPHOCYTES NFR BLD: 34.2 % (ref 18–48)
MCH RBC QN AUTO: 29.4 PG (ref 27–31)
MCHC RBC AUTO-ENTMCNC: 30.4 G/DL (ref 32–36)
MCV RBC AUTO: 97 FL (ref 82–98)
MONOCYTES # BLD AUTO: 0.6 K/UL (ref 0.3–1)
MONOCYTES NFR BLD: 8.8 % (ref 4–15)
NEUTROPHILS # BLD AUTO: 3.8 K/UL (ref 1.8–7.7)
NEUTROPHILS NFR BLD: 52.9 % (ref 38–73)
NONHDLC SERPL-MCNC: 151 MG/DL
NRBC BLD-RTO: 0 /100 WBC
PLATELET # BLD AUTO: 239 K/UL (ref 150–450)
PMV BLD AUTO: 13.7 FL (ref 9.2–12.9)
POTASSIUM SERPL-SCNC: 4.3 MMOL/L (ref 3.5–5.1)
PROT SERPL-MCNC: 8.1 G/DL (ref 6–8.4)
RBC # BLD AUTO: 4.63 M/UL (ref 4–5.4)
SODIUM SERPL-SCNC: 139 MMOL/L (ref 136–145)
TRIGL SERPL-MCNC: 90 MG/DL (ref 30–150)
TSH SERPL DL<=0.005 MIU/L-ACNC: 2.03 UIU/ML (ref 0.4–4)
WBC # BLD AUTO: 7.08 K/UL (ref 3.9–12.7)

## 2022-01-25 PROCEDURE — 84443 ASSAY THYROID STIM HORMONE: CPT | Mod: HCNC | Performed by: INTERNAL MEDICINE

## 2022-01-25 PROCEDURE — 80061 LIPID PANEL: CPT | Mod: HCNC | Performed by: INTERNAL MEDICINE

## 2022-01-25 PROCEDURE — 85025 COMPLETE CBC W/AUTO DIFF WBC: CPT | Mod: HCNC | Performed by: INTERNAL MEDICINE

## 2022-01-25 PROCEDURE — 82306 VITAMIN D 25 HYDROXY: CPT | Mod: HCNC | Performed by: INTERNAL MEDICINE

## 2022-01-25 PROCEDURE — 80053 COMPREHEN METABOLIC PANEL: CPT | Mod: HCNC | Performed by: INTERNAL MEDICINE

## 2022-01-25 PROCEDURE — 36415 COLL VENOUS BLD VENIPUNCTURE: CPT | Mod: HCNC,PO | Performed by: INTERNAL MEDICINE

## 2022-01-28 ENCOUNTER — OFFICE VISIT (OUTPATIENT)
Dept: OPHTHALMOLOGY | Facility: CLINIC | Age: 77
End: 2022-01-28
Payer: MEDICARE

## 2022-01-28 DIAGNOSIS — H52.4 HYPEROPIA WITH PRESBYOPIA, BILATERAL: ICD-10-CM

## 2022-01-28 DIAGNOSIS — H40.013 OPEN ANGLE WITH BORDERLINE FINDINGS OF BOTH EYES: ICD-10-CM

## 2022-01-28 DIAGNOSIS — H25.13 NUCLEAR SCLEROSIS, BILATERAL: Primary | ICD-10-CM

## 2022-01-28 DIAGNOSIS — H25.013 CORTICAL AGE-RELATED CATARACT OF BOTH EYES: ICD-10-CM

## 2022-01-28 DIAGNOSIS — H52.03 HYPEROPIA WITH PRESBYOPIA, BILATERAL: ICD-10-CM

## 2022-01-28 PROCEDURE — 1159F PR MEDICATION LIST DOCUMENTED IN MEDICAL RECORD: ICD-10-PCS | Mod: HCNC,CPTII,S$GLB, | Performed by: OPTOMETRIST

## 2022-01-28 PROCEDURE — 1160F RVW MEDS BY RX/DR IN RCRD: CPT | Mod: HCNC,CPTII,S$GLB, | Performed by: OPTOMETRIST

## 2022-01-28 PROCEDURE — 92133 POSTERIOR SEGMENT OCT OPTIC NERVE(OCULAR COHERENCE TOMOGRAPHY) - OU - BOTH EYES: ICD-10-PCS | Mod: HCNC,S$GLB,, | Performed by: OPTOMETRIST

## 2022-01-28 PROCEDURE — 92014 PR EYE EXAM, EST PATIENT,COMPREHESV: ICD-10-PCS | Mod: HCNC,S$GLB,, | Performed by: OPTOMETRIST

## 2022-01-28 PROCEDURE — 1160F PR REVIEW ALL MEDS BY PRESCRIBER/CLIN PHARMACIST DOCUMENTED: ICD-10-PCS | Mod: HCNC,CPTII,S$GLB, | Performed by: OPTOMETRIST

## 2022-01-28 PROCEDURE — 92015 DETERMINE REFRACTIVE STATE: CPT | Mod: HCNC,S$GLB,, | Performed by: OPTOMETRIST

## 2022-01-28 PROCEDURE — 99999 PR PBB SHADOW E&M-EST. PATIENT-LVL I: CPT | Mod: PBBFAC,HCNC,, | Performed by: OPTOMETRIST

## 2022-01-28 PROCEDURE — 1159F MED LIST DOCD IN RCRD: CPT | Mod: HCNC,CPTII,S$GLB, | Performed by: OPTOMETRIST

## 2022-01-28 PROCEDURE — 92015 PR REFRACTION: ICD-10-PCS | Mod: HCNC,S$GLB,, | Performed by: OPTOMETRIST

## 2022-01-28 PROCEDURE — 92133 CPTRZD OPH DX IMG PST SGM ON: CPT | Mod: HCNC,S$GLB,, | Performed by: OPTOMETRIST

## 2022-01-28 PROCEDURE — 92014 COMPRE OPH EXAM EST PT 1/>: CPT | Mod: HCNC,S$GLB,, | Performed by: OPTOMETRIST

## 2022-01-28 PROCEDURE — 99999 PR PBB SHADOW E&M-EST. PATIENT-LVL I: ICD-10-PCS | Mod: PBBFAC,HCNC,, | Performed by: OPTOMETRIST

## 2022-01-28 NOTE — PROGRESS NOTES
HPI     Annual Exam     Comments: EP              Comments     Vision changes since last eye exam?: no    Any eye pain today: no    Other ocular symptoms: itchy and dry using pataday once daily    Interested in contact lens fitting today? no                     Last edited by Anita Montelongo on 1/28/2022 10:02 AM. (History)            Assessment /Plan     For exam results, see Encounter Report.    Nuclear sclerosis, bilateral  Cortical age-related cataract of both eyes  Cataract accounts for vision change. New Rx for glasses/contacts will not improve vision.   Refer to ophthalmologist for cataract evaluation.     Open angle with borderline findings of both eyes  -     Posterior Segment OCT Optic Nerve- Both eyes  Suspect based on ONH cupping OU  Slight thinning on NFL scan OD>OS with asymmetric GCL   Normal IOP today  Will need to get VF and pachs following cat sx    Hyperopia with presbyopia, bilateral  Hold spec Rx      RTC next available with [facility] for cataract evaluation or PRN if any problems.   Discussed above and answered questions.

## 2022-01-31 ENCOUNTER — OFFICE VISIT (OUTPATIENT)
Dept: FAMILY MEDICINE | Facility: CLINIC | Age: 77
End: 2022-01-31
Payer: MEDICARE

## 2022-01-31 VITALS
BODY MASS INDEX: 38.39 KG/M2 | HEIGHT: 64 IN | HEART RATE: 62 BPM | WEIGHT: 224.88 LBS | TEMPERATURE: 98 F | DIASTOLIC BLOOD PRESSURE: 78 MMHG | OXYGEN SATURATION: 97 % | SYSTOLIC BLOOD PRESSURE: 114 MMHG | RESPIRATION RATE: 18 BRPM

## 2022-01-31 DIAGNOSIS — I10 ESSENTIAL HYPERTENSION: Primary | ICD-10-CM

## 2022-01-31 DIAGNOSIS — E78.49 OTHER HYPERLIPIDEMIA: ICD-10-CM

## 2022-01-31 DIAGNOSIS — E66.01 SEVERE OBESITY WITH BODY MASS INDEX (BMI) OF 35.0 TO 39.9 WITH COMORBIDITY: ICD-10-CM

## 2022-01-31 DIAGNOSIS — G25.81 RLS (RESTLESS LEGS SYNDROME): ICD-10-CM

## 2022-01-31 DIAGNOSIS — J45.20 MILD INTERMITTENT ASTHMA WITHOUT COMPLICATION: ICD-10-CM

## 2022-01-31 DIAGNOSIS — I48.92 ATRIAL FLUTTER WITH RAPID VENTRICULAR RESPONSE: ICD-10-CM

## 2022-01-31 DIAGNOSIS — E55.9 VITAMIN D DEFICIENCY: ICD-10-CM

## 2022-01-31 DIAGNOSIS — Z79.01 CHRONIC ANTICOAGULATION: ICD-10-CM

## 2022-01-31 DIAGNOSIS — Z00.00 ENCOUNTER FOR PREVENTIVE HEALTH EXAMINATION: ICD-10-CM

## 2022-01-31 PROCEDURE — 99214 PR OFFICE/OUTPT VISIT, EST, LEVL IV, 30-39 MIN: ICD-10-PCS | Mod: HCNC,S$GLB,, | Performed by: INTERNAL MEDICINE

## 2022-01-31 PROCEDURE — 3288F PR FALLS RISK ASSESSMENT DOCUMENTED: ICD-10-PCS | Mod: HCNC,CPTII,S$GLB, | Performed by: INTERNAL MEDICINE

## 2022-01-31 PROCEDURE — 99999 PR PBB SHADOW E&M-EST. PATIENT-LVL IV: CPT | Mod: PBBFAC,HCNC,, | Performed by: INTERNAL MEDICINE

## 2022-01-31 PROCEDURE — 99499 RISK ADDL DX/OHS AUDIT: ICD-10-PCS | Mod: S$GLB,,, | Performed by: INTERNAL MEDICINE

## 2022-01-31 PROCEDURE — 99499 UNLISTED E&M SERVICE: CPT | Mod: S$GLB,,, | Performed by: INTERNAL MEDICINE

## 2022-01-31 PROCEDURE — 1159F MED LIST DOCD IN RCRD: CPT | Mod: HCNC,CPTII,S$GLB, | Performed by: INTERNAL MEDICINE

## 2022-01-31 PROCEDURE — 1101F PR PT FALLS ASSESS DOC 0-1 FALLS W/OUT INJ PAST YR: ICD-10-PCS | Mod: HCNC,CPTII,S$GLB, | Performed by: INTERNAL MEDICINE

## 2022-01-31 PROCEDURE — 1126F PR PAIN SEVERITY QUANTIFIED, NO PAIN PRESENT: ICD-10-PCS | Mod: HCNC,CPTII,S$GLB, | Performed by: INTERNAL MEDICINE

## 2022-01-31 PROCEDURE — 3078F DIAST BP <80 MM HG: CPT | Mod: HCNC,CPTII,S$GLB, | Performed by: INTERNAL MEDICINE

## 2022-01-31 PROCEDURE — 1159F PR MEDICATION LIST DOCUMENTED IN MEDICAL RECORD: ICD-10-PCS | Mod: HCNC,CPTII,S$GLB, | Performed by: INTERNAL MEDICINE

## 2022-01-31 PROCEDURE — 3074F PR MOST RECENT SYSTOLIC BLOOD PRESSURE < 130 MM HG: ICD-10-PCS | Mod: HCNC,CPTII,S$GLB, | Performed by: INTERNAL MEDICINE

## 2022-01-31 PROCEDURE — 3288F FALL RISK ASSESSMENT DOCD: CPT | Mod: HCNC,CPTII,S$GLB, | Performed by: INTERNAL MEDICINE

## 2022-01-31 PROCEDURE — 1126F AMNT PAIN NOTED NONE PRSNT: CPT | Mod: HCNC,CPTII,S$GLB, | Performed by: INTERNAL MEDICINE

## 2022-01-31 PROCEDURE — 1101F PT FALLS ASSESS-DOCD LE1/YR: CPT | Mod: HCNC,CPTII,S$GLB, | Performed by: INTERNAL MEDICINE

## 2022-01-31 PROCEDURE — 3074F SYST BP LT 130 MM HG: CPT | Mod: HCNC,CPTII,S$GLB, | Performed by: INTERNAL MEDICINE

## 2022-01-31 PROCEDURE — 3078F PR MOST RECENT DIASTOLIC BLOOD PRESSURE < 80 MM HG: ICD-10-PCS | Mod: HCNC,CPTII,S$GLB, | Performed by: INTERNAL MEDICINE

## 2022-01-31 PROCEDURE — 99214 OFFICE O/P EST MOD 30 MIN: CPT | Mod: HCNC,S$GLB,, | Performed by: INTERNAL MEDICINE

## 2022-01-31 PROCEDURE — 99999 PR PBB SHADOW E&M-EST. PATIENT-LVL IV: ICD-10-PCS | Mod: PBBFAC,HCNC,, | Performed by: INTERNAL MEDICINE

## 2022-01-31 RX ORDER — ROPINIROLE 0.5 MG/1
.5-1 TABLET, FILM COATED ORAL NIGHTLY
Qty: 60 TABLET | Refills: 11 | Status: SHIPPED | OUTPATIENT
Start: 2022-01-31 | End: 2022-05-31 | Stop reason: SDUPTHER

## 2022-02-03 ENCOUNTER — TELEPHONE (OUTPATIENT)
Dept: OPHTHALMOLOGY | Facility: CLINIC | Age: 77
End: 2022-02-03
Payer: MEDICARE

## 2022-02-10 ENCOUNTER — TELEPHONE (OUTPATIENT)
Dept: OPHTHALMOLOGY | Facility: CLINIC | Age: 77
End: 2022-02-10
Payer: MEDICARE

## 2022-02-10 NOTE — TELEPHONE ENCOUNTER
----- Message from Qi Fortune sent at 2/10/2022  1:03 PM CST -----  Pt need to schedule cataract cons

## 2022-04-28 ENCOUNTER — DOCUMENTATION ONLY (OUTPATIENT)
Dept: OPHTHALMOLOGY | Facility: CLINIC | Age: 77
End: 2022-04-28
Payer: MEDICARE

## 2022-04-29 ENCOUNTER — TELEPHONE (OUTPATIENT)
Dept: OPHTHALMOLOGY | Facility: CLINIC | Age: 77
End: 2022-04-29
Payer: MEDICARE

## 2022-04-29 NOTE — TELEPHONE ENCOUNTER
----- Message from Gabino Corona sent at 4/28/2022  2:45 PM CDT -----  Contact: self  Pt calling back to schedule cat kuldeep. Missed your calls in February  ----- Message -----  From: Nadine Bower  Sent: 4/28/2022   2:15 PM CDT  To: [facility] Staff    Nathalie Membreno would like a call back at [phone], in regards to her evaluation.

## 2022-05-31 ENCOUNTER — OFFICE VISIT (OUTPATIENT)
Dept: FAMILY MEDICINE | Facility: CLINIC | Age: 77
End: 2022-05-31
Payer: MEDICARE

## 2022-05-31 VITALS
SYSTOLIC BLOOD PRESSURE: 128 MMHG | DIASTOLIC BLOOD PRESSURE: 78 MMHG | BODY MASS INDEX: 38.52 KG/M2 | HEART RATE: 60 BPM | WEIGHT: 225.63 LBS | TEMPERATURE: 98 F | OXYGEN SATURATION: 96 % | HEIGHT: 64 IN

## 2022-05-31 DIAGNOSIS — Z86.73 HISTORY OF TIA (TRANSIENT ISCHEMIC ATTACK): ICD-10-CM

## 2022-05-31 DIAGNOSIS — J45.20 MILD INTERMITTENT ASTHMA WITHOUT COMPLICATION: ICD-10-CM

## 2022-05-31 DIAGNOSIS — I48.92 ATRIAL FLUTTER WITH RAPID VENTRICULAR RESPONSE: ICD-10-CM

## 2022-05-31 DIAGNOSIS — I10 ESSENTIAL HYPERTENSION: Primary | ICD-10-CM

## 2022-05-31 DIAGNOSIS — K59.01 SLOW TRANSIT CONSTIPATION: ICD-10-CM

## 2022-05-31 DIAGNOSIS — G25.81 RLS (RESTLESS LEGS SYNDROME): ICD-10-CM

## 2022-05-31 PROCEDURE — 3074F PR MOST RECENT SYSTOLIC BLOOD PRESSURE < 130 MM HG: ICD-10-PCS | Mod: CPTII,S$GLB,, | Performed by: INTERNAL MEDICINE

## 2022-05-31 PROCEDURE — 3288F PR FALLS RISK ASSESSMENT DOCUMENTED: ICD-10-PCS | Mod: CPTII,S$GLB,, | Performed by: INTERNAL MEDICINE

## 2022-05-31 PROCEDURE — 99999 PR PBB SHADOW E&M-EST. PATIENT-LVL IV: ICD-10-PCS | Mod: PBBFAC,,, | Performed by: INTERNAL MEDICINE

## 2022-05-31 PROCEDURE — 3078F PR MOST RECENT DIASTOLIC BLOOD PRESSURE < 80 MM HG: ICD-10-PCS | Mod: CPTII,S$GLB,, | Performed by: INTERNAL MEDICINE

## 2022-05-31 PROCEDURE — 1101F PT FALLS ASSESS-DOCD LE1/YR: CPT | Mod: CPTII,S$GLB,, | Performed by: INTERNAL MEDICINE

## 2022-05-31 PROCEDURE — 99999 PR PBB SHADOW E&M-EST. PATIENT-LVL IV: CPT | Mod: PBBFAC,,, | Performed by: INTERNAL MEDICINE

## 2022-05-31 PROCEDURE — 99214 PR OFFICE/OUTPT VISIT, EST, LEVL IV, 30-39 MIN: ICD-10-PCS | Mod: S$GLB,,, | Performed by: INTERNAL MEDICINE

## 2022-05-31 PROCEDURE — 1159F PR MEDICATION LIST DOCUMENTED IN MEDICAL RECORD: ICD-10-PCS | Mod: CPTII,S$GLB,, | Performed by: INTERNAL MEDICINE

## 2022-05-31 PROCEDURE — 1126F PR PAIN SEVERITY QUANTIFIED, NO PAIN PRESENT: ICD-10-PCS | Mod: CPTII,S$GLB,, | Performed by: INTERNAL MEDICINE

## 2022-05-31 PROCEDURE — 3288F FALL RISK ASSESSMENT DOCD: CPT | Mod: CPTII,S$GLB,, | Performed by: INTERNAL MEDICINE

## 2022-05-31 PROCEDURE — 1159F MED LIST DOCD IN RCRD: CPT | Mod: CPTII,S$GLB,, | Performed by: INTERNAL MEDICINE

## 2022-05-31 PROCEDURE — 3078F DIAST BP <80 MM HG: CPT | Mod: CPTII,S$GLB,, | Performed by: INTERNAL MEDICINE

## 2022-05-31 PROCEDURE — 1126F AMNT PAIN NOTED NONE PRSNT: CPT | Mod: CPTII,S$GLB,, | Performed by: INTERNAL MEDICINE

## 2022-05-31 PROCEDURE — 99214 OFFICE O/P EST MOD 30 MIN: CPT | Mod: S$GLB,,, | Performed by: INTERNAL MEDICINE

## 2022-05-31 PROCEDURE — 1101F PR PT FALLS ASSESS DOC 0-1 FALLS W/OUT INJ PAST YR: ICD-10-PCS | Mod: CPTII,S$GLB,, | Performed by: INTERNAL MEDICINE

## 2022-05-31 PROCEDURE — 3074F SYST BP LT 130 MM HG: CPT | Mod: CPTII,S$GLB,, | Performed by: INTERNAL MEDICINE

## 2022-05-31 RX ORDER — ROPINIROLE 0.5 MG/1
1.5 TABLET, FILM COATED ORAL NIGHTLY
Qty: 90 TABLET | Refills: 11 | Status: SHIPPED | OUTPATIENT
Start: 2022-05-31 | End: 2022-09-14

## 2022-05-31 RX ORDER — ERGOCALCIFEROL 1.25 MG/1
50000 CAPSULE ORAL
COMMUNITY
End: 2024-02-02

## 2022-06-30 ENCOUNTER — OFFICE VISIT (OUTPATIENT)
Dept: OPHTHALMOLOGY | Facility: CLINIC | Age: 77
End: 2022-06-30
Payer: MEDICARE

## 2022-06-30 ENCOUNTER — DOCUMENTATION ONLY (OUTPATIENT)
Dept: OPHTHALMOLOGY | Facility: CLINIC | Age: 77
End: 2022-06-30

## 2022-06-30 DIAGNOSIS — H25.12 NUCLEAR SCLEROSIS OF LEFT EYE: ICD-10-CM

## 2022-06-30 DIAGNOSIS — H40.013 OPEN ANGLE WITH BORDERLINE FINDINGS OF BOTH EYES: ICD-10-CM

## 2022-06-30 DIAGNOSIS — H25.11 NUCLEAR SCLEROSIS OF RIGHT EYE: Primary | ICD-10-CM

## 2022-06-30 PROCEDURE — 92136 OPHTHALMIC BIOMETRY: CPT | Mod: RT,S$GLB,, | Performed by: STUDENT IN AN ORGANIZED HEALTH CARE EDUCATION/TRAINING PROGRAM

## 2022-06-30 PROCEDURE — 1159F MED LIST DOCD IN RCRD: CPT | Mod: CPTII,S$GLB,, | Performed by: STUDENT IN AN ORGANIZED HEALTH CARE EDUCATION/TRAINING PROGRAM

## 2022-06-30 PROCEDURE — 1160F RVW MEDS BY RX/DR IN RCRD: CPT | Mod: CPTII,S$GLB,, | Performed by: STUDENT IN AN ORGANIZED HEALTH CARE EDUCATION/TRAINING PROGRAM

## 2022-06-30 PROCEDURE — 92083 EXTENDED VISUAL FIELD XM: CPT | Mod: S$GLB,,, | Performed by: STUDENT IN AN ORGANIZED HEALTH CARE EDUCATION/TRAINING PROGRAM

## 2022-06-30 PROCEDURE — 92025 CORNEAL TOPOGRAPHY - OU - BOTH EYES: ICD-10-PCS | Mod: S$GLB,,, | Performed by: STUDENT IN AN ORGANIZED HEALTH CARE EDUCATION/TRAINING PROGRAM

## 2022-06-30 PROCEDURE — 1159F PR MEDICATION LIST DOCUMENTED IN MEDICAL RECORD: ICD-10-PCS | Mod: CPTII,S$GLB,, | Performed by: STUDENT IN AN ORGANIZED HEALTH CARE EDUCATION/TRAINING PROGRAM

## 2022-06-30 PROCEDURE — 1160F PR REVIEW ALL MEDS BY PRESCRIBER/CLIN PHARMACIST DOCUMENTED: ICD-10-PCS | Mod: CPTII,S$GLB,, | Performed by: STUDENT IN AN ORGANIZED HEALTH CARE EDUCATION/TRAINING PROGRAM

## 2022-06-30 PROCEDURE — 99999 PR PBB SHADOW E&M-EST. PATIENT-LVL III: CPT | Mod: PBBFAC,,, | Performed by: STUDENT IN AN ORGANIZED HEALTH CARE EDUCATION/TRAINING PROGRAM

## 2022-06-30 PROCEDURE — 92136 IOL MASTER - OD - RIGHT EYE: ICD-10-PCS | Mod: RT,S$GLB,, | Performed by: STUDENT IN AN ORGANIZED HEALTH CARE EDUCATION/TRAINING PROGRAM

## 2022-06-30 PROCEDURE — 99203 OFFICE O/P NEW LOW 30 MIN: CPT | Mod: S$GLB,,, | Performed by: STUDENT IN AN ORGANIZED HEALTH CARE EDUCATION/TRAINING PROGRAM

## 2022-06-30 PROCEDURE — 92083 HUMPHREY VISUAL FIELD - OU - BOTH EYES: ICD-10-PCS | Mod: S$GLB,,, | Performed by: STUDENT IN AN ORGANIZED HEALTH CARE EDUCATION/TRAINING PROGRAM

## 2022-06-30 PROCEDURE — 99999 PR PBB SHADOW E&M-EST. PATIENT-LVL III: ICD-10-PCS | Mod: PBBFAC,,, | Performed by: STUDENT IN AN ORGANIZED HEALTH CARE EDUCATION/TRAINING PROGRAM

## 2022-06-30 PROCEDURE — 99203 PR OFFICE/OUTPT VISIT, NEW, LEVL III, 30-44 MIN: ICD-10-PCS | Mod: S$GLB,,, | Performed by: STUDENT IN AN ORGANIZED HEALTH CARE EDUCATION/TRAINING PROGRAM

## 2022-06-30 PROCEDURE — 92025 CPTRIZED CORNEAL TOPOGRAPHY: CPT | Mod: S$GLB,,, | Performed by: STUDENT IN AN ORGANIZED HEALTH CARE EDUCATION/TRAINING PROGRAM

## 2022-06-30 RX ORDER — PREDNISOLONE ACETATE 10 MG/ML
1 SUSPENSION/ DROPS OPHTHALMIC 4 TIMES DAILY
Qty: 5 ML | Refills: 1 | Status: SHIPPED | OUTPATIENT
Start: 2022-06-30 | End: 2022-09-14

## 2022-06-30 NOTE — PROGRESS NOTES
"Short Stay Record    Diagnosis: Nuclear Sclerotic Cataract right    CC: Blurry Vision     HPI:  Nathalie Membreno is a 76 y.o. female who presents for evaluation prior to ophthalmic surgery. No current complaints.     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 Nuno    CARPAL TUNNEL RELEASE      b/l    COLONOSCOPY N/A 3/31/2021    Procedure: COLONOSCOPY;  Surgeon: Fozia Dasilva MD;  [facility];  Service: Endoscopy;  Laterality: N/A;    DILATION AND CURETTAGE OF UTERUS      10/2021, no cancer per pt.    FOOT SURGERY Right 05/02/2013    wart removal    TRIGGER FINGER RELEASE Right     TUBAL LIGATION       Social History     Tobacco Use    Smoking status: Never Smoker    Smokeless tobacco: Never Used   Substance Use Topics    Alcohol use: No     Alcohol/week: 0.0 standard drinks     Family History   Problem Relation Age of Onset    Hypertension Mother     Hypertension Father     Cancer Father         prostate    Cataracts Father     Glaucoma Father     Diabetes Father     Hypertension Sister     Diabetes Sister     Breast cancer Neg Hx     Colon cancer Neg Hx     Ovarian cancer Neg Hx     Thrombophilia Neg Hx      Review of patient's allergies indicates:   Allergen Reactions    Penicillins Swelling     Generalized swelling  Other reaction(s): Swelling  Generalized swelling  Other reaction(s): Swelling  Generalized swelling "         Current Outpatient Medications:     acetaminophen (TYLENOL) 500 MG tablet, Take 500 mg by mouth every 6 (six) hours as needed., Disp: , Rfl:     albuterol 90 mcg/actuation inhaler, Inhale 2 puffs into the lungs every 4 (four) hours as needed for Wheezing., Disp: 1 each, Rfl: 6    amLODIPine (NORVASC) 5 MG tablet, Take 1 tablet (5 mg total) by mouth once daily., Disp: 30 tablet, Rfl: 11    atorvastatin (LIPITOR) 40 MG tablet, TK 1 T PO Q NIGHT, Disp: 90 tablet, Rfl: 5    cholecalciferol, vitamin D3, (VITAMIN D3) 50 mcg (2,000 unit) Cap, Take 1 capsule by mouth once daily., Disp: , Rfl:     diclofenac sodium (VOLTAREN) 1 % Gel, Apply 2 g topically 2 (two) times daily., Disp: 60 g, Rfl: 1    digoxin (LANOXIN) 125 mcg tablet, TAKE 1 TABLET EVERY DAY, Disp: 90 tablet, Rfl: 2    diphenhydrAMINE (BENADRYL) 25 mg capsule, Take 25 mg by mouth every 6 (six) hours as needed., Disp: , Rfl:     ergocalciferol (ERGOCALCIFEROL) 50,000 unit Cap, Take 50,000 Units by mouth every 7 days., Disp: , Rfl:     fluticasone propionate (FLONASE) 50 mcg/actuation nasal spray, 2 sprays (100 mcg total) by Each Nostril route once daily., Disp: 16 g, Rfl: 6    gabapentin (NEURONTIN) 300 MG capsule, Take 1 capsule (300 mg total) by mouth every evening., Disp: 90 capsule, Rfl: 3    hydrOXYzine pamoate (VISTARIL) 25 MG Cap, Take 25 mg by mouth., Disp: , Rfl:     losartan (COZAAR) 100 MG tablet, Take 1 tablet (100 mg total) by mouth once daily., Disp: 90 tablet, Rfl: 1    medroxyPROGESTERone (PROVERA) 10 MG tablet, Take 100 tablets by mouth once daily., Disp: , Rfl:     metoprolol succinate (TOPROL-XL) 100 MG 24 hr tablet, TAKE 1 TABLET EVERY DAY, Disp: 90 tablet, Rfl: 3    nystatin-triamcinolone (MYCOLOG II) cream, Apply to affected area 2 times daily, Disp: 30 g, Rfl: 1    polyethylene glycol (GLYCOLAX) 17 gram PwPk, Take by mouth once daily., Disp: , Rfl:     polyethylene glycol (GLYCOLAX) 17 gram/dose powder, DISSOLVE 17  GRAMS IN LIQUID AND DRINK BY MOUTH ONCE DAILY, Disp: 510 g, Rfl: 6    prednisoLONE acetate (PRED FORTE) 1 % DrpS, Place 1 drop into the right eye 4 (four) times daily., Disp: 5 mL, Rfl: 1    rivaroxaban (XARELTO) 20 mg Tab, TAKE 1 TABLET BY MOUTH EVERY DAY WITH DINNER, Disp: , Rfl:     rOPINIRole (REQUIP) 0.5 MG tablet, Take 3 tablets (1.5 mg total) by mouth every evening., Disp: 90 tablet, Rfl: 11    Review of Systems:  10 Pt ROS negative except as stated in HPI    Physical Exam:  General Appearance:    A&Ox3, no distress, appears stated age   Head:    Normocephalic, without obvious abnormality, atraumatic   Eyes:    PERRL, EOM's intact   Back:     Symmetric, no curvature   Lungs:     respirations unlabored   Chest Wall:    No tenderness or deformity    Heart:  Abdomen:  Extremities:  Skin:    S1 and S2 present    Soft, non-tender    Extremities normal, atraumatic    Skin color, texture, turgor normal     Patient is stable for ophthalmic surgery under local and MAC.

## 2022-06-30 NOTE — PROGRESS NOTES
HPI     Cataract      Additional comments: Pt reports for cat eval. Denies any pain,   occasionally some discomfort due to itchiness. Va blurry, OD blurrier than   OS. Using pataday. Noticing issues with glare, stopped driving at   nighttime. Used glasses from [employer], but broke them recently so having   trouble with distance and reading.               Comments     Pataday prn          Last edited by Almas Paredes on 6/30/2022  2:13 PM. (History)            Assessment /Plan     For exam results, see Encounter Report.    Nuclear sclerosis of right eye- Visually Significant Cataract OU  Patient reports decreased vision consistent with the clinical amount of lenticular opacity, which reaches the level of visual significance and affects activities of daily living including reading and glare. Risks, benefits, and alternatives to cataract surgery were discussed.  Discussion of risks included possibility of infection as well as permanent vision loss.The pt expressed a desire to proceed with surgery with the potential for some reasonable degree of visual improvement. Recommended regular use of artificial tears and good lid hygiene to optimize surgical outcome.     Discussed IOL options and refractive outcomes for this patient.    Phaco right eye,   Topical  Will aim for Monofocal - Distance IOL    Intraop Kenalog 40: no    Post op gtts:   Durezol or PF      Discussed that vision may be limited by none  Dilation: good  Alpha Blockers: none    SS record completed, IOL master reviewed, external referral completed.   Referral to [facility] for Ophthalmic surgery  Prescriptions sent for preoperative medications  Explained that patient may need glasses after surgery.      Nuclear sclerosis of left eye- Follow    Open angle with borderline findings of both eyes-   HVF done today, unreliable   Will repeat HFV after CEIOL OU      Return to clinic for PCIOL OD

## 2022-07-21 ENCOUNTER — OUTSIDE PLACE OF SERVICE (OUTPATIENT)
Dept: OPHTHALMOLOGY | Facility: CLINIC | Age: 77
End: 2022-07-21
Payer: MEDICARE

## 2022-07-21 PROCEDURE — 66984 XCAPSL CTRC RMVL W/O ECP: CPT | Mod: RT,,, | Performed by: STUDENT IN AN ORGANIZED HEALTH CARE EDUCATION/TRAINING PROGRAM

## 2022-07-21 PROCEDURE — 66984 PR REMOVAL, CATARACT, W/INSRT INTRAOC LENS, W/O ENDO CYCLO: ICD-10-PCS | Mod: RT,,, | Performed by: STUDENT IN AN ORGANIZED HEALTH CARE EDUCATION/TRAINING PROGRAM

## 2022-07-22 ENCOUNTER — OFFICE VISIT (OUTPATIENT)
Dept: OPHTHALMOLOGY | Facility: CLINIC | Age: 77
End: 2022-07-22
Payer: MEDICARE

## 2022-07-22 DIAGNOSIS — Z98.890 POST-OPERATIVE STATE: Primary | ICD-10-CM

## 2022-07-22 DIAGNOSIS — Z98.41 CATARACT EXTRACTION STATUS OF EYE, RIGHT: ICD-10-CM

## 2022-07-22 PROCEDURE — 99999 PR PBB SHADOW E&M-EST. PATIENT-LVL III: ICD-10-PCS | Mod: PBBFAC,,, | Performed by: STUDENT IN AN ORGANIZED HEALTH CARE EDUCATION/TRAINING PROGRAM

## 2022-07-22 PROCEDURE — 99024 POSTOP FOLLOW-UP VISIT: CPT | Mod: S$GLB,,, | Performed by: STUDENT IN AN ORGANIZED HEALTH CARE EDUCATION/TRAINING PROGRAM

## 2022-07-22 PROCEDURE — 99024 PR POST-OP FOLLOW-UP VISIT: ICD-10-PCS | Mod: S$GLB,,, | Performed by: STUDENT IN AN ORGANIZED HEALTH CARE EDUCATION/TRAINING PROGRAM

## 2022-07-22 PROCEDURE — 1160F RVW MEDS BY RX/DR IN RCRD: CPT | Mod: CPTII,S$GLB,, | Performed by: STUDENT IN AN ORGANIZED HEALTH CARE EDUCATION/TRAINING PROGRAM

## 2022-07-22 PROCEDURE — 99999 PR PBB SHADOW E&M-EST. PATIENT-LVL III: CPT | Mod: PBBFAC,,, | Performed by: STUDENT IN AN ORGANIZED HEALTH CARE EDUCATION/TRAINING PROGRAM

## 2022-07-22 PROCEDURE — 1159F PR MEDICATION LIST DOCUMENTED IN MEDICAL RECORD: ICD-10-PCS | Mod: CPTII,S$GLB,, | Performed by: STUDENT IN AN ORGANIZED HEALTH CARE EDUCATION/TRAINING PROGRAM

## 2022-07-22 PROCEDURE — 1160F PR REVIEW ALL MEDS BY PRESCRIBER/CLIN PHARMACIST DOCUMENTED: ICD-10-PCS | Mod: CPTII,S$GLB,, | Performed by: STUDENT IN AN ORGANIZED HEALTH CARE EDUCATION/TRAINING PROGRAM

## 2022-07-22 PROCEDURE — 1159F MED LIST DOCD IN RCRD: CPT | Mod: CPTII,S$GLB,, | Performed by: STUDENT IN AN ORGANIZED HEALTH CARE EDUCATION/TRAINING PROGRAM

## 2022-07-22 NOTE — PROGRESS NOTES
Assessment /Plan     For exam results, see Encounter Report.    Post-operative state  Cataract extraction status of eye, right- POD#1 S/P CEIOL OD Doing well. Mild edema    Continue gtts to operative eye:  PF QID      Reinstructed in importance of absolute compliance with Post-OP instructions including medications, shield at bedtime, protective glasses during the day, and limitation of activities. Follow up appointments in approximately one and six weeks or call immediately for increased pain, redness or vision loss.     RTC 1 week. MOCT if PH worse than 20/25

## 2022-07-29 ENCOUNTER — DOCUMENTATION ONLY (OUTPATIENT)
Dept: OPHTHALMOLOGY | Facility: CLINIC | Age: 77
End: 2022-07-29

## 2022-07-29 ENCOUNTER — OFFICE VISIT (OUTPATIENT)
Dept: OPHTHALMOLOGY | Facility: CLINIC | Age: 77
End: 2022-07-29
Payer: MEDICARE

## 2022-07-29 DIAGNOSIS — Z98.890 POST-OPERATIVE STATE: Primary | ICD-10-CM

## 2022-07-29 DIAGNOSIS — H25.12 NUCLEAR SCLEROSIS OF LEFT EYE: ICD-10-CM

## 2022-07-29 DIAGNOSIS — Z98.41 CATARACT EXTRACTION STATUS OF EYE, RIGHT: ICD-10-CM

## 2022-07-29 PROCEDURE — 92136 OPHTHALMIC BIOMETRY: CPT | Mod: 26,LT,S$GLB, | Performed by: STUDENT IN AN ORGANIZED HEALTH CARE EDUCATION/TRAINING PROGRAM

## 2022-07-29 PROCEDURE — 99999 PR PBB SHADOW E&M-EST. PATIENT-LVL III: CPT | Mod: PBBFAC,,, | Performed by: STUDENT IN AN ORGANIZED HEALTH CARE EDUCATION/TRAINING PROGRAM

## 2022-07-29 PROCEDURE — 1160F PR REVIEW ALL MEDS BY PRESCRIBER/CLIN PHARMACIST DOCUMENTED: ICD-10-PCS | Mod: CPTII,S$GLB,, | Performed by: STUDENT IN AN ORGANIZED HEALTH CARE EDUCATION/TRAINING PROGRAM

## 2022-07-29 PROCEDURE — 1159F MED LIST DOCD IN RCRD: CPT | Mod: CPTII,S$GLB,, | Performed by: STUDENT IN AN ORGANIZED HEALTH CARE EDUCATION/TRAINING PROGRAM

## 2022-07-29 PROCEDURE — 99999 PR PBB SHADOW E&M-EST. PATIENT-LVL III: ICD-10-PCS | Mod: PBBFAC,,, | Performed by: STUDENT IN AN ORGANIZED HEALTH CARE EDUCATION/TRAINING PROGRAM

## 2022-07-29 PROCEDURE — 1159F PR MEDICATION LIST DOCUMENTED IN MEDICAL RECORD: ICD-10-PCS | Mod: CPTII,S$GLB,, | Performed by: STUDENT IN AN ORGANIZED HEALTH CARE EDUCATION/TRAINING PROGRAM

## 2022-07-29 PROCEDURE — 99024 POSTOP FOLLOW-UP VISIT: CPT | Mod: S$GLB,,, | Performed by: STUDENT IN AN ORGANIZED HEALTH CARE EDUCATION/TRAINING PROGRAM

## 2022-07-29 PROCEDURE — 1160F RVW MEDS BY RX/DR IN RCRD: CPT | Mod: CPTII,S$GLB,, | Performed by: STUDENT IN AN ORGANIZED HEALTH CARE EDUCATION/TRAINING PROGRAM

## 2022-07-29 PROCEDURE — 99024 PR POST-OP FOLLOW-UP VISIT: ICD-10-PCS | Mod: S$GLB,,, | Performed by: STUDENT IN AN ORGANIZED HEALTH CARE EDUCATION/TRAINING PROGRAM

## 2022-07-29 PROCEDURE — 92136 IOL MASTER - OS - LEFT EYE: ICD-10-PCS | Mod: 26,LT,S$GLB, | Performed by: STUDENT IN AN ORGANIZED HEALTH CARE EDUCATION/TRAINING PROGRAM

## 2022-07-29 RX ORDER — PREDNISOLONE ACETATE 10 MG/ML
1 SUSPENSION/ DROPS OPHTHALMIC EVERY 4 HOURS
Qty: 5 ML | Refills: 1 | Status: SHIPPED | OUTPATIENT
Start: 2022-07-29 | End: 2022-09-14

## 2022-07-29 NOTE — PROGRESS NOTES
"  Short Stay Record    Diagnosis: Nuclear Sclerotic Cataract left    CC: Blurry Vision     HPI:  Nathalie Membreno is a 77 y.o. female who presents for evaluation prior to ophthalmic surgery. No current complaints.     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 Nuno    CARPAL TUNNEL RELEASE      b/l    COLONOSCOPY N/A 3/31/2021    Procedure: COLONOSCOPY;  Surgeon: Fozia Dasilva MD;  Location: [facility];  Service: Endoscopy;  Laterality: N/A;    DILATION AND CURETTAGE OF UTERUS      10/2021, no cancer per pt.    FOOT SURGERY Right 05/02/2013    wart removal    TRIGGER FINGER RELEASE Right     TUBAL LIGATION       Social History     Tobacco Use    Smoking status: Never Smoker    Smokeless tobacco: Never Used   Substance Use Topics    Alcohol use: No     Alcohol/week: 0.0 standard drinks     Family History   Problem Relation Age of Onset    Hypertension Mother     Hypertension Father     Cancer Father         prostate    Cataracts Father     Glaucoma Father     Diabetes Father     Hypertension Sister     Diabetes Sister     Breast cancer Neg Hx     Colon cancer Neg Hx     Ovarian cancer Neg Hx     Thrombophilia Neg Hx      Review of patient's allergies indicates:   Allergen Reactions    Penicillins Swelling     Generalized swelling  Other reaction(s): Swelling  Generalized swelling  Other reaction(s): Swelling  Generalized swelling "         Current Outpatient Medications:     acetaminophen (TYLENOL) 500 MG tablet, Take 500 mg by mouth every 6 (six) hours as needed., Disp: , Rfl:     albuterol 90 mcg/actuation inhaler, Inhale 2 puffs into the lungs every 4 (four) hours as needed for Wheezing., Disp: 1 each, Rfl: 6    amLODIPine (NORVASC) 5 MG tablet, Take 1 tablet (5 mg total) by mouth once daily., Disp: 30 tablet, Rfl: 11    atorvastatin (LIPITOR) 40 MG tablet, TK 1 T PO Q NIGHT, Disp: 90 tablet, Rfl: 5    cholecalciferol, vitamin D3, (VITAMIN D3) 50 mcg (2,000 unit) Cap, Take 1 capsule by mouth once daily., Disp: , Rfl:     diclofenac sodium (VOLTAREN) 1 % Gel, Apply 2 g topically 2 (two) times daily., Disp: 60 g, Rfl: 1    digoxin (LANOXIN) 125 mcg tablet, TAKE 1 TABLET EVERY DAY, Disp: 90 tablet, Rfl: 2    diphenhydrAMINE (BENADRYL) 25 mg capsule, Take 25 mg by mouth every 6 (six) hours as needed., Disp: , Rfl:     ergocalciferol (ERGOCALCIFEROL) 50,000 unit Cap, Take 50,000 Units by mouth every 7 days., Disp: , Rfl:     fluticasone propionate (FLONASE) 50 mcg/actuation nasal spray, 2 sprays (100 mcg total) by Each Nostril route once daily., Disp: 16 g, Rfl: 6    gabapentin (NEURONTIN) 300 MG capsule, Take 1 capsule (300 mg total) by mouth every evening. (Patient not taking: Reported on 7/29/2022), Disp: 90 capsule, Rfl: 3    hydrOXYzine pamoate (VISTARIL) 25 MG Cap, Take 25 mg by mouth., Disp: , Rfl:     losartan (COZAAR) 100 MG tablet, Take 1 tablet (100 mg total) by mouth once daily., Disp: 90 tablet, Rfl: 1    medroxyPROGESTERone (PROVERA) 10 MG tablet, Take 100 tablets by mouth once daily., Disp: , Rfl:     metoprolol succinate (TOPROL-XL) 100 MG 24 hr tablet, TAKE 1 TABLET EVERY DAY, Disp: 90 tablet, Rfl: 3    nystatin-triamcinolone (MYCOLOG II) cream, Apply to affected area 2 times daily, Disp: 30 g, Rfl: 1    polyethylene glycol (GLYCOLAX) 17 gram PwPk, Take by mouth once daily., Disp: , Rfl:     polyethylene glycol  (GLYCOLAX) 17 gram/dose powder, DISSOLVE 17 GRAMS IN LIQUID AND DRINK BY MOUTH ONCE DAILY, Disp: 510 g, Rfl: 6    prednisoLONE acetate (PRED FORTE) 1 % DrpS, Place 1 drop into the right eye 4 (four) times daily., Disp: 5 mL, Rfl: 1    prednisoLONE acetate (PRED FORTE) 1 % DrpS, Place 1 drop into the left eye every 4 (four) hours., Disp: 5 mL, Rfl: 1    rivaroxaban (XARELTO) 20 mg Tab, TAKE 1 TABLET BY MOUTH EVERY DAY WITH DINNER, Disp: , Rfl:     rOPINIRole (REQUIP) 0.5 MG tablet, Take 3 tablets (1.5 mg total) by mouth every evening., Disp: 90 tablet, Rfl: 11    Review of Systems:  10 Pt ROS negative except as stated in HPI    Physical Exam:  General Appearance:    A&Ox3, no distress, appears stated age   Head:    Normocephalic, without obvious abnormality, atraumatic   Eyes:    PERRL, EOM's intact   Back:     Symmetric, no curvature   Lungs:     respirations unlabored   Chest Wall:    No tenderness or deformity    Heart:  Abdomen:  Extremities:  Skin:    S1 and S2 present    Soft, non-tender    Extremities normal, atraumatic    Skin color, texture, turgor normal     Patient is stable for ophthalmic surgery under local and MAC.       _

## 2022-07-29 NOTE — PROGRESS NOTES
HPI     One week post phaco OD    Patient states that the Prednisolone Acetate burns. The burning lasts for   a few seconds.  Foreign body sensation in her right eye started on Wednesday  Her right eye has been tearing since Wednesday      The patient states her left eye is blurry and glare is bothersome.    1. PCIOL OD 7/21/22  NSC OS  2. Open Angle borderline findings OU    OD- Pred QID     Pataday prn (not using since surgery)    Last edited by Cathy Muñiz MD on 7/29/2022  9:58 AM. (History)            Assessment /Plan     For exam results, see Encounter Report.    Post-operative state  Cataract extraction status of eye, right- Impression/Plan  POW#1 S/P CEIOL OD : Doing well with no evidence of infection. Healing well    PF Taper 4-3-2-1 then stop    Pt given and instructed in one week postop instructions. Can resume normal activitites and d/c eye shield. OTC reading glasses can be used until evaluated for final MR. Follow up in one month or PRN pain, redness, vision loss, or other concerns.      Nuclear sclerosis of left eye- Patient reports decreased vision in the fellow eye consistent with the clinical amount of lenticular opacity, which reaches the level of visual significance and affects activities of daily living including reading and glare. Risks, benefits, and alternatives to cataract surgery were discussed and pt desired to schedule cataract surgery. Pt was consented and the biometry and lens options were reviewed.    Phaco left eye,   Topical  Will aim for Monofocal - Distance IOL    Intraop Kenalog 40: no    Post op gtts:   Durezol or PF      Discussed that vision may be limited by:  none    Dilation: good  Alpha Blockers: none    SS record completed, IOL master reviewed, external referral completed.   Referral to [facility] for Ophthalmic surgery  Prescriptions sent for preoperative medications  Explained that patient may need glasses after surgery.        Return to clinic for  PCIOL OS

## 2022-07-30 DIAGNOSIS — I10 ESSENTIAL HYPERTENSION: ICD-10-CM

## 2022-07-30 NOTE — TELEPHONE ENCOUNTER
No new care gaps identified.  [facility] Embedded Care Gaps. Reference number: [id number]. 7/30/2022   3:51:52 PM CDT

## 2022-08-01 RX ORDER — AMLODIPINE BESYLATE 5 MG/1
TABLET ORAL
Qty: 90 TABLET | Refills: 3 | Status: SHIPPED | OUTPATIENT
Start: 2022-08-01 | End: 2022-09-14 | Stop reason: SDUPTHER

## 2022-08-01 NOTE — TELEPHONE ENCOUNTER
Refill Authorization Note   Nathalie PattonTemi  is requesting a refill authorization.  Brief Assessment and Rationale for Refill:  Approve     Medication Therapy Plan:       Medication Reconciliation Completed: No   Comments:     No Care Gaps Recommended     Note composed:11:39 AM 08/01/2022

## 2022-08-17 RX ORDER — ATORVASTATIN CALCIUM 40 MG/1
TABLET, FILM COATED ORAL
Qty: 90 TABLET | Refills: 1 | Status: SHIPPED | OUTPATIENT
Start: 2022-08-17 | End: 2023-04-24

## 2022-08-17 NOTE — TELEPHONE ENCOUNTER
No new care gaps identified.  [facility] Embedded Care Gaps. Reference number: [id number]. 8/17/2022   5:01:17 PM CDT

## 2022-08-17 NOTE — TELEPHONE ENCOUNTER
Refill Decision Note   Nathalie Temi  is requesting a refill authorization.  Brief Assessment and Rationale for Refill:  Approve     Medication Therapy Plan:       Medication Reconciliation Completed: No   Comments:     No Care Gaps recommended.     Note composed:5:45 PM 08/17/2022

## 2022-08-18 ENCOUNTER — OUTSIDE PLACE OF SERVICE (OUTPATIENT)
Dept: OPHTHALMOLOGY | Facility: CLINIC | Age: 77
End: 2022-08-18
Payer: MEDICARE

## 2022-08-18 PROCEDURE — 66984 XCAPSL CTRC RMVL W/O ECP: CPT | Mod: LT,,, | Performed by: STUDENT IN AN ORGANIZED HEALTH CARE EDUCATION/TRAINING PROGRAM

## 2022-08-18 PROCEDURE — 66984 PR REMOVAL, CATARACT, W/INSRT INTRAOC LENS, W/O ENDO CYCLO: ICD-10-PCS | Mod: LT,,, | Performed by: STUDENT IN AN ORGANIZED HEALTH CARE EDUCATION/TRAINING PROGRAM

## 2022-08-19 ENCOUNTER — OFFICE VISIT (OUTPATIENT)
Dept: OPHTHALMOLOGY | Facility: CLINIC | Age: 77
End: 2022-08-19
Payer: MEDICARE

## 2022-08-19 DIAGNOSIS — Z98.890 POST-OPERATIVE STATE: Primary | ICD-10-CM

## 2022-08-19 DIAGNOSIS — Z98.42 CATARACT EXTRACTION STATUS OF EYE, LEFT: ICD-10-CM

## 2022-08-19 PROCEDURE — 99999 PR PBB SHADOW E&M-EST. PATIENT-LVL III: CPT | Mod: PBBFAC,,, | Performed by: STUDENT IN AN ORGANIZED HEALTH CARE EDUCATION/TRAINING PROGRAM

## 2022-08-19 PROCEDURE — 99024 POSTOP FOLLOW-UP VISIT: CPT | Mod: S$GLB,,, | Performed by: STUDENT IN AN ORGANIZED HEALTH CARE EDUCATION/TRAINING PROGRAM

## 2022-08-19 PROCEDURE — 99999 PR PBB SHADOW E&M-EST. PATIENT-LVL III: ICD-10-PCS | Mod: PBBFAC,,, | Performed by: STUDENT IN AN ORGANIZED HEALTH CARE EDUCATION/TRAINING PROGRAM

## 2022-08-19 PROCEDURE — 1160F RVW MEDS BY RX/DR IN RCRD: CPT | Mod: CPTII,S$GLB,, | Performed by: STUDENT IN AN ORGANIZED HEALTH CARE EDUCATION/TRAINING PROGRAM

## 2022-08-19 PROCEDURE — 1160F PR REVIEW ALL MEDS BY PRESCRIBER/CLIN PHARMACIST DOCUMENTED: ICD-10-PCS | Mod: CPTII,S$GLB,, | Performed by: STUDENT IN AN ORGANIZED HEALTH CARE EDUCATION/TRAINING PROGRAM

## 2022-08-19 PROCEDURE — 99024 PR POST-OP FOLLOW-UP VISIT: ICD-10-PCS | Mod: S$GLB,,, | Performed by: STUDENT IN AN ORGANIZED HEALTH CARE EDUCATION/TRAINING PROGRAM

## 2022-08-19 PROCEDURE — 1159F MED LIST DOCD IN RCRD: CPT | Mod: CPTII,S$GLB,, | Performed by: STUDENT IN AN ORGANIZED HEALTH CARE EDUCATION/TRAINING PROGRAM

## 2022-08-19 PROCEDURE — 1159F PR MEDICATION LIST DOCUMENTED IN MEDICAL RECORD: ICD-10-PCS | Mod: CPTII,S$GLB,, | Performed by: STUDENT IN AN ORGANIZED HEALTH CARE EDUCATION/TRAINING PROGRAM

## 2022-08-19 NOTE — PROGRESS NOTES
HPI     POD#1 s/p CEIOL OS. Has received appropriate post-op drops. Denies any   discomfort. No new ocular complaints.      1. PCIOL OD 7/21/22  PCIOL OS 08/18/2022  2. Open Angle borderline findings OU    OS- Pred QID     Marilin cote      Last edited by Anita Shannon on 8/19/2022  7:59 AM. (History)            Assessment /Plan     For exam results, see Encounter Report.    Post-operative state  Cataract extraction status of eye, left- POD#1 S/P CEIOL OS Doing well. Mild edema    Continue gtts to operative eye:  PF QID      Reinstructed in importance of absolute compliance with Post-OP instructions including medications, shield at bedtime, protective glasses during the day, and limitation of activities. Follow up appointments in approximately one and six weeks or call immediately for increased pain, redness or vision loss.     RTC 1 week. MOCT if PH worse than 20/25

## 2022-08-26 ENCOUNTER — OFFICE VISIT (OUTPATIENT)
Dept: OPHTHALMOLOGY | Facility: CLINIC | Age: 77
End: 2022-08-26
Payer: MEDICARE

## 2022-08-26 DIAGNOSIS — Z98.42 CATARACT EXTRACTION STATUS OF EYE, LEFT: ICD-10-CM

## 2022-08-26 DIAGNOSIS — Z98.890 POST-OPERATIVE STATE: Primary | ICD-10-CM

## 2022-08-26 PROCEDURE — 99024 PR POST-OP FOLLOW-UP VISIT: ICD-10-PCS | Mod: S$GLB,,, | Performed by: STUDENT IN AN ORGANIZED HEALTH CARE EDUCATION/TRAINING PROGRAM

## 2022-08-26 PROCEDURE — 1160F PR REVIEW ALL MEDS BY PRESCRIBER/CLIN PHARMACIST DOCUMENTED: ICD-10-PCS | Mod: CPTII,S$GLB,, | Performed by: STUDENT IN AN ORGANIZED HEALTH CARE EDUCATION/TRAINING PROGRAM

## 2022-08-26 PROCEDURE — 99999 PR PBB SHADOW E&M-EST. PATIENT-LVL III: CPT | Mod: PBBFAC,,, | Performed by: STUDENT IN AN ORGANIZED HEALTH CARE EDUCATION/TRAINING PROGRAM

## 2022-08-26 PROCEDURE — 1159F MED LIST DOCD IN RCRD: CPT | Mod: CPTII,S$GLB,, | Performed by: STUDENT IN AN ORGANIZED HEALTH CARE EDUCATION/TRAINING PROGRAM

## 2022-08-26 PROCEDURE — 1159F PR MEDICATION LIST DOCUMENTED IN MEDICAL RECORD: ICD-10-PCS | Mod: CPTII,S$GLB,, | Performed by: STUDENT IN AN ORGANIZED HEALTH CARE EDUCATION/TRAINING PROGRAM

## 2022-08-26 PROCEDURE — 99999 PR PBB SHADOW E&M-EST. PATIENT-LVL III: ICD-10-PCS | Mod: PBBFAC,,, | Performed by: STUDENT IN AN ORGANIZED HEALTH CARE EDUCATION/TRAINING PROGRAM

## 2022-08-26 PROCEDURE — 1160F RVW MEDS BY RX/DR IN RCRD: CPT | Mod: CPTII,S$GLB,, | Performed by: STUDENT IN AN ORGANIZED HEALTH CARE EDUCATION/TRAINING PROGRAM

## 2022-08-26 PROCEDURE — 99024 POSTOP FOLLOW-UP VISIT: CPT | Mod: S$GLB,,, | Performed by: STUDENT IN AN ORGANIZED HEALTH CARE EDUCATION/TRAINING PROGRAM

## 2022-08-26 NOTE — PROGRESS NOTES
HPI     Pt in today for a post-op of the left eye. Doing well. Denies any ocular   pain or discomfort. Compliant with drops.    1. PCIOL OD 7/21/22  PCIOL OS 08/18/2022  2. Open Angle borderline findings OU    OS- Pred HOLA Gaston prn      Last edited by Anita Shannon on 8/26/2022  9:06 AM. (History)            Assessment /Plan     For exam results, see Encounter Report.    Post-operative state  Cataract extraction status of eye, left- Impression/Plan  POW#1 S/P CEIOL OS : Doing well with no evidence of infection. Healing well    PF Taper 4-3-2-1 then stop OU    Pt given and instructed in one week postop instructions. Can resume normal activitites and d/c eye shield. OTC reading glasses can be used until evaluated for final MR. Follow up in one month or PRN pain, redness, vision loss, or other concerns.        Return to clinic in 6 month w DNL

## 2022-08-30 ENCOUNTER — TELEPHONE (OUTPATIENT)
Dept: ADMINISTRATIVE | Facility: HOSPITAL | Age: 77
End: 2022-08-30
Payer: MEDICARE

## 2022-09-02 NOTE — PROGRESS NOTES
"Subjective:      Patient ID: Nathalie Membreno is a 77 y.o. female.    Chief Complaint: Follow-up      HPI  Here for follow up of medical problems.  Exercise is restricted by knee pain, but doesn't want to see Ortho.  No f/c/sw/cough.  No cp/sob/palp.  BMs normal, no b/b.  Urine normal.  Tylenol/voltaren gel helping knee pain.  RLS doing well on requip.    Updated/ annual due 1/23:  HM: 9/22 fluvax, 3/21 covid vaccines, 6/19 HAV, 6/15 [id number], 11/16 booster [id number], 9/14 TDaP, 5/18 BMD rep 5y, 3/21 Cscope rep 3y, 1/22 MMG, 10/20 Gyn Dr. Holden, 7/20 Eye Dr. Fiore, 11/16 HCV neg, Card Dr. Neal at [facility].     Review of Systems   Constitutional:  Negative for chills, diaphoresis and fever.   Respiratory:  Negative for cough and shortness of breath.    Cardiovascular:  Negative for chest pain, palpitations and leg swelling.   Gastrointestinal:  Negative for blood in stool, constipation, diarrhea, nausea and vomiting.   Genitourinary:  Negative for dysuria, frequency and hematuria.   Psychiatric/Behavioral:  The patient is not nervous/anxious.        Objective:   /80   Pulse 87   Temp 98.6 °F (37 °C)   Ht 5' 4" (1.626 m)   Wt 99.9 kg (220 lb 2.1 oz)   SpO2 97%   BMI 37.79 kg/m²     Physical Exam  Constitutional:       Appearance: She is well-developed.   Neck:      Thyroid: No thyroid mass.      Vascular: No carotid bruit.   Cardiovascular:      Rate and Rhythm: Normal rate and regular rhythm.      Heart sounds: No murmur heard.    No friction rub. No gallop.   Pulmonary:      Effort: Pulmonary effort is normal.      Breath sounds: Normal breath sounds. No wheezing or rales.   Abdominal:      General: Bowel sounds are normal.      Palpations: Abdomen is soft. There is no mass.      Tenderness: There is no abdominal tenderness.   Musculoskeletal:      Cervical back: Neck supple.   Lymphadenopathy:      Cervical: No cervical adenopathy.   Neurological:      Mental Status: She is alert and " oriented to person, place, and time.         Assessment:       1. Essential hypertension    2. Mild intermittent asthma without complication    3. Atrial flutter with rapid ventricular response    4. Chronic anticoagulation    5. Other hyperlipidemia    6. Slow transit constipation    7. Severe obesity with body mass index (BMI) of 35.0 to 39.9 with comorbidity    8. Preventive measure    9. Asymptomatic postmenopausal state    10. Encounter for screening mammogram for malignant neoplasm of breast          Plan:     Essential hypertension- stable, cont rx.    Mild intermittent asthma without complication, doing well lately, rare albuterol.    Atrial flutter with rapid ventricular response, Chronic anticoagulation    Other hyperlipidemia- cont meds, check labs in 4mo.  -     CBC Auto Differential; Future; Expected date: 09/14/2022  -     Comprehensive Metabolic Panel; Future; Expected date: 09/14/2022  -     TSH; Future; Expected date: 09/14/2022  -     Lipid Panel; Future; Expected date: 09/14/2022    Slow transit constipation, good on miralax.    Severe obesity with body mass index (BMI) of 35.0 to 39.9 with comorbidity    Preventive measure- fluvax now.      Asymptomatic postmenopausal state  -     DXA Bone Density Spine And Hip; Future; Expected date: 09/14/2022    Encounter for screening mammogram for malignant neoplasm of breast  -     Mammo Digital Screening Bilat w/ Akil; Future; Expected date: 09/14/2022

## 2022-09-13 ENCOUNTER — TELEPHONE (OUTPATIENT)
Dept: ADMINISTRATIVE | Facility: HOSPITAL | Age: 77
End: 2022-09-13
Payer: MEDICARE

## 2022-09-14 ENCOUNTER — OFFICE VISIT (OUTPATIENT)
Dept: FAMILY MEDICINE | Facility: CLINIC | Age: 77
End: 2022-09-14
Payer: MEDICARE

## 2022-09-14 VITALS
OXYGEN SATURATION: 97 % | BODY MASS INDEX: 37.58 KG/M2 | WEIGHT: 220.13 LBS | TEMPERATURE: 99 F | HEIGHT: 64 IN | HEART RATE: 87 BPM | SYSTOLIC BLOOD PRESSURE: 128 MMHG | DIASTOLIC BLOOD PRESSURE: 80 MMHG

## 2022-09-14 DIAGNOSIS — K59.01 SLOW TRANSIT CONSTIPATION: ICD-10-CM

## 2022-09-14 DIAGNOSIS — E66.01 SEVERE OBESITY WITH BODY MASS INDEX (BMI) OF 35.0 TO 39.9 WITH COMORBIDITY: ICD-10-CM

## 2022-09-14 DIAGNOSIS — G25.81 RLS (RESTLESS LEGS SYNDROME): ICD-10-CM

## 2022-09-14 DIAGNOSIS — Z79.01 CHRONIC ANTICOAGULATION: ICD-10-CM

## 2022-09-14 DIAGNOSIS — I10 ESSENTIAL HYPERTENSION: Primary | ICD-10-CM

## 2022-09-14 DIAGNOSIS — E78.49 OTHER HYPERLIPIDEMIA: ICD-10-CM

## 2022-09-14 DIAGNOSIS — J45.20 MILD INTERMITTENT ASTHMA WITHOUT COMPLICATION: ICD-10-CM

## 2022-09-14 DIAGNOSIS — Z78.0 ASYMPTOMATIC POSTMENOPAUSAL STATE: ICD-10-CM

## 2022-09-14 DIAGNOSIS — Z12.31 ENCOUNTER FOR SCREENING MAMMOGRAM FOR MALIGNANT NEOPLASM OF BREAST: ICD-10-CM

## 2022-09-14 DIAGNOSIS — I48.92 ATRIAL FLUTTER WITH RAPID VENTRICULAR RESPONSE: ICD-10-CM

## 2022-09-14 DIAGNOSIS — Z29.9 PREVENTIVE MEASURE: ICD-10-CM

## 2022-09-14 PROCEDURE — 3079F DIAST BP 80-89 MM HG: CPT | Mod: CPTII,S$GLB,, | Performed by: INTERNAL MEDICINE

## 2022-09-14 PROCEDURE — 3288F FALL RISK ASSESSMENT DOCD: CPT | Mod: CPTII,S$GLB,, | Performed by: INTERNAL MEDICINE

## 2022-09-14 PROCEDURE — 99214 OFFICE O/P EST MOD 30 MIN: CPT | Mod: 25,S$GLB,, | Performed by: INTERNAL MEDICINE

## 2022-09-14 PROCEDURE — 90694 FLU VACCINE - QUADRIVALENT - ADJUVANTED: ICD-10-PCS | Mod: S$GLB,,, | Performed by: INTERNAL MEDICINE

## 2022-09-14 PROCEDURE — 1159F MED LIST DOCD IN RCRD: CPT | Mod: CPTII,S$GLB,, | Performed by: INTERNAL MEDICINE

## 2022-09-14 PROCEDURE — 3074F PR MOST RECENT SYSTOLIC BLOOD PRESSURE < 130 MM HG: ICD-10-PCS | Mod: CPTII,S$GLB,, | Performed by: INTERNAL MEDICINE

## 2022-09-14 PROCEDURE — 3074F SYST BP LT 130 MM HG: CPT | Mod: CPTII,S$GLB,, | Performed by: INTERNAL MEDICINE

## 2022-09-14 PROCEDURE — 1125F AMNT PAIN NOTED PAIN PRSNT: CPT | Mod: CPTII,S$GLB,, | Performed by: INTERNAL MEDICINE

## 2022-09-14 PROCEDURE — G0008 ADMIN INFLUENZA VIRUS VAC: HCPCS | Mod: S$GLB,,, | Performed by: INTERNAL MEDICINE

## 2022-09-14 PROCEDURE — 90694 VACC AIIV4 NO PRSRV 0.5ML IM: CPT | Mod: S$GLB,,, | Performed by: INTERNAL MEDICINE

## 2022-09-14 PROCEDURE — 1101F PR PT FALLS ASSESS DOC 0-1 FALLS W/OUT INJ PAST YR: ICD-10-PCS | Mod: CPTII,S$GLB,, | Performed by: INTERNAL MEDICINE

## 2022-09-14 PROCEDURE — 3079F PR MOST RECENT DIASTOLIC BLOOD PRESSURE 80-89 MM HG: ICD-10-PCS | Mod: CPTII,S$GLB,, | Performed by: INTERNAL MEDICINE

## 2022-09-14 PROCEDURE — 1101F PT FALLS ASSESS-DOCD LE1/YR: CPT | Mod: CPTII,S$GLB,, | Performed by: INTERNAL MEDICINE

## 2022-09-14 PROCEDURE — 3288F PR FALLS RISK ASSESSMENT DOCUMENTED: ICD-10-PCS | Mod: CPTII,S$GLB,, | Performed by: INTERNAL MEDICINE

## 2022-09-14 PROCEDURE — 1159F PR MEDICATION LIST DOCUMENTED IN MEDICAL RECORD: ICD-10-PCS | Mod: CPTII,S$GLB,, | Performed by: INTERNAL MEDICINE

## 2022-09-14 PROCEDURE — G0008 FLU VACCINE - QUADRIVALENT - ADJUVANTED: ICD-10-PCS | Mod: S$GLB,,, | Performed by: INTERNAL MEDICINE

## 2022-09-14 PROCEDURE — 99999 PR PBB SHADOW E&M-EST. PATIENT-LVL IV: CPT | Mod: PBBFAC,,, | Performed by: INTERNAL MEDICINE

## 2022-09-14 PROCEDURE — 99214 PR OFFICE/OUTPT VISIT, EST, LEVL IV, 30-39 MIN: ICD-10-PCS | Mod: 25,S$GLB,, | Performed by: INTERNAL MEDICINE

## 2022-09-14 PROCEDURE — 1125F PR PAIN SEVERITY QUANTIFIED, PAIN PRESENT: ICD-10-PCS | Mod: CPTII,S$GLB,, | Performed by: INTERNAL MEDICINE

## 2022-09-14 PROCEDURE — 99999 PR PBB SHADOW E&M-EST. PATIENT-LVL IV: ICD-10-PCS | Mod: PBBFAC,,, | Performed by: INTERNAL MEDICINE

## 2022-09-14 RX ORDER — AMLODIPINE BESYLATE 5 MG/1
5 TABLET ORAL DAILY
Qty: 90 TABLET | Refills: 3 | Status: SHIPPED | OUTPATIENT
Start: 2022-09-14 | End: 2022-12-22

## 2022-09-14 RX ORDER — ROPINIROLE 1 MG/1
TABLET, FILM COATED ORAL
COMMUNITY
Start: 2022-05-31

## 2022-09-27 ENCOUNTER — OFFICE VISIT (OUTPATIENT)
Dept: FAMILY MEDICINE | Facility: CLINIC | Age: 77
End: 2022-09-27
Payer: MEDICARE

## 2022-09-27 VITALS
RESPIRATION RATE: 18 BRPM | DIASTOLIC BLOOD PRESSURE: 70 MMHG | HEIGHT: 64 IN | WEIGHT: 223.31 LBS | HEART RATE: 64 BPM | SYSTOLIC BLOOD PRESSURE: 120 MMHG | BODY MASS INDEX: 38.12 KG/M2 | OXYGEN SATURATION: 99 % | TEMPERATURE: 97 F

## 2022-09-27 DIAGNOSIS — Z79.01 CHRONIC ANTICOAGULATION: ICD-10-CM

## 2022-09-27 DIAGNOSIS — Z86.73 HISTORY OF TIA (TRANSIENT ISCHEMIC ATTACK): ICD-10-CM

## 2022-09-27 DIAGNOSIS — M48.061 SPINAL STENOSIS OF LUMBAR REGION, UNSPECIFIED WHETHER NEUROGENIC CLAUDICATION PRESENT: ICD-10-CM

## 2022-09-27 DIAGNOSIS — K59.01 SLOW TRANSIT CONSTIPATION: ICD-10-CM

## 2022-09-27 DIAGNOSIS — E78.49 OTHER HYPERLIPIDEMIA: ICD-10-CM

## 2022-09-27 DIAGNOSIS — K21.9 GASTROESOPHAGEAL REFLUX DISEASE WITHOUT ESOPHAGITIS: ICD-10-CM

## 2022-09-27 DIAGNOSIS — I77.1 TORTUOUS AORTA: ICD-10-CM

## 2022-09-27 DIAGNOSIS — E55.9 VITAMIN D DEFICIENCY: ICD-10-CM

## 2022-09-27 DIAGNOSIS — Z00.00 ENCOUNTER FOR PREVENTIVE HEALTH EXAMINATION: Primary | ICD-10-CM

## 2022-09-27 DIAGNOSIS — G25.81 RLS (RESTLESS LEGS SYNDROME): ICD-10-CM

## 2022-09-27 DIAGNOSIS — E66.01 SEVERE OBESITY WITH BODY MASS INDEX (BMI) OF 35.0 TO 39.9 WITH COMORBIDITY: ICD-10-CM

## 2022-09-27 DIAGNOSIS — I10 ESSENTIAL HYPERTENSION: ICD-10-CM

## 2022-09-27 DIAGNOSIS — I48.92 ATRIAL FLUTTER WITH RAPID VENTRICULAR RESPONSE: ICD-10-CM

## 2022-09-27 DIAGNOSIS — Z95.810 PRESENCE OF CARDIAC DEFIBRILLATOR: ICD-10-CM

## 2022-09-27 DIAGNOSIS — M17.0 PRIMARY OSTEOARTHRITIS OF BOTH KNEES: ICD-10-CM

## 2022-09-27 PROCEDURE — 1160F RVW MEDS BY RX/DR IN RCRD: CPT | Mod: CPTII,S$GLB,, | Performed by: NURSE PRACTITIONER

## 2022-09-27 PROCEDURE — 1159F PR MEDICATION LIST DOCUMENTED IN MEDICAL RECORD: ICD-10-PCS | Mod: CPTII,S$GLB,, | Performed by: NURSE PRACTITIONER

## 2022-09-27 PROCEDURE — 1125F PR PAIN SEVERITY QUANTIFIED, PAIN PRESENT: ICD-10-PCS | Mod: CPTII,S$GLB,, | Performed by: NURSE PRACTITIONER

## 2022-09-27 PROCEDURE — 3288F FALL RISK ASSESSMENT DOCD: CPT | Mod: CPTII,S$GLB,, | Performed by: NURSE PRACTITIONER

## 2022-09-27 PROCEDURE — 1125F AMNT PAIN NOTED PAIN PRSNT: CPT | Mod: CPTII,S$GLB,, | Performed by: NURSE PRACTITIONER

## 2022-09-27 PROCEDURE — G0439 PR MEDICARE ANNUAL WELLNESS SUBSEQUENT VISIT: ICD-10-PCS | Mod: S$GLB,,, | Performed by: NURSE PRACTITIONER

## 2022-09-27 PROCEDURE — 3288F PR FALLS RISK ASSESSMENT DOCUMENTED: ICD-10-PCS | Mod: CPTII,S$GLB,, | Performed by: NURSE PRACTITIONER

## 2022-09-27 PROCEDURE — 99499 UNLISTED E&M SERVICE: CPT | Mod: HCNC,S$GLB,, | Performed by: NURSE PRACTITIONER

## 2022-09-27 PROCEDURE — 3078F DIAST BP <80 MM HG: CPT | Mod: CPTII,S$GLB,, | Performed by: NURSE PRACTITIONER

## 2022-09-27 PROCEDURE — 1101F PT FALLS ASSESS-DOCD LE1/YR: CPT | Mod: CPTII,S$GLB,, | Performed by: NURSE PRACTITIONER

## 2022-09-27 PROCEDURE — 1101F PR PT FALLS ASSESS DOC 0-1 FALLS W/OUT INJ PAST YR: ICD-10-PCS | Mod: CPTII,S$GLB,, | Performed by: NURSE PRACTITIONER

## 2022-09-27 PROCEDURE — 99999 PR PBB SHADOW E&M-EST. PATIENT-LVL V: CPT | Mod: PBBFAC,,, | Performed by: NURSE PRACTITIONER

## 2022-09-27 PROCEDURE — 3074F SYST BP LT 130 MM HG: CPT | Mod: CPTII,S$GLB,, | Performed by: NURSE PRACTITIONER

## 2022-09-27 PROCEDURE — 3074F PR MOST RECENT SYSTOLIC BLOOD PRESSURE < 130 MM HG: ICD-10-PCS | Mod: CPTII,S$GLB,, | Performed by: NURSE PRACTITIONER

## 2022-09-27 PROCEDURE — 99499 RISK ADDL DX/OHS AUDIT: ICD-10-PCS | Mod: HCNC,S$GLB,, | Performed by: NURSE PRACTITIONER

## 2022-09-27 PROCEDURE — G0439 PPPS, SUBSEQ VISIT: HCPCS | Mod: S$GLB,,, | Performed by: NURSE PRACTITIONER

## 2022-09-27 PROCEDURE — 1170F PR FUNCTIONAL STATUS ASSESSED: ICD-10-PCS | Mod: CPTII,S$GLB,, | Performed by: NURSE PRACTITIONER

## 2022-09-27 PROCEDURE — 1160F PR REVIEW ALL MEDS BY PRESCRIBER/CLIN PHARMACIST DOCUMENTED: ICD-10-PCS | Mod: CPTII,S$GLB,, | Performed by: NURSE PRACTITIONER

## 2022-09-27 PROCEDURE — G9919 SCRN ND POS ND PROV OF REC: HCPCS | Mod: CPTII,S$GLB,, | Performed by: NURSE PRACTITIONER

## 2022-09-27 PROCEDURE — 1159F MED LIST DOCD IN RCRD: CPT | Mod: CPTII,S$GLB,, | Performed by: NURSE PRACTITIONER

## 2022-09-27 PROCEDURE — G9919 PR SCREENING AND POSITIVE: ICD-10-PCS | Mod: CPTII,S$GLB,, | Performed by: NURSE PRACTITIONER

## 2022-09-27 PROCEDURE — 1170F FXNL STATUS ASSESSED: CPT | Mod: CPTII,S$GLB,, | Performed by: NURSE PRACTITIONER

## 2022-09-27 PROCEDURE — 99999 PR PBB SHADOW E&M-EST. PATIENT-LVL V: ICD-10-PCS | Mod: PBBFAC,,, | Performed by: NURSE PRACTITIONER

## 2022-09-27 PROCEDURE — 3078F PR MOST RECENT DIASTOLIC BLOOD PRESSURE < 80 MM HG: ICD-10-PCS | Mod: CPTII,S$GLB,, | Performed by: NURSE PRACTITIONER

## 2022-09-27 NOTE — PROGRESS NOTES
"Nathalie Membreno presented for a  Medicare AWV and comprehensive Health Risk Assessment today. The following components were reviewed and updated:    Medical history  Family History  Social history  Allergies and Current Medications  Health Risk Assessment  Health Maintenance  Care Team     Patient screened moderate and/or high risk for one or more social determinants of health (SDOH). Patient connected to community resources through the ED Navigator.      ** See Completed Assessments for Annual Wellness Visit within the encounter summary.**         The following assessments were completed:  Living Situation  CAGE  Depression Screening  Timed Get Up and Go  Whisper Test  Cognitive Function Screening  Nutrition Screening  ADL Screening  PAQ Screening        Vitals:    09/27/22 1005   BP: 120/70   Pulse: 64   Resp: 18   Temp: 96.9 °F (36.1 °C)   SpO2: 99%   Weight: 101.3 kg (223 lb 5.2 oz)   Height: 5' 4" (1.626 m)     Body mass index is 38.33 kg/m².  Physical Exam  Vitals and nursing note reviewed.   Constitutional:       Appearance: Normal appearance. She is well-developed.   HENT:      Head: Normocephalic and atraumatic.   Eyes:      Pupils: Pupils are equal, round, and reactive to light.   Neck:      Vascular: No carotid bruit.   Cardiovascular:      Rate and Rhythm: Normal rate and regular rhythm.      Pulses: Normal pulses.      Heart sounds: Normal heart sounds. No murmur heard.    No gallop.   Pulmonary:      Effort: Pulmonary effort is normal.      Breath sounds: Normal breath sounds.   Abdominal:      General: Bowel sounds are normal. There is no distension.      Palpations: Abdomen is soft.      Tenderness: There is no abdominal tenderness.   Musculoskeletal:         General: No tenderness.      Right knee: Decreased range of motion.      Left knee: Decreased range of motion.   Skin:     General: Skin is warm and dry.   Neurological:      Mental Status: She is alert.      Motor: No abnormal muscle " tone.      Gait: Gait abnormal.   Psychiatric:         Speech: Speech normal.         Behavior: Behavior normal.         Thought Content: Thought content normal.         Judgment: Judgment normal.           Current Outpatient Medications   Medication Instructions    acetaminophen (TYLENOL) 500 mg, Oral, Every 6 hours PRN    albuterol 90 mcg/actuation inhaler 2 puffs, Inhalation, Every 4 hours PRN    amLODIPine (NORVASC) 5 mg, Oral, Daily    atorvastatin (LIPITOR) 40 MG tablet TAKE 1 TABLET EVERY NIGHT    cholecalciferol, vitamin D3, (VITAMIN D3) 50 mcg (2,000 unit) Cap 1 capsule, Oral, Daily    diclofenac sodium (VOLTAREN) 2 g, Topical, 2 times daily    digoxin (LANOXIN) 125 mcg tablet TAKE 1 TABLET EVERY DAY    diphenhydrAMINE (BENADRYL) 25 mg, Oral, Every 6 hours PRN    ergocalciferol (ERGOCALCIFEROL) 50,000 Units, Oral, Every 7 days    fluticasone propionate (FLONASE) 100 mcg, Each Nostril, Daily    hydrOXYzine pamoate (VISTARIL) 25 mg, Oral    losartan (COZAAR) 100 mg, Oral, Daily    medroxyPROGESTERone (PROVERA) 10 MG tablet 100 tablets, Oral, Daily    metoprolol succinate (TOPROL-XL) 100 MG 24 hr tablet TAKE 1 TABLET EVERY DAY    nystatin-triamcinolone (MYCOLOG II) cream Apply to affected area 2 times daily    polyethylene glycol (GLYCOLAX) 17 gram PwPk Oral, Daily    polyethylene glycol (GLYCOLAX) 17 gram/dose powder DISSOLVE 17 GRAMS IN LIQUID AND DRINK BY MOUTH ONCE DAILY    rivaroxaban (XARELTO) 20 mg Tab TAKE 1 TABLET BY MOUTH EVERY DAY WITH DINNER    rOPINIRole (REQUIP) 1 MG tablet No dose, route, or frequency recorded.         Diagnoses and health risks identified today and associated recommendations/orders:    1. Encounter for preventive health examination      2. Essential hypertension  Chronic and Stable on Norvasc and Toprol Continue current treatment plan as previously prescribed with your cardiologist  Glenys Mcintosh and Yaakov      3. Atrial flutter with rapid ventricular response  Chronic and  Stable on Norvasc, Digoxin, Xarelto and ToprolContinue current treatment plan as previously prescribed with your cardiologist  Glenys Weiss and Yaakov      4. Presence of cardiac defibrillator  Chronic and Stable -due to # 3   Continue current treatment plan as previously prescribed with your cardiologist  Glenys Mcintosh and Yaakov      5. Tortuous aorta  Chronic and Stable on Norvasc and Toprol Continue current treatment plan as previously prescribed with your cardiologist  Glenys Mcintosh and Yaakov      6. Other hyperlipidemia  Chronic and Stable on Lipitor. Continue current treatment plan as previously prescribed with your cardiologist    7. Chronic anticoagulation  Chronic and Stable on Xarelto- denies s./s of bleeding - followed by cardiologist .    8. Severe obesity with body mass index (BMI) of 35.0 to 39.9 with comorbidity  BMI: 38.33 kg/m²  Idl Wt: 54.7 kg (120 lb 9.5 oz)  Adj Wt: 73.3 kg (161 lb 11 oz)  Reinforce ADA diet and cardio exercise as tolerated    9. Gastroesophageal reflux disease without esophagitis  Stable with diet modification     10. Spinal stenosis of lumbar region, unspecified whether neurogenic claudication present  Chronic and Stable on Tylenol/Voltaren.  No pain at this time - hx of lumbar injections- see pain management in the past     11. History of TIA (transient ischemic attack)  DX 2017- pt had negative CT scan 2/17/ 2017 resolved s/s of CVA   Chronic and Stable blood pressure and cholesterol Continue current treatment plan as previously prescribed with your PCP    12. Primary osteoarthritis of both knees  Chronic and ongoing on Tylenol/Voltaren.  - hx of knee injections- see pain management in the past - decline to see again     13. RLS (restless legs syndrome)  Chronic and Stable on Requip. Continue current treatment plan as previously prescribed with your PCP    14. Vitamin D deficiency  Chronic and Stable on vitamin D  Continue current treatment plan as previously prescribed  with your PCP    15. Slow transit constipation  Chronic and Stable on  Miralax. Continue current treatment plan as previously prescribed with your PCP    I offered to discuss advanced care planning, including how to pick a person who would make decisions for you if you were unable to make them for yourself, called a health care power of , and what kind of decisions you might make such as use of life sustaining treatments such as ventilators and tube feeding when faced with a life limiting illness recorded on a living will that they will need to know. (How you want to be cared for as you near the end of your natural life)     X Patient is interested in learning more about how to make advanced directives.  I provided them paperwork and offered to discuss this with them.      Provided Nathalie with a 5-10 year written screening schedule and personal prevention plan. Recommendations were developed using the USPSTF age appropriate recommendations. Education, counseling, and referrals were provided as needed. After Visit Summary printed and given to patient which includes a list of additional screenings\tests needed.     1 year annual wellness     Nicole Fortune NP

## 2022-09-27 NOTE — PATIENT INSTRUCTIONS
Counseling and Referral of Other Preventative  (Italic type indicates deductible and co-insurance are waived)    Patient Name: Nathalie Membreno  Today's Date: 9/27/2022    Health Maintenance         Date Due Completion Date    COVID-19 Vaccine (1) Never done ---    Shingles Vaccine (1 of 2) Never done ---    Lipid Panel 01/25/2023 1/25/2022    DEXA Scan 05/21/2023 5/21/2018    Override on 7/21/2009: Done (normal; repeat in 5 years)    TETANUS VACCINE 09/23/2024 9/23/2014          No orders of the defined types were placed in this encounter.    The following information is provided to all patients.  This information is to help you find resources for any of the problems found today that may be affecting your health:                Living healthy guide: www.[facility].[place].gov      Understanding Diabetes: www.diabetes.org      Eating healthy: www.cdc.gov/healthyweight      CDC home safety checklist: www.cdc.gov/steadi/patient.html      Agency on Aging: www.goea.[place].[facility]      Alcoholics anonymous (AA): www.aa.org      Physical Activity: www.rogerio.nih.gov/hq8yltd      Tobacco use: www.quitwithusla.org

## 2022-10-18 ENCOUNTER — TELEPHONE (OUTPATIENT)
Dept: PRIMARY CARE CLINIC | Facility: CLINIC | Age: 77
End: 2022-10-18
Payer: MEDICARE

## 2022-10-18 NOTE — TELEPHONE ENCOUNTER
Subjective:   Fco Ceballos  is a 63 y.o. female who presents for follow-up about 10.5 months following laparoscopic gastric bypass surgery. Surgery related complication: NA. She has lost a total of 99 pounds since surgery. Body mass index is 21.93 kg/m². Loss of EBW is 98%. Visit today is to mainly address transitioning to maintenance phase and dietary teaching related to those goals. Her goal weight is 135 lbs    She is tolerating solid foods without difficulty, reports no real issues and denies vomiting, abdominal pain, diarrhea and reflux. Fluid intake:  good                              Protein intake:  not tracking, Not using protein supplements, but does tolerate chocolate premeir if needed  Eating regularly. Breakfast: yogurt with nuts  Lunch: soup with 1/2 sandwich  Dinner: chicken, crab or shrimp with veggies, has added back some rice pilaf or 1/4 baked potato      The patient's exercise level:  moderately active. Decreased some her biking recently, had been biking 8-12 miles daily, added barre for resistance    Changes in her medical history and medications have been reviewed. Has stopped calcium and vitamin D because of trouble swallowing pills. Has been focusing on dairy in her diet and wants to start drinking milk again.     Patient Active Problem List   Diagnosis Code    Edema R60.9    Elevated sedimentation rate R70.0    Elevated liver enzymes R74.8    Diverticulitis K57.92    Intestinal malabsorption K90.9    S/P gastric bypass Z98.84    Steatosis of liver K76.0    Hair loss L65.9     Past Medical History:   Diagnosis Date    Arthritic-like pain     Diverticulitis     Hospitalized April 2019 [facility]    Edema     Elevated liver enzymes     Elevated sedimentation rate     Intestinal malabsorption 12/12/2019    Obstructive sleep apnea     Uses CPAP, Instructed to bring DOS    S/P gastric bypass 12/12/2019 12/06/19 by Dr. Gretel Powers Severe obesity (BMI spoke with patient to schedule appointment with dr Choudhury on 10/20/2022 @ 1:40pm    35.0-39. 9) with comorbidity ([facility] [address]     Steatosis of liver 12/12/2019     Past Surgical History:   Procedure Laterality Date    HX ORTHOPAEDIC Right 2005    skin graft right first digit after trauma     Current Outpatient Medications   Medication Sig Dispense Refill    cyanocobalamin 1,000 mcg tablet Take 1,000 mcg by mouth daily.  multivitamin with iron (FLINTSTONES) chewable tablet Take 1 Tab by mouth daily.  Biotin 2,500 mcg cap Take  by mouth.  calcium citrate-vitamin d3 (CITRACAL+D) 315-200 mg-unit tab Take 1 Tab by mouth daily (with breakfast).  B.infantis-B.ani-B.long-B.bifi (PROBIOTIC 4X) 10-15 mg TbEC Take  by mouth. Review of Systems:  General - Denies fatigue, fever, chills  Cardiac - Denies chest pain, palpitations, shortness of breath  Pulmonary - Denies shortness of breath, productive cough  GI - as noted above  Musculoskeletal - Denies joint or muscular weakness, pain, stiffness  Hematologic - Denies abnormal bleeding, bruising  Neurologic -  Denies weakness, paralysis, numbness, tingling    Objective:     Visit Vitals  Ht 5' 7\" (1.702 m)   Wt 63.5 kg (140 lb)   BMI 21.93 kg/m²        Physical Exam:    Unable to perform as telephone call     Labs:     No results found for this or any previous visit (from the past 2016 hour(s)). Assessment and Plan:   1. Intestinal malabsorption  a. continue required Vitamins: B12, B complex, D, iron, calcium, multivitamin  2. S/p laparoscopic bariatric surgery, GASTRIC BYPASS, history of morbid obesity  a. Sleep goal is 7-9 hours each night. Patient education given on the effects of sleep deprivation on weight control. b. Discussed patients weight loss goals and dietary choices in relation to goals. c. Reminded to measure portions, continue high protein, low carbohydrate diet. Reminded to eat regularly, to eat slowly & not to drink with meals. d. Continue cardio exercise and add resistance exercises.  60-90 minutes of aerobic activity 5 days a week and strength training 2 days each week. e. Encouraged to attend support group   f. Required fluid intake is >64oz daily of decaffeinated sugar free beverages. 3. Maintenance planning discussed for greater than 30 minutes, handouts mailed to patient    Labs ordered   Follow up in 2 months or sooner if patient has questions, concerns or worsening of condition, if unable to reach our office, patient should report to the ED. Ms. Master Mathias has a reminder for a \"due or due soon\" health maintenance. I have asked that she contact her primary care provider for a follow-up on this health maintenance. This visit with Ms Master Mathias was performed under virtual telephone guidelines during the coronavirus (BMBUI-52) public health emergency on 10/22/2020. They understand that this telephone encounter is a billable service, with coverage determined by their insurance carrier. They are aware that they may receive a bill and have provided verbal consent for this visit. This visit was performed with the patient in their home environment and provider was present at [facility]. I have spent over 30 minutes on this visit  both prior to the visit reviewing the patients chart and with the patient on the phone. I have reviewed their medical history and medications and discussed the plan of action to date. They understand that they will be asked to come to the office when our office is allowed normal patient interaction, as dictated by public health officials, for a face-to-face visit to rediscuss all of the things we have talked about today.

## 2022-10-20 ENCOUNTER — OFFICE VISIT (OUTPATIENT)
Dept: PRIMARY CARE CLINIC | Facility: CLINIC | Age: 77
End: 2022-10-20
Payer: MEDICARE

## 2022-10-20 VITALS
BODY MASS INDEX: 40.16 KG/M2 | SYSTOLIC BLOOD PRESSURE: 130 MMHG | HEIGHT: 63 IN | WEIGHT: 226.69 LBS | TEMPERATURE: 98 F | OXYGEN SATURATION: 96 % | DIASTOLIC BLOOD PRESSURE: 72 MMHG | HEART RATE: 72 BPM

## 2022-10-20 DIAGNOSIS — K21.9 GASTROESOPHAGEAL REFLUX DISEASE WITHOUT ESOPHAGITIS: ICD-10-CM

## 2022-10-20 DIAGNOSIS — I10 ESSENTIAL HYPERTENSION: Primary | ICD-10-CM

## 2022-10-20 DIAGNOSIS — K59.01 SLOW TRANSIT CONSTIPATION: ICD-10-CM

## 2022-10-20 DIAGNOSIS — L02.91 ABSCESS: ICD-10-CM

## 2022-10-20 DIAGNOSIS — I77.1 TORTUOUS AORTA: ICD-10-CM

## 2022-10-20 DIAGNOSIS — Z79.01 CHRONIC ANTICOAGULATION: ICD-10-CM

## 2022-10-20 DIAGNOSIS — I48.92 ATRIAL FLUTTER WITH RAPID VENTRICULAR RESPONSE: ICD-10-CM

## 2022-10-20 DIAGNOSIS — E78.49 OTHER HYPERLIPIDEMIA: ICD-10-CM

## 2022-10-20 DIAGNOSIS — E66.01 SEVERE OBESITY WITH BODY MASS INDEX (BMI) OF 35.0 TO 39.9 WITH COMORBIDITY: ICD-10-CM

## 2022-10-20 DIAGNOSIS — J45.20 MILD INTERMITTENT ASTHMA WITHOUT COMPLICATION: ICD-10-CM

## 2022-10-20 PROCEDURE — 1159F PR MEDICATION LIST DOCUMENTED IN MEDICAL RECORD: ICD-10-PCS | Mod: CPTII,S$GLB,, | Performed by: INTERNAL MEDICINE

## 2022-10-20 PROCEDURE — 3078F PR MOST RECENT DIASTOLIC BLOOD PRESSURE < 80 MM HG: ICD-10-PCS | Mod: CPTII,S$GLB,, | Performed by: INTERNAL MEDICINE

## 2022-10-20 PROCEDURE — 99999 PR PBB SHADOW E&M-EST. PATIENT-LVL IV: ICD-10-PCS | Mod: PBBFAC,,, | Performed by: INTERNAL MEDICINE

## 2022-10-20 PROCEDURE — 3075F SYST BP GE 130 - 139MM HG: CPT | Mod: CPTII,S$GLB,, | Performed by: INTERNAL MEDICINE

## 2022-10-20 PROCEDURE — 99999 PR PBB SHADOW E&M-EST. PATIENT-LVL IV: CPT | Mod: PBBFAC,,, | Performed by: INTERNAL MEDICINE

## 2022-10-20 PROCEDURE — 99214 PR OFFICE/OUTPT VISIT, EST, LEVL IV, 30-39 MIN: ICD-10-PCS | Mod: S$GLB,,, | Performed by: INTERNAL MEDICINE

## 2022-10-20 PROCEDURE — 1101F PT FALLS ASSESS-DOCD LE1/YR: CPT | Mod: CPTII,S$GLB,, | Performed by: INTERNAL MEDICINE

## 2022-10-20 PROCEDURE — 1159F MED LIST DOCD IN RCRD: CPT | Mod: CPTII,S$GLB,, | Performed by: INTERNAL MEDICINE

## 2022-10-20 PROCEDURE — 3288F FALL RISK ASSESSMENT DOCD: CPT | Mod: CPTII,S$GLB,, | Performed by: INTERNAL MEDICINE

## 2022-10-20 PROCEDURE — 3075F PR MOST RECENT SYSTOLIC BLOOD PRESS GE 130-139MM HG: ICD-10-PCS | Mod: CPTII,S$GLB,, | Performed by: INTERNAL MEDICINE

## 2022-10-20 PROCEDURE — 99499 UNLISTED E&M SERVICE: CPT | Mod: HCNC,S$GLB,, | Performed by: INTERNAL MEDICINE

## 2022-10-20 PROCEDURE — 3078F DIAST BP <80 MM HG: CPT | Mod: CPTII,S$GLB,, | Performed by: INTERNAL MEDICINE

## 2022-10-20 PROCEDURE — 3288F PR FALLS RISK ASSESSMENT DOCUMENTED: ICD-10-PCS | Mod: CPTII,S$GLB,, | Performed by: INTERNAL MEDICINE

## 2022-10-20 PROCEDURE — 99214 OFFICE O/P EST MOD 30 MIN: CPT | Mod: S$GLB,,, | Performed by: INTERNAL MEDICINE

## 2022-10-20 PROCEDURE — 99499 RISK ADDL DX/OHS AUDIT: ICD-10-PCS | Mod: HCNC,S$GLB,, | Performed by: INTERNAL MEDICINE

## 2022-10-20 PROCEDURE — 1101F PR PT FALLS ASSESS DOC 0-1 FALLS W/OUT INJ PAST YR: ICD-10-PCS | Mod: CPTII,S$GLB,, | Performed by: INTERNAL MEDICINE

## 2022-10-20 RX ORDER — CLINDAMYCIN HYDROCHLORIDE 150 MG/1
300 CAPSULE ORAL
COMMUNITY
Start: 2022-10-17 | End: 2022-10-24

## 2022-10-20 NOTE — PROGRESS NOTES
"Subjective:      Patient ID: Nathalie Membreno is a 77 y.o. female.    Chief Complaint: Follow-up (Patient in due to a keloid skin condition that may be infected in her chest, patient was given clindamycin on 10/17/2022)      HPI  Here for follow up of medical problems.  Ongoing pain from chest abscess.  UC 3d ago, drained and started on clinda 300mg QID x7d.  No f/c/n/v.  Clinda does give nausea.  Redness around it has decreased in size.    BP at home 130/72.  No cp/sob/palp.  BMs normal on miralax.    Updated/ annual due 1/23:  HM: 9/22 fluvax, 3/21 covid vaccines, 6/19 HAV, 6/15 [id number], 11/16 booster [id number], 9/14 TDaP, 5/18 BMD rep 5y, 3/21 Cscope rep 3y, 1/22 MMG, 10/20 Gyn Dr. Holden, 7/20 Eye Dr. Fiore, 11/16 HCV neg, Card Dr. Neal at [facility].     Review of Systems   Constitutional:  Negative for chills, diaphoresis and fever.   Respiratory:  Negative for cough and shortness of breath.    Cardiovascular:  Negative for chest pain, palpitations and leg swelling.   Gastrointestinal:  Negative for blood in stool, constipation, diarrhea, nausea and vomiting.   Genitourinary:  Negative for dysuria, frequency and hematuria.   Psychiatric/Behavioral:  The patient is not nervous/anxious.        Objective:   /72   Pulse 72   Temp 98.3 °F (36.8 °C)   Ht 5' 3" (1.6 m)   Wt 102.8 kg (226 lb 11.2 oz)   SpO2 96%   BMI 40.16 kg/m²     Physical Exam  Constitutional:       Appearance: She is well-developed.   Neck:      Thyroid: No thyroid mass.      Vascular: No carotid bruit.   Cardiovascular:      Rate and Rhythm: Normal rate and regular rhythm.      Heart sounds: No murmur heard.    No friction rub. No gallop.   Pulmonary:      Effort: Pulmonary effort is normal.      Breath sounds: Normal breath sounds. No wheezing or rales.   Abdominal:      General: Bowel sounds are normal.      Palpations: Abdomen is soft. There is no mass.      Tenderness: There is no abdominal tenderness.   Musculoskeletal: "      Cervical back: Neck supple.   Lymphadenopathy:      Cervical: No cervical adenopathy.   Skin:     Comments: Keloid on midline chest with no eryth surrounding, nontender, little oozing but no more abscess present.   Neurological:      Mental Status: She is alert and oriented to person, place, and time.           Assessment:       1. Essential hypertension    2. Mild intermittent asthma without complication    3. Slow transit constipation    4. Other hyperlipidemia    5. Tortuous aorta    6. Atrial flutter with rapid ventricular response    7. Chronic anticoagulation    8. Gastroesophageal reflux disease without esophagitis    9. Severe obesity with body mass index (BMI) of 35.0 to 39.9 with comorbidity    10. Abscess          Plan:     Essential hypertension- stable at home, cont to monitor.    Mild intermittent asthma without complication- doing well, cont inhalers.    Slow transit constipation- stble on miralax.    Other hyperlipidemia, Tortuous aorta- cont statin, activity as tolerated.    Atrial flutter with rapid ventricular response, Chronic anticoagulation- on Dig, check level in 1/2023.    Gastroesophageal reflux disease without esophagitis- cont prn tums.    Severe obesity with body mass index (BMI) of 35.0 to 39.9 with comorbidity    Abscess, resolving well on abic, cont for 7d.    RTC as scheduled.

## 2022-11-21 ENCOUNTER — TELEPHONE (OUTPATIENT)
Dept: PRIMARY CARE CLINIC | Facility: CLINIC | Age: 77
End: 2022-11-21
Payer: MEDICARE

## 2022-11-21 NOTE — TELEPHONE ENCOUNTER
Spoke with pt to ask her if she wanted to be seen or just a prescription for diarrhea. Pt was okay with that and I told her I would give her a call back.    Thanks  SJ

## 2022-11-22 ENCOUNTER — TELEPHONE (OUTPATIENT)
Dept: PRIMARY CARE CLINIC | Facility: CLINIC | Age: 77
End: 2022-11-22
Payer: MEDICARE

## 2022-11-22 NOTE — PROGRESS NOTES
"Subjective:      Patient ID: Nathalie Membreno is a 77 y.o. female.    Chief Complaint: Diarrhea (Pt has had diarrhea for a week. Pt has been nauseous but hasn't vomit. As soon as she eats she goes to the restroom. Says her stomach is really sick.)      HPI  Here for follow up of medical problems.  7 days of loose stool, 3 loose/watery BMs daily.  Every time eats something, needs to go have BM.  Some nausea, no vomiting.  No f/c.  Black stool, but has been taking Pepto Bismol.  No miralax in past week.  No weight loss.      Updated/ annual due 1/23:  HM: 9/22 fluvax, 3/21 covid vaccines, 6/19 HAV, 6/15 [id number], 11/16 booster [id number], 9/14 TDaP, 5/18 BMD rep 5y, 3/21 Cscope rep 3y, 1/22 MMG, 10/20 Gyn Dr. Holden, 7/20 Eye Dr. Fiore, 11/16 HCV neg, Card Dr. Nela at [facility].     Review of Systems   Constitutional:  Negative for chills, diaphoresis and fever.   Respiratory:  Negative for cough and shortness of breath.    Cardiovascular:  Negative for chest pain, palpitations and leg swelling.   Gastrointestinal:  Negative for blood in stool, constipation, diarrhea, nausea and vomiting.   Genitourinary:  Negative for dysuria, frequency and hematuria.   Psychiatric/Behavioral:  The patient is not nervous/anxious.        Objective:   /64 (BP Location: Left arm)   Pulse 65   Temp 98 °F (36.7 °C) (Oral)   Ht 5' 3" (1.6 m)   Wt 102.3 kg (225 lb 8 oz)   SpO2 99%   BMI 39.95 kg/m²     Physical Exam  Constitutional:       Appearance: She is well-developed.   Neck:      Thyroid: No thyroid mass.      Vascular: No carotid bruit.   Cardiovascular:      Rate and Rhythm: Normal rate and regular rhythm.      Heart sounds: No murmur heard.    No friction rub. No gallop.   Pulmonary:      Effort: Pulmonary effort is normal.      Breath sounds: Normal breath sounds. No wheezing or rales.   Abdominal:      General: Bowel sounds are normal.      Palpations: Abdomen is soft. There is no mass.      Tenderness: There " is no abdominal tenderness.   Musculoskeletal:      Cervical back: Neck supple.   Lymphadenopathy:      Cervical: No cervical adenopathy.   Neurological:      Mental Status: She is alert and oriented to person, place, and time.           Assessment:       1. Diarrhea, unspecified type    2. Essential hypertension    3. Atrial flutter with rapid ventricular response    4. Mild intermittent asthma without complication          Plan:     Diarrhea x 7d, nausea- lab, KUB now.  Avoid fruit/vegetables, hydrate and electrolytes.  -     Comprehensive Metabolic Panel; Future; Expected date: 11/25/2022  -     CBC Auto Differential; Future; Expected date: 11/25/2022  -     X-Ray Abdomen AP 1 View; Future; Expected date: 11/25/2022    Essential hypertension- stable, cont rx.    Atrial flutter with rapid ventricular response- on dig/xarelto.    Mild intermittent asthma without complication, doing well.    MD results.  RTC 2mo.

## 2022-11-25 ENCOUNTER — OFFICE VISIT (OUTPATIENT)
Dept: PRIMARY CARE CLINIC | Facility: CLINIC | Age: 77
End: 2022-11-25
Payer: MEDICARE

## 2022-11-25 ENCOUNTER — PATIENT MESSAGE (OUTPATIENT)
Dept: PRIMARY CARE CLINIC | Facility: CLINIC | Age: 77
End: 2022-11-25

## 2022-11-25 ENCOUNTER — HOSPITAL ENCOUNTER (OUTPATIENT)
Dept: RADIOLOGY | Facility: HOSPITAL | Age: 77
Discharge: HOME | End: 2022-11-25
Attending: INTERNAL MEDICINE
Payer: MEDICARE

## 2022-11-25 VITALS
SYSTOLIC BLOOD PRESSURE: 138 MMHG | HEIGHT: 63 IN | HEART RATE: 65 BPM | OXYGEN SATURATION: 99 % | DIASTOLIC BLOOD PRESSURE: 64 MMHG | WEIGHT: 225.5 LBS | BODY MASS INDEX: 39.95 KG/M2 | TEMPERATURE: 98 F

## 2022-11-25 DIAGNOSIS — J45.20 MILD INTERMITTENT ASTHMA WITHOUT COMPLICATION: ICD-10-CM

## 2022-11-25 DIAGNOSIS — I48.92 ATRIAL FLUTTER WITH RAPID VENTRICULAR RESPONSE: ICD-10-CM

## 2022-11-25 DIAGNOSIS — R19.7 DIARRHEA, UNSPECIFIED TYPE: Primary | ICD-10-CM

## 2022-11-25 DIAGNOSIS — R19.7 DIARRHEA, UNSPECIFIED TYPE: ICD-10-CM

## 2022-11-25 DIAGNOSIS — I10 ESSENTIAL HYPERTENSION: ICD-10-CM

## 2022-11-25 PROCEDURE — 1101F PT FALLS ASSESS-DOCD LE1/YR: CPT | Mod: CPTII,S$GLB,, | Performed by: INTERNAL MEDICINE

## 2022-11-25 PROCEDURE — 3075F PR MOST RECENT SYSTOLIC BLOOD PRESS GE 130-139MM HG: ICD-10-PCS | Mod: CPTII,S$GLB,, | Performed by: INTERNAL MEDICINE

## 2022-11-25 PROCEDURE — 3288F FALL RISK ASSESSMENT DOCD: CPT | Mod: CPTII,S$GLB,, | Performed by: INTERNAL MEDICINE

## 2022-11-25 PROCEDURE — 74018 RADEX ABDOMEN 1 VIEW: CPT | Mod: 26,,, | Performed by: STUDENT IN AN ORGANIZED HEALTH CARE EDUCATION/TRAINING PROGRAM

## 2022-11-25 PROCEDURE — 1159F PR MEDICATION LIST DOCUMENTED IN MEDICAL RECORD: ICD-10-PCS | Mod: CPTII,S$GLB,, | Performed by: INTERNAL MEDICINE

## 2022-11-25 PROCEDURE — 1101F PR PT FALLS ASSESS DOC 0-1 FALLS W/OUT INJ PAST YR: ICD-10-PCS | Mod: CPTII,S$GLB,, | Performed by: INTERNAL MEDICINE

## 2022-11-25 PROCEDURE — 99999 PR PBB SHADOW E&M-EST. PATIENT-LVL IV: ICD-10-PCS | Mod: PBBFAC,,, | Performed by: INTERNAL MEDICINE

## 2022-11-25 PROCEDURE — 3288F PR FALLS RISK ASSESSMENT DOCUMENTED: ICD-10-PCS | Mod: CPTII,S$GLB,, | Performed by: INTERNAL MEDICINE

## 2022-11-25 PROCEDURE — 74018 XR ABDOMEN AP 1 VIEW: ICD-10-PCS | Mod: 26,,, | Performed by: STUDENT IN AN ORGANIZED HEALTH CARE EDUCATION/TRAINING PROGRAM

## 2022-11-25 PROCEDURE — 1159F MED LIST DOCD IN RCRD: CPT | Mod: CPTII,S$GLB,, | Performed by: INTERNAL MEDICINE

## 2022-11-25 PROCEDURE — 99214 PR OFFICE/OUTPT VISIT, EST, LEVL IV, 30-39 MIN: ICD-10-PCS | Mod: S$GLB,,, | Performed by: INTERNAL MEDICINE

## 2022-11-25 PROCEDURE — 3075F SYST BP GE 130 - 139MM HG: CPT | Mod: CPTII,S$GLB,, | Performed by: INTERNAL MEDICINE

## 2022-11-25 PROCEDURE — 3078F DIAST BP <80 MM HG: CPT | Mod: CPTII,S$GLB,, | Performed by: INTERNAL MEDICINE

## 2022-11-25 PROCEDURE — 3078F PR MOST RECENT DIASTOLIC BLOOD PRESSURE < 80 MM HG: ICD-10-PCS | Mod: CPTII,S$GLB,, | Performed by: INTERNAL MEDICINE

## 2022-11-25 PROCEDURE — 99999 PR PBB SHADOW E&M-EST. PATIENT-LVL IV: CPT | Mod: PBBFAC,,, | Performed by: INTERNAL MEDICINE

## 2022-11-25 PROCEDURE — 74018 RADEX ABDOMEN 1 VIEW: CPT | Mod: TC

## 2022-11-25 PROCEDURE — 99214 OFFICE O/P EST MOD 30 MIN: CPT | Mod: S$GLB,,, | Performed by: INTERNAL MEDICINE

## 2022-11-25 RX ORDER — AMLODIPINE BESYLATE 5 MG/1
5 TABLET ORAL
COMMUNITY
Start: 2022-11-16 | End: 2022-12-22

## 2022-11-26 ENCOUNTER — PATIENT MESSAGE (OUTPATIENT)
Dept: PRIMARY CARE CLINIC | Facility: CLINIC | Age: 77
End: 2022-11-26
Payer: MEDICARE

## 2022-12-22 ENCOUNTER — OFFICE VISIT (OUTPATIENT)
Dept: PRIMARY CARE CLINIC | Facility: CLINIC | Age: 77
End: 2022-12-22
Payer: MEDICARE

## 2022-12-22 VITALS
SYSTOLIC BLOOD PRESSURE: 114 MMHG | WEIGHT: 223.63 LBS | HEART RATE: 62 BPM | OXYGEN SATURATION: 98 % | HEIGHT: 63 IN | DIASTOLIC BLOOD PRESSURE: 70 MMHG | BODY MASS INDEX: 39.62 KG/M2 | TEMPERATURE: 98 F

## 2022-12-22 DIAGNOSIS — L91.0 KELOID: ICD-10-CM

## 2022-12-22 DIAGNOSIS — I10 ESSENTIAL HYPERTENSION: ICD-10-CM

## 2022-12-22 DIAGNOSIS — L03.90 CELLULITIS, UNSPECIFIED CELLULITIS SITE: Primary | ICD-10-CM

## 2022-12-22 PROCEDURE — 1125F PR PAIN SEVERITY QUANTIFIED, PAIN PRESENT: ICD-10-PCS | Mod: HCNC,CPTII,S$GLB, | Performed by: NURSE PRACTITIONER

## 2022-12-22 PROCEDURE — 1159F PR MEDICATION LIST DOCUMENTED IN MEDICAL RECORD: ICD-10-PCS | Mod: HCNC,CPTII,S$GLB, | Performed by: NURSE PRACTITIONER

## 2022-12-22 PROCEDURE — 1160F RVW MEDS BY RX/DR IN RCRD: CPT | Mod: HCNC,CPTII,S$GLB, | Performed by: NURSE PRACTITIONER

## 2022-12-22 PROCEDURE — 3288F PR FALLS RISK ASSESSMENT DOCUMENTED: ICD-10-PCS | Mod: HCNC,CPTII,S$GLB, | Performed by: NURSE PRACTITIONER

## 2022-12-22 PROCEDURE — 1159F MED LIST DOCD IN RCRD: CPT | Mod: HCNC,CPTII,S$GLB, | Performed by: NURSE PRACTITIONER

## 2022-12-22 PROCEDURE — 99204 OFFICE O/P NEW MOD 45 MIN: CPT | Mod: HCNC,S$GLB,, | Performed by: NURSE PRACTITIONER

## 2022-12-22 PROCEDURE — 99204 PR OFFICE/OUTPT VISIT, NEW, LEVL IV, 45-59 MIN: ICD-10-PCS | Mod: HCNC,S$GLB,, | Performed by: NURSE PRACTITIONER

## 2022-12-22 PROCEDURE — 3074F SYST BP LT 130 MM HG: CPT | Mod: HCNC,CPTII,S$GLB, | Performed by: NURSE PRACTITIONER

## 2022-12-22 PROCEDURE — 3078F DIAST BP <80 MM HG: CPT | Mod: HCNC,CPTII,S$GLB, | Performed by: NURSE PRACTITIONER

## 2022-12-22 PROCEDURE — 1101F PT FALLS ASSESS-DOCD LE1/YR: CPT | Mod: HCNC,CPTII,S$GLB, | Performed by: NURSE PRACTITIONER

## 2022-12-22 PROCEDURE — 99999 PR PBB SHADOW E&M-EST. PATIENT-LVL V: CPT | Mod: PBBFAC,HCNC,, | Performed by: NURSE PRACTITIONER

## 2022-12-22 PROCEDURE — 1125F AMNT PAIN NOTED PAIN PRSNT: CPT | Mod: HCNC,CPTII,S$GLB, | Performed by: NURSE PRACTITIONER

## 2022-12-22 PROCEDURE — 3074F PR MOST RECENT SYSTOLIC BLOOD PRESSURE < 130 MM HG: ICD-10-PCS | Mod: HCNC,CPTII,S$GLB, | Performed by: NURSE PRACTITIONER

## 2022-12-22 PROCEDURE — 3078F PR MOST RECENT DIASTOLIC BLOOD PRESSURE < 80 MM HG: ICD-10-PCS | Mod: HCNC,CPTII,S$GLB, | Performed by: NURSE PRACTITIONER

## 2022-12-22 PROCEDURE — 3288F FALL RISK ASSESSMENT DOCD: CPT | Mod: HCNC,CPTII,S$GLB, | Performed by: NURSE PRACTITIONER

## 2022-12-22 PROCEDURE — 1160F PR REVIEW ALL MEDS BY PRESCRIBER/CLIN PHARMACIST DOCUMENTED: ICD-10-PCS | Mod: HCNC,CPTII,S$GLB, | Performed by: NURSE PRACTITIONER

## 2022-12-22 PROCEDURE — 1101F PR PT FALLS ASSESS DOC 0-1 FALLS W/OUT INJ PAST YR: ICD-10-PCS | Mod: HCNC,CPTII,S$GLB, | Performed by: NURSE PRACTITIONER

## 2022-12-22 PROCEDURE — 99999 PR PBB SHADOW E&M-EST. PATIENT-LVL V: ICD-10-PCS | Mod: PBBFAC,HCNC,, | Performed by: NURSE PRACTITIONER

## 2022-12-22 RX ORDER — METOPROLOL SUCCINATE 100 MG/1
100 TABLET, EXTENDED RELEASE ORAL DAILY
COMMUNITY
End: 2022-12-22 | Stop reason: SDUPTHER

## 2022-12-22 RX ORDER — SULFAMETHOXAZOLE AND TRIMETHOPRIM 800; 160 MG/1; MG/1
1 TABLET ORAL 2 TIMES DAILY
Qty: 14 TABLET | Refills: 0 | Status: SHIPPED | OUTPATIENT
Start: 2022-12-22 | End: 2022-12-22

## 2022-12-22 RX ORDER — SULFAMETHOXAZOLE AND TRIMETHOPRIM 800; 160 MG/1; MG/1
1 TABLET ORAL 2 TIMES DAILY
Qty: 14 TABLET | Refills: 0 | Status: SHIPPED | OUTPATIENT
Start: 2022-12-22 | End: 2022-12-29

## 2022-12-22 RX ORDER — METOPROLOL SUCCINATE 100 MG/1
100 TABLET, EXTENDED RELEASE ORAL DAILY
Qty: 90 TABLET | Refills: 3 | Status: SHIPPED | OUTPATIENT
Start: 2022-12-22 | End: 2024-01-26

## 2022-12-22 RX ORDER — BETAMETHASONE VALERATE 1.2 MG/G
OINTMENT TOPICAL 2 TIMES DAILY
Qty: 15 G | Refills: 1 | Status: SHIPPED | OUTPATIENT
Start: 2022-12-22 | End: 2023-01-21

## 2022-12-22 RX ORDER — AMLODIPINE BESYLATE 5 MG/1
5 TABLET ORAL 2 TIMES DAILY
Qty: 180 TABLET | Refills: 3 | Status: SHIPPED | OUTPATIENT
Start: 2022-12-22 | End: 2024-02-02 | Stop reason: SDUPTHER

## 2022-12-22 NOTE — PROGRESS NOTES
"Nathalie Patelge  12/22/2022  [id number]    Alice Booker MD  Patient Care Team:  Alice Booker MD as PCP - General (Internal Medicine)  CARA Nuno MD as Consulting Physician (Cardiology)  José Weiss MD as Consulting Physician (Cardiology)  Latesha Verdugo LPN as Care Coordinator  MDaniel Schrader, SAMANTHA as Consulting Physician (Optometry)  Yaw Morales DPM (Podiatry)  Brayan Soriano MD (Inactive) as Consulting Physician (Pain Medicine)      [facility] Primary Care Note      Chief Complaint:  Chief Complaint   Patient presents with    swollen keloid on chest        History of Present Illness:  HPI    Pt of Dr Booker seen seen today for an acute visit.     Swelling and pain to keloid/birthmark on chest wall.   Onset yesterday. Has other keloids on body that are not irritated.     Had similar sx 2 months ago, [facility] done [facility]. Took clindamycin for one week after.      She was admitted to [facility] Sept/Oct for what she describes as palpitations and possible tachycardia. No palpitations since. Continues Xarelto. Need records.             Review of Systems   Constitutional:  Negative for chills, fever and malaise/fatigue.   Eyes:  Negative for blurred vision.   Respiratory:  Negative for cough and shortness of breath.    Cardiovascular:  Negative for chest pain and leg swelling.   Gastrointestinal:  Negative for abdominal pain, constipation, diarrhea, heartburn, nausea and vomiting.   Genitourinary:  Negative for dysuria.   Musculoskeletal:  Negative for falls and myalgias.   Skin:         Localized pain, swelling to birthmark central chest wall.    Neurological:  Negative for dizziness, weakness and headaches.       The following were reviewed: Active problem list, medication list, allergies, family history, social history, and Health Maintenance.     History:  Past Medical History:   Diagnosis Date    Allergy     Anemia     Arthritis     knees, hands, back    Back pain     pt reports "mild"    " Dysphagia, unspecified(787.20)     GERD (gastroesophageal reflux disease)     History of carpal tunnel release of both wrists 3/10/2016    Hyperlipidemia     Hypertension     LGSIL (low grade squamous intraepithelial dysplasia)     HPV +    Mild intermittent asthma without complication 7/28/2020    Obesity     Stroke 02/08/2017     Past Surgical History:   Procedure Laterality Date    CARDIAC DEFIBRILLATOR PLACEMENT  04/2017    Dr. Nuno    CARPAL TUNNEL RELEASE      b/l    CATARACT EXTRACTION EXTRACAPSULAR W/ INTRAOCULAR LENS IMPLANTATION Bilateral 2022    COLONOSCOPY N/A 03/31/2021    Procedure: COLONOSCOPY;  Surgeon: Fozia Dasilva MD;  Location: [facility];  Service: Endoscopy;  Laterality: N/A;    DILATION AND CURETTAGE OF UTERUS      10/2021, no cancer per pt.    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     Employer: [employer]    Tobacco Use    Smoking status: Never    Smokeless tobacco: Never   Substance and Sexual Activity    Alcohol use: No     Alcohol/week: 0.0 standard drinks    Drug use: No    Sexual activity: Not Currently     Partners: Male     Birth control/protection: None   Social History Narrative    Live alone     Social Determinants of Health     Financial Resource Strain: Low Risk     Difficulty of Paying Living Expenses: Not hard at all   Food Insecurity: No Food Insecurity    Worried About Running Out of Food in the Last Year: Never true    Ran Out of  Food in the Last Year: Never true   Transportation Needs: No Transportation Needs    Lack of Transportation (Medical): No    Lack of Transportation (Non-Medical): No   Physical Activity: Inactive    Days of Exercise per Week: 0 days    Minutes of Exercise per Session: 0 min   Stress: Stress Concern Present    Feeling of Stress : To some extent   Social Connections: Moderately Integrated    Frequency of Communication with Friends and Family: More than three times a week    Frequency of Social Gatherings with Friends and Family: More than three times a week    Attends Sabianism Services: More than 4 times per year    Active Member of Clubs or Organizations: No    Attends Club or Organization Meetings: More than 4 times per year    Marital Status:    Housing Stability: Unknown    Unable to Pay for Housing in the Last Year: No     Patient Active Problem List   Diagnosis    Essential hypertension    Other hyperlipidemia    GERD (gastroesophageal reflux disease)    Allergy    Lumbar spinal stenosis    Vitamin D deficiency    Bilateral carpal tunnel syndrome    Tortuous aorta    Severe obesity with body mass index (BMI) of 35.0 to 39.9 with comorbidity    Atrial flutter with rapid ventricular response    Diverticulosis of large intestine without hemorrhage    History of colon polyps    Slow transit constipation    History of TIA (transient ischemic attack)    Presence of cardiac defibrillator    Chronic anticoagulation    Mild intermittent asthma without complication    Endometrial thickening on ultrasound    Fibroid tumor    RLS (restless legs syndrome)     Review of patient's allergies indicates:   Allergen Reactions    Penicillins Swelling     Generalized swelling  Other reaction(s): Swelling  Generalized swelling  Other reaction(s): Swelling  Generalized swelling       Medications:  Current Outpatient Medications on File Prior to Visit   Medication Sig Dispense Refill    acetaminophen (TYLENOL) 500 MG tablet  Take 500 mg by mouth every 6 (six) hours as needed.      albuterol 90 mcg/actuation inhaler Inhale 2 puffs into the lungs every 4 (four) hours as needed for Wheezing. 1 each 6    atorvastatin (LIPITOR) 40 MG tablet TAKE 1 TABLET EVERY NIGHT 90 tablet 1    cholecalciferol, vitamin D3, (VITAMIN D3) 50 mcg (2,000 unit) Cap Take 1 capsule by mouth once daily.      diclofenac sodium (VOLTAREN) 1 % Gel Apply 2 g topically 2 (two) times daily. 60 g 1    digoxin (LANOXIN) 125 mcg tablet TAKE 1 TABLET EVERY DAY 90 tablet 2    diphenhydrAMINE (BENADRYL) 25 mg capsule Take 25 mg by mouth every 6 (six) hours as needed.      ergocalciferol (ERGOCALCIFEROL) 50,000 unit Cap Take 50,000 Units by mouth every 7 days.      fluticasone propionate (FLONASE) 50 mcg/actuation nasal spray 2 sprays (100 mcg total) by Each Nostril route once daily. 16 g 6    hydrOXYzine pamoate (VISTARIL) 25 MG Cap Take 25 mg by mouth.      losartan (COZAAR) 100 MG tablet Take 1 tablet (100 mg total) by mouth once daily. 90 tablet 1    polyethylene glycol (GLYCOLAX) 17 gram PwPk Take by mouth once daily.      rivaroxaban (XARELTO) 20 mg Tab TAKE 1 TABLET BY MOUTH EVERY DAY WITH DINNER      rOPINIRole (REQUIP) 1 MG tablet       [DISCONTINUED] amLODIPine (NORVASC) 5 MG tablet Take 1 tablet (5 mg total) by mouth once daily. 90 tablet 3    [DISCONTINUED] amLODIPine (NORVASC) 5 MG tablet Take 5 mg by mouth.      [DISCONTINUED] medroxyPROGESTERone (PROVERA) 10 MG tablet Take 100 tablets by mouth once daily.      [DISCONTINUED] metoprolol succinate (TOPROL-XL) 100 MG 24 hr tablet TAKE 1 TABLET EVERY DAY 90 tablet 3    [DISCONTINUED] metoprolol succinate (TOPROL-XL) 100 MG 24 hr tablet Take 100 mg by mouth once daily.      nystatin-triamcinolone (MYCOLOG II) cream Apply to affected area 2 times daily 30 g 1     No current facility-administered medications on file prior to visit.       Medications have been reviewed and reconciled with patient at visit  today.    Barriers to medications present (no )    Fall since last office visit (no )      Exam:  Vitals:    12/22/22 1056   BP: 114/70   Pulse:    Temp:      Weight: 101.4 kg (223 lb 9.6 oz)   Body mass index is 39.61 kg/m².      BP Readings from Last 3 Encounters:   12/22/22 114/70   11/25/22 138/64   10/20/22 130/72     Wt Readings from Last 3 Encounters:   12/22/22 1012 101.4 kg (223 lb 9.6 oz)   11/25/22 1453 102.3 kg (225 lb 8 oz)   10/20/22 1314 102.8 kg (226 lb 11.2 oz)            Physical Exam  Constitutional:       General: She is not in acute distress.     Appearance: She is obese.   HENT:      Mouth/Throat:      Mouth: Mucous membranes are moist.      Pharynx: No oropharyngeal exudate or posterior oropharyngeal erythema.   Eyes:      General: No scleral icterus.  Cardiovascular:      Rate and Rhythm: Normal rate and regular rhythm.   Pulmonary:      Effort: No respiratory distress.      Breath sounds: Normal breath sounds.   Abdominal:      General: There is no distension.      Palpations: Abdomen is soft.      Tenderness: There is no abdominal tenderness.   Musculoskeletal:         General: No swelling or tenderness.      Cervical back: Neck supple. No tenderness.   Lymphadenopathy:      Cervical: No cervical adenopathy.   Skin:     General: Skin is warm and dry.      Comments: Approximately 2 cm x 4 cm raised, soft, light pink area on dark base. Edges mildly erythematous. No fluctuance. No reproduced pain.   Neurological:      Mental Status: She is alert. Mental status is at baseline.      Gait: Gait normal.   Psychiatric:         Mood and Affect: Mood normal.         Thought Content: Thought content normal.       Laboratory Reviewed: (Yes)  Lab Results   Component Value Date    WBC 6.07 11/25/2022    HGB 13.6 11/25/2022    HCT 43.5 11/25/2022     11/25/2022    CHOL 214 (H) 01/25/2022    TRIG 90 01/25/2022    HDL 63 01/25/2022    ALT 13 11/25/2022    AST 16 11/25/2022     (L) 11/25/2022     K 4.1 11/25/2022     11/25/2022    CREATININE 1.0 11/25/2022    BUN 11 11/25/2022    CO2 28 11/25/2022    TSH 2.034 01/25/2022           Health Maintenance  Health Maintenance Topics with due status: Not Due       Topic Last Completion Date    TETANUS VACCINE 09/23/2014    DEXA Scan 05/21/2018    Lipid Panel 01/25/2022     Health Maintenance Due   Topic Date Due    Shingles Vaccine (1 of 2) Never done    COVID-19 Vaccine (4 - Booster for Moderna series) 03/31/2022       Assessment:  Problem List Items Addressed This Visit          Cardiac/Vascular    Essential hypertension    Relevant Medications    amLODIPine (NORVASC) 5 MG tablet     Other Visit Diagnoses       Cellulitis, unspecified cellulitis site    -  Primary    Relevant Medications    sulfamethoxazole-trimethoprim 800-160mg (BACTRIM DS) 800-160 mg Tab    Other Relevant Orders    Ambulatory referral/consult to Dermatology    Keloid        Relevant Medications    betamethasone valerate 0.1% (VALISONE) 0.1 % Oint    Other Relevant Orders    Ambulatory referral/consult to Dermatology          Will refer to dermatology d/t recurrent pain, cellulitis to site.     Plan:  Cellulitis, unspecified cellulitis site  -     Ambulatory referral/consult to Dermatology; Future; Expected date: 12/29/2022  -     Discontinue: sulfamethoxazole-trimethoprim 800-160mg (BACTRIM DS) 800-160 mg Tab; Take 1 tablet by mouth 2 (two) times daily. for 7 days  Dispense: 14 tablet; Refill: 0  -     sulfamethoxazole-trimethoprim 800-160mg (BACTRIM DS) 800-160 mg Tab; Take 1 tablet by mouth 2 (two) times daily. for 7 days  Dispense: 14 tablet; Refill: 0    Keloid  -     Ambulatory referral/consult to Dermatology; Future; Expected date: 12/29/2022  -     betamethasone valerate 0.1% (VALISONE) 0.1 % Oint; Apply topically 2 (two) times daily.  Dispense: 15 g; Refill: 1    Essential hypertension  -     amLODIPine (NORVASC) 5 MG tablet; Take 1 tablet (5 mg total) by mouth 2 (two) times  daily.  Dispense: 180 tablet; Refill: 3    Other orders  -     metoprolol succinate (TOPROL-XL) 100 MG 24 hr tablet; Take 1 tablet (100 mg total) by mouth once daily.  Dispense: 90 tablet; Refill: 3      -Patient's lab results were reviewed and discussed with patient  -Treatment options and alternatives were discussed with the patient. Patient expressed understanding. Patient was given the opportunity to ask questions and be an active participant in their medical care. Patient had no further questions or concerns at this time.   -Documentation of patient's health and condition was obtained from family member who was present during visit.  -Patient is an overall moderate risk for health complications from their medical conditions.       Follow up: Follow up in about 4 weeks (around 1/19/2023), or PCP.      After visit summary printed and given to patient upon discharge.  Patient goals and care plan are included in After visit summary.    Total medical decision making time was 57 min.  The following issues were discussed: The primary encounter diagnosis was Cellulitis, unspecified cellulitis site. Diagnoses of Keloid and Essential hypertension were also pertinent to this visit.    Health maintenance needs, recent test results and goals of care discussed with pt and questions answered.

## 2022-12-22 NOTE — PATIENT INSTRUCTIONS
Hold digoxin 12/24/22 and 12/27/22.    Take Bactrim twice daily for 7 days.    Warm compresses to area 4 times daily.     Use bleach  as dicussed.

## 2023-01-12 NOTE — PROGRESS NOTES
Subjective:      Patient ID: Nathalie Membreno is a 77 y.o. female.    Chief Complaint: Follow-up (Four month follow up. Pt has no issues or concerns. )      HPI  Here for f/u medical problems and preventive exam.  Walking for exercise, 2-3x per week.  BP at home 135/67.  Prunes working well for BMs.  No f/c/sw/cough.  No cp/sob/palp.  BMs and urine normal.  Denies depression.    Needs albuterol about twice a year.  Compliant with atorva 40mg daily.    The 10-year ASCVD risk score (Manolo BROOKS, et al., 2019) is: 19.5%    Values used to calculate the score:      Age: 77 years      Sex: Female      Is Non- : Yes      Diabetic: No      Tobacco smoker: No      Systolic Blood Pressure: 135 mmHg      Is BP treated: Yes      HDL Cholesterol: 57 mg/dL      Total Cholesterol: 228 mg/dL        1/23 BMD:  FINDINGS:  The L1 to L4 vertebral bone mineral density is equal to 1.282 g/cm squared with a T score of 0.8.  There has been no significant change relative to the prior study.     The left femoral neck bone mineral density is equal to 0.985 g/cm squared with a T score of -0.4.  There has been  no significant change relative to the prior study.     Impression:     No evidence of significant bone density loss       HM: 9/22 fluvax, 3/21 covid vaccines, 6/19 HAV, 6/15 [id number], 11/16 booster [id number], 1/23 today [id number], 9/14 TDaP, 1/23 BMD rep 5y, 3/21 Cscope rep 3y, 1/23 MMG/me, 10/20 Gyn Dr. Holden, 7/20 Eye Dr. Fiore, 11/16 HCV neg, Card Dr. Leach at [facility].     Review of Systems   Constitutional:  Negative for appetite change, chills, diaphoresis and fever.   HENT:  Negative for congestion, ear pain, rhinorrhea, sinus pressure and sore throat.    Respiratory:  Negative for cough, chest tightness and shortness of breath.    Cardiovascular:  Negative for chest pain and palpitations.   Gastrointestinal:  Negative for blood in stool, constipation, diarrhea, nausea and vomiting.  "  Genitourinary:  Negative for dysuria, frequency, hematuria, menstrual problem, urgency and vaginal discharge.   Musculoskeletal:  Negative for arthralgias.   Skin:  Negative for rash.   Neurological:  Negative for dizziness and headaches.   Psychiatric/Behavioral:  Negative for sleep disturbance. The patient is not nervous/anxious.        Objective:   /67   Pulse (!) 55   Temp 98.2 °F (36.8 °C) (Oral)   Ht 5' 3" (1.6 m)   Wt 102.8 kg (226 lb 9.6 oz)   SpO2 98%   BMI 40.14 kg/m²     Physical Exam  Constitutional:       Appearance: She is well-developed.   HENT:      Right Ear: External ear normal. Tympanic membrane is not injected.      Left Ear: External ear normal. Tympanic membrane is not injected.   Eyes:      Conjunctiva/sclera: Conjunctivae normal.   Neck:      Thyroid: No thyromegaly.   Cardiovascular:      Rate and Rhythm: Normal rate and regular rhythm.      Heart sounds: No murmur heard.    No friction rub. No gallop.   Pulmonary:      Effort: Pulmonary effort is normal.      Breath sounds: Normal breath sounds. No wheezing or rales.   Chest:   Breasts:     Right: No mass, nipple discharge, skin change or tenderness.      Left: No mass, nipple discharge, skin change or tenderness.   Abdominal:      General: Bowel sounds are normal.      Palpations: Abdomen is soft. There is no mass.      Tenderness: There is no abdominal tenderness.   Musculoskeletal:      Cervical back: Normal range of motion and neck supple.   Lymphadenopathy:      Cervical: No cervical adenopathy.   Skin:     General: Skin is warm.      Findings: No rash.   Neurological:      Mental Status: She is alert and oriented to person, place, and time.        Latest Reference Range & Units 01/17/23 08:51   WBC 3.90 - 12.70 K/uL 6.61   RBC 4.00 - 5.40 M/uL 4.07   Hemoglobin 12.0 - 16.0 g/dL 12.2   Hematocrit 37.0 - 48.5 % 39.6   MCV 82 - 98 fL 97   MCH 27.0 - 31.0 pg 30.0   MCHC 32.0 - 36.0 g/dL 30.8 (L)   RDW 11.5 - 14.5 % 14.8 (H) "   Platelets 150 - 450 K/uL 217   MPV 9.2 - 12.9 fL 13.5 (H)   Gran % 38.0 - 73.0 % 49.6   Lymph % 18.0 - 48.0 % 37.2   Mono % 4.0 - 15.0 % 9.5   Eosinophil % 0.0 - 8.0 % 2.9   Basophil % 0.0 - 1.9 % 0.5   Immature Granulocytes 0.0 - 0.5 % 0.3   Gran # (ANC) 1.8 - 7.7 K/uL 3.3   Lymph # 1.0 - 4.8 K/uL 2.5   Mono # 0.3 - 1.0 K/uL 0.6   Eos # 0.0 - 0.5 K/uL 0.2   Baso # 0.00 - 0.20 K/uL 0.03   Immature Grans (Abs) 0.00 - 0.04 K/uL 0.02   nRBC 0 /100 WBC 0   Differential Method  Automated   Sodium 136 - 145 mmol/L 138   Potassium 3.5 - 5.1 mmol/L 4.0   Chloride 95 - 110 mmol/L 107   CO2 23 - 29 mmol/L 24   Anion Gap 8 - 16 mmol/L 7 (L)   BUN 8 - 23 mg/dL 13   Creatinine 0.5 - 1.4 mg/dL 0.9   eGFR >60 mL/min/1.73 m^2 >60.0   Glucose 70 - 110 mg/dL 89   Calcium 8.7 - 10.5 mg/dL 9.2   Alkaline Phosphatase 55 - 135 U/L 90   PROTEIN TOTAL 6.0 - 8.4 g/dL 7.2   Albumin 3.5 - 5.2 g/dL 3.3 (L)   BILIRUBIN TOTAL 0.1 - 1.0 mg/dL 0.5   AST 10 - 40 U/L 12   ALT 10 - 44 U/L 9 (L)   Cholesterol 120 - 199 mg/dL 228 (H)   HDL 40 - 75 mg/dL 57   HDL/Cholesterol Ratio 20.0 - 50.0 % 25.0   LDL Cholesterol External 63.0 - 159.0 mg/dL 156.0   Non-HDL Cholesterol mg/dL 171   Total Cholesterol/HDL Ratio 2.0 - 5.0  4.0   Triglycerides 30 - 150 mg/dL 75   TSH 0.400 - 4.000 uIU/mL 1.294   Digoxin Lvl 0.8 - 2.0 ng/mL 0.5 (L)       Assessment:       1. Other hyperlipidemia    2. Mild intermittent asthma without complication    3. Atrial flutter with rapid ventricular response    4. Chronic anticoagulation    5. Tortuous aorta    6. Severe obesity with body mass index (BMI) of 35.0 to 39.9 with comorbidity    7. Encounter for preventive health examination          Plan:     Other hyperlipidemia, Tortuous aorta- cont statin/exercise.    Mild intermittent asthma without complication- doing well, try wt loss.  -     Pneumococcal Conjugate Vaccine (20 Valent) (IM)    Atrial flutter with rapid ventricular response, Chronic anticoagulation- cont meds per  Cards, fax labs to him now.    Severe obesity with body mass index (BMI) of 35.0 to 39.9 with comorbidity- try contrave components, RTC3mo.    Encounter for preventive health examination- Discussed pt needs to get Shingrix vaccination at pharmacy.    PT for left shoulder pain since CVA 2018.

## 2023-01-17 ENCOUNTER — HOSPITAL ENCOUNTER (OUTPATIENT)
Dept: RADIOLOGY | Facility: HOSPITAL | Age: 78
Discharge: HOME | End: 2023-01-17
Attending: INTERNAL MEDICINE
Payer: MEDICARE

## 2023-01-17 DIAGNOSIS — Z12.31 ENCOUNTER FOR SCREENING MAMMOGRAM FOR MALIGNANT NEOPLASM OF BREAST: ICD-10-CM

## 2023-01-17 PROCEDURE — 77067 SCR MAMMO BI INCL CAD: CPT | Mod: TC,HCNC

## 2023-01-17 PROCEDURE — 77063 MAMMO DIGITAL SCREENING BILAT WITH TOMO: ICD-10-PCS | Mod: 26,HCNC,, | Performed by: RADIOLOGY

## 2023-01-17 PROCEDURE — 77067 MAMMO DIGITAL SCREENING BILAT WITH TOMO: ICD-10-PCS | Mod: 26,HCNC,, | Performed by: RADIOLOGY

## 2023-01-17 PROCEDURE — 77063 BREAST TOMOSYNTHESIS BI: CPT | Mod: 26,HCNC,, | Performed by: RADIOLOGY

## 2023-01-17 PROCEDURE — 77067 SCR MAMMO BI INCL CAD: CPT | Mod: 26,HCNC,, | Performed by: RADIOLOGY

## 2023-01-25 ENCOUNTER — OFFICE VISIT (OUTPATIENT)
Dept: PRIMARY CARE CLINIC | Facility: CLINIC | Age: 78
End: 2023-01-25
Payer: MEDICARE

## 2023-01-25 VITALS
TEMPERATURE: 98 F | HEIGHT: 63 IN | HEART RATE: 55 BPM | BODY MASS INDEX: 40.16 KG/M2 | SYSTOLIC BLOOD PRESSURE: 135 MMHG | WEIGHT: 226.63 LBS | DIASTOLIC BLOOD PRESSURE: 67 MMHG | OXYGEN SATURATION: 98 %

## 2023-01-25 DIAGNOSIS — I77.1 TORTUOUS AORTA: ICD-10-CM

## 2023-01-25 DIAGNOSIS — E66.01 SEVERE OBESITY WITH BODY MASS INDEX (BMI) OF 35.0 TO 39.9 WITH COMORBIDITY: ICD-10-CM

## 2023-01-25 DIAGNOSIS — J45.20 MILD INTERMITTENT ASTHMA WITHOUT COMPLICATION: ICD-10-CM

## 2023-01-25 DIAGNOSIS — E78.49 OTHER HYPERLIPIDEMIA: Primary | ICD-10-CM

## 2023-01-25 DIAGNOSIS — Z79.01 CHRONIC ANTICOAGULATION: ICD-10-CM

## 2023-01-25 DIAGNOSIS — G89.29 CHRONIC LEFT SHOULDER PAIN: ICD-10-CM

## 2023-01-25 DIAGNOSIS — I48.92 ATRIAL FLUTTER WITH RAPID VENTRICULAR RESPONSE: ICD-10-CM

## 2023-01-25 DIAGNOSIS — Z00.00 ENCOUNTER FOR PREVENTIVE HEALTH EXAMINATION: ICD-10-CM

## 2023-01-25 DIAGNOSIS — M25.512 CHRONIC LEFT SHOULDER PAIN: ICD-10-CM

## 2023-01-25 PROCEDURE — 1101F PR PT FALLS ASSESS DOC 0-1 FALLS W/OUT INJ PAST YR: ICD-10-PCS | Mod: HCNC,CPTII,S$GLB, | Performed by: INTERNAL MEDICINE

## 2023-01-25 PROCEDURE — G0009 PNEUMOCOCCAL CONJUGATE VACCINE 20-VALENT: ICD-10-PCS | Mod: HCNC,S$GLB,, | Performed by: INTERNAL MEDICINE

## 2023-01-25 PROCEDURE — 90677 PCV20 VACCINE IM: CPT | Mod: HCNC,S$GLB,, | Performed by: INTERNAL MEDICINE

## 2023-01-25 PROCEDURE — G0009 ADMIN PNEUMOCOCCAL VACCINE: HCPCS | Mod: HCNC,S$GLB,, | Performed by: INTERNAL MEDICINE

## 2023-01-25 PROCEDURE — 3075F PR MOST RECENT SYSTOLIC BLOOD PRESS GE 130-139MM HG: ICD-10-PCS | Mod: HCNC,CPTII,S$GLB, | Performed by: INTERNAL MEDICINE

## 2023-01-25 PROCEDURE — 3078F PR MOST RECENT DIASTOLIC BLOOD PRESSURE < 80 MM HG: ICD-10-PCS | Mod: HCNC,CPTII,S$GLB, | Performed by: INTERNAL MEDICINE

## 2023-01-25 PROCEDURE — 1126F PR PAIN SEVERITY QUANTIFIED, NO PAIN PRESENT: ICD-10-PCS | Mod: HCNC,CPTII,S$GLB, | Performed by: INTERNAL MEDICINE

## 2023-01-25 PROCEDURE — 3288F FALL RISK ASSESSMENT DOCD: CPT | Mod: HCNC,CPTII,S$GLB, | Performed by: INTERNAL MEDICINE

## 2023-01-25 PROCEDURE — 99214 OFFICE O/P EST MOD 30 MIN: CPT | Mod: 25,HCNC,S$GLB, | Performed by: INTERNAL MEDICINE

## 2023-01-25 PROCEDURE — 1159F MED LIST DOCD IN RCRD: CPT | Mod: HCNC,CPTII,S$GLB, | Performed by: INTERNAL MEDICINE

## 2023-01-25 PROCEDURE — 3288F PR FALLS RISK ASSESSMENT DOCUMENTED: ICD-10-PCS | Mod: HCNC,CPTII,S$GLB, | Performed by: INTERNAL MEDICINE

## 2023-01-25 PROCEDURE — 99214 PR OFFICE/OUTPT VISIT, EST, LEVL IV, 30-39 MIN: ICD-10-PCS | Mod: 25,HCNC,S$GLB, | Performed by: INTERNAL MEDICINE

## 2023-01-25 PROCEDURE — 90677 PNEUMOCOCCAL CONJUGATE VACCINE 20-VALENT: ICD-10-PCS | Mod: HCNC,S$GLB,, | Performed by: INTERNAL MEDICINE

## 2023-01-25 PROCEDURE — 3075F SYST BP GE 130 - 139MM HG: CPT | Mod: HCNC,CPTII,S$GLB, | Performed by: INTERNAL MEDICINE

## 2023-01-25 PROCEDURE — 3078F DIAST BP <80 MM HG: CPT | Mod: HCNC,CPTII,S$GLB, | Performed by: INTERNAL MEDICINE

## 2023-01-25 PROCEDURE — 1159F PR MEDICATION LIST DOCUMENTED IN MEDICAL RECORD: ICD-10-PCS | Mod: HCNC,CPTII,S$GLB, | Performed by: INTERNAL MEDICINE

## 2023-01-25 PROCEDURE — 99999 PR PBB SHADOW E&M-EST. PATIENT-LVL V: ICD-10-PCS | Mod: PBBFAC,HCNC,, | Performed by: INTERNAL MEDICINE

## 2023-01-25 PROCEDURE — 99999 PR PBB SHADOW E&M-EST. PATIENT-LVL V: CPT | Mod: PBBFAC,HCNC,, | Performed by: INTERNAL MEDICINE

## 2023-01-25 PROCEDURE — 1126F AMNT PAIN NOTED NONE PRSNT: CPT | Mod: HCNC,CPTII,S$GLB, | Performed by: INTERNAL MEDICINE

## 2023-01-25 PROCEDURE — 1101F PT FALLS ASSESS-DOCD LE1/YR: CPT | Mod: HCNC,CPTII,S$GLB, | Performed by: INTERNAL MEDICINE

## 2023-01-25 RX ORDER — NALTREXONE HYDROCHLORIDE 50 MG/1
12.5 TABLET, FILM COATED ORAL 2 TIMES DAILY
Qty: 30 TABLET | Refills: 6 | Status: SHIPPED | OUTPATIENT
Start: 2023-01-25 | End: 2023-02-24

## 2023-01-25 RX ORDER — BUPROPION HYDROCHLORIDE 150 MG/1
150 TABLET, EXTENDED RELEASE ORAL 2 TIMES DAILY
Qty: 60 TABLET | Refills: 6 | Status: SHIPPED | OUTPATIENT
Start: 2023-01-25 | End: 2023-07-05

## 2023-01-30 ENCOUNTER — TELEPHONE (OUTPATIENT)
Dept: PRIMARY CARE CLINIC | Facility: CLINIC | Age: 78
End: 2023-01-30
Payer: MEDICARE

## 2023-02-02 ENCOUNTER — OFFICE VISIT (OUTPATIENT)
Dept: DERMATOLOGY | Facility: CLINIC | Age: 78
End: 2023-02-02
Payer: MEDICARE

## 2023-02-02 DIAGNOSIS — L03.90 CELLULITIS, UNSPECIFIED CELLULITIS SITE: ICD-10-CM

## 2023-02-02 DIAGNOSIS — L70.0 ACNE VULGARIS: Primary | ICD-10-CM

## 2023-02-02 DIAGNOSIS — L91.0 KELOID: ICD-10-CM

## 2023-02-02 DIAGNOSIS — L91.0 KELOID OF SKIN: ICD-10-CM

## 2023-02-02 PROCEDURE — 3288F FALL RISK ASSESSMENT DOCD: CPT | Mod: HCNC,CPTII,S$GLB, | Performed by: PHYSICIAN ASSISTANT

## 2023-02-02 PROCEDURE — 1159F PR MEDICATION LIST DOCUMENTED IN MEDICAL RECORD: ICD-10-PCS | Mod: HCNC,CPTII,S$GLB, | Performed by: PHYSICIAN ASSISTANT

## 2023-02-02 PROCEDURE — 99203 OFFICE O/P NEW LOW 30 MIN: CPT | Mod: HCNC,S$GLB,, | Performed by: PHYSICIAN ASSISTANT

## 2023-02-02 PROCEDURE — 3288F PR FALLS RISK ASSESSMENT DOCUMENTED: ICD-10-PCS | Mod: HCNC,CPTII,S$GLB, | Performed by: PHYSICIAN ASSISTANT

## 2023-02-02 PROCEDURE — 99999 PR PBB SHADOW E&M-EST. PATIENT-LVL IV: CPT | Mod: PBBFAC,HCNC,, | Performed by: PHYSICIAN ASSISTANT

## 2023-02-02 PROCEDURE — 1160F PR REVIEW ALL MEDS BY PRESCRIBER/CLIN PHARMACIST DOCUMENTED: ICD-10-PCS | Mod: HCNC,CPTII,S$GLB, | Performed by: PHYSICIAN ASSISTANT

## 2023-02-02 PROCEDURE — 1101F PT FALLS ASSESS-DOCD LE1/YR: CPT | Mod: HCNC,CPTII,S$GLB, | Performed by: PHYSICIAN ASSISTANT

## 2023-02-02 PROCEDURE — 1160F RVW MEDS BY RX/DR IN RCRD: CPT | Mod: HCNC,CPTII,S$GLB, | Performed by: PHYSICIAN ASSISTANT

## 2023-02-02 PROCEDURE — 99203 PR OFFICE/OUTPT VISIT, NEW, LEVL III, 30-44 MIN: ICD-10-PCS | Mod: HCNC,S$GLB,, | Performed by: PHYSICIAN ASSISTANT

## 2023-02-02 PROCEDURE — 99999 PR PBB SHADOW E&M-EST. PATIENT-LVL IV: ICD-10-PCS | Mod: PBBFAC,HCNC,, | Performed by: PHYSICIAN ASSISTANT

## 2023-02-02 PROCEDURE — 1101F PR PT FALLS ASSESS DOC 0-1 FALLS W/OUT INJ PAST YR: ICD-10-PCS | Mod: HCNC,CPTII,S$GLB, | Performed by: PHYSICIAN ASSISTANT

## 2023-02-02 PROCEDURE — 1159F MED LIST DOCD IN RCRD: CPT | Mod: HCNC,CPTII,S$GLB, | Performed by: PHYSICIAN ASSISTANT

## 2023-02-02 PROCEDURE — 1126F AMNT PAIN NOTED NONE PRSNT: CPT | Mod: HCNC,CPTII,S$GLB, | Performed by: PHYSICIAN ASSISTANT

## 2023-02-02 PROCEDURE — 1126F PR PAIN SEVERITY QUANTIFIED, NO PAIN PRESENT: ICD-10-PCS | Mod: HCNC,CPTII,S$GLB, | Performed by: PHYSICIAN ASSISTANT

## 2023-02-02 RX ORDER — CLINDAMYCIN PHOSPHATE 10 MG/G
GEL TOPICAL 2 TIMES DAILY
Qty: 30 G | Refills: 3 | Status: SHIPPED | OUTPATIENT
Start: 2023-02-02

## 2023-02-02 NOTE — Clinical Note
Hi!   Do you do ILK for keloids if pt is on blood thinner (any concern for hematoma)?  She has large plaques, so we opted for trial of cordran today.   Thanks!

## 2023-02-02 NOTE — PROGRESS NOTES
History of Present Illness: The patient presents with chief complaint of keloid .  Location: chest   Duration: many years   Signs/Symptoms: painful and itching     Prior treatments: none

## 2023-02-02 NOTE — PROGRESS NOTES
"  Subjective:       Patient ID:  Nathalie Membreno is a 77 y.o. female who presents for   Chief Complaint   Patient presents with    Keloid     C/o keloid on chest, painful and itching      History of Present Illness: The patient presents with chief complaint of keloid .  Location: chest   Duration: many years (pt states "most of my life")  Signs/Symptoms: painful and itching, thickening, spreading. Intermittent "pimple like swelling", tenderness. Denies h/o trauma, surgery, or known acne. States several family members have similar things (mom, sister, and brother). Has been seen by PCP for acute tenderness and swelling of the area and dx'd w/ cellulitis and keloid.    Prior treatments: bactrim (rx'd by PCP on 12/22/22), recommended Bactrim (states it helped temporarily).  Betamethasone ointment (only little help).        PMHX: on eliquis and digoxin- h/o AF w/RVR (followed by cards)      Review of Systems   Constitutional:  Negative for fever and chills.   Gastrointestinal:  Negative for nausea and vomiting.   Skin:  Positive for itching, activity-related sunscreen use and tendency to form keloidal scars. Negative for rash, dry skin, sun sensitivity, daily sunscreen use, recent sunburn, dry lips and abscesses.   Hematologic/Lymphatic: Does not bruise/bleed easily.      Objective:    Physical Exam   Constitutional: She appears well-developed and well-nourished. No distress.   Neurological: She is alert and oriented to person, place, and time. She is not disoriented.   Psychiatric: She has a normal mood and affect.   Skin:   Areas Examined (abnormalities noted in diagram):   Head / Face Inspection Performed  Neck Inspection Performed  Chest / Axilla Inspection Performed  Back Inspection Performed  RUE Inspected  LUE Inspection Performed            Diagram Legend     Erythematous scaling macule/papule c/w actinic keratosis       Vascular papule c/w angioma      Pigmented verrucoid papule/plaque c/w seborrheic " keratosis      Yellow umbilicated papule c/w sebaceous hyperplasia      Irregularly shaped tan macule c/w lentigo     1-2 mm smooth white papules consistent with Milia      Movable subcutaneous cyst with punctum c/w epidermal inclusion cyst      Subcutaneous movable cyst c/w pilar cyst      Firm pink to brown papule c/w dermatofibroma      Pedunculated fleshy papule(s) c/w skin tag(s)      Evenly pigmented macule c/w junctional nevus     Mildly variegated pigmented, slightly irregular-bordered macule c/w mildly atypical nevus      Flesh colored to evenly pigmented papule c/w intradermal nevus       Pink pearly papule/plaque c/w basal cell carcinoma      Erythematous hyperkeratotic cursted plaque c/w SCC      Surgical scar with no sign of skin cancer recurrence      Open and closed comedones      Inflammatory papules and pustules      Verrucoid papule consistent consistent with wart     Erythematous eczematous patches and plaques     Dystrophic onycholytic nail with subungual debris c/w onychomycosis     Umbilicated papule    Erythematous-base heme-crusted tan verrucoid plaque consistent with inflamed seborrheic keratosis     Erythematous Silvery Scaling Plaque c/w Psoriasis     See annotation      Assessment / Plan:        Acne vulgaris  -     clindamycin phosphate 1% (CLINDAGEL) 1 % gel; Apply topically 2 (two) times daily. For acne of chest.  Dispense: 30 g; Refill: 3  Ddx: other  -suspect acne like flaring of chest which may be contributing to worsening of keloids.   -trial of above rx     Keloid of skin  Keloid  -     flurandrenolide 4 mcg/cm2 Tape; Apply to fit area involved. Wear 12 hours/day then remove. Reapply next day.  Dispense: 1 each; Refill: 3  -     Ambulatory referral/consult to Dermatology  Discussed dx and tx options.  Trial of topical cordran tape  May reconsider ILK in future with caution as pt is on blood thinner. Would avoid epi with lido if consideration of starting ILK. Consider topical EMLA  cream.    Cellulitis, unspecified cellulitis site  -     Ambulatory referral/consult to Dermatology  No s/s of infection today.           Follow up in about 6 weeks (around 3/16/2023) for keloid.

## 2023-02-09 ENCOUNTER — TELEPHONE (OUTPATIENT)
Dept: PRIMARY CARE CLINIC | Facility: CLINIC | Age: 78
End: 2023-02-09
Payer: MEDICARE

## 2023-02-09 ENCOUNTER — CLINICAL SUPPORT (OUTPATIENT)
Dept: REHABILITATION | Facility: HOSPITAL | Age: 78
End: 2023-02-09
Payer: MEDICARE

## 2023-02-09 DIAGNOSIS — R53.1 DECREASED STRENGTH, ENDURANCE, AND MOBILITY: ICD-10-CM

## 2023-02-09 DIAGNOSIS — R68.89 DECREASED FUNCTIONAL ACTIVITY TOLERANCE: ICD-10-CM

## 2023-02-09 DIAGNOSIS — G89.29 CHRONIC LEFT SHOULDER PAIN: ICD-10-CM

## 2023-02-09 DIAGNOSIS — R26.89 BALANCE PROBLEM: ICD-10-CM

## 2023-02-09 DIAGNOSIS — R68.89 DECREASED STRENGTH, ENDURANCE, AND MOBILITY: ICD-10-CM

## 2023-02-09 DIAGNOSIS — Z74.09 DECREASED STRENGTH, ENDURANCE, AND MOBILITY: ICD-10-CM

## 2023-02-09 DIAGNOSIS — M25.60 DECREASED RANGE OF MOTION: ICD-10-CM

## 2023-02-09 DIAGNOSIS — M25.512 CHRONIC LEFT SHOULDER PAIN: ICD-10-CM

## 2023-02-09 PROCEDURE — 97110 THERAPEUTIC EXERCISES: CPT | Mod: HCNC,PN

## 2023-02-09 PROCEDURE — 97162 PT EVAL MOD COMPLEX 30 MIN: CPT | Mod: HCNC,PN

## 2023-02-09 NOTE — PLAN OF CARE
[facility]   Physical Therapy Initial Evaluation     Date: 2/9/2023   Name: Nathalie Membreno  [facility] Number: [id number]    Therapy Diagnosis:   Encounter Diagnoses   Name Primary?    Chronic left shoulder pain     Decreased strength, endurance, and mobility     Decreased functional activity tolerance     Decreased range of motion     Balance problem      Physician: Alice Booker MD    Physician Orders: PT Eval and Treat   Medical Diagnosis from Referral: Chronic left shoulder pain  Evaluation Date: 2/9/2023  Authorization Period Expiration: 1/25/24  Plan of Care Expiration: 4/9/23  Progress Note Due: 3/9/23  Visit # / Visits authorized: 1/1   FOTO: 1/3    Precautions:  HTN, Arthritis, hx of stroke      Time In: 9:04 am  Time Out: 10:05 am  Total Appointment Time (timed & untimed codes): 50 minutes      SUBJECTIVE     Date of onset: right shoulder ~2 weeks ago    History of current condition - Nathalie reports: Patient reports that she has been having pain in both shoulders.  Right shoulder started hurting about 2 weeks ago, left shoulder has been hurting since stroke in 2018.  She reports no therapy after stroke but has been limited with ability to lift arm since.  She has to use her right UE to lift left UE overhead.  No injury that she can recall. She reports pain over glenohumeral joint.  Has been told that she has arthritis in her shoulder.  She is also reporting knee pain bilaterally - which limits how long she can stand and walk.  She is reporting dropping items in right hand since her hand surgery - she reports that she did have therapy at [facility].  Does also report neck pain in center of spine, and history of JANET in neck.  Patient reports that she has been wearing knee brace (sleeve) since this past July on right knee.  She reports prior injections in right knee as well some years ago but did not help her pain.  Did advise patient that since left shoulder has been limited for  "so long since stroke may be limited with ROM/strength able to achieve.    Current Activity Level: able to do normal daily activities, but does not exercise. Does use cane for long distances.  Can walk around grocery store for about 45 minutes with buggy and then done. Has to sit down and rest during meal preparation.     Falls: no    Imaging, none    Prior Therapy: yes, for hands and legs  Social History: patient lives alone  Occupation: retired,   Prior Level of Function: was able to stand for full meal preparation, easier to grocery shop and stand for long periods of time.  Current Level of Function: see current activity level above    Pain:  Current 6/10, worst 9/10, best 3/10   Location: bilateral knee and shoulders  Description: Aching, Dull, Throbbing, and weakness in hands and shoulders  Aggravating Factors: reaching overhead, using arms, dropping items in right hand, knees other her more with standing/walking   Easing Factors: nothing    Patients goals: Be able to do more activities.     Medical History:   Past Medical History:   Diagnosis Date    Allergy     Anemia     Arthritis     knees, hands, back    Back pain     pt reports "mild"    Dysphagia, unspecified(787.20)     GERD (gastroesophageal reflux disease)     History of carpal tunnel release of both wrists 3/10/2016    Hyperlipidemia     Hypertension     LGSIL (low grade squamous intraepithelial dysplasia)     HPV +    Mild intermittent asthma without complication 7/28/2020    Obesity     Stroke 02/08/2017       Surgical History:   Nathalie Membreno  has a past surgical history that includes Tubal ligation; Carpal tunnel release; Trigger finger release (Right); Foot surgery (Right, 05/02/2013); Cardiac defibrillator placement (04/2017); Colonoscopy (N/A, 03/31/2021); Dilation and curettage of uterus; and Cataract extraction, extracapsular w/ intraocular lens implant (Bilateral, 2022).    Medications:   Nathalie has a current " medication list which includes the following prescription(s): acetaminophen, albuterol, amlodipine, atorvastatin, betamethasone valerate 0.1%, bupropion, cholecalciferol (vitamin d3), clindamycin phosphate 1%, diclofenac sodium, digoxin, diphenhydramine, ergocalciferol, flurandrenolide, fluticasone propionate, hydroxyzine pamoate, losartan, metoprolol succinate, naltrexone, nystatin-triamcinolone, polyethylene glycol, rivaroxaban, and ropinirole.    Allergies:   Review of patient's allergies indicates:   Allergen Reactions    Penicillins Swelling     Generalized swelling  Other reaction(s): Swelling  Generalized swelling  Other reaction(s): Swelling  Generalized swelling          OBJECTIVE     Posture:  Pt presents with postural abnormalities which include:      [x] Forward Head   [] Increased Lumbar Lordosis   [x] Rounded Shoulder   [] Flat Back Posture   [] Increased Thoracic Kyphosis [] Pes Planus   [] Increased Trunk Sway  [x] Valgus knee position   [] Increased Trunk Rotation  [] Varus knee position   [] Increased cervical lordosis [] Other:    Sensation:    Sensation to light touch over UE's is  [x] Intact [] Impaired [] Defer  Sensation to light touch over LE's is  [x] Intact [] Impaired [] Defer    Reflexes:  Patellar   [x] Defer [] Normal [] Hyper []  Hypo   Achilles  [x] Defer [] Normal [] Hyper []  Hypo  Tricep  [x] Defer [] Normal [] Hyper []  Hypo  Brachioradialis [x] Defer [] Normal [] Hyper []  Hypo      Gait Analysis: The patient ambulated with the following assistive device: none; the pt presents with the following gait abnormalities: decreased step length bilateral, decreased hip extension bilateral, genu valgum bilateral, and + hip drop bilaterally with antalgic gait.      Balance  Right   (seconds) Left  (seconds) Pain/dysfunction Noted   Narrow Base of Support NT ---    Tandem Stance NT NT    Single Leg Stance 3 3              Functional Tests  Outcome Norms   Timed Up and Go 12.73 sec <13.5 sec    30 second Sit to Stand 8x Age Male Female   60-64 <14 <12   65-69 <12 <11   70-74 <12 <10   75-79 <11 <10   80-84 <10 <9   85-89 <8 <8   90-93 <7 <4      Five Time Sit to Stand 17.68 sec 60s: <11.4 sec  70s: <12.6 sec  80s: 14.8 sec        Range of Motion:    Knee AROM/PROM Right Left Pain/Dysfunction with Movement   Knee Flexion (135º) supine 125 125    Knee Extension (0º) sitting  -10 -5     and   Shoulder AROM/PROM Right Left Pain/Dysfunction with Movement   Shoulder Flexion (180º) 140 100    Functional ER Right upper trapezial  Left upper trapezial     Functional IR Upper gluteal waist    Supine shoulder ER 60 17    Supine shoulder IR 42 58          AROM: * No reproduction of UE symptoms with AROM cervical spine  Motion: Movement Results:   Cervical Flexion  chin to chest   Cervical Extension 80%   Cervical Rotation 75%   Upper Extremity IR reach (Medial Rotation) See above   Upper Extremity ER reach (External Rotation) See above   Multi-Segmental Flexion lower knee   Multi-Segmental Extension 50%   Multi-Segmental Rotation 50%   Arms Down Deep Squat defer       Strength:    U/E MMT Right Left Pain/Dysfunction with Movement   Cervical Side Bending 4/5 4/5    Upper trapezial  4/5 4/5    Shoulder Abduction 4-/5 3+/5    Shoulder IR 4/5 4/5    Shoulder ER   4/5 4/5    Serratus Anterior 4-/5 3+/5    Supraspinatus  4-/5 3+/5 Pain and substitution on right    Elbow Flexion  4-/5 3+/5    Elbow Extension 4-/5 3+/5    Wrist Extension 4/5 4/5    4th/5th Finger Intrinsics 4/5 4-/5     and   L/E MMT Right  (spine) Left Pain/Dysfunction with Movement   Modified (90/90) Abdominal Strength  defer ---    Hip Flexion  2+/5 2+/5 + trunk shift   Knee Extension 4-/5 4-/5    Knee Flexion 3+/5 3+/5    Hip IR 3+/5 3+/5 + trunk shift   Hip ER 3+/5 3+/5 + trunk shift   Ankle DF 4+/5 4+/5        Muscle Length:   defer    Special Tests:     Right Left    Wing Flores  [x] Positive    [] Negative [x] Positive    [] Negative   Empty Can   [x] Positive pain    [] Negative [x] Positive pain/substitution    [] Negative   Horizontal Adduction Test  [x] Positive    [] Negative [x] Positive    [] Negative   Median ULTT  [] Positive    [x] Negative [] Positive    [x] Negative   Ulnar ULTT [] Positive    [x] Negative [] Positive    [x] Negative   Radial ULTT [] Positive    [x] Negative [] Positive    [x] Negative       Palpation:  defer    Limitation/Restriction for FOTO defer today Survey    Therapist reviewed FOTO scores for Nathalie Membreno on 2/9/2023.   FOTO documents entered into [employer] - see Media section.    Limitation Score: not assessed today will capture next visit         TREATMENT     Total Treatment time (time-based codes) separate from Evaluation: 10 minutes      Nathalie received the treatments listed below:      therapeutic exercises to develop strength, ROM, posture, and core stabilization for 10 minutes including:    Intervention 2/9/2023  Parameters   HEP x    Nustep x 5 min to increase ROM, endurance                           PATIENT EDUCATION AND HOME EXERCISES     Education provided:   - Patient  was instructed in home exercise program  to address the deficits listed above and to address overall condition and quality of life. Patient  was encouraged to participate in cardiovascular exercise and wellness exercise in fitness center with assistance of health  at [address].   - Patient was educated on all the above exercise prior/during/after for proper posture, positioning, and execution for safe performance with home exercise program.    Written Home Exercises Provided: yes. Exercises were reviewed and Nathalie was able to demonstrate them prior to the end of the session.  Nathalie demonstrated good  understanding of the education provided. See EMR under Patient Instructions for exercises provided during therapy sessions.    ASSESSMENT     Nathalie is a 77 y.o. female referred to outpatient Physical Therapy with a medical  "diagnosis of Chronic left shoulder pain. The patient presents with signs and symptoms consistent with diagnosis along with general deconditioning and impairments which include weakness, impaired endurance, impaired self care skills, impaired functional mobility, decreased upper extremity function, decreased lower extremity function, pain, decreased ROM, and impaired joint extensibility.    Patient  will require follow up at another location for more frequent physical therapy or services not offered at this location    Patient prognosis is Fair.   Patient will benefit from skilled outpatient Physical Therapy to address the deficits stated above and in the chart below, provide patient /family education, and to maximize patientt's level of independence.     Plan of care discussed with patient: Yes  Patient's spiritual, cultural and educational needs considered and patient is agreeable to the plan of care and goals as stated below:     Anticipated Barriers for therapy: co-morbidities, sedentary lifestyle, chronicity of condition, lack of understanding of condition, lack of support system, and coping style      Medical Necessity is demonstrated by the following:    History  Co-morbidities and personal factors that may impact the plan of care Co-morbidities:   Past Medical History:   Diagnosis Date    Allergy     Anemia     Arthritis     knees, hands, back    Back pain     pt reports "mild"    Dysphagia, unspecified(787.20)     GERD (gastroesophageal reflux disease)     History of carpal tunnel release of both wrists 3/10/2016    Hyperlipidemia     Hypertension     LGSIL (low grade squamous intraepithelial dysplasia)     HPV +    Mild intermittent asthma without complication 7/28/2020    Obesity     Stroke 02/08/2017        Personal Factors:   coping style  lifestyle     high   Examination  Body Structures and Functions, activity limitations and participation restrictions that may impact the plan of care Body Regions: "   lower extremities  upper extremities  trunk    Body Systems:    ROM  strength  balance  gait  transfers  joint mobility, muscle tone, muscle length    Participation Restrictions:   See current level of function listed above     Activity limitations:   Learning and applying knowledge  no deficits    General Tasks and Commands  no deficits    Communication  no deficits    Mobility  lifting and carrying objects  walking  steps  Reaching with UE's    Self care  washing oneself (bathing, drying, washing hands)  caring for body parts (brushing teeth, shaving, grooming)  dressing    Domestic Life  shopping  cooking  doing house work (cleaning house, washing dishes, laundry)  assisting others    Interactions/Relationships  no deficits    Life Areas  no deficits    Community and Social Life  community life  recreation and leisure         high   Clinical Presentation evolving clinical presentation with changing clinical characteristics moderate   Decision Making/ Complexity Score: moderate         Goals:  Reviewed: 2/9/2023     Short Term Goals: In 4 weeks   1.Patient to be educated on HEP.  2.Patient to increase knee and shoulder active range of motion, in order to improve available range of motion for ADL's.    3.Patient to increase bilateral UE/LE strength by 1/2 grade, in order to improve endurance and increase ability to perform all functional activities for increased time.   4.Patient to have pain less than 6/10 at worst, to improve QOL.  5.Patient to score FOTO, with long term goal's made PRN.  6. Patient to improve score on TUG, 30 sec sit to stand and 5x sit to stand, in order to decrease fall risk.    Long Term Goals: In 8 weeks  1.Patient to have long term goal for the FOTO , to improve QOL.  2. Patient to increase bilateral UE/LE strength to 4-4+/5, in order to improve endurance and increase ability to perform all functional activities for increased time.  3. Patient to have decreased pain to 3/10 at worst, to  improve QOL.  4. Patient to normalize score on TUG, 30 sec sit to stand and 5x sit to stand, in order to improve endurance and decrease fall risk.  5. Patient to perform daily activities including meal preparation without taking rest breaks.    PLAN   Plan of care Certification: 2/9/2023 to 4/9/23.    Patient will benefit from more frequent physical therapy and/or services not offered at this location and is being referred to [facility] location for follow up. Outpatient Physical Therapy 2 times weekly for 8 weeks to include the following interventions: Gait Training, Manual Therapy, Moist Heat/ Ice, Neuromuscular Re-ed, Patient Education, Self Care, Therapeutic Activities, and Therapeutic Exercise.     Toma Roebrtson, PT      I CERTIFY THE NEED FOR THESE SERVICES FURNISHED UNDER THIS PLAN OF TREATMENT AND WHILE UNDER MY CARE   Physician's comments:     Physician's Signature: ___________________________________________________

## 2023-02-16 ENCOUNTER — CLINICAL SUPPORT (OUTPATIENT)
Dept: REHABILITATION | Facility: HOSPITAL | Age: 78
End: 2023-02-16
Payer: MEDICARE

## 2023-02-16 DIAGNOSIS — R68.89 DECREASED FUNCTIONAL ACTIVITY TOLERANCE: ICD-10-CM

## 2023-02-16 DIAGNOSIS — R26.89 BALANCE PROBLEM: ICD-10-CM

## 2023-02-16 DIAGNOSIS — M25.60 DECREASED RANGE OF MOTION: ICD-10-CM

## 2023-02-16 DIAGNOSIS — Z74.09 DECREASED STRENGTH, ENDURANCE, AND MOBILITY: Primary | ICD-10-CM

## 2023-02-16 DIAGNOSIS — R68.89 DECREASED STRENGTH, ENDURANCE, AND MOBILITY: Primary | ICD-10-CM

## 2023-02-16 DIAGNOSIS — R53.1 DECREASED STRENGTH, ENDURANCE, AND MOBILITY: Primary | ICD-10-CM

## 2023-02-16 PROCEDURE — 97140 MANUAL THERAPY 1/> REGIONS: CPT | Mod: PN

## 2023-02-16 PROCEDURE — 97110 THERAPEUTIC EXERCISES: CPT | Mod: PN

## 2023-02-16 PROCEDURE — 97112 NEUROMUSCULAR REEDUCATION: CPT | Mod: PN

## 2023-02-16 NOTE — PROGRESS NOTES
[facility]   Physical Therapy Treatment Note     Name: Nathalie Ochoaridge  Clinic Number: [id number]    Therapy Diagnosis:   Encounter Diagnoses   Name Primary?    Decreased strength, endurance, and mobility Yes    Decreased functional activity tolerance     Decreased range of motion     Balance problem      Physician: Alice Booker MD    Visit Date: 2/16/2023       Physician Orders: PT Eval and Treat   Medical Diagnosis from Referral: Chronic left shoulder pain  Evaluation Date: 2/9/2023  Authorization Period Expiration: 1/25/24  Plan of Care Expiration: 4/9/23  Progress Note Due: 3/9/23  Visit # / Visits authorized: 1/12 + eval    FOTO: 1/3     Precautions:  HTN, Arthritis, hx of stroke  2018    PTA Visit #: 0/5     Time In: 11:05  Time Out: 12:00  Total Billable Time: 55 minutes    SUBJECTIVE     Pt reports: her CVA in 2018 affected L UE. Patient reports after her last session with PT she had swelling for 3 days in her hand and pain.  She was compliant with home exercise program.  Response to previous treatment: increase pain and swelling  Functional change: n/a at this time    Pain: 5/10  Location: right UE down to hand      OBJECTIVE     Objective Measures updated at progress report unless specified.     Treatment     Nathalie received the treatments listed below:      therapeutic exercises to develop strength, endurance, ROM, flexibility, posture, and core stabilization for 37  minutes including:  UBE for upright posture, ROM, strength, and endurance 3' F/ 3' B  Shoulder rolls 2 x 10 reps  Scapula retraction 2 x 10 reps  Pulleys shoulder flexion 2 minutes/ 2 minutes scaption  Supine shoulder cane flexion 2 x 10 reps with 5 sec hold  Shoulder ER with cane 2 x 10 reps      FOTO needs to be done for initial visit    manual therapy techniques:  x 10 minutes  MFR  Gentle Joint mobs  Stretching/ ROM  Scapula mobility        neuromuscular re-education activities to improve: Balance,  Coordination, Kinesthetic, Sense, Proprioception, and Posture for 8 minutes. The following activities were included:  Posture education in sitting and standing     therapeutic activities to improve functional performance for 0  minutes, includin    gait training to improve functional mobility and safety for 0  minutes, includin          Patient Education and Home Exercises     Home Exercises Provided and Patient Education Provided     Education provided:   -HEP and modify activities  - Posture education in sitting and standing  Written Home Exercises Provided: Patient instructed to cont prior HEP. Exercises were reviewed and Nathalie was able to demonstrate them prior to the end of the session.  Nathalie demonstrated good  understanding of the education provided. See EMR under Patient Instructions for exercises provided during therapy sessions    ASSESSMENT     First visit after initial evaluation. Patient reported increase pain and swelling after her initial visit. Patient reports she is feeling better today. Patient reports she is most concerned with lack of ROM and strength in her arms.  Patient tolerated treatment with moderate difficulty. Posture education provided in sitting and standing. Patient demonstrated and verbalized understanding. Modification of activities also discussed.  Patient was fatigued after the session.     Nathalie Is progressing well towards her goals.   Pt prognosis is Fair.     Pt will continue to benefit from skilled outpatient physical therapy to address the deficits listed in the problem list box on initial evaluation, provide pt/family education and to maximize pt's level of independence in the home and community environment.     Pt's spiritual, cultural and educational needs considered and pt agreeable to plan of care and goals.     Anticipated barriers to physical therapy: history of CVA    Goals: Short Term Goals: In 4 weeks   1.Patient to be educated on HEP.  2.Patient to  increase knee and shoulder active range of motion, in order to improve available range of motion for ADL's.    3.Patient to increase bilateral UE/LE strength by 1/2 grade, in order to improve endurance and increase ability to perform all functional activities for increased time.   4.Patient to have pain less than 6/10 at worst, to improve QOL.  5.Patient to score FOTO, with long term goal's made PRN.  6. Patient to improve score on TUG, 30 sec sit to stand and 5x sit to stand, in order to decrease fall risk.     Long Term Goals: In 8 weeks  1.Patient to have long term goal for the FOTO , to improve QOL.  2. Patient to increase bilateral UE/LE strength to 4-4+/5, in order to improve endurance and increase ability to perform all functional activities for increased time.  3. Patient to have decreased pain to 3/10 at worst, to improve QOL.  4. Patient to normalize score on TUG, 30 sec sit to stand and 5x sit to stand, in order to improve endurance and decrease fall risk.  5. Patient to perform daily activities including meal preparation without taking rest breaks.    PLAN     Plan of care Certification: 2/9/2023 to 4/9/23.     Patient will benefit from more frequent physical therapy and/or services not offered at this location and is being referred to '[facility] location for follow up. Outpatient Physical Therapy 2 times weekly for 8 weeks to include the following interventions: Gait Training, Manual Therapy, Moist Heat/ Ice, Neuromuscular Re-ed, Patient Education, Self Care, Therapeutic Activities, and Therapeutic Exercise.       Kacey Magdaleno, PT

## 2023-02-21 ENCOUNTER — CLINICAL SUPPORT (OUTPATIENT)
Dept: REHABILITATION | Facility: HOSPITAL | Age: 78
End: 2023-02-21
Payer: MEDICARE

## 2023-02-21 DIAGNOSIS — R26.89 BALANCE PROBLEM: ICD-10-CM

## 2023-02-21 DIAGNOSIS — Z74.09 DECREASED STRENGTH, ENDURANCE, AND MOBILITY: Primary | ICD-10-CM

## 2023-02-21 DIAGNOSIS — R68.89 DECREASED STRENGTH, ENDURANCE, AND MOBILITY: Primary | ICD-10-CM

## 2023-02-21 DIAGNOSIS — R53.1 DECREASED STRENGTH, ENDURANCE, AND MOBILITY: Primary | ICD-10-CM

## 2023-02-21 DIAGNOSIS — R68.89 DECREASED FUNCTIONAL ACTIVITY TOLERANCE: ICD-10-CM

## 2023-02-21 DIAGNOSIS — M25.60 DECREASED RANGE OF MOTION: ICD-10-CM

## 2023-02-21 PROCEDURE — 97110 THERAPEUTIC EXERCISES: CPT | Mod: PN

## 2023-02-21 PROCEDURE — 97112 NEUROMUSCULAR REEDUCATION: CPT | Mod: PN

## 2023-02-21 NOTE — PROGRESS NOTES
[facility]   Physical Therapy Treatment Note     Name: Nathalie Membreno  Clinic Number: [id number]    Therapy Diagnosis:   Encounter Diagnoses   Name Primary?    Decreased strength, endurance, and mobility Yes    Decreased functional activity tolerance     Decreased range of motion     Balance problem        Physician: Alice Booker MD    Visit Date: 2/21/2023       Physician Orders: PT Eval and Treat   Medical Diagnosis from Referral: Chronic left shoulder pain  Evaluation Date: 2/9/2023  Authorization Period Expiration: 1/25/24  Plan of Care Expiration: 4/9/23  Progress Note Due: 3/9/23  Visit # / Visits authorized: 2/12 + eval    FOTO: 1/3     Precautions:  HTN, Arthritis, hx of stroke  2018    PTA Visit #: 0/5     Time In: 9:28  Time Out: 10:15  Total Billable Time:47 minutes    SUBJECTIVE     Pt reports: her knees are feeling better today and did not need to use AD.   She was compliant with home exercise program.  Response to previous treatment: increase pain and swelling  Functional change: n/a at this time    Pain: 6/10  Location: B shoulders    OBJECTIVE   Initial foto completed 2/21/23  Limitation/Restriction for FOTO shoulder Survey     Therapist reviewed FOTO scores for Nathalie Membreno on 2/9/2023.   FOTO documents entered into [employer] - see Media section.     Limitation Score: 58%       Treatment     Nathalie received the treatments listed below:      therapeutic exercises to develop strength, endurance, ROM, flexibility, posture, and core stabilization for 32 minutes including:  Nustep for  ROM, strength, and endurance 6 minutes R:2  Shoulder rolls 2 x 10 reps  Scapula retraction 2 x 10 reps with R TB  Pulleys shoulder flexion 2 minutes/ 2 minutes scaption  Wall slides 2 x 10 reps difficult and painful especially L shoulder  Wall push ups with a plus 1 x 10 reps  Sitting shoulder cane flexion 2 x 10 reps to 90 degrees with mirror as a cue and manual to decrease L  shoulder hiking   Shoulder ER with cane 2 x 10 reps for ROM and stretching    neuromuscular re-education activities to improve: Balance, Coordination, Kinesthetic, Sense, Proprioception, and Posture for 15  minutes. The following activities were included:  Posture education in sitting and standing on going  Sit to stand with cuing for control and equal WB on B LE's   Shoulder ER with R TB for posture and postioning    manual therapy techniques:  x 0 minutes  MFR  Gentle Joint mobs  Stretching/ ROM  Scapula mobility            therapeutic activities to improve functional performance for 0  minutes, includin    gait training to improve functional mobility and safety for 0  minutes, includin          Patient Education and Home Exercises     Home Exercises Provided and Patient Education Provided     Education provided:   -HEP and modify activities  - Posture education in sitting and standing  Written Home Exercises Provided: Patient instructed to cont prior HEP. Exercises were reviewed and Nathalie was able to demonstrate them prior to the end of the session.  Nathalie demonstrated good  understanding of the education provided. See EMR under Patient Instructions for exercises provided during therapy sessions    ASSESSMENT     Patient came into the clinic walking with no AD. She reports her knees are feeling better today. Patient tolerated treatment with difficulty. Pain in shoulders especially L is a limiting factor. Patient required several rest breaks throughout the session due to fatigue and pain.      Nathalie Is progressing well towards her goals.   Pt prognosis is Fair.     Pt will continue to benefit from skilled outpatient physical therapy to address the deficits listed in the problem list box on initial evaluation, provide pt/family education and to maximize pt's level of independence in the home and community environment.     Pt's spiritual, cultural and educational needs considered and pt agreeable to  plan of care and goals.     Anticipated barriers to physical therapy: history of CVA    Goals: Short Term Goals: In 4 weeks   1.Patient to be educated on HEP Progressing  2.Patient to increase knee and shoulder active range of motion, in order to improve available range of motion for ADL's.    3.Patient to increase bilateral UE/LE strength by 1/2 grade, in order to improve endurance and increase ability to perform all functional activities for increased time.   4.Patient to have pain less than 6/10 at worst, to improve QOL.  5. Patient to improve score on TUG, 30 sec sit to stand and 5x sit to stand, in order to decrease fall risk.     Long Term Goals: In 8 weeks  1 Patient to score  a 44% or lower on FOTO, , to improve QOL.  2. Patient to increase bilateral UE/LE strength to 4-4+/5, in order to improve endurance and increase ability to perform all functional activities for increased time.  3. Patient to have decreased pain to 3/10 at worst, to improve QOL.  4. Patient to normalize score on TUG, 30 sec sit to stand and 5x sit to stand, in order to improve endurance and decrease fall risk.  5. Patient to perform daily activities including meal preparation without taking rest breaks.    PLAN     Plan of care Certification: 2/9/2023 to 4/9/23.     Patient will benefit from more frequent physical therapy and/or services not offered at this location and is being referred to [facility] location for follow up. Outpatient Physical Therapy 2 times weekly for 8 weeks to include the following interventions: Gait Training, Manual Therapy, Moist Heat/ Ice, Neuromuscular Re-ed, Patient Education, Self Care, Therapeutic Activities, and Therapeutic Exercise.       Kacey Magdaleno, PT

## 2023-02-23 ENCOUNTER — CLINICAL SUPPORT (OUTPATIENT)
Dept: REHABILITATION | Facility: HOSPITAL | Age: 78
End: 2023-02-23
Payer: MEDICARE

## 2023-02-23 DIAGNOSIS — Z74.09 DECREASED STRENGTH, ENDURANCE, AND MOBILITY: Primary | ICD-10-CM

## 2023-02-23 DIAGNOSIS — M25.60 DECREASED RANGE OF MOTION: ICD-10-CM

## 2023-02-23 DIAGNOSIS — R26.89 BALANCE PROBLEM: ICD-10-CM

## 2023-02-23 DIAGNOSIS — R53.1 DECREASED STRENGTH, ENDURANCE, AND MOBILITY: Primary | ICD-10-CM

## 2023-02-23 DIAGNOSIS — R68.89 DECREASED FUNCTIONAL ACTIVITY TOLERANCE: ICD-10-CM

## 2023-02-23 DIAGNOSIS — R68.89 DECREASED STRENGTH, ENDURANCE, AND MOBILITY: Primary | ICD-10-CM

## 2023-02-23 PROCEDURE — 97112 NEUROMUSCULAR REEDUCATION: CPT | Mod: PN

## 2023-02-23 PROCEDURE — 97110 THERAPEUTIC EXERCISES: CPT | Mod: PN

## 2023-02-23 NOTE — PROGRESS NOTES
[facility]   Physical Therapy Treatment Note     Name: Nathalie Membreno  Clinic Number: [id number]    Therapy Diagnosis:   Encounter Diagnoses   Name Primary?    Decreased strength, endurance, and mobility Yes    Decreased functional activity tolerance     Decreased range of motion     Balance problem          Physician: Alice Booker MD    Visit Date: 2/23/2023       Physician Orders: PT Eval and Treat   Medical Diagnosis from Referral: Chronic left shoulder pain  Evaluation Date: 2/9/2023  Authorization Period Expiration: 1/25/24  Plan of Care Expiration: 4/9/23  Progress Note Due: 3/9/23  Visit # / Visits authorized: 3/ 12 + eval    FOTO: 1/3     Precautions:  HTN, Arthritis, hx of stroke  2018    PTA Visit #: 0/5     Time In: 10:07  Time Out: 11:05  Total Billable Time:58  minutes    SUBJECTIVE     Pt reports: her R hand was sore after last treatment when holding onto TB for exercises. Patient reports she was tired after the last session. Patient reports she has been wearing a knee brace on R knee for a couple of months due to knee giving out.   She was compliant with home exercise program.  Response to previous treatment: increase pain and swelling  Functional change: n/a at this time    Pain: 0/10  Location: B shoulders    OBJECTIVE   Initial foto completed 2/21/23  Limitation/Restriction for FOTO shoulder Survey     Therapist reviewed FOTO scores for Nathalie Membreno on 2/9/2023.   FOTO documents entered into [employer] - see Media section.     Limitation Score: 58%       Treatment     Nathalie received the treatments listed below:      therapeutic exercises to develop strength, endurance, ROM, flexibility, posture, and core stabilization for 30 minutes including:  UBE  for upright posture,  ROM, strength, and endurance 3' F/3'B  Shoulder rolls 2 x 10 reps  Pulleys shoulder flexion 2 minutes/ 2 minutes scaption  Shoulder flexion in sitting with 1# 2 x 10 reps with cuing for  decrease hiking of L shoulder, and shoulder abduction with 1# 2 x 10 reps  Shuttle 4 bands 3 minutes  Knee extension 2# 2 x 10 reps  Sitting knee flexion with G TB 2 x 10 reps    neuromuscular re-education activities to improve: Balance, Coordination, Kinesthetic, Sense, Proprioception, and Posture for 28  minutes. The following activities were included:  Posture education in sitting and standing on going  Scapula retraction 2 x 10 reps with no  R TB due to R hand pain for posture and positioning  Sit to stand with cuing for control and equal WB on B LE's  2 x 10 reps  Shoulder ER for posture and postioning 2 x 15 reps/ no TB due to R hand/ wrist  pain  Rebounder with yellow ball overhead with alternating step pattern      Deferred exercises  Wall slides 2 x 10 reps difficult and painful especially L shoulder  Wall push ups with a plus 1 x 10 reps  Sitting shoulder cane flexion 2 x 10 reps to 90 degrees with mirror as a cue and manual to decrease L shoulder hiking   Shoulder ER with cane 2 x 10 reps for ROM and stretching        manual therapy techniques:  x 0 minutes  MFR  Gentle Joint mobs  Stretching/ ROM  Scapula mobility            therapeutic activities to improve functional performance for 0  minutes, includin    gait training to improve functional mobility and safety for 0  minutes, includin          Patient Education and Home Exercises     Home Exercises Provided and Patient Education Provided     Education provided:   -HEP and modify activities  - Posture education in sitting and standing  Written Home Exercises Provided: Patient instructed to cont prior HEP. Exercises were reviewed and Nathalie was able to demonstrate them prior to the end of the session.  Nathalie demonstrated good  understanding of the education provided. See EMR under Patient Instructions for exercises provided during therapy sessions    ASSESSMENT     Patient reports she experienced R hand pain after her last PT session. PT  modified treatment and did not use TB today. Patient progressed to 1# weights for shoulder strengthening. Balance activities with rebounder added today with no LOB. Patient could not get ball overhead. B knee strengthening added in sitting today. She tolerated treatment well with fewer rest breaks.   Nathalie Is progressing well towards her goals.   Pt prognosis is Fair.     Pt will continue to benefit from skilled outpatient physical therapy to address the deficits listed in the problem list box on initial evaluation, provide pt/family education and to maximize pt's level of independence in the home and community environment.     Pt's spiritual, cultural and educational needs considered and pt agreeable to plan of care and goals.     Anticipated barriers to physical therapy: history of CVA    Goals: Short Term Goals: In 4 weeks   1.Patient to be educated on HEP Progressing  2.Patient to increase knee and shoulder active range of motion, in order to improve available range of motion for ADL's.    3.Patient to increase bilateral UE/LE strength by 1/2 grade, in order to improve endurance and increase ability to perform all functional activities for increased time.   4.Patient to have pain less than 6/10 at worst, to improve QOL.  5. Patient to improve score on TUG, 30 sec sit to stand and 5x sit to stand, in order to decrease fall risk.     Long Term Goals: In 8 weeks  1 Patient to score  a 44% or lower on FOTO, , to improve QOL.  2. Patient to increase bilateral UE/LE strength to 4-4+/5, in order to improve endurance and increase ability to perform all functional activities for increased time.  3. Patient to have decreased pain to 3/10 at worst, to improve QOL.  4. Patient to normalize score on TUG, 30 sec sit to stand and 5x sit to stand, in order to improve endurance and decrease fall risk.  5. Patient to perform daily activities including meal preparation without taking rest breaks.    PLAN     Plan of care  Certification: 2/9/2023 to 4/9/23.     Patient will benefit from more frequent physical therapy and/or services not offered at this location and [facility]. Outpatient Physical Therapy 2 times weekly for 8 weeks to include the following interventions: Gait Training, Manual Therapy, Moist Heat/ Ice, Neuromuscular Re-ed, Patient Education, Self Care, Therapeutic Activities, and Therapeutic Exercise.       Kacey Magdaleno, PT

## 2023-02-27 NOTE — PROGRESS NOTES
[facility]   Physical Therapy Assistant Treatment Note     Name: Nathalie Membreno  Clinic Number: [id number]    Therapy Diagnosis:   Encounter Diagnoses   Name Primary?    Decreased strength, endurance, and mobility Yes    Decreased functional activity tolerance     Decreased range of motion     Balance problem        Physician: Alice Booker MD    Visit Date: 2/28/2023       Physician Orders: PT Eval and Treat   Medical Diagnosis from Referral: Chronic left shoulder pain  Evaluation Date: 2/9/2023  Authorization Period Expiration: 1/25/24  Plan of Care Expiration: 4/9/23  Progress Note Due: 3/9/23  Visit # / Visits authorized: 4/ 12 + eval    FOTO: 1/3 NEXT VISIT     Precautions:  HTN, Arthritis, hx of stroke  2018    PTA Visit #: 1/5     Time In: 1:25  Time Out: 2:20  Total Billable Time: 45 minutes (+10 minutes with Tech)    SUBJECTIVE     Pt reports: she continues to wear knee brace on R knee due to giving out. Her left shoulder always hurts worse than her right because this is CVA affected side.  She was compliant with home exercise program.  Response to previous treatment: sore  Functional change: n/a at this time    Pain: 6/10  Location: B shoulders    OBJECTIVE   Initial foto completed 2/21/23    Treatment     Nathalie received the treatments listed below:      manual therapy techniques:  x 10 minutes  MFR to left upper traps  Gentle Joint mobs  Stretching/ ROM    therapeutic exercises to develop strength, endurance, ROM, flexibility, posture, and core stabilization for 30 minutes including:  UBE  for upright posture,  ROM, strength, and endurance 3' F/3'B  Shuttle 4 bands 3 minutes  Shoulder rolls 2 x 10 reps  Pulleys shoulder flexion 2 minutes/ 2 minutes scaption  Shoulder flexion in sitting with 1# 2 x 10 reps with cuing for decrease hiking of L shoulder, and shoulder abduction with 1# 2 x 10 reps  Knee extension 2# 2 x 10 reps  Sitting knee flexion with G TB 2 x 10  reps  Shoulder ER with cane 2 x 10 reps for ROM and stretching    neuromuscular re-education activities to improve: Balance, Coordination, Kinesthetic, Sense, Proprioception, and Posture for 15  minutes. The following activities were included:  Posture education in sitting and standing on going  Scapula retraction 2 x 10 reps with YTB for posture and positioning  Shoulder ER for posture and postioning 2 x 15 reps/ no TB due to R hand/ wrist  pain  Sit to stand with cuing for control and equal WB on B LE's  2 x 10 reps  Sitting shoulder cane flexion 2 x 10 reps to 90 degrees with mirror as a cue and manual to decrease L shoulder hiking     Deferred exercises  Rebounder with yellow ball overhead with alternating step pattern  Wall slides 2 x 10 reps difficult and painful especially L shoulder  Wall push ups with a plus 1 x 10 reps    therapeutic activities to improve functional performance for 0  minutes, includin    gait training to improve functional mobility and safety for 0  minutes, includin          Patient Education and Home Exercises     Home Exercises Provided and Patient Education Provided     Education provided:   -HEP and modify activities  - Posture education in sitting and standing  Written Home Exercises Provided: Patient instructed to cont prior HEP. Exercises were reviewed and Nathalie was able to demonstrate them prior to the end of the session.  Nathalie demonstrated good  understanding of the education provided. See EMR under Patient Instructions for exercises provided during therapy sessions    ASSESSMENT     Patient reports increased pain in bilateral shoulders so therapy began with manual joint mobilizations and soft tissue mobilization to decrease pain. Patient has good tolerance to all postural strengthening and range of motion exercises without adverse symptoms noted. Required tactile cues to decrease upper traps compensation with overhead activities. Patient has less hand pain with use  of handles during resisted activities. Will resume balance activities next visit. She will benefit from continued skilled therapy to improve range of motion, strength, functional activities and decrease pain    Nathalie Is progressing well towards her goals.   Pt prognosis is Fair.     Pt will continue to benefit from skilled outpatient physical therapy to address the deficits listed in the problem list box on initial evaluation, provide pt/family education and to maximize pt's level of independence in the home and community environment.     Pt's spiritual, cultural and educational needs considered and pt agreeable to plan of care and goals.     Anticipated barriers to physical therapy: history of CVA    Goals: Short Term Goals: In 4 weeks   1.Patient to be educated on HEP Progressing  2.Patient to increase knee and shoulder active range of motion, in order to improve available range of motion for ADL's.    3.Patient to increase bilateral UE/LE strength by 1/2 grade, in order to improve endurance and increase ability to perform all functional activities for increased time.   4.Patient to have pain less than 6/10 at worst, to improve QOL.  5. Patient to improve score on TUG, 30 sec sit to stand and 5x sit to stand, in order to decrease fall risk.     Long Term Goals: In 8 weeks  1 Patient to score  a 44% or lower on FOTO, , to improve QOL.  2. Patient to increase bilateral UE/LE strength to 4-4+/5, in order to improve endurance and increase ability to perform all functional activities for increased time.  3. Patient to have decreased pain to 3/10 at worst, to improve QOL.  4. Patient to normalize score on TUG, 30 sec sit to stand and 5x sit to stand, in order to improve endurance and decrease fall risk.  5. Patient to perform daily activities including meal preparation without taking rest breaks.    PLAN     Plan of care Certification: 2/9/2023 to 4/9/23.     Patient will benefit from more frequent physical therapy  and/or services not offered at this location and is being referred to [facility] location for follow up. Outpatient Physical Therapy 2 times weekly for 8 weeks to include the following interventions: Gait Training, Manual Therapy, Moist Heat/ Ice, Neuromuscular Re-ed, Patient Education, Self Care, Therapeutic Activities, and Therapeutic Exercise.       Minda Parks, PTA

## 2023-02-28 ENCOUNTER — CLINICAL SUPPORT (OUTPATIENT)
Dept: REHABILITATION | Facility: HOSPITAL | Age: 78
End: 2023-02-28
Payer: MEDICARE

## 2023-02-28 DIAGNOSIS — M25.60 DECREASED RANGE OF MOTION: ICD-10-CM

## 2023-02-28 DIAGNOSIS — Z74.09 DECREASED STRENGTH, ENDURANCE, AND MOBILITY: Primary | ICD-10-CM

## 2023-02-28 DIAGNOSIS — R53.1 DECREASED STRENGTH, ENDURANCE, AND MOBILITY: Primary | ICD-10-CM

## 2023-02-28 DIAGNOSIS — R68.89 DECREASED STRENGTH, ENDURANCE, AND MOBILITY: Primary | ICD-10-CM

## 2023-02-28 DIAGNOSIS — R68.89 DECREASED FUNCTIONAL ACTIVITY TOLERANCE: ICD-10-CM

## 2023-02-28 DIAGNOSIS — R26.89 BALANCE PROBLEM: ICD-10-CM

## 2023-02-28 PROCEDURE — 97140 MANUAL THERAPY 1/> REGIONS: CPT | Mod: PN,CQ

## 2023-02-28 PROCEDURE — 97112 NEUROMUSCULAR REEDUCATION: CPT | Mod: PN,CQ

## 2023-02-28 PROCEDURE — 97110 THERAPEUTIC EXERCISES: CPT | Mod: PN,CQ

## 2023-03-02 ENCOUNTER — CLINICAL SUPPORT (OUTPATIENT)
Dept: REHABILITATION | Facility: HOSPITAL | Age: 78
End: 2023-03-02
Payer: MEDICARE

## 2023-03-02 DIAGNOSIS — M25.60 DECREASED RANGE OF MOTION: ICD-10-CM

## 2023-03-02 DIAGNOSIS — R68.89 DECREASED FUNCTIONAL ACTIVITY TOLERANCE: ICD-10-CM

## 2023-03-02 DIAGNOSIS — Z74.09 DECREASED STRENGTH, ENDURANCE, AND MOBILITY: Primary | ICD-10-CM

## 2023-03-02 DIAGNOSIS — R53.1 DECREASED STRENGTH, ENDURANCE, AND MOBILITY: Primary | ICD-10-CM

## 2023-03-02 DIAGNOSIS — R26.89 BALANCE PROBLEM: ICD-10-CM

## 2023-03-02 DIAGNOSIS — R68.89 DECREASED STRENGTH, ENDURANCE, AND MOBILITY: Primary | ICD-10-CM

## 2023-03-02 PROCEDURE — 97530 THERAPEUTIC ACTIVITIES: CPT | Mod: PN

## 2023-03-02 PROCEDURE — 97112 NEUROMUSCULAR REEDUCATION: CPT | Mod: PN

## 2023-03-02 PROCEDURE — 97110 THERAPEUTIC EXERCISES: CPT | Mod: PN

## 2023-03-02 NOTE — PROGRESS NOTES
[facility]   Physical Therapy  Treatment Note     Name: Nathalie Membreno  Clinic Number: [id number]    Therapy Diagnosis:   Encounter Diagnoses   Name Primary?    Decreased strength, endurance, and mobility Yes    Decreased functional activity tolerance     Decreased range of motion     Balance problem          Physician: Ailce Booker MD    Visit Date: 3/2/2023       Physician Orders: PT Eval and Treat   Medical Diagnosis from Referral: Chronic left shoulder pain  Evaluation Date: 2/9/2023  Authorization Period Expiration: 1/25/24  Plan of Care Expiration: 4/9/23  Visit # / Visits authorized:5/ 12 + eval    FOTO: 2/3      Precautions:  HTN, Arthritis, hx of stroke  2018    PTA Visit #: 0/5     Time In: 11:14  Time Out: 12:18  Total Billable Time: 64  minutes     SUBJECTIVE     Pt reports: she continues to wear knee brace on R knee due to giving out. No new complaints. Patient reports she feels good today  She was compliant with home exercise program.  Response to previous treatment: sore  Functional change: n/a at this time    Pain: 0/10  Location: B shoulders    OBJECTIVE   3/2/23 10 sit to stands in 30 secs  (8 on IE)  5 sit to stands in 13 secs  (17.68 secs on IE)  FOTO completed    Initial foto completed 2/21/23    Treatment     Nathalie received the treatments listed below:      manual therapy techniques:  x 0 minutes  MFR to left upper traps  Gentle Joint mobs  Stretching/ ROM    therapeutic exercises to develop strength, endurance, ROM, flexibility, posture, and core stabilization for 24  minutes including:  Nustep for   ROM, strength, and endurance 6 minutes R:3  Shuttle 4 bands 3 minutes  Pulleys shoulder flexion 2 minutes/ 2 minutes scaption  Shoulder flexion in sitting with 1# 2 x 10 reps with cuing for decrease hiking of L shoulder, and shoulder abduction with 1# 2 x 10 reps  Knee extension 2# 2 x 10 reps  Sitting knee flexion with G TB 2 x 10 reps    neuromuscular  re-education activities to improve: Balance, Coordination, Kinesthetic, Sense, Proprioception, and Posture for 30  minutes. The following activities were included:  Posture education in sitting and standing on going  Scapula retraction 2 x 15 reps / no TB due to pain in R hand  Shoulder ER for posture and postioning 2 x 15 reps/ no TB due to R hand/ wrist  pain  Rebounder with yellow ball overhead with alternating step pattern  Side stepping  Alternating marches in // bars with no UE support    therapeutic activities to improve functional performance for 10  minutes, including:  Pushing sled  Sit to stands  Negotiation of 4 steps using HR's x 4    Deferred exercises  Shoulder ER with cane 2 x 10 reps for ROM and stretching   Wall slides 2 x 10 reps difficult and painful especially L shoulder  Wall push ups with a plus 1 x 10 reps      gait training to improve functional mobility and safety for 0  minutes, includin          Patient Education and Home Exercises     Home Exercises Provided and Patient Education Provided     Education provided:   -HEP and modify activities  - Posture education in sitting and standing  - safety with negotiation of steps    Written Home Exercises Provided: Patient instructed to cont prior HEP. Exercises were reviewed and Nathalie was able to demonstrate them prior to the end of the session.  Nathalie demonstrated good  understanding of the education provided. See EMR under Patient Instructions for exercises provided during therapy sessions    ASSESSMENT   Patient is responding well to treatment. She has progressed with sit to stand from her initial evaluation. 10 sit to stands in 30 secs  (8 on IE)  5 sit to stands in 13 secs  (17.68 secs on IE). FOTO score is improving and patient reports less pain.  She will benefit from continued skilled therapy to improve range of motion, strength, functional activities and decrease pain.     Nathalie Is progressing well towards her goals.   Pt  prognosis is Fair.     Pt will continue to benefit from skilled outpatient physical therapy to address the deficits listed in the problem list box on initial evaluation, provide pt/family education and to maximize pt's level of independence in the home and community environment.     Pt's spiritual, cultural and educational needs considered and pt agreeable to plan of care and goals.     Anticipated barriers to physical therapy: history of CVA    Goals: Short Term Goals: In 4 weeks   1.Patient to be educated on HEP Progressing  2.Patient to increase knee and shoulder active range of motion, in order to improve available range of motion for ADL's.    3.Patient to increase bilateral UE/LE strength by 1/2 grade, in order to improve endurance and increase ability to perform all functional activities for increased time.   4.Patient to have pain less than 6/10 at worst, to improve QOL. Met 3/2/23  5. Patient to improve score on TUG, 30 sec sit to stand and 5x sit to stand, in order to decrease fall risk. 3/2/23 30 sec sit to stand progressing from 8 on IE to 10, progressing with 5 sit to stands in 13 secs today compared to 17.68 secs on IE     Long Term Goals: In 8 weeks  1 Patient to score  a 44% or lower on FOTO, , to improve QOL. Progressing 47% 3/2/23  2. Patient to increase bilateral UE/LE strength to 4-4+/5, in order to improve endurance and increase ability to perform all functional activities for increased time.  3. Patient to have decreased pain to 3/10 at worst, to improve QOL.  4. Patient to normalize score on TUG, 30 sec sit to stand and 5x sit to stand, in order to improve endurance and decrease fall risk.  5. Patient to perform daily activities including meal preparation without taking rest breaks.    PLAN     Plan of care Certification: 2/9/2023 to 4/9/23.     Patient will benefit from more frequent physical therapy and/or services not offered at this location and is being referred to [place] [facility] location for  follow up. Outpatient Physical Therapy 2 times weekly for 8 weeks to include the following interventions: Gait Training, Manual Therapy, Moist Heat/ Ice, Neuromuscular Re-ed, Patient Education, Self Care, Therapeutic Activities, and Therapeutic Exercise.       Kacey Magdaleno, PT

## 2023-03-07 ENCOUNTER — CLINICAL SUPPORT (OUTPATIENT)
Dept: REHABILITATION | Facility: HOSPITAL | Age: 78
End: 2023-03-07
Payer: MEDICARE

## 2023-03-07 DIAGNOSIS — R26.89 BALANCE PROBLEM: ICD-10-CM

## 2023-03-07 DIAGNOSIS — Z74.09 DECREASED STRENGTH, ENDURANCE, AND MOBILITY: Primary | ICD-10-CM

## 2023-03-07 DIAGNOSIS — R68.89 DECREASED FUNCTIONAL ACTIVITY TOLERANCE: ICD-10-CM

## 2023-03-07 DIAGNOSIS — M25.60 DECREASED RANGE OF MOTION: ICD-10-CM

## 2023-03-07 DIAGNOSIS — R53.1 DECREASED STRENGTH, ENDURANCE, AND MOBILITY: Primary | ICD-10-CM

## 2023-03-07 DIAGNOSIS — R68.89 DECREASED STRENGTH, ENDURANCE, AND MOBILITY: Primary | ICD-10-CM

## 2023-03-07 PROCEDURE — 97530 THERAPEUTIC ACTIVITIES: CPT | Mod: PN

## 2023-03-07 PROCEDURE — 97110 THERAPEUTIC EXERCISES: CPT | Mod: PN

## 2023-03-07 PROCEDURE — 97112 NEUROMUSCULAR REEDUCATION: CPT | Mod: PN

## 2023-03-07 NOTE — PROGRESS NOTES
[facility]   Physical Therapy  Treatment Note     Name: Nathalie Ochoaridge  Clinic Number: [id number]    Therapy Diagnosis:   Encounter Diagnoses   Name Primary?    Decreased strength, endurance, and mobility Yes    Decreased functional activity tolerance     Decreased range of motion     Balance problem            Physician: Alice Booker MD    Visit Date: 3/7/2023       Physician Orders: PT Eval and Treat   Medical Diagnosis from Referral: Chronic left shoulder pain  Evaluation Date: 2/9/2023  Authorization Period Expiration: 1/25/24  Plan of Care Expiration: 4/9/23  Visit # / Visits authorized:6/ 12 + eval    FOTO: 2/3      Precautions:  HTN, Arthritis, hx of stroke  2018    PTA Visit #: 0/5     Time In: 9:34  Time Out: 10:30  Total Billable Time: 56 minutes     SUBJECTIVE     Pt reports: she is feeling better and only has stiffness in shoulders this am.   She was compliant with home exercise program.  Response to previous treatment: sore  Functional change: n/a at this time    Pain: 0/10  Location: B shoulders    OBJECTIVE   3/7/23 TUG 11 secs    3/2/23 10 sit to stands in 30 secs  (8 on IE)  5 sit to stands in 13 secs  (17.68 secs on IE)  FOTO completed    Initial foto completed 2/21/23    Treatment     Nathalie received the treatments listed below:      manual therapy techniques:  x 0 minutes  MFR to left upper traps  Gentle Joint mobs  Stretching/ ROM    therapeutic exercises to develop strength, endurance, ROM, flexibility, posture, and core stabilization for 24   minutes including:  Nustep for   ROM, strength, and endurance 6 minutes R:3  Pulleys shoulder flexion 2 minutes/ 2 minutes scaption  Shoulder flexion  B in sitting with 1# 2 x 10 reps with cuing for decrease hiking of L shoulder, and shoulder abduction with 1# 2 x 10 reps  Shuttle 5 bands 3 minutes  Knee extension 3# 2 x 10 reps  Sitting knee flexion with G TB 2 x 10 reps    neuromuscular re-education activities to  improve: Balance, Coordination, Kinesthetic, Sense, Proprioception, and Posture for 24 minutes. The following activities were included:  TUG 12 secs/ 11 secs  Posture education in sitting and standing on going  Scapula retraction 2 x 15 reps / no TB due to pain in R hand/ add pulleys next visit  Shoulder ER for posture and postioning 2 x 15 reps with 1# B  Rebounder with yellow ball overhead with alternating step pattern  Side stepping  Alternating marches in // bars with no UE support    therapeutic activities to improve functional performance for 8   minutes, including:  Pushing sled  Sit to stands 2 x 10 reps with cuing for equal weight and control with eccentric  movements  Negotiation of 4 steps using HR's x 4    Deferred exercises  Shoulder ER with cane 2 x 10 reps for ROM and stretching   Wall slides 2 x 10 reps difficult and painful especially L shoulder  Wall push ups with a plus 1 x 10 reps      gait training to improve functional mobility and safety for 0  minutes, includin          Patient Education and Home Exercises     Home Exercises Provided and Patient Education Provided     Education provided:   -HEP and modify activities  - Posture education in sitting and standing  - safety with negotiation of steps    Written Home Exercises Provided: Patient instructed to cont prior HEP. Exercises were reviewed and Nathalie was able to demonstrate them prior to the end of the session.  Nathalie demonstrated good  understanding of the education provided. See EMR under Patient Instructions for exercises provided during therapy sessions    ASSESSMENT   Patient is responding well to treatment. Shoulder AROM with 1# is improving. She continues to  demonstrate shoulder abduction weakness. Shoulder flexion is improving with less compensatory techniques with cuing and mirror as a visual. She improved her time on the TUG test to 11 secs. She tolerated increase resistance on shuttle today.  Patient required several rest  breaks and was tired after the session.  She will benefit from continued skilled therapy to improve range of motion, strength, functional activities and decrease pain.     Nathalie Is progressing well towards her goals.   Pt prognosis is Fair.     Pt will continue to benefit from skilled outpatient physical therapy to address the deficits listed in the problem list box on initial evaluation, provide pt/family education and to maximize pt's level of independence in the home and community environment.     Pt's spiritual, cultural and educational needs considered and pt agreeable to plan of care and goals.     Anticipated barriers to physical therapy: history of CVA    Goals: Short Term Goals: In 4 weeks   1.Patient to be educated on HEP Progressing  2.Patient to increase knee and shoulder active range of motion, in order to improve available range of motion for ADL's.    3.Patient to increase bilateral UE/LE strength by 1/2 grade, in order to improve endurance and increase ability to perform all functional activities for increased time.   4.Patient to have pain less than 6/10 at worst, to improve QOL. Met 3/2/23  5. Patient to improve score on TUG, 30 sec sit to stand and 5x sit to stand, in order to decrease fall risk. 3/2/23 30 sec sit to stand progressing from 8 on IE to 10, progressing with 5 sit to stands in 13 secs today compared to 17.68 secs on IE  MET 3/7/23 TUG 11 secs     Long Term Goals: In 8 weeks  1 Patient to score  a 44% or lower on FOTO, , to improve QOL. Progressing 47% 3/2/23  2. Patient to increase bilateral UE/LE strength to 4-4+/5, in order to improve endurance and increase ability to perform all functional activities for increased time.  3. Patient to have decreased pain to 3/10 at worst, to improve QOL.  4. Patient to normalize score on TUG, 30 sec sit to stand and 5x sit to stand, in order to improve endurance and decrease fall risk. Progressing  5. Patient to perform daily activities including  meal preparation without taking rest breaks.    PLAN     Plan of care Certification: 2/9/2023 to 4/9/23.     Patient will benefit from more frequent physical therapy and/or services not offered at this location and is being referred to [facility] location for follow up. Outpatient Physical Therapy 2 times weekly for 8 weeks to include the following interventions: Gait Training, Manual Therapy, Moist Heat/ Ice, Neuromuscular Re-ed, Patient Education, Self Care, Therapeutic Activities, and Therapeutic Exercise.       Kacey Magdaleno, PT

## 2023-03-14 ENCOUNTER — CLINICAL SUPPORT (OUTPATIENT)
Dept: REHABILITATION | Facility: HOSPITAL | Age: 78
End: 2023-03-14
Payer: MEDICARE

## 2023-03-14 DIAGNOSIS — R53.1 DECREASED STRENGTH, ENDURANCE, AND MOBILITY: Primary | ICD-10-CM

## 2023-03-14 DIAGNOSIS — Z74.09 DECREASED STRENGTH, ENDURANCE, AND MOBILITY: Primary | ICD-10-CM

## 2023-03-14 DIAGNOSIS — R68.89 DECREASED FUNCTIONAL ACTIVITY TOLERANCE: ICD-10-CM

## 2023-03-14 DIAGNOSIS — R68.89 DECREASED STRENGTH, ENDURANCE, AND MOBILITY: Primary | ICD-10-CM

## 2023-03-14 DIAGNOSIS — M25.60 DECREASED RANGE OF MOTION: ICD-10-CM

## 2023-03-14 DIAGNOSIS — R26.89 BALANCE PROBLEM: ICD-10-CM

## 2023-03-14 PROCEDURE — 97112 NEUROMUSCULAR REEDUCATION: CPT | Mod: PN

## 2023-03-14 PROCEDURE — 97530 THERAPEUTIC ACTIVITIES: CPT | Mod: PN

## 2023-03-14 PROCEDURE — 97110 THERAPEUTIC EXERCISES: CPT | Mod: PN

## 2023-03-14 NOTE — PROGRESS NOTES
[facility]   Physical Therapy  Treatment Note     Name: Nathalie eMmbreno  Clinic Number: [id number]    Therapy Diagnosis:   Encounter Diagnoses   Name Primary?    Decreased strength, endurance, and mobility Yes    Decreased functional activity tolerance     Decreased range of motion     Balance problem              Physician: Alice Booker MD    Visit Date: 3/14/2023       Physician Orders: PT Eval and Treat   Medical Diagnosis from Referral: Chronic left shoulder pain  Evaluation Date: 2/9/2023  Authorization Period Expiration: 1/25/24  Plan of Care Expiration: 4/9/23  Visit # / Visits authorized:7/ 12 + eval    FOTO: 2/3      Precautions:  HTN, Arthritis, hx of stroke  2018    PTA Visit #: 0/5     Time In: 10:47  Time Out: 11:55  Total Billable Time:68 minutes     SUBJECTIVE     Pt reports: she is feeling better and only has stiffness in shoulders this am. Patient reports she was able to sweep her porch and take garbage can to the road.   She was compliant with home exercise program.  Response to previous treatment: sore  Functional change: n/a at this time    Pain: 0/10  Location: B shoulders    OBJECTIVE   3/7/23 TUG 11 secs    3/2/23 10 sit to stands in 30 secs  (8 on IE)  5 sit to stands in 13 secs  (17.68 secs on IE)  FOTO completed    Initial foto completed 2/21/23    Treatment     Nathalie received the treatments listed below:      manual therapy techniques:  x 0 minutes  MFR to left upper traps  Gentle Joint mobs  Stretching/ ROM    therapeutic exercises to develop strength, endurance, ROM, flexibility, posture, and core stabilization for 26   minutes including:  Nustep for   ROM, strength, and endurance 8 minutes R:3  Pulleys shoulder flexion 2 minutes/ 2 minutes scaption  Shoulder flexion  B in sitting with 1# 2 x 10 reps with cuing for decrease hiking of L shoulder, and shoulder abduction with 1# 2 x 10 reps  Shuttle 6 bands 3 minutes  Knee extension 3# 3 x 10  reps  Sitting knee flexion with G TB 3 x 10 reps    neuromuscular re-education activities to improve: Balance, Coordination, Kinesthetic, Sense, Proprioception, and Posture for 34 minutes. The following activities were included:  Posture education in sitting and standing on going  Rows while sitting on the swiss ball 20# 2 x 15 reps  Shoulder ER for posture and postioning 2 x 15 reps with 1# B while sitting on the ball  Rebounder with yellow ball overhead with alternating step pattern  Side stepping to help with movements in the house/ kitchen  Alternating marches in // bars with no UE support  Obstacle course side to side, forward, picking up objects off of the floor    therapeutic activities to improve functional performance for 8  minutes, including:  Pushing sled  Sit to stands 2 x 10 reps with cuing for equal weight and control with eccentric  movements  Negotiation of 4 steps using HR's x 4    Deferred exercises  Shoulder ER with cane 2 x 10 reps for ROM and stretching   Wall slides 2 x 10 reps difficult and painful especially L shoulder  Wall push ups with a plus 1 x 10 reps      gait training to improve functional mobility and safety for 0  minutes, includin          Patient Education and Home Exercises     Home Exercises Provided and Patient Education Provided     Education provided:   -HEP and modify activities  - Posture education in sitting and standing  - safety with negotiation of steps    Written Home Exercises Provided: Patient instructed to cont prior HEP. Exercises were reviewed and Nathalie was able to demonstrate them prior to the end of the session.  Nathalie demonstrated good  understanding of the education provided. See EMR under Patient Instructions for exercises provided during therapy sessions    ASSESSMENT   Patient is responding well to treatment. She is progressing with dynamic standing balance and functional  activities. She performed posture exercises while sitting on swiss ball.   Patient required several rest breaks and was tired after the session. She continues to require tactile cuing during shoulder strengthening.   She will benefit from continued skilled therapy to improve range of motion, strength, functional activities and decrease pain.     Nathalie Is progressing well towards her goals.   Pt prognosis is Fair.     Pt will continue to benefit from skilled outpatient physical therapy to address the deficits listed in the problem list box on initial evaluation, provide pt/family education and to maximize pt's level of independence in the home and community environment.     Pt's spiritual, cultural and educational needs considered and pt agreeable to plan of care and goals.     Anticipated barriers to physical therapy: history of CVA    Goals: Short Term Goals: In 4 weeks   1.Patient to be educated on HEP Progressing  2.Patient to increase knee and shoulder active range of motion, in order to improve available range of motion for ADL's.    3.Patient to increase bilateral UE/LE strength by 1/2 grade, in order to improve endurance and increase ability to perform all functional activities for increased time.   4.Patient to have pain less than 6/10 at worst, to improve QOL. Met 3/2/23  5. Patient to improve score on TUG, 30 sec sit to stand and 5x sit to stand, in order to decrease fall risk. 3/2/23 30 sec sit to stand progressing from 8 on IE to 10, progressing with 5 sit to stands in 13 secs today compared to 17.68 secs on IE  MET 3/7/23 TUG 11 secs     Long Term Goals: In 8 weeks  1 Patient to score  a 44% or lower on FOTO, , to improve QOL. Progressing 47% 3/2/23  2. Patient to increase bilateral UE/LE strength to 4-4+/5, in order to improve endurance and increase ability to perform all functional activities for increased time.  3. Patient to have decreased pain to 3/10 at worst, to improve QOL. Met 3/14/23  4. Patient to normalize score on TUG, 30 sec sit to stand and 5x sit to stand, in  order to improve endurance and decrease fall risk. Progressing  5. Patient to perform daily activities including meal preparation without taking rest breaks./ Progressing 3/14/23 can perform daily ADL's with no rest break except for cooking meals    PLAN     Plan of care Certification: 2/9/2023 to 4/9/23.     Patient will benefit from more frequent physical therapy and/or services not offered at this location and is being referred to [facility] location for follow up. Outpatient Physical Therapy 2 times weekly for 8 weeks to include the following interventions: Gait Training, Manual Therapy, Moist Heat/ Ice, Neuromuscular Re-ed, Patient Education, Self Care, Therapeutic Activities, and Therapeutic Exercise.       Kacey Magdaleno, PT

## 2023-03-16 ENCOUNTER — CLINICAL SUPPORT (OUTPATIENT)
Dept: REHABILITATION | Facility: HOSPITAL | Age: 78
End: 2023-03-16
Payer: MEDICARE

## 2023-03-16 ENCOUNTER — OFFICE VISIT (OUTPATIENT)
Dept: DERMATOLOGY | Facility: CLINIC | Age: 78
End: 2023-03-16
Payer: MEDICARE

## 2023-03-16 DIAGNOSIS — Z74.09 DECREASED STRENGTH, ENDURANCE, AND MOBILITY: Primary | ICD-10-CM

## 2023-03-16 DIAGNOSIS — R68.89 DECREASED STRENGTH, ENDURANCE, AND MOBILITY: Primary | ICD-10-CM

## 2023-03-16 DIAGNOSIS — R68.89 DECREASED FUNCTIONAL ACTIVITY TOLERANCE: ICD-10-CM

## 2023-03-16 DIAGNOSIS — R26.89 BALANCE PROBLEM: ICD-10-CM

## 2023-03-16 DIAGNOSIS — L91.0 KELOID: Primary | ICD-10-CM

## 2023-03-16 DIAGNOSIS — L70.0 ACNE VULGARIS: ICD-10-CM

## 2023-03-16 DIAGNOSIS — M25.60 DECREASED RANGE OF MOTION: ICD-10-CM

## 2023-03-16 DIAGNOSIS — R53.1 DECREASED STRENGTH, ENDURANCE, AND MOBILITY: Primary | ICD-10-CM

## 2023-03-16 PROCEDURE — 1159F PR MEDICATION LIST DOCUMENTED IN MEDICAL RECORD: ICD-10-PCS | Mod: CPTII,S$GLB,, | Performed by: PHYSICIAN ASSISTANT

## 2023-03-16 PROCEDURE — 1159F MED LIST DOCD IN RCRD: CPT | Mod: CPTII,S$GLB,, | Performed by: PHYSICIAN ASSISTANT

## 2023-03-16 PROCEDURE — 1160F RVW MEDS BY RX/DR IN RCRD: CPT | Mod: CPTII,S$GLB,, | Performed by: PHYSICIAN ASSISTANT

## 2023-03-16 PROCEDURE — 97530 THERAPEUTIC ACTIVITIES: CPT | Mod: PN

## 2023-03-16 PROCEDURE — 97112 NEUROMUSCULAR REEDUCATION: CPT | Mod: PN

## 2023-03-16 PROCEDURE — 1126F AMNT PAIN NOTED NONE PRSNT: CPT | Mod: CPTII,S$GLB,, | Performed by: PHYSICIAN ASSISTANT

## 2023-03-16 PROCEDURE — 99999 PR PBB SHADOW E&M-EST. PATIENT-LVL III: CPT | Mod: PBBFAC,,, | Performed by: PHYSICIAN ASSISTANT

## 2023-03-16 PROCEDURE — 1126F PR PAIN SEVERITY QUANTIFIED, NO PAIN PRESENT: ICD-10-PCS | Mod: CPTII,S$GLB,, | Performed by: PHYSICIAN ASSISTANT

## 2023-03-16 PROCEDURE — 99214 PR OFFICE/OUTPT VISIT, EST, LEVL IV, 30-39 MIN: ICD-10-PCS | Mod: S$GLB,,, | Performed by: PHYSICIAN ASSISTANT

## 2023-03-16 PROCEDURE — 99999 PR PBB SHADOW E&M-EST. PATIENT-LVL III: ICD-10-PCS | Mod: PBBFAC,,, | Performed by: PHYSICIAN ASSISTANT

## 2023-03-16 PROCEDURE — 99214 OFFICE O/P EST MOD 30 MIN: CPT | Mod: S$GLB,,, | Performed by: PHYSICIAN ASSISTANT

## 2023-03-16 PROCEDURE — 97110 THERAPEUTIC EXERCISES: CPT | Mod: PN

## 2023-03-16 PROCEDURE — 1160F PR REVIEW ALL MEDS BY PRESCRIBER/CLIN PHARMACIST DOCUMENTED: ICD-10-PCS | Mod: CPTII,S$GLB,, | Performed by: PHYSICIAN ASSISTANT

## 2023-03-16 RX ORDER — BETAMETHASONE DIPROPIONATE 0.5 MG/G
OINTMENT TOPICAL 2 TIMES DAILY
Qty: 45 G | Refills: 0 | Status: SHIPPED | OUTPATIENT
Start: 2023-03-16 | End: 2024-02-02

## 2023-03-16 NOTE — PROGRESS NOTES
[facility]   Physical Therapy  Treatment Note     Name: Nathalie Ochoaridge  Clinic Number: [id number]    Therapy Diagnosis:   Encounter Diagnoses   Name Primary?    Decreased strength, endurance, and mobility Yes    Decreased functional activity tolerance     Decreased range of motion     Balance problem                Physician: Alice Booker MD    Visit Date: 3/16/2023       Physician Orders: PT Eval and Treat   Medical Diagnosis from Referral: Chronic left shoulder pain  Evaluation Date: 2/9/2023  Authorization Period Expiration: 1/25/24  Plan of Care Expiration: 4/9/23  Visit # / Visits authorized:8/ 12 + eval    FOTO: 2/3      Precautions:  HTN, Arthritis, hx of stroke  2018    PTA Visit #: 0/5     Time In: 11:32  Time Out: 12:30  Total Billable Time:58 minutes     SUBJECTIVE     Pt reports: she has not had to use her cane as much since starting therapy  She was compliant with home exercise program.  Response to previous treatment: sore  Functional change: n/a at this time    Pain: 0/10/ 5/10 knees  Location: B shoulders    OBJECTIVE   3/16/23 MMT shoulder flexion R 5/5, L 3+/5 in available ROM, R shoulder abduction 5/5, L 2+/5 with compensatory techniques      3/7/23 TUG 11 secs    3/2/23 10 sit to stands in 30 secs  (8 on IE)  5 sit to stands in 13 secs  (17.68 secs on IE)  FOTO completed    Initial foto completed 2/21/23    Treatment     Nathalie received the treatments listed below:      manual therapy techniques:  x 0 minutes  MFR to left upper traps  Gentle Joint mobs  Stretching/ ROM    therapeutic exercises to develop strength, endurance, ROM, flexibility, posture, and core stabilization for 20 minutes including:  Nustep for   ROM, strength, and endurance 8 minutes R:3  Pulleys shoulder flexion 2 minutes/ 2 minutes scaption  Shoulder flexion  B in sitting with 1# 2 x 10 reps with cuing for decrease hiking of L shoulder, and shoulder abduction with 1# 2 x 10 reps  Shuttle  6 bands 3 minutes  Shoulder press 1# 2 x 10 reps  MMT    Deferred exercises  Knee extension 3# 3 x 10 reps  Sitting knee flexion with G TB 3 x 10 reps    neuromuscular re-education activities to improve: Balance, Coordination, Kinesthetic, Sense, Proprioception, and Posture for 25 minutes. The following activities were included:  Posture education in sitting and standing on going  Rows while sitting on the swiss ball 20# 2 x 15 reps with cuing for scapula retraction  Shoulder ER for posture and postioning 2 x 15 reps with 1# B while sitting on the ball  Side stepping to help with movements in the house/ kitchen  Alternating marches in // bars with no UE support  Rebounder with yellow ball overhead with alternating step pattern    Obstacle course side to side, forward, picking up objects off of the floor not performed today    therapeutic activities to improve functional performance for 13  minutes, including:  Pushing sled  Sit to stands 2 x 10 reps with cuing for equal weight and control with eccentric  movements from 18 in box  Negotiation of 4 steps using HR's x 4    Deferred exercises  Shoulder ER with cane 2 x 10 reps for ROM and stretching   Wall slides 2 x 10 reps difficult and painful especially L shoulder  Wall push ups with a plus 1 x 10 reps      gait training to improve functional mobility and safety for 0  minutes, includin          Patient Education and Home Exercises     Home Exercises Provided and Patient Education Provided     Education provided:   -HEP and modify activities  - Posture education in sitting and standing  - safety with negotiation of steps    Written Home Exercises Provided: Patient instructed to cont prior HEP. Exercises were reviewed and Nathalie was able to demonstrate them prior to the end of the session.  Nathalie demonstrated good  understanding of the education provided. See EMR under Patient Instructions for exercises provided during therapy sessions    ASSESSMENT   Patient  is responding well to treatment. She was more tired today and  required several rest breaks.  She continues to require tactile cuing during shoulder strengthening.  MMT shoulder flexion R 5/5, L 3+/5 in available ROM, R shoulder abduction 5/5, L 2+/5 with compensatory techniques.   She will benefit from continued skilled therapy to improve range of motion, strength, functional activities and decrease pain.     Nathalie Is progressing well towards her goals.   Pt prognosis is Fair.     Pt will continue to benefit from skilled outpatient physical therapy to address the deficits listed in the problem list box on initial evaluation, provide pt/family education and to maximize pt's level of independence in the home and community environment.     Pt's spiritual, cultural and educational needs considered and pt agreeable to plan of care and goals.     Anticipated barriers to physical therapy: history of CVA    Goals: Short Term Goals: In 4 weeks   1.Patient to be educated on HEP Progressing  2.Patient to increase knee and shoulder active range of motion, in order to improve available range of motion for ADL's.    3.Patient to increase bilateral UE/LE strength by 1/2 grade, in order to improve endurance and increase ability to perform all functional activities for increased time.   4.Patient to have pain less than 6/10 at worst, to improve QOL. Met 3/2/23  5. Patient to improve score on TUG, 30 sec sit to stand and 5x sit to stand, in order to decrease fall risk. 3/2/23 30 sec sit to stand progressing from 8 on IE to 10, progressing with 5 sit to stands in 13 secs today compared to 17.68 secs on IE  MET 3/7/23 TUG 11 secs     Long Term Goals: In 8 weeks  1 Patient to score  a 44% or lower on FOTO, , to improve QOL. Progressing 47% 3/2/23  2. Patient to increase bilateral UE/LE strength to 4-4+/5, in order to improve endurance and increase ability to perform all functional activities for increased time.  3. Patient to have  decreased pain to 3/10 at worst, to improve QOL. Met 3/14/23  4. Patient to normalize score on TUG, 30 sec sit to stand and 5x sit to stand, in order to improve endurance and decrease fall risk. Progressing  5. Patient to perform daily activities including meal preparation without taking rest breaks./ Progressing 3/14/23 can perform daily ADL's with no rest break except for cooking meals    PLAN     Plan of care Certification: 2/9/2023 to 4/9/23.     Patient will benefit from more frequent physical therapy and/or services not offered at this location and is being referred to [facility] location for follow up. Outpatient Physical Therapy 2 times weekly for 8 weeks to include the following interventions: Gait Training, Manual Therapy, Moist Heat/ Ice, Neuromuscular Re-ed, Patient Education, Self Care, Therapeutic Activities, and Therapeutic Exercise.       Kacey Magdaleno, PT

## 2023-03-16 NOTE — PROGRESS NOTES
Subjective:       Patient ID:  Nathalie Membreno is a 77 y.o. female who presents for   Chief Complaint   Patient presents with    Keloid     Follow up. Tx topical ( clindamycin gel)  Sx itching.      Hx of keloidal plaques of chest with history of cellulitis and intemittent pimple like flares of area, trial of clinda gel at last visit and cordran tape. Here for f/u. Using clindagel, but never got cordran. Denies any tender pimple like flares since last visit. Keloids still with itching/tenderness.           Review of Systems   Constitutional:  Negative for fever and chills.   Gastrointestinal:  Negative for nausea and vomiting.   Skin:  Positive for activity-related sunscreen use. Negative for itching, rash, dry skin, sun sensitivity, daily sunscreen use, recent sunburn, dry lips and abscesses.   Hematologic/Lymphatic: Does not bruise/bleed easily.      Objective:    Physical Exam   Constitutional: She appears well-developed and well-nourished. No distress.   Neurological: She is alert and oriented to person, place, and time. She is not disoriented.   Psychiatric: She has a normal mood and affect.   Skin:   Areas Examined (abnormalities noted in diagram):   Head / Face Inspection Performed  Neck Inspection Performed  Chest / Axilla Inspection Performed  Back Inspection Performed  RUE Inspected  LUE Inspection Performed            Diagram Legend     Erythematous scaling macule/papule c/w actinic keratosis       Vascular papule c/w angioma      Pigmented verrucoid papule/plaque c/w seborrheic keratosis      Yellow umbilicated papule c/w sebaceous hyperplasia      Irregularly shaped tan macule c/w lentigo     1-2 mm smooth white papules consistent with Milia      Movable subcutaneous cyst with punctum c/w epidermal inclusion cyst      Subcutaneous movable cyst c/w pilar cyst      Firm pink to brown papule c/w dermatofibroma      Pedunculated fleshy papule(s) c/w skin tag(s)      Evenly pigmented macule c/w  junctional nevus     Mildly variegated pigmented, slightly irregular-bordered macule c/w mildly atypical nevus      Flesh colored to evenly pigmented papule c/w intradermal nevus       Pink pearly papule/plaque c/w basal cell carcinoma      Erythematous hyperkeratotic cursted plaque c/w SCC      Surgical scar with no sign of skin cancer recurrence      Open and closed comedones      Inflammatory papules and pustules      Verrucoid papule consistent consistent with wart     Erythematous eczematous patches and plaques     Dystrophic onycholytic nail with subungual debris c/w onychomycosis     Umbilicated papule    Erythematous-base heme-crusted tan verrucoid plaque consistent with inflamed seborrheic keratosis     Erythematous Silvery Scaling Plaque c/w Psoriasis     See annotation      Assessment / Plan:        Keloid  -     betamethasone dipropionate (DIPROLENE) 0.05 % ointment; Apply topically 2 (two) times daily. PRN keloid scar. Strong steroid- do Not use on face.  Dispense: 45 g; Refill: 0  Never got cordran tape. Will try for above rx. Would reconsider ILK in future, pt declines today.      Acne vulgaris  Continue clindagel qd to bid.          Follow up in about 3 months (around 6/16/2023) for keloid.

## 2023-03-18 ENCOUNTER — NURSE TRIAGE (OUTPATIENT)
Dept: ADMINISTRATIVE | Facility: CLINIC | Age: 78
End: 2023-03-18
Payer: MEDICARE

## 2023-03-18 NOTE — TELEPHONE ENCOUNTER
"Pt with keloid to her chest wall area that stared with swelling, redness and painful since last pm.  Pt was seen on 3/16 for a "tender keloid" but not swollen or red.  States "its big".  Care advise states to see a provider within 4 hour, OAC offered and pt declined.  Pt verbally understood all instructions, all questions answered, advised to call back for any worsening sympotms.      Reason for Disposition   [1] Looks infected (spreading redness, pus) AND [2] large red area (> 2 in. or 5 cm)    Additional Information   Negative: [1] Sudden onset of rash (within last 2 hours) AND [2] difficulty breathing or swallowing   Negative: Sounds like a life-threatening emergency to the triager   Negative: [1] Localized purple or blood-colored spots or dots AND [2] not from injury or friction AND [3] fever   Negative: Patient sounds very sick or weak to the triager   Negative: [1] Red area or streak AND [2] fever   Negative: [1] Rash is painful to touch AND [2] fever    Protocols used: Rash or Redness - [id number]    "

## 2023-03-20 ENCOUNTER — OFFICE VISIT (OUTPATIENT)
Dept: DERMATOLOGY | Facility: CLINIC | Age: 78
End: 2023-03-20
Payer: MEDICARE

## 2023-03-20 DIAGNOSIS — L98.9 SKIN EROSION: ICD-10-CM

## 2023-03-20 DIAGNOSIS — L91.0 KELOID: Primary | ICD-10-CM

## 2023-03-20 PROCEDURE — 3288F PR FALLS RISK ASSESSMENT DOCUMENTED: ICD-10-PCS | Mod: CPTII,S$GLB,, | Performed by: PHYSICIAN ASSISTANT

## 2023-03-20 PROCEDURE — 99214 OFFICE O/P EST MOD 30 MIN: CPT | Mod: S$GLB,,, | Performed by: PHYSICIAN ASSISTANT

## 2023-03-20 PROCEDURE — 87070 CULTURE OTHR SPECIMN AEROBIC: CPT | Performed by: PHYSICIAN ASSISTANT

## 2023-03-20 PROCEDURE — 1159F PR MEDICATION LIST DOCUMENTED IN MEDICAL RECORD: ICD-10-PCS | Mod: CPTII,S$GLB,, | Performed by: PHYSICIAN ASSISTANT

## 2023-03-20 PROCEDURE — 1101F PT FALLS ASSESS-DOCD LE1/YR: CPT | Mod: CPTII,S$GLB,, | Performed by: PHYSICIAN ASSISTANT

## 2023-03-20 PROCEDURE — 1101F PR PT FALLS ASSESS DOC 0-1 FALLS W/OUT INJ PAST YR: ICD-10-PCS | Mod: CPTII,S$GLB,, | Performed by: PHYSICIAN ASSISTANT

## 2023-03-20 PROCEDURE — 1160F PR REVIEW ALL MEDS BY PRESCRIBER/CLIN PHARMACIST DOCUMENTED: ICD-10-PCS | Mod: CPTII,S$GLB,, | Performed by: PHYSICIAN ASSISTANT

## 2023-03-20 PROCEDURE — 3288F FALL RISK ASSESSMENT DOCD: CPT | Mod: CPTII,S$GLB,, | Performed by: PHYSICIAN ASSISTANT

## 2023-03-20 PROCEDURE — 99214 PR OFFICE/OUTPT VISIT, EST, LEVL IV, 30-39 MIN: ICD-10-PCS | Mod: S$GLB,,, | Performed by: PHYSICIAN ASSISTANT

## 2023-03-20 PROCEDURE — 1160F RVW MEDS BY RX/DR IN RCRD: CPT | Mod: CPTII,S$GLB,, | Performed by: PHYSICIAN ASSISTANT

## 2023-03-20 PROCEDURE — 1126F AMNT PAIN NOTED NONE PRSNT: CPT | Mod: CPTII,S$GLB,, | Performed by: PHYSICIAN ASSISTANT

## 2023-03-20 PROCEDURE — 1126F PR PAIN SEVERITY QUANTIFIED, NO PAIN PRESENT: ICD-10-PCS | Mod: CPTII,S$GLB,, | Performed by: PHYSICIAN ASSISTANT

## 2023-03-20 PROCEDURE — 1159F MED LIST DOCD IN RCRD: CPT | Mod: CPTII,S$GLB,, | Performed by: PHYSICIAN ASSISTANT

## 2023-03-20 PROCEDURE — 99999 PR PBB SHADOW E&M-EST. PATIENT-LVL IV: ICD-10-PCS | Mod: PBBFAC,,, | Performed by: PHYSICIAN ASSISTANT

## 2023-03-20 PROCEDURE — 99999 PR PBB SHADOW E&M-EST. PATIENT-LVL IV: CPT | Mod: PBBFAC,,, | Performed by: PHYSICIAN ASSISTANT

## 2023-03-20 RX ORDER — MUPIROCIN 20 MG/G
OINTMENT TOPICAL 3 TIMES DAILY
Qty: 22 G | Refills: 1 | Status: SHIPPED | OUTPATIENT
Start: 2023-03-20 | End: 2023-07-07 | Stop reason: SDUPTHER

## 2023-03-20 NOTE — PROGRESS NOTES
"  Subjective:       Patient ID:  Nathalie Membreno is a 77 y.o. female who presents for   Chief Complaint   Patient presents with    Keloid     C/o keloid on chest, painful      Hx of keloidal plaques of chest with history of cellulitis and intemittent pimple like flares of area, last seen 3/16/23. Current tx: clinda gel, never got cordran tape after initial visit, trial of betamethasone oint discussed at last visit. Pt here for c/o "keloid that burst on Sunday morning with drainage."  She says "it does this about every 2 months for the past 6 months or so." Pt states she applied betamethasone oint x 1, and then had some swelling of keloid area and redness, and pus like drainage. Went to ED at [facility] and was discharged to home in stable condition on 3/18/23 with recommendations to apply warm compresses. No current rx creams or po meds.    Denies fever, chills, malaise.      Pt states her mother had the same issue with keloids.      Review of Systems   Constitutional:  Negative for fever, chills, fatigue and malaise.   Gastrointestinal:  Negative for nausea and vomiting.   Skin:  Positive for activity-related sunscreen use. Negative for itching, rash, dry skin, sun sensitivity, daily sunscreen use, recent sunburn, dry lips and abscesses.   Hematologic/Lymphatic: Does not bruise/bleed easily.      Objective:    Physical Exam   Constitutional: She appears well-developed and well-nourished. No distress.   Neurological: She is alert and oriented to person, place, and time. She is not disoriented.   Psychiatric: She has a normal mood and affect.   Skin:   Areas Examined (abnormalities noted in diagram):   Head / Face Inspection Performed  Neck Inspection Performed  Chest / Axilla Inspection Performed  Back Inspection Performed  RUE Inspected  LUE Inspection Performed            Diagram Legend     Erythematous scaling macule/papule c/w actinic keratosis       Vascular papule c/w angioma      Pigmented verrucoid " papule/plaque c/w seborrheic keratosis      Yellow umbilicated papule c/w sebaceous hyperplasia      Irregularly shaped tan macule c/w lentigo     1-2 mm smooth white papules consistent with Milia      Movable subcutaneous cyst with punctum c/w epidermal inclusion cyst      Subcutaneous movable cyst c/w pilar cyst      Firm pink to brown papule c/w dermatofibroma      Pedunculated fleshy papule(s) c/w skin tag(s)      Evenly pigmented macule c/w junctional nevus     Mildly variegated pigmented, slightly irregular-bordered macule c/w mildly atypical nevus      Flesh colored to evenly pigmented papule c/w intradermal nevus       Pink pearly papule/plaque c/w basal cell carcinoma      Erythematous hyperkeratotic cursted plaque c/w SCC      Surgical scar with no sign of skin cancer recurrence      Open and closed comedones      Inflammatory papules and pustules      Verrucoid papule consistent consistent with wart     Erythematous eczematous patches and plaques     Dystrophic onycholytic nail with subungual debris c/w onychomycosis     Umbilicated papule    Erythematous-base heme-crusted tan verrucoid plaque consistent with inflamed seborrheic keratosis     Erythematous Silvery Scaling Plaque c/w Psoriasis     See annotation      Assessment / Plan:        Keloid  Skin erosion  -     mupirocin (BACTROBAN) 2 % ointment; Apply topically 3 (three) times daily. For broken skin.  Dispense: 22 g; Refill: 1  -     Ambulatory referral/consult to General Surgery; Future; Expected date: 03/27/2023  -     Aerobic culture  Start above rx. Agree w/ d/c betamethasone oint for now. Will get bacterial culture to r/o infection. Given recurrence of symptoms, pt is motivated for surgical evaluation. Will refer to General Surgery.         Follow up if symptoms worsen or fail to improve.

## 2023-03-20 NOTE — PATIENT INSTRUCTIONS
Stop Betamethasone Ointment.    Start Mupirocin Ointment (2-3 times daily).    Warm compresses 2-3 times daily.

## 2023-03-21 ENCOUNTER — DOCUMENTATION ONLY (OUTPATIENT)
Dept: REHABILITATION | Facility: HOSPITAL | Age: 78
End: 2023-03-21
Payer: MEDICARE

## 2023-03-21 NOTE — PROGRESS NOTES
PT called and spoke to patient regarding her missed visit today. She reports she called and cancelled her appointments due to her keloid bleeding and possibly needing surgery.

## 2023-03-24 LAB — BACTERIA SPEC AEROBE CULT: NORMAL

## 2023-03-31 ENCOUNTER — TELEPHONE (OUTPATIENT)
Dept: RADIATION ONCOLOGY | Facility: CLINIC | Age: 78
End: 2023-03-31
Payer: MEDICARE

## 2023-03-31 ENCOUNTER — HOSPITAL ENCOUNTER (OUTPATIENT)
Dept: CARDIOLOGY | Facility: HOSPITAL | Age: 78
Discharge: HOME | End: 2023-03-31
Attending: SURGERY
Payer: MEDICARE

## 2023-03-31 ENCOUNTER — OFFICE VISIT (OUTPATIENT)
Dept: SURGERY | Facility: CLINIC | Age: 78
End: 2023-03-31
Payer: MEDICARE

## 2023-03-31 VITALS
TEMPERATURE: 97 F | DIASTOLIC BLOOD PRESSURE: 72 MMHG | SYSTOLIC BLOOD PRESSURE: 124 MMHG | WEIGHT: 226.19 LBS | BODY MASS INDEX: 40.07 KG/M2 | HEART RATE: 54 BPM

## 2023-03-31 DIAGNOSIS — L91.0 KELOID: Primary | ICD-10-CM

## 2023-03-31 DIAGNOSIS — Z01.818 PREOPERATIVE TESTING: ICD-10-CM

## 2023-03-31 DIAGNOSIS — Z01.818 PREOPERATIVE EXAMINATION: ICD-10-CM

## 2023-03-31 PROCEDURE — 3074F PR MOST RECENT SYSTOLIC BLOOD PRESSURE < 130 MM HG: ICD-10-PCS | Mod: CPTII,S$GLB,, | Performed by: SURGERY

## 2023-03-31 PROCEDURE — 3078F PR MOST RECENT DIASTOLIC BLOOD PRESSURE < 80 MM HG: ICD-10-PCS | Mod: CPTII,S$GLB,, | Performed by: SURGERY

## 2023-03-31 PROCEDURE — 93005 ELECTROCARDIOGRAM TRACING: CPT

## 2023-03-31 PROCEDURE — 1125F PR PAIN SEVERITY QUANTIFIED, PAIN PRESENT: ICD-10-PCS | Mod: CPTII,S$GLB,, | Performed by: SURGERY

## 2023-03-31 PROCEDURE — 99204 PR OFFICE/OUTPT VISIT, NEW, LEVL IV, 45-59 MIN: ICD-10-PCS | Mod: S$GLB,,, | Performed by: SURGERY

## 2023-03-31 PROCEDURE — 1125F AMNT PAIN NOTED PAIN PRSNT: CPT | Mod: CPTII,S$GLB,, | Performed by: SURGERY

## 2023-03-31 PROCEDURE — 93010 ELECTROCARDIOGRAM REPORT: CPT | Mod: ,,, | Performed by: INTERNAL MEDICINE

## 2023-03-31 PROCEDURE — 99999 PR PBB SHADOW E&M-EST. PATIENT-LVL V: ICD-10-PCS | Mod: PBBFAC,,, | Performed by: SURGERY

## 2023-03-31 PROCEDURE — 1157F PR ADVANCE CARE PLAN OR EQUIV PRESENT IN MEDICAL RECORD: ICD-10-PCS | Mod: CPTII,S$GLB,, | Performed by: SURGERY

## 2023-03-31 PROCEDURE — 3074F SYST BP LT 130 MM HG: CPT | Mod: CPTII,S$GLB,, | Performed by: SURGERY

## 2023-03-31 PROCEDURE — 3078F DIAST BP <80 MM HG: CPT | Mod: CPTII,S$GLB,, | Performed by: SURGERY

## 2023-03-31 PROCEDURE — 1157F ADVNC CARE PLAN IN RCRD: CPT | Mod: CPTII,S$GLB,, | Performed by: SURGERY

## 2023-03-31 PROCEDURE — 99204 OFFICE O/P NEW MOD 45 MIN: CPT | Mod: S$GLB,,, | Performed by: SURGERY

## 2023-03-31 PROCEDURE — 93010 EKG 12-LEAD: ICD-10-PCS | Mod: ,,, | Performed by: INTERNAL MEDICINE

## 2023-03-31 PROCEDURE — 99999 PR PBB SHADOW E&M-EST. PATIENT-LVL V: CPT | Mod: PBBFAC,,, | Performed by: SURGERY

## 2023-03-31 NOTE — PROGRESS NOTES
[facility] -   General Surgery History & Physical    SUBJECTIVE:     History of Present Illness:  Patient is a 77 y.o. female presents with a large keloid of her anterior chest. She states it causes her pain and will sometimes become red and swollen, requiring drainage and antibiotics.      Review of patient's allergies indicates:   Allergen Reactions    Penicillins Swelling     Generalized swelling  Other reaction(s): Swelling  Generalized swelling  Other reaction(s): Swelling  Generalized swelling       Current Outpatient Medications   Medication Sig Dispense Refill    acetaminophen (TYLENOL) 500 MG tablet Take 500 mg by mouth every 6 (six) hours as needed.      albuterol 90 mcg/actuation inhaler Inhale 2 puffs into the lungs every 4 (four) hours as needed for Wheezing. 1 each 6    amLODIPine (NORVASC) 5 MG tablet Take 1 tablet (5 mg total) by mouth 2 (two) times daily. 180 tablet 3    atorvastatin (LIPITOR) 40 MG tablet TAKE 1 TABLET EVERY NIGHT 90 tablet 1    betamethasone dipropionate (DIPROLENE) 0.05 % ointment Apply topically 2 (two) times daily. PRN keloid scar. Strong steroid- do Not use on face. 45 g 0    buPROPion (WELLBUTRIN SR) 150 MG TBSR 12 hr tablet Take 1 tablet (150 mg total) by mouth 2 (two) times daily. 60 tablet 6    cholecalciferol, vitamin D3, (VITAMIN D3) 50 mcg (2,000 unit) Cap Take 1 capsule by mouth once daily.      clindamycin phosphate 1% (CLINDAGEL) 1 % gel Apply topically 2 (two) times daily. For acne of chest. 30 g 3    diclofenac sodium (VOLTAREN) 1 % Gel Apply 2 g topically 2 (two) times daily. 60 g 1    digoxin (LANOXIN) 125 mcg tablet TAKE 1 TABLET EVERY DAY 90 tablet 2    diphenhydrAMINE (BENADRYL) 25 mg capsule Take 25 mg by mouth every 6 (six) hours as needed.      ergocalciferol (ERGOCALCIFEROL) 50,000 unit Cap Take 50,000 Units by mouth every 7 days.      flurandrenolide 4 mcg/cm2 Tape Apply to fit area involved. Wear 12 hours/day then remove. Reapply next day.  "1 each 3    fluticasone propionate (FLONASE) 50 mcg/actuation nasal spray 2 sprays (100 mcg total) by Each Nostril route once daily. 16 g 6    hydrOXYzine pamoate (VISTARIL) 25 MG Cap Take 25 mg by mouth.      losartan (COZAAR) 100 MG tablet Take 1 tablet (100 mg total) by mouth once daily. 90 tablet 1    metoprolol succinate (TOPROL-XL) 100 MG 24 hr tablet Take 1 tablet (100 mg total) by mouth once daily. 90 tablet 3    mupirocin (BACTROBAN) 2 % ointment Apply topically 3 (three) times daily. For broken skin. 22 g 1    polyethylene glycol (GLYCOLAX) 17 gram PwPk Take by mouth once daily.      rivaroxaban (XARELTO) 20 mg Tab TAKE 1 TABLET BY MOUTH EVERY DAY WITH DINNER      rOPINIRole (REQUIP) 1 MG tablet       betamethasone valerate 0.1% (VALISONE) 0.1 % Oint Apply topically 2 (two) times daily. 15 g 1    nystatin-triamcinolone (MYCOLOG II) cream Apply to affected area 2 times daily 30 g 1     No current facility-administered medications for this visit.       Past Medical History:   Diagnosis Date    Allergy     Anemia     Arthritis     knees, hands, back    Back pain     pt reports "mild"    Dysphagia, unspecified(787.20)     GERD (gastroesophageal reflux disease)     History of carpal tunnel release of both wrists 3/10/2016    Hyperlipidemia     Hypertension     LGSIL (low grade squamous intraepithelial dysplasia)     HPV +    Mild intermittent asthma without complication 7/28/2020    Obesity     Stroke 02/08/2017     Past Surgical History:   Procedure Laterality Date    CARDIAC DEFIBRILLATOR PLACEMENT  04/2017    Dr. Nuno    CARPAL TUNNEL RELEASE      b/l    CATARACT EXTRACTION EXTRACAPSULAR W/ INTRAOCULAR LENS IMPLANTATION Bilateral 2022    COLONOSCOPY N/A 03/31/2021    Procedure: COLONOSCOPY;  Surgeon: Fozia Dasilva MD;  Location: [facility];  Service: Endoscopy;  Laterality: N/A;    DILATION AND CURETTAGE OF UTERUS      10/2021, no cancer per pt.    FOOT SURGERY Right 05/02/2013    wart removal    TRIGGER " FINGER RELEASE Right     TUBAL LIGATION       Family History   Problem Relation Age of Onset    Hypertension Mother     Hypertension Father     Cancer Father         prostate    Cataracts Father     Glaucoma Father     Diabetes Father     Hypertension Sister     Diabetes Sister     Breast cancer Neg Hx     Colon cancer Neg Hx     Ovarian cancer Neg Hx     Thrombophilia Neg Hx      Social History     Tobacco Use    Smoking status: Never    Smokeless tobacco: Never   Substance Use Topics    Alcohol use: No     Alcohol/week: 0.0 standard drinks    Drug use: No        Review of Systems:  Review of Systems   Constitutional:  Negative for fever.   Skin:  Positive for wound.     OBJECTIVE:     Vital Signs (Most Recent)  Temp: 96.9 °F (36.1 °C) (03/31/23 1030)  Pulse: (!) 54 (03/31/23 1030)  BP: 124/72 (03/31/23 1030)     102.6 kg (226 lb 3.1 oz)     Physical Exam:  Physical Exam  Vitals and nursing note reviewed.   Constitutional:       General: She is not in acute distress.     Appearance: She is not ill-appearing, toxic-appearing or diaphoretic.   HENT:      Head: Normocephalic and atraumatic.      Nose: Nose normal.   Eyes:      General: No scleral icterus.        Right eye: No discharge.         Left eye: No discharge.      Extraocular Movements: Extraocular movements intact.   Cardiovascular:      Rate and Rhythm: Normal rate and regular rhythm.   Pulmonary:      Effort: No respiratory distress.      Breath sounds: No stridor.   Abdominal:      Palpations: Abdomen is soft.      Tenderness: There is no abdominal tenderness.   Musculoskeletal:      Cervical back: No rigidity.   Skin:     General: Skin is warm and dry.      Coloration: Skin is not jaundiced.      Comments: Approx 6-7 cm anterior chest wall irregular transverse keloid with underlying sebaceous cyst   Neurological:      Mental Status: She is alert and oriented to person, place, and time.   Psychiatric:         Mood and Affect: Mood normal.          Behavior: Behavior normal.       Laboratory  WBC   Date Value Ref Range Status   01/17/2023 6.61 3.90 - 12.70 K/uL Final     Hemoglobin   Date Value Ref Range Status   01/17/2023 12.2 12.0 - 16.0 g/dL Final     Hematocrit   Date Value Ref Range Status   01/17/2023 39.6 37.0 - 48.5 % Final     Platelets   Date Value Ref Range Status   01/17/2023 217 150 - 450 K/uL Final     Sodium   Date Value Ref Range Status   01/17/2023 138 136 - 145 mmol/L Final     Potassium   Date Value Ref Range Status   01/17/2023 4.0 3.5 - 5.1 mmol/L Final     Creatinine   Date Value Ref Range Status   01/17/2023 0.9 0.5 - 1.4 mg/dL Final     Alkaline Phosphatase   Date Value Ref Range Status   01/17/2023 90 55 - 135 U/L Final     AST   Date Value Ref Range Status   01/17/2023 12 10 - 40 U/L Final     ALT   Date Value Ref Range Status   01/17/2023 9 (L) 10 - 44 U/L Final      No results found for: HGBA1C    Diagnostic Results:  Result:  Mammo Digital Screening Bilat w/ Akil     History:  Patient is 77 y.o. and is seen for a screening mammogram.        Films Compared:  Compared to: 01/07/2022 Mammo Digital Screening Bilat w/ Akil and 02/28/2020 Mammo Digital Screening Bilat      Findings:   This procedure was performed using tomosynthesis.   Computer-aided detection was utilized in the interpretation of this examination.     The breasts have scattered areas of fibroglandular density. There is no evidence of suspicious masses, microcalcifications or architectural distortion.     Impression:   No mammographic evidence of malignancy.     BI-RADS Category 1: Negative     Recommendation:  Routine screening mammogram in 1 year is recommended.        ASSESSMENT/PLAN:     Nathalie was seen today for other.    Diagnoses and all orders for this visit:    Keloid  -     Ambulatory referral/consult to General Surgery  -     Ambulatory referral/consult to Radiation Oncology; Future    Preoperative testing  -     EKG 12-lead; Future    Preoperative  examination  -     Ambulatory referral/consult to Pre-Admit; Future         Will try to coordinate excision of the keloid follow by same day radiation to the area. I have sent a referral to rad onc as well as a message.  The procedure details were explained to the patient including the option of not doing post-excision radiation, but she would like to the have the radiation done due to the size and discomfort of the keloid. She was informed that she will regardless form a keloid, but this is to minimize its formation.  She will need to hold her Xarelto 2-3 days prior to surgery.    After explaining the risks (including bleeding, infection, impaired wound healing, scarring, damage to other organs, vascular complications, cardiopulmonary complications, and death), benefits, and alternatives, patient verbalized understanding and would like to proceed with surgery. All questions were answered to their satisfaction.      Patient expressed understanding and is in agreement.      Justine Youngblood, DO  General Surgery  [facility]

## 2023-04-05 ENCOUNTER — TELEPHONE (OUTPATIENT)
Dept: RADIATION ONCOLOGY | Facility: CLINIC | Age: 78
End: 2023-04-05
Payer: MEDICARE

## 2023-04-05 NOTE — TELEPHONE ENCOUNTER
Attempted to call the patient to schedule the Rad Onc consult but there was no answer. Left a detailed message along with our direct number to call back to schedule the appt.

## 2023-04-12 NOTE — PROGRESS NOTES
"REFERRING PHYSICIAN:   Justine Li MD    DIAGNOSIS:  Keloid of the anterior chest wall/ sternum    HISTORY OF PRESENT ILLNESS:   Ms. Membreno is a 77-year-old female with history of keloid involving the skin, now with pain and drainage from keloid involving the chestwall wall near the sternum. She has tried steroid in the past without resolution. She is planned for surgical resection followed by postoperative radiation to reduce chance of recurrence.  She is here today for discussion regarding that.    REVIEW OF SYSTEMS:  As above.  In addition, patient denies headaches, visual problems, dizziness, chest pain, shortness of breath, cough, nausea, vomiting, diarrhea, or any new bony pains. Patient also denies easy bruising, skin rashes, or numbness or tingling.    ECO    PAST MEDICAL HISTORY:  Past Medical History:   Diagnosis Date    Allergy     Anemia     Arthritis     knees, hands, back    Back pain     pt reports "mild"    Dysphagia, unspecified(787.20)     GERD (gastroesophageal reflux disease)     History of carpal tunnel release of both wrists 3/10/2016    Hyperlipidemia     Hypertension     LGSIL (low grade squamous intraepithelial dysplasia)     HPV +    Mild intermittent asthma without complication 2020    Obesity     Stroke 2017       PAST SURGICAL HISTORY:  Past Surgical History:   Procedure Laterality Date    CARDIAC DEFIBRILLATOR PLACEMENT  2017    Dr. Nuno    CARPAL TUNNEL RELEASE      b/l    CATARACT EXTRACTION EXTRACAPSULAR W/ INTRAOCULAR LENS IMPLANTATION Bilateral     COLONOSCOPY N/A 2021    Procedure: COLONOSCOPY;  Surgeon: Fozia Dasilva MD;  Location: [facility];  Service: Endoscopy;  Laterality: N/A;    DILATION AND CURETTAGE OF UTERUS      10/2021, no cancer per pt.    FOOT SURGERY Right 2013    wart removal    TRIGGER FINGER RELEASE Right     TUBAL LIGATION         ALLERGIES:   Review of patient's allergies indicates:   Allergen Reactions    " Penicillins Swelling     Generalized swelling  Other reaction(s): Swelling  Generalized swelling  Other reaction(s): Swelling  Generalized swelling       MEDICATIONS:  Current Outpatient Medications   Medication Sig    acetaminophen (TYLENOL) 500 MG tablet Take 500 mg by mouth every 6 (six) hours as needed.    albuterol 90 mcg/actuation inhaler Inhale 2 puffs into the lungs every 4 (four) hours as needed for Wheezing.    amLODIPine (NORVASC) 5 MG tablet Take 1 tablet (5 mg total) by mouth 2 (two) times daily.    atorvastatin (LIPITOR) 40 MG tablet TAKE 1 TABLET EVERY NIGHT    betamethasone dipropionate (DIPROLENE) 0.05 % ointment Apply topically 2 (two) times daily. PRN keloid scar. Strong steroid- do Not use on face.    betamethasone valerate 0.1% (VALISONE) 0.1 % Oint Apply topically 2 (two) times daily.    buPROPion (WELLBUTRIN SR) 150 MG TBSR 12 hr tablet Take 1 tablet (150 mg total) by mouth 2 (two) times daily.    cholecalciferol, vitamin D3, (VITAMIN D3) 50 mcg (2,000 unit) Cap Take 1 capsule by mouth once daily.    clindamycin phosphate 1% (CLINDAGEL) 1 % gel Apply topically 2 (two) times daily. For acne of chest.    diclofenac sodium (VOLTAREN) 1 % Gel Apply 2 g topically 2 (two) times daily.    digoxin (LANOXIN) 125 mcg tablet TAKE 1 TABLET EVERY DAY    diphenhydrAMINE (BENADRYL) 25 mg capsule Take 25 mg by mouth every 6 (six) hours as needed.    ergocalciferol (ERGOCALCIFEROL) 50,000 unit Cap Take 50,000 Units by mouth every 7 days.    flurandrenolide 4 mcg/cm2 Tape Apply to fit area involved. Wear 12 hours/day then remove. Reapply next day.    fluticasone propionate (FLONASE) 50 mcg/actuation nasal spray 2 sprays (100 mcg total) by Each Nostril route once daily.    hydrOXYzine pamoate (VISTARIL) 25 MG Cap Take 25 mg by mouth.    losartan (COZAAR) 100 MG tablet Take 1 tablet (100 mg total) by mouth once daily.    metoprolol succinate (TOPROL-XL) 100 MG 24 hr tablet Take 1 tablet (100 mg total) by mouth  once daily.    mupirocin (BACTROBAN) 2 % ointment Apply topically 3 (three) times daily. For broken skin.    nystatin-triamcinolone (MYCOLOG II) cream Apply to affected area 2 times daily    polyethylene glycol (GLYCOLAX) 17 gram PwPk Take by mouth once daily.    rivaroxaban (XARELTO) 20 mg Tab TAKE 1 TABLET BY MOUTH EVERY DAY WITH DINNER    rOPINIRole (REQUIP) 1 MG tablet      No current facility-administered medications for this visit.       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     Employer: [employer]    Tobacco Use    Smoking status: Never    Smokeless tobacco: Never   Substance and Sexual Activity    Alcohol use: No     Alcohol/week: 0.0 standard drinks    Drug use: No    Sexual activity: Not Currently     Partners: Male     Birth control/protection: None   Social History Narrative    Live alone     Social Determinants of Health     Financial Resource Strain: Low Risk     Difficulty of Paying Living Expenses: Not hard at all   Food Insecurity: No Food Insecurity    Worried About Running Out of Food in the Last Year: Never true    Ran Out of Food in the Last Year: Never true   Transportation Needs: No Transportation Needs    Lack of Transportation (Medical): No    Lack of Transportation (Non-Medical): No   Physical Activity: Inactive    Days of Exercise per Week: 0 days    Minutes of Exercise per Session: 0 min   Stress: Stress Concern Present    Feeling of Stress : To some extent   Social Connections: Moderately Integrated    Frequency of Communication with Friends and Family: More than three times a week    Frequency of Social Gatherings with Friends and Family: More than three times a week    Attends [employer] Services: More than 4 times per year    Active Member of Clubs or Organizations: No    Attends Club or Organization Meetings: More than 4 times per year    Marital Status:  "   Housing Stability: Unknown    Unable to Pay for Housing in the Last Year: No       FAMILY HISTORY:  Family History   Problem Relation Age of Onset    Hypertension Mother     Hypertension Father     Cancer Father         prostate    Cataracts Father     Glaucoma Father     Diabetes Father     Hypertension Sister     Diabetes Sister     Breast cancer Neg Hx     Colon cancer Neg Hx     Ovarian cancer Neg Hx     Thrombophilia Neg Hx          PHYSICAL EXAMINATION:  Vitals:    04/13/23 1246   BP: 136/79   Pulse: 62   Resp: 18   Temp: 97.6 °F (36.4 °C)   SpO2: 97%   Weight: 99.6 kg (219 lb 8 oz)   Height: 5' 4" (1.626 m)   Body mass index is 37.68 kg/m².   GENERAL: Patient is alert and oriented, in no acute distress. She has multiple areas of keloid formation through our the skin  HEENT:Extraocular muscles are intact.  Oropharynx is clear without lesions.  There is no cervical or supraclavicular lymphadenopathy palpated.  No thyromegaly noted.  HEART: Regular rate and rhythm.  LUNGS: Clear to auscultation bilaterally. Large keloid noted in the anterior chest near the sternum with drainage with pain  ABDOMEN:Soft, nontender, nondistended, without hepatosplenomegaly.  Normoactive bowel sounds.  EXTREMITIES: No clubbing, cyanosis, or edema.  NEUROLOGICAL: Cranial nerve II through XII grossly intact.  Sensation is intact.  Strength is 5 out of 5 in the upper and lower extremities bilaterally.       ASSESSMENT:   This is a 77-year-old female with history of keloid of the skin who is planned for resection of a painful keloid involving the anterior cheswall    PLAN:   Ms. Patel is planned to undergo resection of keloid by surgery. The date is not yet set. To reduce the chance of recurrence of the keloid, she is recommended to undergo postoperative radiation to the surgical scar after resection.  I recommend 1800 cGy in 3 fractions. Plan to initiate treatment on post op day 0 -1.    The risks, benefits, and side " effects of radiation were explained in detail to the patient.  All questions were answered and informed consent was signed.  We will coordinate with surgery to start radiation.     Psychosocial Distress screening score of Distress Score: 0 - No Distress noted and reviewed. No intervention indicated.     I spent approximately 60 minutes reviewing the available records and evaluating the patient, out of which over 50% of the time was spent face to face with the patient in counseling and coordinating this patient's care.

## 2023-04-13 ENCOUNTER — OFFICE VISIT (OUTPATIENT)
Dept: RADIATION ONCOLOGY | Facility: CLINIC | Age: 78
End: 2023-04-13
Payer: MEDICARE

## 2023-04-13 VITALS
WEIGHT: 219.5 LBS | HEART RATE: 62 BPM | DIASTOLIC BLOOD PRESSURE: 79 MMHG | BODY MASS INDEX: 37.47 KG/M2 | SYSTOLIC BLOOD PRESSURE: 136 MMHG | OXYGEN SATURATION: 97 % | HEIGHT: 64 IN | RESPIRATION RATE: 18 BRPM | TEMPERATURE: 98 F

## 2023-04-13 DIAGNOSIS — L91.0 KELOID OF SKIN: ICD-10-CM

## 2023-04-13 DIAGNOSIS — L91.0 KELOID: ICD-10-CM

## 2023-04-13 PROCEDURE — 1160F PR REVIEW ALL MEDS BY PRESCRIBER/CLIN PHARMACIST DOCUMENTED: ICD-10-PCS | Mod: CPTII,S$GLB,, | Performed by: RADIOLOGY

## 2023-04-13 PROCEDURE — 1101F PR PT FALLS ASSESS DOC 0-1 FALLS W/OUT INJ PAST YR: ICD-10-PCS | Mod: CPTII,S$GLB,, | Performed by: RADIOLOGY

## 2023-04-13 PROCEDURE — 1126F AMNT PAIN NOTED NONE PRSNT: CPT | Mod: CPTII,S$GLB,, | Performed by: RADIOLOGY

## 2023-04-13 PROCEDURE — 3078F DIAST BP <80 MM HG: CPT | Mod: CPTII,S$GLB,, | Performed by: RADIOLOGY

## 2023-04-13 PROCEDURE — 1101F PT FALLS ASSESS-DOCD LE1/YR: CPT | Mod: CPTII,S$GLB,, | Performed by: RADIOLOGY

## 2023-04-13 PROCEDURE — 3075F SYST BP GE 130 - 139MM HG: CPT | Mod: CPTII,S$GLB,, | Performed by: RADIOLOGY

## 2023-04-13 PROCEDURE — 99999 PR PBB SHADOW E&M-EST. PATIENT-LVL V: ICD-10-PCS | Mod: PBBFAC,,, | Performed by: RADIOLOGY

## 2023-04-13 PROCEDURE — 3075F PR MOST RECENT SYSTOLIC BLOOD PRESS GE 130-139MM HG: ICD-10-PCS | Mod: CPTII,S$GLB,, | Performed by: RADIOLOGY

## 2023-04-13 PROCEDURE — 99203 PR OFFICE/OUTPT VISIT, NEW, LEVL III, 30-44 MIN: ICD-10-PCS | Mod: S$GLB,,, | Performed by: RADIOLOGY

## 2023-04-13 PROCEDURE — 1160F RVW MEDS BY RX/DR IN RCRD: CPT | Mod: CPTII,S$GLB,, | Performed by: RADIOLOGY

## 2023-04-13 PROCEDURE — 99203 OFFICE O/P NEW LOW 30 MIN: CPT | Mod: S$GLB,,, | Performed by: RADIOLOGY

## 2023-04-13 PROCEDURE — 1126F PR PAIN SEVERITY QUANTIFIED, NO PAIN PRESENT: ICD-10-PCS | Mod: CPTII,S$GLB,, | Performed by: RADIOLOGY

## 2023-04-13 PROCEDURE — 3078F PR MOST RECENT DIASTOLIC BLOOD PRESSURE < 80 MM HG: ICD-10-PCS | Mod: CPTII,S$GLB,, | Performed by: RADIOLOGY

## 2023-04-13 PROCEDURE — 3288F PR FALLS RISK ASSESSMENT DOCUMENTED: ICD-10-PCS | Mod: CPTII,S$GLB,, | Performed by: RADIOLOGY

## 2023-04-13 PROCEDURE — 1159F MED LIST DOCD IN RCRD: CPT | Mod: CPTII,S$GLB,, | Performed by: RADIOLOGY

## 2023-04-13 PROCEDURE — 99999 PR PBB SHADOW E&M-EST. PATIENT-LVL V: CPT | Mod: PBBFAC,,, | Performed by: RADIOLOGY

## 2023-04-13 PROCEDURE — 1159F PR MEDICATION LIST DOCUMENTED IN MEDICAL RECORD: ICD-10-PCS | Mod: CPTII,S$GLB,, | Performed by: RADIOLOGY

## 2023-04-13 PROCEDURE — 3288F FALL RISK ASSESSMENT DOCD: CPT | Mod: CPTII,S$GLB,, | Performed by: RADIOLOGY

## 2023-04-24 ENCOUNTER — OFFICE VISIT (OUTPATIENT)
Dept: PRIMARY CARE CLINIC | Facility: CLINIC | Age: 78
End: 2023-04-24
Payer: MEDICARE

## 2023-04-24 VITALS
WEIGHT: 223.19 LBS | HEIGHT: 64 IN | OXYGEN SATURATION: 98 % | BODY MASS INDEX: 38.1 KG/M2 | HEART RATE: 64 BPM | DIASTOLIC BLOOD PRESSURE: 62 MMHG | TEMPERATURE: 98 F | SYSTOLIC BLOOD PRESSURE: 136 MMHG

## 2023-04-24 DIAGNOSIS — Z71.89 ACP (ADVANCE CARE PLANNING): ICD-10-CM

## 2023-04-24 DIAGNOSIS — E78.49 OTHER HYPERLIPIDEMIA: Primary | ICD-10-CM

## 2023-04-24 PROCEDURE — 99214 PR OFFICE/OUTPT VISIT, EST, LEVL IV, 30-39 MIN: ICD-10-PCS | Mod: S$GLB,,, | Performed by: FAMILY MEDICINE

## 2023-04-24 PROCEDURE — 3078F PR MOST RECENT DIASTOLIC BLOOD PRESSURE < 80 MM HG: ICD-10-PCS | Mod: CPTII,S$GLB,, | Performed by: FAMILY MEDICINE

## 2023-04-24 PROCEDURE — 1159F MED LIST DOCD IN RCRD: CPT | Mod: CPTII,S$GLB,, | Performed by: FAMILY MEDICINE

## 2023-04-24 PROCEDURE — 99999 PR PBB SHADOW E&M-EST. PATIENT-LVL V: ICD-10-PCS | Mod: PBBFAC,,, | Performed by: FAMILY MEDICINE

## 2023-04-24 PROCEDURE — 99999 PR PBB SHADOW E&M-EST. PATIENT-LVL V: CPT | Mod: PBBFAC,,, | Performed by: FAMILY MEDICINE

## 2023-04-24 PROCEDURE — 3288F PR FALLS RISK ASSESSMENT DOCUMENTED: ICD-10-PCS | Mod: CPTII,S$GLB,, | Performed by: FAMILY MEDICINE

## 2023-04-24 PROCEDURE — 99214 OFFICE O/P EST MOD 30 MIN: CPT | Mod: S$GLB,,, | Performed by: FAMILY MEDICINE

## 2023-04-24 PROCEDURE — 1123F ACP DISCUSS/DSCN MKR DOCD: CPT | Mod: CPTII,S$GLB,, | Performed by: FAMILY MEDICINE

## 2023-04-24 PROCEDURE — 1123F PR ADV CARE PLAN DISCUSSED, PLAN OR SURROGATE DOCUMENTED: ICD-10-PCS | Mod: CPTII,S$GLB,, | Performed by: FAMILY MEDICINE

## 2023-04-24 PROCEDURE — 1101F PR PT FALLS ASSESS DOC 0-1 FALLS W/OUT INJ PAST YR: ICD-10-PCS | Mod: CPTII,S$GLB,, | Performed by: FAMILY MEDICINE

## 2023-04-24 PROCEDURE — 1160F PR REVIEW ALL MEDS BY PRESCRIBER/CLIN PHARMACIST DOCUMENTED: ICD-10-PCS | Mod: CPTII,S$GLB,, | Performed by: FAMILY MEDICINE

## 2023-04-24 PROCEDURE — 3075F SYST BP GE 130 - 139MM HG: CPT | Mod: CPTII,S$GLB,, | Performed by: FAMILY MEDICINE

## 2023-04-24 PROCEDURE — 3288F FALL RISK ASSESSMENT DOCD: CPT | Mod: CPTII,S$GLB,, | Performed by: FAMILY MEDICINE

## 2023-04-24 PROCEDURE — 1159F PR MEDICATION LIST DOCUMENTED IN MEDICAL RECORD: ICD-10-PCS | Mod: CPTII,S$GLB,, | Performed by: FAMILY MEDICINE

## 2023-04-24 PROCEDURE — 1160F RVW MEDS BY RX/DR IN RCRD: CPT | Mod: CPTII,S$GLB,, | Performed by: FAMILY MEDICINE

## 2023-04-24 PROCEDURE — 3075F PR MOST RECENT SYSTOLIC BLOOD PRESS GE 130-139MM HG: ICD-10-PCS | Mod: CPTII,S$GLB,, | Performed by: FAMILY MEDICINE

## 2023-04-24 PROCEDURE — 3078F DIAST BP <80 MM HG: CPT | Mod: CPTII,S$GLB,, | Performed by: FAMILY MEDICINE

## 2023-04-24 PROCEDURE — 1125F PR PAIN SEVERITY QUANTIFIED, PAIN PRESENT: ICD-10-PCS | Mod: CPTII,S$GLB,, | Performed by: FAMILY MEDICINE

## 2023-04-24 PROCEDURE — 1101F PT FALLS ASSESS-DOCD LE1/YR: CPT | Mod: CPTII,S$GLB,, | Performed by: FAMILY MEDICINE

## 2023-04-24 PROCEDURE — 1125F AMNT PAIN NOTED PAIN PRSNT: CPT | Mod: CPTII,S$GLB,, | Performed by: FAMILY MEDICINE

## 2023-04-24 RX ORDER — ROSUVASTATIN CALCIUM 40 MG/1
40 TABLET, COATED ORAL NIGHTLY
Qty: 90 TABLET | Refills: 3 | Status: SHIPPED | OUTPATIENT
Start: 2023-04-24 | End: 2023-08-01

## 2023-04-24 NOTE — PROGRESS NOTES
Subjective:       Patient ID: Nathalie Membreno is a 77 y.o. female.    Chief Complaint: Follow-up (Three month follow up. Pt is currently waiting for surgery group to reach out to her for removal of keloid on her chest. Pt states the site is tender to the touch and some slight pain. )    HPI:     Here for f/u medical problems and preventive exam.  Walking for exercise, 2-3x per week.  BP at home 135/67.  Prunes working well for BMs.  No f/c/sw/cough.  No cp/sob/palp.  BMs and urine normal.  Denies depression.  Has trouble with weight going up and down. Not currently taking wellbutrin which was prescribed at last visit.  Needs albuterol about twice a year.  Compliant with atorva 40mg daily.    The 10-year ASCVD risk score (Manolo BROOKS, et al., 2019) is: 19.7%    Values used to calculate the score:      Age: 77 years      Sex: Female      Is Non- : Yes      Diabetic: No      Tobacco smoker: No      Systolic Blood Pressure: 136 mmHg      Is BP treated: Yes      HDL Cholesterol: 57 mg/dL      Total Cholesterol: 228 mg/dL      1/23 BMD:  FINDINGS:  The L1 to L4 vertebral bone mineral density is equal to 1.282 g/cm squared with a T score of 0.8.  There has been no significant change relative to the prior study.     The left femoral neck bone mineral density is equal to 0.985 g/cm squared with a T score of -0.4.  There has been  no significant change relative to the prior study.     Impression:     No evidence of significant bone density loss       HM: 9/22 fluvax, 3/21 covid vaccines, 6/19 HAV, 6/15 [id number], 11/16 booster [id number], 1/23 today [id number], 9/14 TDaP, 1/23 BMD rep 5y, 3/21 Cscope rep 3y, 1/23 MMG/me, 10/20 Gyn Dr. Holden, 7/20 Eye Dr. Fiore, 11/16 HCV neg, Card Dr[facility] Le[facility].    Advance Care Planning     Date: 04/24/2023    Living Will  During this visit, I engaged the patient  in the advance care planning process.  The patient and I reviewed the role for advance  directives and their purpose in directing future healthcare if the patient's unable to speak for him/herself.  At this point in time, the patient does have full decision-making capacity.  We discussed different extreme health states that she could experience, and reviewed what kind of medical care she would want in those situations.  The patient communicated that if she were comatose and had little chance of a meaningful recovery, she would not want machines/life-sustaining treatments used. In addition to the above preference, other important end-of-life issues for the patient include . The patient has completed a living will to reflect these preferences.         Power of   I initiated the process of advance care planning today and explained the importance of this process to the patient.  I introduced the concept of advance directives to the patient, as well. Then the patient received detailed information about the importance of designating a Health Care Power of  (HCPOA). She was also instructed to communicate with this person about their wishes for future healthcare, should she become sick and lose decision-making capacity. The patient has not previously appointed a HCPOA. After our discussion, the patient has decided to complete a HCPOA and has appointed her son, health care agent:  Almas Ochoaridge  & health care agent number:  [phone]            Objective:     Vitals:    04/24/23 1431   BP: 136/62   Pulse: 64   Temp: 98.2 °F (36.8 °C)     Wt Readings from Last 3 Encounters:   04/24/23 101.2 kg (223 lb 3.2 oz)   04/13/23 99.6 kg (219 lb 8 oz)   03/31/23 102.6 kg (226 lb 3.1 oz)     Temp Readings from Last 3 Encounters:   04/24/23 98.2 °F (36.8 °C) (Oral)   04/13/23 97.6 °F (36.4 °C)   03/31/23 96.9 °F (36.1 °C) (Temporal)     BP Readings from Last 3 Encounters:   04/24/23 136/62   04/13/23 136/79   03/31/23 124/72     Pulse Readings from Last 3 Encounters:   04/24/23 64   04/13/23 62    03/31/23 (!) 54          Physical Exam  Vitals and nursing note reviewed.   Constitutional:       Appearance: She is well-developed. She is obese.   HENT:      Head: Normocephalic and atraumatic.   Eyes:      Pupils: Pupils are equal, round, and reactive to light.   Cardiovascular:      Rate and Rhythm: Normal rate and regular rhythm.   Pulmonary:      Effort: Pulmonary effort is normal.      Breath sounds: Normal breath sounds.   Musculoskeletal:         General: No deformity. Normal range of motion.      Cervical back: Normal range of motion and neck supple.   Skin:     General: Skin is warm and dry.   Neurological:      Mental Status: She is alert and oriented to person, place, and time.   Psychiatric:         Behavior: Behavior normal.           Assessment:       1. Other hyperlipidemia    2. ACP (advance care planning)        Plan:           Problem List Items Addressed This Visit          Cardiac/Vascular    Other hyperlipidemia - Primary    Relevant Medications    rosuvastatin (CRESTOR) 40 MG Tab    Other Relevant Orders    Lipid Panel       Palliative Care    ACP (advance care planning)    Overview     Advance Care Planning     Date: 04/24/2023  Completed HCPOA and living will to reflect preferences. No artificial nutrition and life support if not expected to recover              Lots of counseling today.     Start eating more vegetables and less meat.     Keep a food journal so we can build awareness of what you're eating.     Let us know when you're done with your surgery so we can get you in the gym! We'd love to set you up with our .

## 2023-04-24 NOTE — PATIENT INSTRUCTIONS
Start eating more vegetables and less meat.     Keep a food journal so we can build awareness of what you're eating.     Let us know when you're done with your surgery so we can get you in the gym! We'd love to set you up with our .

## 2023-05-04 ENCOUNTER — TELEPHONE (OUTPATIENT)
Dept: PRIMARY CARE CLINIC | Facility: CLINIC | Age: 78
End: 2023-05-04
Payer: MEDICARE

## 2023-05-12 ENCOUNTER — TELEPHONE (OUTPATIENT)
Dept: SURGERY | Facility: CLINIC | Age: 78
End: 2023-05-12
Payer: MEDICARE

## 2023-05-12 RX ORDER — ONDANSETRON 2 MG/ML
4 INJECTION INTRAMUSCULAR; INTRAVENOUS EVERY 12 HOURS PRN
Status: CANCELLED | OUTPATIENT
Start: 2023-05-12

## 2023-05-12 RX ORDER — SODIUM CHLORIDE, SODIUM LACTATE, POTASSIUM CHLORIDE, CALCIUM CHLORIDE 600; 310; 30; 20 MG/100ML; MG/100ML; MG/100ML; MG/100ML
INJECTION, SOLUTION INTRAVENOUS CONTINUOUS
Status: CANCELLED | OUTPATIENT
Start: 2023-05-12

## 2023-05-12 RX ORDER — SODIUM CHLORIDE 9 MG/ML
INJECTION, SOLUTION INTRAVENOUS CONTINUOUS
Status: CANCELLED | OUTPATIENT
Start: 2023-05-12

## 2023-05-12 NOTE — TELEPHONE ENCOUNTER
Returned patient's callback. Patient states that someone called patient to set up surgery with Dr. Youngblood. Will send a message to Mirna the surgery scheduler. Patient verbalized understanding.

## 2023-05-16 ENCOUNTER — TELEPHONE (OUTPATIENT)
Dept: SURGERY | Facility: CLINIC | Age: 78
End: 2023-05-16
Payer: MEDICARE

## 2023-05-16 DIAGNOSIS — Z01.818 PREOPERATIVE TESTING: ICD-10-CM

## 2023-05-16 DIAGNOSIS — L91.0 KELOID: Primary | ICD-10-CM

## 2023-05-16 NOTE — TELEPHONE ENCOUNTER
Returned call during conversation preop testing was scheduled. Encouraged pt to contact our office with any questions/concerns. Pt verbalized an understanding

## 2023-05-16 NOTE — TELEPHONE ENCOUNTER
Follow up call re: upcoming sx (6/21/23) and preop instructions. No ans left message via vm to call back and call back number was provided.

## 2023-06-01 ENCOUNTER — HOSPITAL ENCOUNTER (OUTPATIENT)
Dept: RADIATION THERAPY | Facility: HOSPITAL | Age: 78
Discharge: HOME | End: 2023-06-01
Attending: RADIOLOGY
Payer: MEDICARE

## 2023-06-01 ENCOUNTER — TELEPHONE (OUTPATIENT)
Dept: RADIATION ONCOLOGY | Facility: CLINIC | Age: 78
End: 2023-06-01
Payer: MEDICARE

## 2023-06-16 ENCOUNTER — HOSPITAL ENCOUNTER (OUTPATIENT)
Dept: CARDIOLOGY | Facility: HOSPITAL | Age: 78
Discharge: HOME | End: 2023-06-16
Attending: SURGERY
Payer: MEDICARE

## 2023-06-16 ENCOUNTER — HOSPITAL ENCOUNTER (OUTPATIENT)
Dept: RADIOLOGY | Facility: HOSPITAL | Age: 78
Discharge: HOME | End: 2023-06-16
Attending: SURGERY
Payer: MEDICARE

## 2023-06-16 ENCOUNTER — OFFICE VISIT (OUTPATIENT)
Dept: INTERNAL MEDICINE | Facility: CLINIC | Age: 78
End: 2023-06-16
Payer: MEDICARE

## 2023-06-16 VITALS
SYSTOLIC BLOOD PRESSURE: 131 MMHG | HEART RATE: 64 BPM | TEMPERATURE: 97 F | OXYGEN SATURATION: 94 % | DIASTOLIC BLOOD PRESSURE: 81 MMHG

## 2023-06-16 DIAGNOSIS — L91.0 KELOID OF SKIN: ICD-10-CM

## 2023-06-16 DIAGNOSIS — Z01.818 PREOPERATIVE EXAMINATION: ICD-10-CM

## 2023-06-16 DIAGNOSIS — Z01.818 PREOPERATIVE TESTING: ICD-10-CM

## 2023-06-16 DIAGNOSIS — I10 ESSENTIAL HYPERTENSION: ICD-10-CM

## 2023-06-16 DIAGNOSIS — L91.0 KELOID: ICD-10-CM

## 2023-06-16 DIAGNOSIS — I48.92 ATRIAL FLUTTER WITH RAPID VENTRICULAR RESPONSE: ICD-10-CM

## 2023-06-16 PROCEDURE — 71046 XR CHEST PA AND LATERAL: ICD-10-PCS | Mod: 26,,, | Performed by: RADIOLOGY

## 2023-06-16 PROCEDURE — 71046 X-RAY EXAM CHEST 2 VIEWS: CPT | Mod: 26,,, | Performed by: RADIOLOGY

## 2023-06-16 PROCEDURE — 93010 ELECTROCARDIOGRAM REPORT: CPT | Mod: ,,, | Performed by: INTERNAL MEDICINE

## 2023-06-16 PROCEDURE — 99999 PR PBB SHADOW E&M-EST. PATIENT-LVL IV: CPT | Mod: PBBFAC,,,

## 2023-06-16 PROCEDURE — 93005 ELECTROCARDIOGRAM TRACING: CPT

## 2023-06-16 PROCEDURE — 99999 PR PBB SHADOW E&M-EST. PATIENT-LVL IV: ICD-10-PCS | Mod: PBBFAC,,,

## 2023-06-16 PROCEDURE — 93010 EKG 12-LEAD: ICD-10-PCS | Mod: ,,, | Performed by: INTERNAL MEDICINE

## 2023-06-16 PROCEDURE — 71046 X-RAY EXAM CHEST 2 VIEWS: CPT | Mod: TC

## 2023-06-16 NOTE — ASSESSMENT & PLAN NOTE
Planned for Keloid Excision with Dr. Justine Youngblood on 6/21/23.     --Morning of Procedure: amlodipine, metoprolol, digoxin  --Hold: Losartan, resume all medications post-operatively  --Hold ASA, NSAIDs and all vitamins/supplements 7 days prior to procedure  --Hold Xarelto 2 days prior to surgery- last Xarelto: 6/18/23

## 2023-06-16 NOTE — PROGRESS NOTES
"                                               Preoperative History and Physical                                                              Hospital Medicine                                                                      Chief Complaint: Preoperative evaluation     History of Present Illness:      Nathalie Membreno is a 77 y.o. female who presents to the office today for a preoperative consultation at the request of Dr. Justine Youngblood who plans on performing Keloid Excision on June 21.     Functional Status:      The patient is able to climb a flight of stairs. The patient is able to ambulate without difficulty. The patient's functional status is not affected by the surgical problem. The patient's functional status is not affected by shortness of breath, chest pain, dyspnea on exertion and fatigue.    MET score greater than 4    Past Medical History:      Past Medical History:   Diagnosis Date    Allergy     Anemia     Arthritis     knees, hands, back    Back pain     pt reports "mild"    Dysphagia, unspecified(787.20)     GERD (gastroesophageal reflux disease)     History of carpal tunnel release of both wrists 03/10/2016    Hyperlipidemia     Hypertension     LGSIL (low grade squamous intraepithelial dysplasia)     HPV +    Mild intermittent asthma without complication 07/28/2020    Obesity     Stroke 02/08/2017        Past Surgical History:      Past Surgical History:   Procedure Laterality Date    CARDIAC DEFIBRILLATOR PLACEMENT  04/2017    Dr. Nuno    CARPAL TUNNEL RELEASE      b/l    CATARACT EXTRACTION EXTRACAPSULAR W/ INTRAOCULAR LENS IMPLANTATION Bilateral 2022    COLONOSCOPY N/A 03/31/2021    Procedure: COLONOSCOPY;  Surgeon: Fozia Dasilva MD;  Location: [facility];  Service: Endoscopy;  Laterality: N/A;    DILATION AND CURETTAGE OF UTERUS      10/2021, no cancer per pt.    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     Employer: [employer]    Tobacco Use    Smoking status: Never    Smokeless tobacco: Never   Substance and Sexual Activity    Alcohol use: No     Alcohol/week: 0.0 standard drinks    Drug use: No    Sexual activity: Not Currently     Partners: Male     Birth control/protection: None   Social History Narrative    Live alone     Social Determinants of Health     Financial Resource Strain: Low Risk     Difficulty of Paying Living Expenses: Not hard at all   Food Insecurity: No Food Insecurity    Worried About Running Out of Food in the Last Year: Never true    Ran Out of Food in the Last Year: Never true   Transportation Needs: No Transportation Needs    Lack of Transportation (Medical): No    Lack of Transportation (Non-Medical): No   Physical Activity: Inactive    Days of Exercise per Week: 0 days    Minutes of Exercise per Session: 0 min   Stress: Stress Concern Present    Feeling of Stress : To some extent   Social Connections: Moderately Integrated    Frequency of Communication with Friends and Family: More than three times a week    Frequency of Social Gatherings with Friends and Family: More than three times a week    Attends Congregation Services: More than 4 times per year    Active Member of Clubs or Organizations: No    Attends Club or Organization Meetings: More than 4 times per year    Marital Status:    Housing Stability: Unknown    Unable to Pay for Housing in the Last Year: No        Family History:      Family History   Problem Relation Age of Onset    Hypertension Mother     Hypertension Father     Cancer Father         prostate    Cataracts Father     Glaucoma Father     Diabetes Father     Hypertension Sister     Diabetes Sister     Breast cancer Neg Hx     Colon cancer Neg Hx     Ovarian cancer Neg Hx     Thrombophilia Neg Hx         Allergies:      Review of patient's allergies indicates:   Allergen Reactions    Penicillins Swelling     Generalized swelling  Other reaction(s): Swelling  Generalized swelling  Other reaction(s): Swelling  Generalized swelling       Medications:      Current Outpatient Medications   Medication Sig    amLODIPine (NORVASC) 5 MG tablet Take 1 tablet (5 mg total) by mouth 2 (two) times daily.    digoxin (LANOXIN) 125 mcg tablet TAKE 1 TABLET EVERY DAY    ergocalciferol (ERGOCALCIFEROL) 50,000 unit Cap Take 50,000 Units by mouth every 7 days.    losartan (COZAAR) 100 MG tablet Take 1 tablet (100 mg total) by mouth once daily.    metoprolol succinate (TOPROL-XL) 100 MG 24 hr tablet Take 1 tablet (100 mg total) by mouth once daily.    polyethylene glycol (GLYCOLAX) 17 gram PwPk Take by mouth once daily.    rivaroxaban (XARELTO) 20 mg Tab TAKE 1 TABLET BY MOUTH EVERY DAY WITH DINNER    acetaminophen (TYLENOL) 500 MG tablet Take 500 mg by mouth every 6 (six) hours as needed.    albuterol 90 mcg/actuation inhaler Inhale 2 puffs into the lungs every 4 (four) hours as needed for Wheezing.    betamethasone dipropionate (DIPROLENE) 0.05 % ointment Apply topically 2 (two) times daily. PRN keloid scar. Strong steroid- do Not use on face.    betamethasone valerate 0.1% (VALISONE) 0.1 % Oint Apply topically 2 (two) times daily.    buPROPion (WELLBUTRIN SR) 150 MG TBSR 12 hr tablet Take 1 tablet (150 mg total) by mouth 2 (two) times daily.    cholecalciferol, vitamin D3, (VITAMIN D3) 50 mcg (2,000 unit) Cap Take 1 capsule by mouth once daily.    clindamycin phosphate 1% (CLINDAGEL) 1 % gel Apply topically 2 (two) times daily. For acne of chest.    diclofenac sodium (VOLTAREN) 1 % Gel Apply 2 g topically 2 (two) times daily.    diphenhydrAMINE (BENADRYL) 25 mg capsule Take 25 mg by mouth every 6 (six) hours as needed.    flurandrenolide 4 mcg/cm2 Tape Apply to fit area involved. Wear 12 hours/day then remove. Reapply next day.     fluticasone propionate (FLONASE) 50 mcg/actuation nasal spray 2 sprays (100 mcg total) by Each Nostril route once daily.    hydrOXYzine pamoate (VISTARIL) 25 MG Cap Take 25 mg by mouth.    mupirocin (BACTROBAN) 2 % ointment Apply topically 3 (three) times daily. For broken skin.    nystatin-triamcinolone (MYCOLOG II) cream Apply to affected area 2 times daily    rOPINIRole (REQUIP) 1 MG tablet     rosuvastatin (CRESTOR) 40 MG Tab Take 1 tablet (40 mg total) by mouth every evening.     No current facility-administered medications for this visit.       Vitals:      Vitals:    06/16/23 1002   BP: 131/81   Pulse: 64   Temp: 97.3 °F (36.3 °C)       Review of Systems:        Constitutional: Negative for fever, chills, weight loss, malaise/fatigue and diaphoresis.   HENT: Negative for hearing loss, ear pain, nosebleeds, congestion, sore throat, neck pain, tinnitus and ear discharge.    Eyes: Negative for blurred vision, double vision, photophobia, pain, discharge and redness.   Respiratory: Negative for cough, hemoptysis, sputum production, shortness of breath, wheezing and stridor.    Cardiovascular: Negative for chest pain, palpitations, orthopnea, claudication, leg swelling and PND.   Gastrointestinal: Negative for heartburn, nausea, vomiting, abdominal pain, diarrhea, constipation, blood in stool and melena.   Genitourinary: Negative for dysuria, urgency, frequency, hematuria and flank pain.   Musculoskeletal: Negative for myalgias, back pain, joint pain and falls.   Skin: Negative for itching and rash.   Neurological: Negative for dizziness, tingling, tremors, sensory change, speech change, focal weakness, seizures, loss of consciousness, weakness and headaches.   Endo/Heme/Allergies: Negative for environmental allergies and polydipsia. Does not bruise/bleed easily.   Psychiatric/Behavioral: Negative for depression, suicidal ideas, hallucinations, memory loss and substance abuse. The patient is not nervous/anxious  and does not have insomnia.    All 14 systems reviewed and negative except as noted above.    Physical Exam:      Constitutional: Appears well-developed, well-nourished and in no acute distress.  Patient is oriented to person, place, and time.   Head: Normocephalic and atraumatic. Mucous membranes moist.  Neck: Neck supple no mass.   Cardiovascular: Normal rate and regular rhythm.  S1 S2 appreciated by ascultation.  Pulmonary/Chest: Effort normal and clear to auscultation bilaterally. No respiratory distress.   Abdomen: Soft. Non-tender and non-distended. Bowel sounds are normal.   Neurological: Patient is alert and oriented to person, place and time. Moves all extremities.  Skin: Warm and dry. No lesions.  Extremities: No clubbing, cyanosis or edema.    Laboratory data:      Reviewed and noted in plan where applicable. Please see chart for full laboratory data.    LABS: CBC, CMP pending  Predictors of intubation difficulty:       Morbid obesity? no   Anatomically abnormal facies? no   Prominent incisors? no   Receding mandible? no   Short, thick neck? no   Neck range of motion: normal   Dentition: No chipped, loose, or missing teeth.  Based on the Modified Mallampati, patient is a mallampati score: II (hard and soft palate, upper portion of tonsils anduvula visible)    Cardiographics:      ECG: Sinus bradycardia   ST and T wave abnormality, consider inferolateral ischemia   Abnormal ECG   Echocardiogram: not indicated    Imaging:      Chest x-ray: normal and reviewed by myself    Assessment and Plan:      Preoperative examination  Known risk factors for perioperative complications: None    Difficulty with intubation is not anticipated.    Cardiac Risk Estimation: Based on the Revised Cardiac Risk index, patient is a Class 1 risk with a 3.9% risk of a major cardiac event in a low risk procedure.    1.) Preoperative workup as follows: hemoglobin, hematocrit, electrolytes, creatinine, glucose, liver function studies,  coagulation studies.  2.) Change in medication regimen before surgery: discontinue ASA, NSAIDs 7 days before surgery, hold Metformin 24 hours prior to surgery.  3.) Prophylaxis for cardiac events with perioperative beta-blockers: not indicated.  4.) Invasive hemodynamic monitoring perioperatively: not indicated.  5.) Deep vein thrombosis prophylaxis postoperatively: intermittent pneumatic compression boots and regimen to be chosen by surgical team.  6.) Surveillance for postoperative MI with ECG immediately postoperatively and on postoperati ve days 1 and 2 AND troponin levels 24 hours postoperatively and on day 4 or hospital discharge (whichever comes first): not indicated.  7.) Current medications which may produce withdrawal symptoms if withheld perioperatively: None  8.) Other measures: None    Keloid of skin  Planned for Keloid Excision with Dr. Justine Youngblood on 6/21/23.     --Morning of Procedure: amlodipine, metoprolol, digoxin  --Hold: Losartan, resume all medications post-operatively  --Hold ASA, NSAIDs and all vitamins/supplements 7 days prior to procedure  --Hold Xarelto 2 days prior to surgery- last Xarelto: 6/18/23    Essential hypertension  Chronic, well controlled.   OP regimen: Losartan, amlodipine, metoprolol    --See medication recommendations as above    Atrial flutter with rapid ventricular response  Chronic, well controlled.   OP regimen: Metoprolol, Digoxin    --See medication recommendations as above        Electronically signed by Maria Antonia Curry NP on 6/16/2023 at 10:14 AM.

## 2023-06-16 NOTE — ASSESSMENT & PLAN NOTE
Known risk factors for perioperative complications: None    Difficulty with intubation is not anticipated.    Cardiac Risk Estimation: Based on the Revised Cardiac Risk index, patient is a Class 1 risk with a 3.9% risk of a major cardiac event in a low risk procedure.    1.) Preoperative workup as follows: hemoglobin, hematocrit, electrolytes, creatinine, glucose, liver function studies, coagulation studies.  2.) Change in medication regimen before surgery: discontinue ASA, NSAIDs 7 days before surgery, hold Metformin 24 hours prior to surgery.  3.) Prophylaxis for cardiac events with perioperative beta-blockers: not indicated.  4.) Invasive hemodynamic monitoring perioperatively: not indicated.  5.) Deep vein thrombosis prophylaxis postoperatively: intermittent pneumatic compression boots and regimen to be chosen by surgical team.  6.) Surveillance for postoperative MI with ECG immediately postoperatively and on postoperati ve days 1 and 2 AND troponin levels 24 hours postoperatively and on day 4 or hospital discharge (whichever comes first): not indicated.  7.) Current medications which may produce withdrawal symptoms if withheld perioperatively: None  8.) Other measures: None

## 2023-06-16 NOTE — ASSESSMENT & PLAN NOTE
Chronic, well controlled.   OP regimen: Losartan, amlodipine, metoprolol    --See medication recommendations as above

## 2023-06-16 NOTE — ASSESSMENT & PLAN NOTE
Chronic, well controlled.   OP regimen: Metoprolol, Digoxin    --See medication recommendations as above

## 2023-06-16 NOTE — DISCHARGE INSTRUCTIONS
Pre op instructions reviewed with patient during Clinic Visit with Provider, verbalized understanding.    To confirm, Surgery is scheduled on 6/21/23. We will call you late afternoon the business day prior to surgery with your arrival time.    *Please report to the [facility] Lobby (1st Floor) located off of [facility] (2nd Entrance/Building on the left, in front of the flag pole).  Address: [address] [place] [address] [place] [address]     INSTRUCTIONS IMPORTANT!!!  Do Not Eat, Drink, or Smoke after 12 midnight unless instructed otherwise by your Surgeon. OK to brush teeth, no gum, candy or mints!      *Take Only these medications with a small sip of water Morning of Surgery as instructed by Maria Antonia Curry NP:  amlodipine, metoprolol            Blood Thinners: Stop taking Xarelto 2 days,(last dose: 6/18/23), prior to surgery per Physician Instructions! Call your Surgeon office to inquire about any questions regarding your blood thinner medication.    HOLD THE FOLLOWING MEDICATIONS, PRIOR TO SURGERY, AS OF TODAY:  - VITAMIN D  - VOLTAREN GEL    ____  HOLD all vitamins, herbal supplements, aspirin products & NSAIDS 7 days prior to surgery, as these can thin the blood.  ____  Avoid Alcoholic beverages 3 days prior to surgery, as it can thin the blood.  ____  NO Acrylic/fake nails or nail polish worn day of surgery (specifically hand/arm & foot surgeries).  ____  NO powder, lotions, deodorants, oils or cream on body.  ____  Remove all jewelry & piercings prior to surgery.  ____  Remove Dentures, Hearing Aids & Contact Lens prior to surgery.  ____  Bring photo ID and insurance information to hospital (Leave Valuables at Home).  ____  If going home the same day, arrange for a ride home. You will not be able to drive for 24 hrs if Anesthesia was used.   ____  Wear clean, loose fitting clothing to allow for dressings/ bandages.            Bathing Instructions:       -Do not shave your face or body 3 days prior to  surgery.              -Shower & Rinse your body as usual with anti-bacterial Soap (Dial), on the evening prior to surgery and the morning of surgery.               -With your hand, apply one packet of Hibiclens soap to the surgical site.               -Wash the site gently for 5 full minutes. Do Not scrub your skin too hard.               -Rinse your body thoroughly.                -Pat yourself day with a clean, soft towel.               -Do not use lotion, cream, powder or deodorant               -Dress in clean clothes      [facility] Visitor/Ride Policy:  Only 2 adults allowed in pre op/recovery area during your procedure. You MUST HAVE A RIDE HOME from a responsible adult that you know and trust. Medical Transport, Uber or Lyft can ONLY be used if patient has a responsible adult to accompany them during ride home.    Discharge Instructions: You will receive Post-op/Discharge instructions by your Discharge Nurse prior to going home. Please call your Surgeon's office with any post-surgery questions/concerns @ [phone].    *If you are running late or have questions the morning of surgery, please call the Surgery Dept @ [phone].  *Insurance/ Financial Questions, please call [phone].  *If you need to Cancel/Reschedule Surgery, please call Surgeon office @ [phone].      Thank you,  -[facility] Pre Admit Dept.  [phone] Aubree PARISI  or [phone]  M-F 7:30 am-4:00 pm (Closed Major Holidays)    Additional Testing Scheduled Today:  EKG (4TH Floor)                                                                    LABS (1ST Floor) and                                                     CHEST X-RAY (1ST Floor),                                                            ** ALWAYS Check in at the  **

## 2023-06-16 NOTE — PRE ADMISSION SCREENING
Pre op instructions reviewed with patient during Clinic Visit with Provider, verbalized understanding.    To confirm, Surgery is scheduled on 6/21/23. We will call you late afternoon the business day prior to surgery with your arrival time.    *Please report to the [facility] (1st Floor) located off of [facility] (2nd Entrance/Building on the left, in front of the flag pole).  Address: [address] [place] [address] [place]. [address]     INSTRUCTIONS IMPORTANT!!!  Do Not Eat, Drink, or Smoke after 12 midnight unless instructed otherwise by your Surgeon. OK to brush teeth, no gum, candy or mints!      *Take Only these medications with a small sip of water Morning of Surgery as instructed by Maria Antonia Curry NP:  amlodipine, metoprolol            Blood Thinners: Stop taking Xarelto 2 days,(last dose: 6/18/23), prior to surgery per Physician Instructions! Call your Surgeon office to inquire about any questions regarding your blood thinner medication.    HOLD THE FOLLOWING MEDICATIONS, PRIOR TO SURGERY, AS OF TODAY:  - VITAMIN D  - VOLTAREN GEL    ____  HOLD all vitamins, herbal supplements, aspirin products & NSAIDS 7 days prior to surgery, as these can thin the blood.  ____  Avoid Alcoholic beverages 3 days prior to surgery, as it can thin the blood.  ____  NO Acrylic/fake nails or nail polish worn day of surgery (specifically hand/arm & foot surgeries).  ____  NO powder, lotions, deodorants, oils or cream on body.  ____  Remove all jewelry & piercings prior to surgery.  ____  Remove Dentures, Hearing Aids & Contact Lens prior to surgery.  ____  Bring photo ID and insurance information to hospital (Leave Valuables at Home).  ____  If going home the same day, arrange for a ride home. You will not be able to drive for 24 hrs if Anesthesia was used.   ____  Wear clean, loose fitting clothing to allow for dressings/ bandages.            Bathing Instructions:       -Do not shave your face or body 3 days prior to  surgery.              -Shower & Rinse your body as usual with anti-bacterial Soap (Dial), on the evening prior to surgery and the morning of surgery.               -With your hand, apply one packet of Hibiclens soap to the surgical site.               -Wash the site gently for 5 full minutes. Do Not scrub your skin too hard.               -Rinse your body thoroughly.                -Pat yourself day with a clean, soft towel.               -Do not use lotion, cream, powder or deodorant               -Dress in clean clothes      [facility] Visitor/Ride Policy:  Only 2 adults allowed in pre op/recovery area during your procedure. You MUST HAVE A RIDE HOME from a responsible adult that you know and trust. Medical Transport, Uber or Lyft can ONLY be used if patient has a responsible adult to accompany them during ride home.    Discharge Instructions: You will receive Post-op/Discharge instructions by your Discharge Nurse prior to going home. Please call your Surgeon's office with any post-surgery questions/concerns @ [phone].    *If you are running late or have questions the morning of surgery, please call the Surgery Dept @ [phone].  *Insurance/ Financial Questions, please call [phone].  *If you need to Cancel/Reschedule Surgery, please call Surgeon office @ [phone].      Thank you,  -[facility] Pre Admit Dept.  [phone] Aubree PARISI  or [phone]  M-F 7:30 am-4:00 pm (Closed Major Holidays)    Additional Testing Scheduled Today:  EKG (4TH Floor)                                                                    LABS (1ST Floor) and                                                     CHEST X-RAY (1ST Floor),                                                            ** ALWAYS Check in at the  **

## 2023-06-20 ENCOUNTER — TELEPHONE (OUTPATIENT)
Dept: PREADMISSION TESTING | Facility: HOSPITAL | Age: 78
End: 2023-06-20
Payer: MEDICARE

## 2023-06-20 ENCOUNTER — ANESTHESIA EVENT (OUTPATIENT)
Dept: SURGERY | Facility: HOSPITAL | Age: 78
End: 2023-06-20
Payer: MEDICARE

## 2023-06-20 NOTE — TELEPHONE ENCOUNTER
Called and spoke with Pt about the following:     Please arrive to [facility] ([facility] at 5:30 am on 6/21/23 for your scheduled procedure.  Address: [address] [place] [address] [place]. [address] (2nd Building on left, 1st Floor Lobby)  >>>NO eating or drinking after midnight unless instructed otherwise by your Surgeon<<<

## 2023-06-20 NOTE — ANESTHESIA PREPROCEDURE EVALUATION
"                                                                                                             06/20/2023  Nathalie Membreno is a 77 y.o., female     Patient Active Problem List   Diagnosis    Essential hypertension    Other hyperlipidemia    GERD (gastroesophageal reflux disease)    Allergy    Lumbar spinal stenosis    Vitamin D deficiency    Bilateral carpal tunnel syndrome    Tortuous aorta    Severe obesity with body mass index (BMI) of 35.0 to 39.9 with comorbidity    Atrial flutter with rapid ventricular response    Diverticulosis of large intestine without hemorrhage    History of colon polyps    Slow transit constipation    History of TIA (transient ischemic attack)    Presence of cardiac defibrillator    Chronic anticoagulation    Mild intermittent asthma without complication    Endometrial thickening on ultrasound    Fibroid tumor    RLS (restless legs syndrome)    Decreased strength, endurance, and mobility    Decreased functional activity tolerance    Decreased range of motion    Balance problem    Keloid of skin    ACP (advance care planning)    Preoperative examination     Past Medical History:   Diagnosis Date    Allergy     Anemia     Arthritis     knees, hands, back    Back pain     pt reports "mild"    Dysphagia, unspecified(787.20)     GERD (gastroesophageal reflux disease)     History of carpal tunnel release of both wrists 03/10/2016    Hyperlipidemia     Hypertension     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 Nuno    CARPAL TUNNEL RELEASE      b/l    CATARACT EXTRACTION EXTRACAPSULAR W/ INTRAOCULAR LENS IMPLANTATION Bilateral 2022    COLONOSCOPY N/A 03/31/2021    Procedure: COLONOSCOPY;  Surgeon: Fozia Dasilva, " MD;  Location[facility];  Service: Endoscopy;  Laterality: N/A;    DILATION AND CURETTAGE OF UTERUS      10/2021, no cancer per pt.    FOOT SURGERY Right 05/02/2013    wart removal    TRIGGER FINGER RELEASE Right     TUBAL LIGATION       TTE (2021):  CONCLUSION   1. Low risk treadmill stress echocardiogram.   2. Normal blood pressure response.   3. Below average exercise tolerance for age and gender.   Patient walked for 3 minutes of a Darrian protocol with 4 to 5 minutes   expected for age.  Had a peak heart rate of 106 with a target heart rate   of 123.  Did not experience any chest pain.  She did have EKG changes or   essentially worsening of her baseline EKG.  Echo images were unremarkable.   [facility]      Chemistry        Component Value Date/Time     06/16/2023 1047    K 4.4 06/16/2023 1047     06/16/2023 1047    CO2 25 06/16/2023 1047    BUN 14 06/16/2023 1047    CREATININE 1.0 06/16/2023 1047    GLU 92 06/16/2023 1047        Component Value Date/Time    CALCIUM 9.2 06/16/2023 1047    ALKPHOS 82 06/16/2023 1047    AST 14 06/16/2023 1047    ALT 9 (L) 06/16/2023 1047    BILITOT 0.5 06/16/2023 1047    ESTGFRAFRICA >60.0 01/25/2022 0858    EGFRNONAA >60.0 01/25/2022 0858        Lab Results   Component Value Date    WBC 6.06 06/16/2023    HGB 13.5 06/16/2023    HCT 43.1 06/16/2023    MCV 98 06/16/2023     06/16/2023       Pre-op Assessment    I have reviewed the Patient Summary Reports.    I have reviewed the NPO Status.   I have reviewed the Medications.     Review of Systems  Anesthesia Hx:  No problems with previous Anesthesia  History of prior surgery of interest to airway management or planning: Previous anesthesia: MAC Denies Family Hx of Anesthesia complications.   Denies Personal Hx of Anesthesia complications.   Social:  Non-Smoker    Hematology/Oncology:  Hematology Normal   Oncology Normal     Cardiovascular:   Exercise tolerance: poor Pacemaker Hypertension  Dysrhythmias (AFlutter w/ RVR)  Denies BARKER. ECG has been reviewed. Sinus bradycardia   ST and T wave abnormality, consider inferolateral ischemia   Abnormal ECG   When compared with ECG of 31-MAR-2023 11:27,   Inverted T waves have replaced nonspecific T wave abnormality in Inferior   leads   Inverted T waves have replaced nonspecific T wave abnormality in Anterior   leads   Confirmed by LONNIE FINNEY MD ([id number]) on 6/16/2023 3:02:02 PM    Pulmonary:   Asthma mild Denies Recent URI.    Renal/:  Renal/ Normal     Hepatic/GI:   GERD    Musculoskeletal:  Musculoskeletal Normal    Neurological:   CVA    Endocrine:  Endocrine Normal  Obesity / BMI > 30      Physical Exam  General: Well nourished, Cooperative, Alert and Oriented    Airway:  Mallampati: III   Mouth Opening: Normal  TM Distance: Normal  Tongue: Large  Neck ROM: Normal ROM    Dental:  Intact  Patient denies any currently loose or chipped teeth; Patient denies any removable dental appliances      Anesthesia Plan  Type of Anesthesia, risks & benefits discussed:    Anesthesia Type: Gen ETT, Gen Supraglottic Airway  Intra-op Monitoring Plan: Standard ASA Monitors  Post Op Pain Control Plan: multimodal analgesia and IV/PO Opioids PRN  Induction:  IV  Airway Plan: Direct, Post-Induction  Informed Consent: Informed consent signed with the Patient and all parties understand the risks and agree with anesthesia plan.  All questions answered.   ASA Score: 3  Day of Surgery Review of History & Physical: H&P Update referred to the surgeon/provider.  Anesthesia Plan Notes: -- Morning of Procedure: took her amlodipine, metoprolol; has NOT taken digoxin within last week   -- has been holding Xarelto for 3 days    *Has not felt her ICD fire in more than a year     Ready For Surgery From Anesthesia Perspective.     .

## 2023-06-21 ENCOUNTER — HOSPITAL ENCOUNTER (OUTPATIENT)
Facility: HOSPITAL | Age: 78
Discharge: HOME | End: 2023-06-21
Attending: SURGERY | Admitting: SURGERY
Payer: MEDICARE

## 2023-06-21 ENCOUNTER — HOSPITAL ENCOUNTER (OUTPATIENT)
Dept: RADIATION THERAPY | Facility: HOSPITAL | Age: 78
Discharge: HOME | End: 2023-06-21
Attending: SURGERY | Admitting: SURGERY
Payer: MEDICARE

## 2023-06-21 ENCOUNTER — ANESTHESIA (OUTPATIENT)
Dept: SURGERY | Facility: HOSPITAL | Age: 78
End: 2023-06-21
Payer: MEDICARE

## 2023-06-21 ENCOUNTER — TELEPHONE (OUTPATIENT)
Dept: RADIATION ONCOLOGY | Facility: CLINIC | Age: 78
End: 2023-06-21
Payer: MEDICARE

## 2023-06-21 ENCOUNTER — DOCUMENTATION ONLY (OUTPATIENT)
Dept: RADIATION ONCOLOGY | Facility: CLINIC | Age: 78
End: 2023-06-21
Payer: MEDICARE

## 2023-06-21 DIAGNOSIS — L91.0 KELOID: ICD-10-CM

## 2023-06-21 PROCEDURE — 77280 THER RAD SIMULAJ FIELD SMPL: CPT | Mod: 26,,, | Performed by: RADIOLOGY

## 2023-06-21 PROCEDURE — 77261 THER RADIOLOGY TX PLNG SMPL: CPT | Mod: ,,, | Performed by: RADIOLOGY

## 2023-06-21 PROCEDURE — 11406 PR EXC SKIN BENIG >4 CM TRUNK,ARM,LEG: ICD-10-PCS | Mod: ,,, | Performed by: SURGERY

## 2023-06-21 PROCEDURE — 37000009 HC ANESTHESIA EA ADD 15 MINS: Performed by: SURGERY

## 2023-06-21 PROCEDURE — 88305 TISSUE EXAM BY PATHOLOGIST: CPT | Performed by: PATHOLOGY

## 2023-06-21 PROCEDURE — 77300 RADIATION THERAPY DOSE PLAN: CPT | Mod: 26,,, | Performed by: RADIOLOGY

## 2023-06-21 PROCEDURE — 88305 TISSUE EXAM BY PATHOLOGIST: CPT | Mod: 26,,, | Performed by: PATHOLOGY

## 2023-06-21 PROCEDURE — 71000039 HC RECOVERY, EACH ADD'L HOUR: Performed by: SURGERY

## 2023-06-21 PROCEDURE — 71000015 HC POSTOP RECOV 1ST HR: Performed by: SURGERY

## 2023-06-21 PROCEDURE — 77261 PR  RADIATION THERAPY PLAN SIMPLE: ICD-10-PCS | Mod: ,,, | Performed by: RADIOLOGY

## 2023-06-21 PROCEDURE — 12034 PR LAYR CLOS WND TRUNK,ARM,LEG 7.6-12.5 CM: ICD-10-PCS | Mod: 51,,, | Performed by: SURGERY

## 2023-06-21 PROCEDURE — 71000033 HC RECOVERY, INTIAL HOUR: Performed by: SURGERY

## 2023-06-21 PROCEDURE — 11406 EXC TR-EXT B9+MARG >4.0 CM: CPT | Mod: ,,, | Performed by: SURGERY

## 2023-06-21 PROCEDURE — 63600175 PHARM REV CODE 636 W HCPCS: Performed by: NURSE ANESTHETIST, CERTIFIED REGISTERED

## 2023-06-21 PROCEDURE — 25000003 PHARM REV CODE 250: Performed by: NURSE ANESTHETIST, CERTIFIED REGISTERED

## 2023-06-21 PROCEDURE — 77300 RADIATION THERAPY DOSE PLAN: CPT | Mod: TC | Performed by: RADIOLOGY

## 2023-06-21 PROCEDURE — 77334 RADIATION TREATMENT AID(S): CPT | Mod: 26,,, | Performed by: RADIOLOGY

## 2023-06-21 PROCEDURE — 77412 RADIATION TX DELIVERY LVL 3: CPT | Performed by: RADIOLOGY

## 2023-06-21 PROCEDURE — 77334 PR  RADN TREATMENT AID(S) COMPLX: ICD-10-PCS | Mod: 26,,, | Performed by: RADIOLOGY

## 2023-06-21 PROCEDURE — 37000008 HC ANESTHESIA 1ST 15 MINUTES: Performed by: SURGERY

## 2023-06-21 PROCEDURE — 77280 PR  SET RADN THERAPY FIELD SIMPLE: ICD-10-PCS | Mod: 26,,, | Performed by: RADIOLOGY

## 2023-06-21 PROCEDURE — 25000003 PHARM REV CODE 250: Performed by: SURGERY

## 2023-06-21 PROCEDURE — 77334 RADIATION TREATMENT AID(S): CPT | Mod: TC | Performed by: RADIOLOGY

## 2023-06-21 PROCEDURE — 88305 TISSUE EXAM BY PATHOLOGIST: ICD-10-PCS | Mod: 26,,, | Performed by: PATHOLOGY

## 2023-06-21 PROCEDURE — 12034 INTMD RPR S/TR/EXT 7.6-12.5: CPT | Mod: 51,,, | Performed by: SURGERY

## 2023-06-21 PROCEDURE — 77280 THER RAD SIMULAJ FIELD SMPL: CPT | Mod: TC | Performed by: RADIOLOGY

## 2023-06-21 PROCEDURE — 36000707: Performed by: SURGERY

## 2023-06-21 PROCEDURE — 36000706: Performed by: SURGERY

## 2023-06-21 PROCEDURE — 77300 PR RADIATION THERAPY,DOSIMETRY PLAN: ICD-10-PCS | Mod: 26,,, | Performed by: RADIOLOGY

## 2023-06-21 PROCEDURE — 63600175 PHARM REV CODE 636 W HCPCS: Performed by: STUDENT IN AN ORGANIZED HEALTH CARE EDUCATION/TRAINING PROGRAM

## 2023-06-21 RX ORDER — HYDROCODONE BITARTRATE AND ACETAMINOPHEN 5; 325 MG/1; MG/1
1 TABLET ORAL
Qty: 25 TABLET | Refills: 0 | Status: SHIPPED | OUTPATIENT
Start: 2023-06-21 | End: 2024-02-02

## 2023-06-21 RX ORDER — BACITRACIN 500 [USP'U]/G
OINTMENT TOPICAL 2 TIMES DAILY
Qty: 28 G | Refills: 4 | Status: SHIPPED | OUTPATIENT
Start: 2023-06-21 | End: 2024-02-02

## 2023-06-21 RX ORDER — PHENYLEPHRINE HYDROCHLORIDE 10 MG/ML
INJECTION INTRAVENOUS
Status: DISCONTINUED | OUTPATIENT
Start: 2023-06-21 | End: 2023-06-21

## 2023-06-21 RX ORDER — SODIUM CHLORIDE, SODIUM LACTATE, POTASSIUM CHLORIDE, CALCIUM CHLORIDE 600; 310; 30; 20 MG/100ML; MG/100ML; MG/100ML; MG/100ML
INJECTION, SOLUTION INTRAVENOUS CONTINUOUS
Status: DISCONTINUED | OUTPATIENT
Start: 2023-06-21 | End: 2023-06-21 | Stop reason: HOSPADM

## 2023-06-21 RX ORDER — LIDOCAINE HYDROCHLORIDE 20 MG/ML
INJECTION INTRAVENOUS
Status: DISCONTINUED | OUTPATIENT
Start: 2023-06-21 | End: 2023-06-21

## 2023-06-21 RX ORDER — PROPOFOL 10 MG/ML
VIAL (ML) INTRAVENOUS
Status: DISCONTINUED | OUTPATIENT
Start: 2023-06-21 | End: 2023-06-21

## 2023-06-21 RX ORDER — BUPIVACAINE HYDROCHLORIDE 2.5 MG/ML
INJECTION, SOLUTION EPIDURAL; INFILTRATION; INTRACAUDAL
Status: DISCONTINUED | OUTPATIENT
Start: 2023-06-21 | End: 2023-06-21 | Stop reason: HOSPADM

## 2023-06-21 RX ORDER — EPHEDRINE SULFATE 50 MG/ML
INJECTION, SOLUTION INTRAVENOUS
Status: DISCONTINUED | OUTPATIENT
Start: 2023-06-21 | End: 2023-06-21

## 2023-06-21 RX ORDER — SODIUM CHLORIDE 9 MG/ML
INJECTION, SOLUTION INTRAVENOUS CONTINUOUS
Status: DISCONTINUED | OUTPATIENT
Start: 2023-06-21 | End: 2023-06-21 | Stop reason: HOSPADM

## 2023-06-21 RX ORDER — CEFAZOLIN SODIUM 2 G/50ML
2 SOLUTION INTRAVENOUS
Status: DISCONTINUED | OUTPATIENT
Start: 2023-06-21 | End: 2023-06-21 | Stop reason: HOSPADM

## 2023-06-21 RX ORDER — ROCURONIUM BROMIDE 10 MG/ML
INJECTION, SOLUTION INTRAVENOUS
Status: DISCONTINUED | OUTPATIENT
Start: 2023-06-21 | End: 2023-06-21

## 2023-06-21 RX ORDER — CLINDAMYCIN PHOSPHATE 900 MG/50ML
900 INJECTION, SOLUTION INTRAVENOUS
Status: DISCONTINUED | OUTPATIENT
Start: 2023-06-21 | End: 2023-06-21 | Stop reason: RX

## 2023-06-21 RX ORDER — LIDOCAINE HYDROCHLORIDE 10 MG/ML
INJECTION INFILTRATION; PERINEURAL
Status: DISCONTINUED | OUTPATIENT
Start: 2023-06-21 | End: 2023-06-21 | Stop reason: HOSPADM

## 2023-06-21 RX ORDER — BACITRACIN ZINC 500 UNIT/G
OINTMENT (GRAM) TOPICAL
Status: DISCONTINUED | OUTPATIENT
Start: 2023-06-21 | End: 2023-06-21 | Stop reason: HOSPADM

## 2023-06-21 RX ORDER — ONDANSETRON 2 MG/ML
4 INJECTION INTRAMUSCULAR; INTRAVENOUS DAILY PRN
Status: DISCONTINUED | OUTPATIENT
Start: 2023-06-21 | End: 2023-06-21 | Stop reason: HOSPADM

## 2023-06-21 RX ORDER — FENTANYL CITRATE 50 UG/ML
INJECTION, SOLUTION INTRAMUSCULAR; INTRAVENOUS
Status: DISCONTINUED | OUTPATIENT
Start: 2023-06-21 | End: 2023-06-21

## 2023-06-21 RX ORDER — OXYCODONE AND ACETAMINOPHEN 5; 325 MG/1; MG/1
1 TABLET ORAL
Status: DISCONTINUED | OUTPATIENT
Start: 2023-06-21 | End: 2023-06-21 | Stop reason: HOSPADM

## 2023-06-21 RX ORDER — HYDROMORPHONE HYDROCHLORIDE 2 MG/ML
0.2 INJECTION, SOLUTION INTRAMUSCULAR; INTRAVENOUS; SUBCUTANEOUS EVERY 5 MIN PRN
Status: DISCONTINUED | OUTPATIENT
Start: 2023-06-21 | End: 2023-06-21 | Stop reason: HOSPADM

## 2023-06-21 RX ORDER — ONDANSETRON 2 MG/ML
4 INJECTION INTRAMUSCULAR; INTRAVENOUS EVERY 12 HOURS PRN
Status: DISCONTINUED | OUTPATIENT
Start: 2023-06-21 | End: 2023-06-21 | Stop reason: HOSPADM

## 2023-06-21 RX ADMIN — EPHEDRINE SULFATE 10 MG: 50 INJECTION INTRAVENOUS at 08:06

## 2023-06-21 RX ADMIN — LIDOCAINE HYDROCHLORIDE 100 MG: 20 INJECTION INTRAVENOUS at 06:06

## 2023-06-21 RX ADMIN — EPHEDRINE SULFATE 15 MG: 50 INJECTION INTRAVENOUS at 07:06

## 2023-06-21 RX ADMIN — PROPOFOL 30 MG: 10 INJECTION, EMULSION INTRAVENOUS at 06:06

## 2023-06-21 RX ADMIN — FENTANYL CITRATE 50 MCG: 50 INJECTION, SOLUTION INTRAMUSCULAR; INTRAVENOUS at 07:06

## 2023-06-21 RX ADMIN — PROPOFOL 120 MG: 10 INJECTION, EMULSION INTRAVENOUS at 06:06

## 2023-06-21 RX ADMIN — PHENYLEPHRINE HYDROCHLORIDE 100 MCG: 10 INJECTION INTRAVENOUS at 07:06

## 2023-06-21 RX ADMIN — ONDANSETRON 4 MG: 2 INJECTION INTRAMUSCULAR; INTRAVENOUS at 11:06

## 2023-06-21 RX ADMIN — ROCURONIUM BROMIDE 40 MG: 10 INJECTION, SOLUTION INTRAVENOUS at 06:06

## 2023-06-21 RX ADMIN — VANCOMYCIN HYDROCHLORIDE 1000 MG: 1 INJECTION, POWDER, LYOPHILIZED, FOR SOLUTION INTRAVENOUS at 07:06

## 2023-06-21 RX ADMIN — SODIUM CHLORIDE, SODIUM LACTATE, POTASSIUM CHLORIDE, AND CALCIUM CHLORIDE: .6; .31; .03; .02 INJECTION, SOLUTION INTRAVENOUS at 06:06

## 2023-06-21 RX ADMIN — SODIUM CHLORIDE, SODIUM LACTATE, POTASSIUM CHLORIDE, AND CALCIUM CHLORIDE: .6; .31; .03; .02 INJECTION, SOLUTION INTRAVENOUS at 08:06

## 2023-06-21 RX ADMIN — PHENYLEPHRINE HYDROCHLORIDE 200 MCG: 10 INJECTION INTRAVENOUS at 07:06

## 2023-06-21 RX ADMIN — FENTANYL CITRATE 50 MCG: 50 INJECTION, SOLUTION INTRAMUSCULAR; INTRAVENOUS at 06:06

## 2023-06-21 RX ADMIN — EPHEDRINE SULFATE 15 MG: 50 INJECTION INTRAVENOUS at 08:06

## 2023-06-21 NOTE — OP NOTE
Nathalie Membreno  : 1945, MRN: [id number]  Date of procedure: 2023      Procedure: Excision of anterior chest wall keloid with complex wound closure    Pre-procedure diagnosis: anterior chest wall keloid  Post-procedure diagnosis: Same  Surgeon: Justine Youngblood DO  Assistant: None  Anesthesia: General  EBL: 25 mL  Implants/Drains: None  Specimen: anterior chest wall keloid  Complications: None apparent    Significant findings: 12 x 6 x 2 cm keloid excised    Indications for procedure: See H&P. After explaining the risks, benefits, and alternatives, the patient verbalized understanding and informed written consent was obtained. All questions were answered to their satisfaction.    Description of procedure: The patient was brought to the OR and SCDs were applied. General anesthesia was induced by the Anesthesia Department. Patient was in the supine position. The anterior chest wall was prepped and draped in usual sterile fashion. A time out was taken to identify the correct patient, correct procedure, and correct anatomical site; all parties were in agreement.  Using a 15 blade scalpel, a transverse elliptical incision was made around the keloid and carried down through the subcutaneous tissues using electrocautery. The underlying tissue all the way to the prepectoral fascia was excised. Undermining was then performed of the tissue just below the subcutaneous fascia and above the prepectoral fascia all around the incision in order to bring the incision together without tension. The cavity was irrigated and hemostasis was obtained. The subcutaneous fascia was then approximated with interrupted 0 PDS sutures. The next layer above this was approximated with interrupted 2-0 PDS sutures. Undermining was then performed of the tissue just beneath the dermis to help offset tension. The dermis was approximated with interrupted 3-0 PDS sutures. The epidermis was approximated with interrupted 4-0 PDS sutures in  the subcuticular layer. Interrupted 5-0 prolene sutures were placed to help further approximate the epidermis and provide some eversion of the wound edges. Bacitracin was applied on the skin followed by a sterile gauze dressing.  All sponge and instrument counts were deemed correct. The patient appeared to tolerate the procedure well and there were no apparent complications. The patient was transported to PACU in stable condition.    Justine Youngblood,   General Surgery  [facility]

## 2023-06-21 NOTE — PROGRESS NOTES
Subjective:      Patient ID: Nathalie Membreno is a 77 y.o. female.    Chief Complaint: Follow-up (Pt is here for a follow up for diagnosis of pneumonia. )      HPI  Here for follow up of medical problems.  Developed bilat PEs after xarelto held for 4 days off for keloid surgery.  Scheduled to see [facility] 7/27/23.  Little cough residual.  Better with albuterol neb BID.  No f/c.  No BARKER, no CP.  BMs good on miralax.  Down 8#, not on contrave.      Impression:     1. Bilateral acute pulmonary emboli as detailed above  2. Dilatation of the main pulmonary artery and reflux of contrast into the IVC and hepatic veins suggest possible right heart strain.  Cystic appearing structure measuring approximately 3.4 cm adjacent to the gastric fundus which is of uncertain etiology.  Consider short interval follow-up CT of the abdomen and pelvis in 3-6 months to ensure stability.  3. Multiple fluid attenuating lesions seen throughout the liver in keeping with cysts.  This report was flagged in Epic as abnormal.     COMMUNICATION  This critical result was discovered/received at 14:05 on 06/28/2023.  The critical information above was relayed directly by me by telephone to Adela Ramírez NP on 06/28/2023 at 14:07.       6/29/23 ECHO:  Summary    Concentric remodeling and normal systolic function.  The estimated ejection fraction is 60%.  Grade I left ventricular diastolic dysfunction.  Normal right ventricular size with normal right ventricular systolic function.  Mild tricuspid regurgitation.  There is mild pulmonary hypertension.   --------------------------------------------------------------        Updated/ annual due 1/24:  HM: 9/22 fluvax, 3/21 covid vaccines, 6/19 HAV, 6/15 [id number], 11/16 booster [id number], 1/23 [id number], 9/14 TDaP, 1/23 BMD rep 5y, 3/21 Cscope rep 3y, 1/23 MMG/me, 10/20 Gyn Dr. Holden, 7/20 Eye Dr. Fiore, 11/16 HCV neg, Card Dr. Leach at [facility].     Review of Systems   Constitutional:  Negative  "for chills, diaphoresis and fever.   Respiratory:  Negative for cough and shortness of breath.    Cardiovascular:  Negative for chest pain, palpitations and leg swelling.   Gastrointestinal:  Negative for blood in stool, constipation, diarrhea, nausea and vomiting.   Genitourinary:  Negative for dysuria, frequency and hematuria.   Psychiatric/Behavioral:  The patient is not nervous/anxious.        Objective:   /62 (BP Location: Right arm)   Pulse (!) 56   Temp 98.3 °F (36.8 °C) (Oral)   Ht 5' 4" (1.626 m)   Wt 99.2 kg (218 lb 9.6 oz)   SpO2 97%   BMI 37.52 kg/m²     Physical Exam  Constitutional:       Appearance: She is well-developed.   Neck:      Thyroid: No thyroid mass.      Vascular: No carotid bruit.   Cardiovascular:      Rate and Rhythm: Normal rate and regular rhythm.      Heart sounds: No murmur heard.    No friction rub. No gallop.   Pulmonary:      Effort: Pulmonary effort is normal.      Breath sounds: Normal breath sounds. No wheezing or rales.   Abdominal:      General: Bowel sounds are normal.      Palpations: Abdomen is soft. There is no mass.      Tenderness: There is no abdominal tenderness.   Musculoskeletal:      Cervical back: Neck supple.   Lymphadenopathy:      Cervical: No cervical adenopathy.   Neurological:      Mental Status: She is alert and oriented to person, place, and time.           Assessment:       1. Essential hypertension    2. Mild intermittent asthma without complication    3. Severe obesity with body mass index (BMI) of 35.0 to 39.9 with comorbidity    4. Pneumonia of left lower lobe due to infectious organism    5. Pulmonary embolism, bilateral    6. Atrial flutter with rapid ventricular response    7. Chronic anticoagulation          Plan:     Essential hypertension- stable, cont rx.    Mild intermittent asthma without complication- cont nebulizer treatments.    Severe obesity with body mass index (BMI) of 35.0 to 39.9 with comorbidity, down 8#, " continue.    Pneumonia of left lower lobe due to infectious organism, resolved.    Pulmonary embolism, bilateral- 6/29, after off xarelto x 4d for surgery.    Atrial flutter with rapid ventricular response, Chronic anticoagulation- cont.    RTC 1mo.

## 2023-06-21 NOTE — TELEPHONE ENCOUNTER
Called cardiology clinic to verify patients cardiac device. Spoke with nurse Priscila who stated patient did not have a pacemaker or defibrillator but she has a loop recorder. She stated it does not have leads, does not shock or anything it just records.She stated she would fax over information in writing when doctor comes out of clinic. Fax number and phone number provided, and request faxed over to cardiology clinic.

## 2023-06-21 NOTE — ANESTHESIA PROCEDURE NOTES
Intubation    Date/Time: 6/21/2023 6:57 AM  Performed by: Mami Medellin CRNA  Authorized by: Elian Robert MD     Intubation:     Induction:  Intravenous    Intubated:  Postinduction    Mask Ventilation:  Easy mask    Attempts:  1    Attempted By:  CRNA    Method of Intubation:  Direct and bougie    Blade:  Sams 2    Laryngeal View Grade: Grade IIb - only the arytenoids and epiglottis seen      Difficult Airway Encountered?: No      Complications:  None    Airway Device:  Oral endotracheal tube    Airway Device Size:  7.0    Style/Cuff Inflation:  Cuffed    Inflation Amount (mL):  8    Tube secured:  21    Secured at:  The lips    Placement Verified By:  Capnometry    Complicating Factors:  None    Findings Post-Intubation:  BS equal bilateral

## 2023-06-21 NOTE — PLAN OF CARE
Day 1of 3 outpatient xrt to keloid skin on chest wall. Verbal instructions and contact info given. Skin care and side effects reviewed. Patient verbalized understanding.

## 2023-06-21 NOTE — DISCHARGE SUMMARY
[facility] - Surgery (Hospital)  Discharge Note  Short Stay    Procedure(s) (LRB):  EXCISION, KELOID (N/A)      OUTCOME: Patient tolerated treatment/procedure well without complication and is now ready for discharge.    DISPOSITION: Home or Self Care    FINAL DIAGNOSIS:  <principal problem not specified>    FOLLOWUP: In clinic    DISCHARGE INSTRUCTIONS:  No discharge procedures on file.     TIME SPENT ON DISCHARGE: 10 minutes

## 2023-06-21 NOTE — TRANSFER OF CARE
"Anesthesia Transfer of Care Note    Patient: Nathalie Membreno    Procedure(s) Performed: Procedure(s) (LRB):  EXCISION, KELOID (N/A)    Patient location: PACU    Anesthesia Type: general    Transport from OR: Transported from OR on room air with adequate spontaneous ventilation    Post pain: adequate analgesia    Post assessment: no apparent anesthetic complications and tolerated procedure well    Post vital signs: stable    Level of consciousness: awake, lethargic and responds to stimulation    Nausea/Vomiting: no nausea/vomiting    Complications: none    Transfer of care protocol was followed      Last vitals:   Visit Vitals  BP (!) 171/81   Pulse 61   Temp 36.7 °C (98.1 °F) (Temporal)   Resp 20   Ht 5' 4" (1.626 m)   Wt 101.2 kg (223 lb)   SpO2 99%   Breastfeeding No   BMI 38.28 kg/m²     "

## 2023-06-21 NOTE — H&P
[facility] -   General Surgery History & Physical    SUBJECTIVE:     History of Present Illness:  Patient is a 77 y.o. female presents with a large keloid of her anterior chest. She states it causes her pain and will sometimes become red and swollen, requiring drainage and antibiotics.      Review of patient's allergies indicates:   Allergen Reactions    Penicillins Swelling     Generalized swelling  Other reaction(s): Swelling  Generalized swelling  Other reaction(s): Swelling  Generalized swelling       Current Outpatient Medications   Medication Sig Dispense Refill    acetaminophen (TYLENOL) 500 MG tablet Take 500 mg by mouth every 6 (six) hours as needed.      albuterol 90 mcg/actuation inhaler Inhale 2 puffs into the lungs every 4 (four) hours as needed for Wheezing. 1 each 6    amLODIPine (NORVASC) 5 MG tablet Take 1 tablet (5 mg total) by mouth 2 (two) times daily. 180 tablet 3    atorvastatin (LIPITOR) 40 MG tablet TAKE 1 TABLET EVERY NIGHT 90 tablet 1    betamethasone dipropionate (DIPROLENE) 0.05 % ointment Apply topically 2 (two) times daily. PRN keloid scar. Strong steroid- do Not use on face. 45 g 0    buPROPion (WELLBUTRIN SR) 150 MG TBSR 12 hr tablet Take 1 tablet (150 mg total) by mouth 2 (two) times daily. 60 tablet 6    cholecalciferol, vitamin D3, (VITAMIN D3) 50 mcg (2,000 unit) Cap Take 1 capsule by mouth once daily.      clindamycin phosphate 1% (CLINDAGEL) 1 % gel Apply topically 2 (two) times daily. For acne of chest. 30 g 3    diclofenac sodium (VOLTAREN) 1 % Gel Apply 2 g topically 2 (two) times daily. 60 g 1    digoxin (LANOXIN) 125 mcg tablet TAKE 1 TABLET EVERY DAY 90 tablet 2    diphenhydrAMINE (BENADRYL) 25 mg capsule Take 25 mg by mouth every 6 (six) hours as needed.      ergocalciferol (ERGOCALCIFEROL) 50,000 unit Cap Take 50,000 Units by mouth every 7 days.      flurandrenolide 4 mcg/cm2 Tape Apply to fit area involved. Wear 12 hours/day then remove. Reapply next day.  "1 each 3    fluticasone propionate (FLONASE) 50 mcg/actuation nasal spray 2 sprays (100 mcg total) by Each Nostril route once daily. 16 g 6    hydrOXYzine pamoate (VISTARIL) 25 MG Cap Take 25 mg by mouth.      losartan (COZAAR) 100 MG tablet Take 1 tablet (100 mg total) by mouth once daily. 90 tablet 1    metoprolol succinate (TOPROL-XL) 100 MG 24 hr tablet Take 1 tablet (100 mg total) by mouth once daily. 90 tablet 3    mupirocin (BACTROBAN) 2 % ointment Apply topically 3 (three) times daily. For broken skin. 22 g 1    polyethylene glycol (GLYCOLAX) 17 gram PwPk Take by mouth once daily.      rivaroxaban (XARELTO) 20 mg Tab TAKE 1 TABLET BY MOUTH EVERY DAY WITH DINNER      rOPINIRole (REQUIP) 1 MG tablet       betamethasone valerate 0.1% (VALISONE) 0.1 % Oint Apply topically 2 (two) times daily. 15 g 1    nystatin-triamcinolone (MYCOLOG II) cream Apply to affected area 2 times daily 30 g 1     No current facility-administered medications for this visit.       Past Medical History:   Diagnosis Date    Allergy     Anemia     Arthritis     knees, hands, back    Back pain     pt reports "mild"    Dysphagia, unspecified(787.20)     GERD (gastroesophageal reflux disease)     History of carpal tunnel release of both wrists 3/10/2016    Hyperlipidemia     Hypertension     LGSIL (low grade squamous intraepithelial dysplasia)     HPV +    Mild intermittent asthma without complication 7/28/2020    Obesity     Stroke 02/08/2017     Past Surgical History:   Procedure Laterality Date    CARDIAC DEFIBRILLATOR PLACEMENT  04/2017    Dr. Nuno    CARPAL TUNNEL RELEASE      b/l    CATARACT EXTRACTION EXTRACAPSULAR W/ INTRAOCULAR LENS IMPLANTATION Bilateral 2022    COLONOSCOPY N/A 03/31/2021    Procedure: COLONOSCOPY;  Surgeon: Fozia Dasilva MD;  Location: [facility];  Service: Endoscopy;  Laterality: N/A;    DILATION AND CURETTAGE OF UTERUS      10/2021, no cancer per pt.    FOOT SURGERY Right 05/02/2013    wart removal    TRIGGER " FINGER RELEASE Right     TUBAL LIGATION       Family History   Problem Relation Age of Onset    Hypertension Mother     Hypertension Father     Cancer Father         prostate    Cataracts Father     Glaucoma Father     Diabetes Father     Hypertension Sister     Diabetes Sister     Breast cancer Neg Hx     Colon cancer Neg Hx     Ovarian cancer Neg Hx     Thrombophilia Neg Hx      Social History     Tobacco Use    Smoking status: Never    Smokeless tobacco: Never   Substance Use Topics    Alcohol use: No     Alcohol/week: 0.0 standard drinks    Drug use: No        Review of Systems:  Review of Systems   Constitutional:  Negative for fever.   Skin:  Positive for wound.     OBJECTIVE:     Vital Signs (Most Recent)  Temp: 96.9 °F (36.1 °C) (03/31/23 1030)  Pulse: (!) 54 (03/31/23 1030)  BP: 124/72 (03/31/23 1030)     102.6 kg (226 lb 3.1 oz)     Physical Exam:  Physical Exam  Vitals and nursing note reviewed.   Constitutional:       General: She is not in acute distress.     Appearance: She is not ill-appearing, toxic-appearing or diaphoretic.   HENT:      Head: Normocephalic and atraumatic.      Nose: Nose normal.   Eyes:      General: No scleral icterus.        Right eye: No discharge.         Left eye: No discharge.      Extraocular Movements: Extraocular movements intact.   Cardiovascular:      Rate and Rhythm: Normal rate and regular rhythm.   Pulmonary:      Effort: No respiratory distress.      Breath sounds: No stridor.   Abdominal:      Palpations: Abdomen is soft.      Tenderness: There is no abdominal tenderness.   Musculoskeletal:      Cervical back: No rigidity.   Skin:     General: Skin is warm and dry.      Coloration: Skin is not jaundiced.      Comments: Approx 6-7 cm anterior chest wall irregular transverse keloid with underlying sebaceous cyst   Neurological:      Mental Status: She is alert and oriented to person, place, and time.   Psychiatric:         Mood and Affect: Mood normal.          Behavior: Behavior normal.       Laboratory  WBC   Date Value Ref Range Status   01/17/2023 6.61 3.90 - 12.70 K/uL Final     Hemoglobin   Date Value Ref Range Status   01/17/2023 12.2 12.0 - 16.0 g/dL Final     Hematocrit   Date Value Ref Range Status   01/17/2023 39.6 37.0 - 48.5 % Final     Platelets   Date Value Ref Range Status   01/17/2023 217 150 - 450 K/uL Final     Sodium   Date Value Ref Range Status   01/17/2023 138 136 - 145 mmol/L Final     Potassium   Date Value Ref Range Status   01/17/2023 4.0 3.5 - 5.1 mmol/L Final     Creatinine   Date Value Ref Range Status   01/17/2023 0.9 0.5 - 1.4 mg/dL Final     Alkaline Phosphatase   Date Value Ref Range Status   01/17/2023 90 55 - 135 U/L Final     AST   Date Value Ref Range Status   01/17/2023 12 10 - 40 U/L Final     ALT   Date Value Ref Range Status   01/17/2023 9 (L) 10 - 44 U/L Final      No results found for: HGBA1C    Diagnostic Results:  Result:  Mammo Digital Screening Bilat w/ Akil     History:  Patient is 77 y.o. and is seen for a screening mammogram.        Films Compared:  Compared to: 01/07/2022 Mammo Digital Screening Bilat w/ Akil and 02/28/2020 Mammo Digital Screening Bilat      Findings:   This procedure was performed using tomosynthesis.   Computer-aided detection was utilized in the interpretation of this examination.     The breasts have scattered areas of fibroglandular density. There is no evidence of suspicious masses, microcalcifications or architectural distortion.     Impression:   No mammographic evidence of malignancy.     BI-RADS Category 1: Negative     Recommendation:  Routine screening mammogram in 1 year is recommended.        ASSESSMENT/PLAN:     Nathalie was seen today for other.    Diagnoses and all orders for this visit:    Keloid  -     Ambulatory referral/consult to General Surgery  -     Ambulatory referral/consult to Radiation Oncology; Future    Preoperative testing  -     EKG 12-lead; Future    Preoperative  examination  -     Ambulatory referral/consult to Pre-Admit; Future         Will try to coordinate excision of the keloid follow by same day radiation to the area. I have sent a referral to rad onc as well as a message.  The procedure details were explained to the patient including the option of not doing post-excision radiation, but she would like to the have the radiation done due to the size and discomfort of the keloid. She was informed that she will regardless form a keloid, but this is to minimize its formation.  She will need to hold her Xarelto 2-3 days prior to surgery.    After explaining the risks (including bleeding, infection, impaired wound healing, scarring, damage to other organs, vascular complications, cardiopulmonary complications, and death), benefits, and alternatives, patient verbalized understanding and would like to proceed with surgery. All questions were answered to their satisfaction.      Patient expressed understanding and is in agreement.      Justine Youngblood, DO  General Surgery  [facility] -     She has an appointment for radiation right after surgery for 3 days.

## 2023-06-21 NOTE — PROGRESS NOTES
PHARMACY AUTO-INTERCHANGE    Clindamycin vials and IVPB are on national backorder. Due to critical IV clindamycin shortage, cefazolin is being recommended preferentially for surgical prophylaxis including in the setting of penicillin allergy history. Per system P&T approval, pharmacy has made an automatic switch from IV clindamycin to the following:    Cefazolin 2g x 1 dose      Kashif Cazares PharmD 06/21/2023 5:50 AM

## 2023-06-21 NOTE — PATIENT INSTRUCTIONS
You may remove the dressing after you complete your radiation therapy. Do not get the dressing wet.  After the dressing is removed, you may then shower. Do not scrub the incision; let warm soapy water run over it.   Apply the bacitracin ointment to the incision twice daily and cover with a clean, dry gauze.  You can apply ice to the incision over top of the dressing. Do not leave the ice on for more than 20 minutes at a time to avoid frostbite.  The sutures will be removed in the office in 10-14 days.    You may resume your Xarelto on Friday 6/23.

## 2023-06-21 NOTE — ANESTHESIA POSTPROCEDURE EVALUATION
Anesthesia Post Evaluation    Patient: Nathalie Pattonckenridge    Procedure(s) Performed: Procedure(s) (LRB):  EXCISION, KELOID (N/A)    Final Anesthesia Type: general      Patient location during evaluation: PACU  Patient participation: Yes- Able to Participate  Level of consciousness: awake  Post-procedure vital signs: reviewed and stable  Pain management: adequate  Airway patency: patent    PONV status at discharge: No PONV  Anesthetic complications: no      Cardiovascular status: hemodynamically stable  Respiratory status: unassisted  Hydration status: euvolemic  Follow-up not needed.          Vitals Value Taken Time   /56 06/21/23 1120   Temp 36.6 °C (97.8 °F) 06/21/23 1120   Pulse 62 06/21/23 1120   Resp 17 06/21/23 1120   SpO2 96 % 06/21/23 1120         Event Time   Out of Recovery 11:26:36         Pain/Kapil Score: Kapil Score: 9 (6/21/2023 11:00 AM)

## 2023-06-22 ENCOUNTER — HOSPITAL ENCOUNTER (EMERGENCY)
Facility: HOSPITAL | Age: 78
Discharge: HOME | End: 2023-06-22
Attending: EMERGENCY MEDICINE
Payer: MEDICARE

## 2023-06-22 VITALS
HEART RATE: 62 BPM | OXYGEN SATURATION: 96 % | TEMPERATURE: 98 F | WEIGHT: 223 LBS | SYSTOLIC BLOOD PRESSURE: 117 MMHG | HEIGHT: 64 IN | BODY MASS INDEX: 38.07 KG/M2 | RESPIRATION RATE: 17 BRPM | DIASTOLIC BLOOD PRESSURE: 56 MMHG

## 2023-06-22 VITALS
SYSTOLIC BLOOD PRESSURE: 166 MMHG | TEMPERATURE: 98 F | HEART RATE: 73 BPM | RESPIRATION RATE: 20 BRPM | DIASTOLIC BLOOD PRESSURE: 76 MMHG | OXYGEN SATURATION: 98 %

## 2023-06-22 DIAGNOSIS — J18.9 PNEUMONIA OF LEFT LOWER LOBE DUE TO INFECTIOUS ORGANISM: Primary | ICD-10-CM

## 2023-06-22 DIAGNOSIS — R06.02 SOB (SHORTNESS OF BREATH): ICD-10-CM

## 2023-06-22 LAB
ALBUMIN SERPL BCP-MCNC: 3.8 G/DL (ref 3.5–5.2)
ALP SERPL-CCNC: 96 U/L (ref 55–135)
ALT SERPL W/O P-5'-P-CCNC: 11 U/L (ref 10–44)
ANION GAP SERPL CALC-SCNC: 12 MMOL/L (ref 8–16)
AST SERPL-CCNC: 18 U/L (ref 10–40)
BASOPHILS # BLD AUTO: 0.02 K/UL (ref 0–0.2)
BASOPHILS NFR BLD: 0.2 % (ref 0–1.9)
BILIRUB SERPL-MCNC: 0.6 MG/DL (ref 0.1–1)
BILIRUB UR QL STRIP: NEGATIVE
BNP SERPL-MCNC: 80 PG/ML (ref 0–99)
BUN SERPL-MCNC: 11 MG/DL (ref 8–23)
CALCIUM SERPL-MCNC: 9.7 MG/DL (ref 8.7–10.5)
CHLORIDE SERPL-SCNC: 106 MMOL/L (ref 95–110)
CK SERPL-CCNC: 168 U/L (ref 20–180)
CLARITY UR: CLEAR
CO2 SERPL-SCNC: 23 MMOL/L (ref 23–29)
COLOR UR: COLORLESS
CREAT SERPL-MCNC: 1.1 MG/DL (ref 0.5–1.4)
DIFFERENTIAL METHOD: ABNORMAL
EOSINOPHIL # BLD AUTO: 0.1 K/UL (ref 0–0.5)
EOSINOPHIL NFR BLD: 1.6 % (ref 0–8)
ERYTHROCYTE [DISTWIDTH] IN BLOOD BY AUTOMATED COUNT: 14.7 % (ref 11.5–14.5)
EST. GFR  (NO RACE VARIABLE): 52 ML/MIN/1.73 M^2
GLUCOSE SERPL-MCNC: 97 MG/DL (ref 70–110)
GLUCOSE UR QL STRIP: NEGATIVE
HCT VFR BLD AUTO: 40.5 % (ref 37–48.5)
HCV AB SERPL QL IA: NEGATIVE
HEP C VIRUS HOLD SPECIMEN: NORMAL
HGB BLD-MCNC: 12.7 G/DL (ref 12–16)
HGB UR QL STRIP: ABNORMAL
HIV 1+2 AB+HIV1 P24 AG SERPL QL IA: NEGATIVE
IMM GRANULOCYTES # BLD AUTO: 0.02 K/UL (ref 0–0.04)
IMM GRANULOCYTES NFR BLD AUTO: 0.2 % (ref 0–0.5)
KETONES UR QL STRIP: NEGATIVE
LEUKOCYTE ESTERASE UR QL STRIP: NEGATIVE
LYMPHOCYTES # BLD AUTO: 2.6 K/UL (ref 1–4.8)
LYMPHOCYTES NFR BLD: 29.6 % (ref 18–48)
MCH RBC QN AUTO: 29.8 PG (ref 27–31)
MCHC RBC AUTO-ENTMCNC: 31.4 G/DL (ref 32–36)
MCV RBC AUTO: 95 FL (ref 82–98)
MONOCYTES # BLD AUTO: 0.8 K/UL (ref 0.3–1)
MONOCYTES NFR BLD: 8.8 % (ref 4–15)
NEUTROPHILS # BLD AUTO: 5.3 K/UL (ref 1.8–7.7)
NEUTROPHILS NFR BLD: 59.6 % (ref 38–73)
NITRITE UR QL STRIP: NEGATIVE
NRBC BLD-RTO: 0 /100 WBC
PH UR STRIP: 7 [PH] (ref 5–8)
PLATELET # BLD AUTO: 188 K/UL (ref 150–450)
PMV BLD AUTO: 12.4 FL (ref 9.2–12.9)
POTASSIUM SERPL-SCNC: 4.6 MMOL/L (ref 3.5–5.1)
PROT SERPL-MCNC: 8.5 G/DL (ref 6–8.4)
PROT UR QL STRIP: NEGATIVE
RBC # BLD AUTO: 4.26 M/UL (ref 4–5.4)
SODIUM SERPL-SCNC: 141 MMOL/L (ref 136–145)
SP GR UR STRIP: 1.01 (ref 1–1.03)
TROPONIN I SERPL DL<=0.01 NG/ML-MCNC: 0.02 NG/ML (ref 0–0.03)
URN SPEC COLLECT METH UR: ABNORMAL
UROBILINOGEN UR STRIP-ACNC: NEGATIVE EU/DL
WBC # BLD AUTO: 8.89 K/UL (ref 3.9–12.7)

## 2023-06-22 PROCEDURE — 93005 ELECTROCARDIOGRAM TRACING: CPT

## 2023-06-22 PROCEDURE — 87389 HIV-1 AG W/HIV-1&-2 AB AG IA: CPT | Performed by: EMERGENCY MEDICINE

## 2023-06-22 PROCEDURE — 82550 ASSAY OF CK (CPK): CPT | Performed by: EMERGENCY MEDICINE

## 2023-06-22 PROCEDURE — 77412 RADIATION TX DELIVERY LVL 3: CPT | Performed by: RADIOLOGY

## 2023-06-22 PROCEDURE — 81003 URINALYSIS AUTO W/O SCOPE: CPT | Performed by: EMERGENCY MEDICINE

## 2023-06-22 PROCEDURE — 80053 COMPREHEN METABOLIC PANEL: CPT | Performed by: EMERGENCY MEDICINE

## 2023-06-22 PROCEDURE — 85025 COMPLETE CBC W/AUTO DIFF WBC: CPT | Performed by: EMERGENCY MEDICINE

## 2023-06-22 PROCEDURE — 25000003 PHARM REV CODE 250: Performed by: EMERGENCY MEDICINE

## 2023-06-22 PROCEDURE — 83880 ASSAY OF NATRIURETIC PEPTIDE: CPT | Performed by: EMERGENCY MEDICINE

## 2023-06-22 PROCEDURE — 93010 ELECTROCARDIOGRAM REPORT: CPT | Mod: ,,, | Performed by: INTERNAL MEDICINE

## 2023-06-22 PROCEDURE — 93010 EKG 12-LEAD: ICD-10-PCS | Mod: ,,, | Performed by: INTERNAL MEDICINE

## 2023-06-22 PROCEDURE — 86803 HEPATITIS C AB TEST: CPT | Performed by: EMERGENCY MEDICINE

## 2023-06-22 PROCEDURE — 84484 ASSAY OF TROPONIN QUANT: CPT | Performed by: EMERGENCY MEDICINE

## 2023-06-22 PROCEDURE — 99285 EMERGENCY DEPT VISIT HI MDM: CPT | Mod: 25

## 2023-06-22 RX ORDER — LEVOFLOXACIN 5 MG/ML
750 INJECTION, SOLUTION INTRAVENOUS
Status: DISCONTINUED | OUTPATIENT
Start: 2023-06-22 | End: 2023-06-22

## 2023-06-22 RX ORDER — LEVOFLOXACIN 750 MG/1
750 TABLET ORAL
Status: COMPLETED | OUTPATIENT
Start: 2023-06-22 | End: 2023-06-22

## 2023-06-22 RX ADMIN — LEVOFLOXACIN 750 MG: 750 TABLET, FILM COATED ORAL at 03:06

## 2023-06-22 NOTE — ED PROVIDER NOTES
"SCRIBE #1 NOTE: I, Kalia Lebron, am scribing for, and in the presence of, Jose Dale MD. I have scribed the entire note.      History      Chief Complaint   Patient presents with    Shortness of Breath     Had keloid removed yesterday and today now having SOB w/ exertion. Keloid was removed w/ surgery and radiation.       Review of patient's allergies indicates:   Allergen Reactions    Penicillins Swelling     Generalized swelling  Other reaction(s): Swelling  Generalized swelling  Other reaction(s): Swelling  Generalized swelling        HPI   HPI    6/22/2023, 1:58 PM   History obtained from the patient      History of Present Illness: Nathalie Membreno is a 77 y.o. female patient who presents to the Emergency Department for SOB, onset this morning. Pt had an anterior chest keloid removed yesterday by Dr. Youngblood (General Surgery). Symptoms are constant and moderate in severity. Sxs are worse with exertion. No associated sxs reported. Patient denies any fever, chills, n/v/d, CP, weakness, numbness, dizziness, headache, and all other sxs at this time. No prior Tx reported. No further complaints or concerns at this time.     Arrival mode: EMS    PCP: Alice Choudhury MD       Past Medical History:  Past Medical History:   Diagnosis Date    Allergy     Anemia     Arthritis     knees, hands, back    Back pain     pt reports "mild"    Dysphagia, unspecified(787.20)     GERD (gastroesophageal reflux disease)     History of carpal tunnel release of both wrists 03/10/2016    Hyperlipidemia     Hypertension     LGSIL (low grade squamous intraepithelial dysplasia)     HPV +    Mild intermittent asthma without complication 07/28/2020    Obesity     Stroke 02/08/2017       Past Surgical History:  Past Surgical History:   Procedure Laterality Date    CARDIAC DEFIBRILLATOR PLACEMENT  04/2017    Dr. Nuno    CARPAL TUNNEL RELEASE      b/l    CATARACT EXTRACTION EXTRACAPSULAR W/ INTRAOCULAR LENS IMPLANTATION Bilateral " 2022    COLONOSCOPY N/A 03/31/2021    Procedure: COLONOSCOPY;  Surgeon: Fozia Dasilva MD;  Location: [facility];  Service: Endoscopy;  Laterality: N/A;    DILATION AND CURETTAGE OF UTERUS      10/2021, no cancer per pt.    FOOT SURGERY Right 05/02/2013    wart removal    KELOID EXCISION N/A 6/21/2023    Procedure: EXCISION, KELOID;  Surgeon: Justine Youngblood DO;  Location: [facility] OR;  Service: General;  Laterality: N/A;    TRIGGER FINGER RELEASE Right     TUBAL LIGATION           Family History:  Family History   Problem Relation Age of Onset    Hypertension Mother     Hypertension Father     Cancer Father         prostate    Cataracts Father     Glaucoma Father     Diabetes Father     Hypertension Sister     Diabetes Sister     Breast cancer Neg Hx     Colon cancer Neg Hx     Ovarian cancer Neg Hx     Thrombophilia Neg Hx        Social History:  Social History     Tobacco Use    Smoking status: Never    Smokeless tobacco: Never   Substance and Sexual Activity    Alcohol use: No     Alcohol/week: 0.0 standard drinks    Drug use: No    Sexual activity: Not Currently     Partners: Male     Birth control/protection: None       ROS   Review of Systems   Constitutional:  Negative for chills and fever.   HENT:  Negative for sore throat.    Respiratory:  Positive for shortness of breath.    Cardiovascular:  Negative for chest pain.   Gastrointestinal:  Negative for diarrhea, nausea and vomiting.   Genitourinary:  Negative for dysuria.   Musculoskeletal:  Negative for back pain.   Skin:  Negative for rash.   Neurological:  Negative for dizziness, weakness, light-headedness, numbness and headaches.   Hematological:  Does not bruise/bleed easily.   All other systems reviewed and are negative.    Physical Exam      Initial Vitals [06/22/23 1356]   BP Pulse Resp Temp SpO2   (!) 154/90 86 12 98.2 °F (36.8 °C) 96 %      MAP       --          Physical Exam  Nursing Notes and Vital Signs Reviewed.  Constitutional: Patient is in  no acute distress. Well-developed and well-nourished.  Head: Atraumatic. Normocephalic.  Eyes: PERRL. EOM intact. Conjunctivae are not pale. No scleral icterus.  ENT: Mucous membranes are moist. Oropharynx is clear and symmetric.    Neck: Supple. Full ROM.  Cardiovascular: Regular rate. Regular rhythm. No murmurs, rubs, or gallops. Distal pulses are 2+ and symmetric.  Pulmonary/Chest: No respiratory distress. Clear to auscultation bilaterally. No wheezing or rales.  Abdominal: Soft and non-distended.  There is no tenderness.  No rebound, guarding, or rigidity.   Musculoskeletal: Moves all extremities. No obvious deformities. No edema.  Skin: Warm and dry.  Neurological:  Alert, awake, and appropriate.  Normal speech.  No acute focal neurological deficits are appreciated.  Psychiatric: Normal affect. Good eye contact. Appropriate in content.    ED Course    Procedures  ED Vital Signs:  Vitals:    06/22/23 1356 06/22/23 1425   BP: (!) 154/90    Pulse: 86 82   Resp: 12    Temp: 98.2 °F (36.8 °C)    TempSrc: Oral    SpO2: 96%        Abnormal Lab Results:  Labs Reviewed   COMPREHENSIVE METABOLIC PANEL - Abnormal; Notable for the following components:       Result Value    Total Protein 8.5 (*)     eGFR 52 (*)     All other components within normal limits    Narrative:     Release to patient->Immediate   URINALYSIS, REFLEX TO URINE CULTURE - Abnormal; Notable for the following components:    Color, UA Colorless (*)     Occult Blood UA Trace (*)     All other components within normal limits    Narrative:     Specimen Source->Urine   CBC W/ AUTO DIFFERENTIAL - Abnormal; Notable for the following components:    MCHC 31.4 (*)     RDW 14.7 (*)     All other components within normal limits   HEP C VIRUS HOLD SPECIMEN    Narrative:     Release to patient->Immediate   B-TYPE NATRIURETIC PEPTIDE    Narrative:     Release to patient->Immediate   CK    Narrative:     Release to patient->Immediate   TROPONIN I    Narrative:      Release to patient->Immediate   HIV 1 / 2 ANTIBODY   HEPATITIS C ANTIBODY        All Lab Results:  Results for orders placed or performed during the hospital encounter of 06/22/23   HCV Virus Hold Specimen   Result Value Ref Range    HEP C Virus Hold Specimen Hold for HCV sendout    Comprehensive Metabolic Panel   Result Value Ref Range    Sodium 141 136 - 145 mmol/L    Potassium 4.6 3.5 - 5.1 mmol/L    Chloride 106 95 - 110 mmol/L    CO2 23 23 - 29 mmol/L    Glucose 97 70 - 110 mg/dL    BUN 11 8 - 23 mg/dL    Creatinine 1.1 0.5 - 1.4 mg/dL    Calcium 9.7 8.7 - 10.5 mg/dL    Total Protein 8.5 (H) 6.0 - 8.4 g/dL    Albumin 3.8 3.5 - 5.2 g/dL    Total Bilirubin 0.6 0.1 - 1.0 mg/dL    Alkaline Phosphatase 96 55 - 135 U/L    AST 18 10 - 40 U/L    ALT 11 10 - 44 U/L    eGFR 52 (A) >60 mL/min/1.73 m^2    Anion Gap 12 8 - 16 mmol/L   Urinalysis, Reflex to Urine Culture Urine, Clean Catch    Specimen: Urine   Result Value Ref Range    Specimen UA Urine, Clean Catch     Color, UA Colorless (A) Yellow, Straw, Michelle    Appearance, UA Clear Clear    pH, UA 7.0 5.0 - 8.0    Specific Gravity, UA 1.010 1.005 - 1.030    Protein, UA Negative Negative    Glucose, UA Negative Negative    Ketones, UA Negative Negative    Bilirubin (UA) Negative Negative    Occult Blood UA Trace (A) Negative    Nitrite, UA Negative Negative    Urobilinogen, UA Negative <2.0 EU/dL    Leukocytes, UA Negative Negative   BNP   Result Value Ref Range    BNP 80 0 - 99 pg/mL   CK   Result Value Ref Range     20 - 180 U/L   Troponin I   Result Value Ref Range    Troponin I 0.016 0.000 - 0.026 ng/mL   CBC auto differential   Result Value Ref Range    WBC 8.89 3.90 - 12.70 K/uL    RBC 4.26 4.00 - 5.40 M/uL    Hemoglobin 12.7 12.0 - 16.0 g/dL    Hematocrit 40.5 37.0 - 48.5 %    MCV 95 82 - 98 fL    MCH 29.8 27.0 - 31.0 pg    MCHC 31.4 (L) 32.0 - 36.0 g/dL    RDW 14.7 (H) 11.5 - 14.5 %    Platelets 188 150 - 450 K/uL    MPV 12.4 9.2 - 12.9 fL    Immature  Granulocytes 0.2 0.0 - 0.5 %    Gran # (ANC) 5.3 1.8 - 7.7 K/uL    Immature Grans (Abs) 0.02 0.00 - 0.04 K/uL    Lymph # 2.6 1.0 - 4.8 K/uL    Mono # 0.8 0.3 - 1.0 K/uL    Eos # 0.1 0.0 - 0.5 K/uL    Baso # 0.02 0.00 - 0.20 K/uL    nRBC 0 0 /100 WBC    Gran % 59.6 38.0 - 73.0 %    Lymph % 29.6 18.0 - 48.0 %    Mono % 8.8 4.0 - 15.0 %    Eosinophil % 1.6 0.0 - 8.0 %    Basophil % 0.2 0.0 - 1.9 %    Differential Method Automated      Imaging Results:  Imaging Results              X-Ray Chest AP Portable (Final result)  Result time 06/22/23 14:15:19      Final result by Valdemar Quiles III, MD (06/22/23 14:15:19)                   Impression:      Developing left lower lobe airspace opacity suggesting atelectasis or pneumonia.      Electronically signed by: Almas Quiles  Date:    06/22/2023  Time:    14:15               Narrative:    EXAMINATION:  XR CHEST AP PORTABLE    CLINICAL HISTORY:  sob;    TECHNIQUE:  Single frontal view of the chest was performed.    COMPARISON:  06/16/2023    FINDINGS:  There is a loop recorder device again noted.  Developing left lower lobe airspace opacity partially obscuring the left diaphragm compatible with scars pneumonia or atelectasis.  The mediastinum and cardiac silhouette are unremarkable.  The right lung is clear.                                     The EKG was ordered, reviewed, and independently interpreted by the ED provider.  Interpretation time: 14:26  Rate: 82 BPM  Rhythm: Normal sinus rhythm with sinus arrhythmia  Interpretation: ST & T wave abnormality, consider lateral ischemia. No STEMI.           The Emergency Provider reviewed the vital signs and test results, which are outlined above.    ED Discussion     3:24 PM: Reassessed pt at this time. Discussed with pt all pertinent ED information and results. Discussed pt dx and plan of tx. Gave pt all f/u and return to the ED instructions. All questions and concerns were addressed at this time. Pt expresses  understanding of information and instructions, and is comfortable with plan to discharge. Pt is stable for discharge.    I discussed with patient and/or family/caretaker that evaluation in the ED does not suggest any emergent or life threatening medical conditions requiring immediate intervention beyond what was provided in the ED, and I believe patient is safe for discharge.  Regardless, an unremarkable evaluation in the ED does not preclude the development or presence of a serious of life threatening condition. As such, patient was instructed to return immediately for any worsening or change in current symptoms.         ED Medication(s):  Medications   levoFLOXacin tablet 750 mg (has no administration in time range)       New Prescriptions    No medications on file     Medical Decision Making    Medical Decision Making:   Initial Assessment:   Sob that started earlier today.  Denies chest pain  Differential Diagnosis:   Sob, pneumonia, CHF, electrolyte abnormality  Clinical Tests:   Lab Tests: Ordered and Reviewed  Radiological Study: Ordered and Reviewed  Medical Tests: Ordered and Reviewed         Scribe Attestation:   Scribe #1: I performed the above scribed service and the documentation accurately describes the services I performed. I attest to the accuracy of the note.    Attending:   Physician Attestation Statement for Scribe #1: I, Jose Dale MD, personally performed the services described in this documentation, as scribed by Kalia Lebron, in my presence, and it is both accurate and complete.          Clinical Impression       ICD-10-CM ICD-9-CM   1. Pneumonia of left lower lobe due to infectious organism  J18.9 486   2. SOB (shortness of breath)  R06.02 786.05       Disposition:   Disposition: Discharged  Condition: Stable       Jose Dale MD  06/22/23 1529

## 2023-06-23 ENCOUNTER — DOCUMENTATION ONLY (OUTPATIENT)
Dept: RADIATION ONCOLOGY | Facility: CLINIC | Age: 78
End: 2023-06-23
Payer: MEDICARE

## 2023-06-23 PROCEDURE — 77412 RADIATION TX DELIVERY LVL 3: CPT | Performed by: RADIOLOGY

## 2023-06-26 ENCOUNTER — OFFICE VISIT (OUTPATIENT)
Dept: PRIMARY CARE CLINIC | Facility: CLINIC | Age: 78
End: 2023-06-26
Payer: MEDICARE

## 2023-06-26 ENCOUNTER — HOSPITAL ENCOUNTER (OUTPATIENT)
Dept: RADIOLOGY | Facility: HOSPITAL | Age: 78
Discharge: HOME | End: 2023-06-26
Attending: NURSE PRACTITIONER
Payer: MEDICARE

## 2023-06-26 VITALS
SYSTOLIC BLOOD PRESSURE: 150 MMHG | OXYGEN SATURATION: 95 % | HEART RATE: 65 BPM | DIASTOLIC BLOOD PRESSURE: 78 MMHG | BODY MASS INDEX: 37.66 KG/M2 | WEIGHT: 219.38 LBS

## 2023-06-26 DIAGNOSIS — J18.9 PNEUMONIA OF LEFT LOWER LOBE DUE TO INFECTIOUS ORGANISM: Primary | ICD-10-CM

## 2023-06-26 DIAGNOSIS — R06.02 SHORTNESS OF BREATH: ICD-10-CM

## 2023-06-26 DIAGNOSIS — J18.9 PNEUMONIA OF LEFT LOWER LOBE DUE TO INFECTIOUS ORGANISM: ICD-10-CM

## 2023-06-26 PROCEDURE — 99999 PR PBB SHADOW E&M-EST. PATIENT-LVL III: CPT | Mod: PBBFAC,,, | Performed by: NURSE PRACTITIONER

## 2023-06-26 PROCEDURE — 1101F PR PT FALLS ASSESS DOC 0-1 FALLS W/OUT INJ PAST YR: ICD-10-PCS | Mod: CPTII,S$GLB,, | Performed by: NURSE PRACTITIONER

## 2023-06-26 PROCEDURE — 3078F PR MOST RECENT DIASTOLIC BLOOD PRESSURE < 80 MM HG: ICD-10-PCS | Mod: CPTII,S$GLB,, | Performed by: NURSE PRACTITIONER

## 2023-06-26 PROCEDURE — 99214 PR OFFICE/OUTPT VISIT, EST, LEVL IV, 30-39 MIN: ICD-10-PCS | Mod: S$GLB,,, | Performed by: NURSE PRACTITIONER

## 2023-06-26 PROCEDURE — 3078F DIAST BP <80 MM HG: CPT | Mod: CPTII,S$GLB,, | Performed by: NURSE PRACTITIONER

## 2023-06-26 PROCEDURE — 3077F PR MOST RECENT SYSTOLIC BLOOD PRESSURE >= 140 MM HG: ICD-10-PCS | Mod: CPTII,S$GLB,, | Performed by: NURSE PRACTITIONER

## 2023-06-26 PROCEDURE — 71046 X-RAY EXAM CHEST 2 VIEWS: CPT | Mod: TC,FY,PO

## 2023-06-26 PROCEDURE — 71046 X-RAY EXAM CHEST 2 VIEWS: CPT | Mod: 26,,, | Performed by: RADIOLOGY

## 2023-06-26 PROCEDURE — 3288F PR FALLS RISK ASSESSMENT DOCUMENTED: ICD-10-PCS | Mod: CPTII,S$GLB,, | Performed by: NURSE PRACTITIONER

## 2023-06-26 PROCEDURE — 1157F ADVNC CARE PLAN IN RCRD: CPT | Mod: CPTII,S$GLB,, | Performed by: NURSE PRACTITIONER

## 2023-06-26 PROCEDURE — 77336 RADIATION PHYSICS CONSULT: CPT | Performed by: RADIOLOGY

## 2023-06-26 PROCEDURE — 71046 XR CHEST PA AND LATERAL: ICD-10-PCS | Mod: 26,,, | Performed by: RADIOLOGY

## 2023-06-26 PROCEDURE — 99999 PR PBB SHADOW E&M-EST. PATIENT-LVL III: ICD-10-PCS | Mod: PBBFAC,,, | Performed by: NURSE PRACTITIONER

## 2023-06-26 PROCEDURE — 3077F SYST BP >= 140 MM HG: CPT | Mod: CPTII,S$GLB,, | Performed by: NURSE PRACTITIONER

## 2023-06-26 PROCEDURE — 1157F PR ADVANCE CARE PLAN OR EQUIV PRESENT IN MEDICAL RECORD: ICD-10-PCS | Mod: CPTII,S$GLB,, | Performed by: NURSE PRACTITIONER

## 2023-06-26 PROCEDURE — 99214 OFFICE O/P EST MOD 30 MIN: CPT | Mod: S$GLB,,, | Performed by: NURSE PRACTITIONER

## 2023-06-26 PROCEDURE — 1101F PT FALLS ASSESS-DOCD LE1/YR: CPT | Mod: CPTII,S$GLB,, | Performed by: NURSE PRACTITIONER

## 2023-06-26 PROCEDURE — 3288F FALL RISK ASSESSMENT DOCD: CPT | Mod: CPTII,S$GLB,, | Performed by: NURSE PRACTITIONER

## 2023-06-26 RX ORDER — LEVOFLOXACIN 750 MG/1
750 TABLET ORAL DAILY
Qty: 10 TABLET | Refills: 0 | Status: SHIPPED | OUTPATIENT
Start: 2023-06-26 | End: 2023-07-06

## 2023-06-26 NOTE — ASSESSMENT & PLAN NOTE
Repeat CXR confirmed LLL pneumonia  Levaquin  Hx of asthma - use albuterol inhaler  Pt with BARKER and orthopnea, reporting tachycardia - ruling out PE

## 2023-06-26 NOTE — PROGRESS NOTES
"Nathalie [place]enridge  06/26/2023  [id number]    Alice Booker MD  Patient Care Team:  Alice Booker MD as PCP - General (Internal Medicine)  CARA Nuno MD as Consulting Physician (Cardiology)  José Weiss MD as Consulting Physician (Cardiology)  Latesha Verdugo LPN as Care Coordinator  GILL Schrader, OD as Consulting Physician (Optometry)  Yaw Morales DPM (Podiatry)  Brayan Soriano MD (Inactive) as Consulting Physician (Pain Medicine)      [facility] Note      Chief Complaint:  Chief Complaint   Patient presents with    Follow-up     Hospital follow up. Pt has pneumonia.        History of Present Illness:  Had keloid removal on 6/21 by  Dr. Dykes. Went home  Awakened the next day SOB, anxious, palpitations ( heart racing)  Sore throat, headache - improved  Mild cough that is productive - clear with light brown    Today - BARKER, Orthopena with 3 pillows to 4 pillow  Has midspinal cervical pain since the procedure    + fatigue  Denies fever, chills, pleuritic chest pain, hemoptysis                The following were reviewed: Active problem list, medication list, allergies, family history, social history, and Health Maintenance.     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 Nuno    CARPAL TUNNEL RELEASE      b/l    CATARACT EXTRACTION EXTRACAPSULAR W/ INTRAOCULAR LENS IMPLANTATION " Bilateral 2022    COLONOSCOPY N/A 03/31/2021    Procedure: COLONOSCOPY;  Surgeon: Fozia Dasilva MD;  Location: [facility] ENDO;  Service: Endoscopy;  Laterality: N/A;    DILATION AND CURETTAGE OF UTERUS      10/2021, no cancer per pt.    FOOT SURGERY Right 05/02/2013    wart removal    KELOID EXCISION N/A 6/21/2023    Procedure: EXCISION, KELOID;  Surgeon: Justine Youngblood DO;  Location: [facility] OR;  Service: General;  Laterality: N/A;    TRIGGER FINGER RELEASE Right     TUBAL LIGATION       Family History   Problem Relation Age of Onset    Hypertension Mother     Hypertension Father     Cancer Father         prostate    Cataracts Father     Glaucoma Father     Diabetes Father     Hypertension Sister     Diabetes Sister     Breast cancer Neg Hx     Colon cancer Neg Hx     Ovarian cancer Neg Hx     Thrombophilia Neg Hx      Patient Active Problem List   Diagnosis    Essential hypertension    Other hyperlipidemia    GERD (gastroesophageal reflux disease)    Allergy    Lumbar spinal stenosis    Vitamin D deficiency    Bilateral carpal tunnel syndrome    Tortuous aorta    Severe obesity with body mass index (BMI) of 35.0 to 39.9 with comorbidity    Atrial flutter with rapid ventricular response    Diverticulosis of large intestine without hemorrhage    History of colon polyps    Slow transit constipation    History of TIA (transient ischemic attack)    Presence of cardiac defibrillator    Chronic anticoagulation    Mild intermittent asthma without complication    Endometrial thickening on ultrasound    Fibroid tumor    RLS (restless legs syndrome)    Decreased strength, endurance, and mobility    Decreased functional activity tolerance    Decreased range of motion    Balance problem    Keloid of skin    ACP (advance care planning)    Preoperative examination    Pneumonia of left lower lobe due to infectious organism    Shortness of breath     Review of patient's allergies indicates:   Allergen Reactions    Penicillins  Swelling     Generalized swelling  Other reaction(s): Swelling  Generalized swelling  Other reaction(s): Swelling  Generalized swelling       Medications:  Current Outpatient Medications on File Prior to Visit   Medication Sig Dispense Refill    acetaminophen (TYLENOL) 500 MG tablet Take 500 mg by mouth every 6 (six) hours as needed.      albuterol 90 mcg/actuation inhaler Inhale 2 puffs into the lungs every 4 (four) hours as needed for Wheezing. 1 each 6    amLODIPine (NORVASC) 5 MG tablet Take 1 tablet (5 mg total) by mouth 2 (two) times daily. 180 tablet 3    bacitracin 500 unit/gram ointment Apply topically 2 (two) times daily. 28 g 4    betamethasone dipropionate (DIPROLENE) 0.05 % ointment Apply topically 2 (two) times daily. PRN keloid scar. Strong steroid- do Not use on face. 45 g 0    buPROPion (WELLBUTRIN SR) 150 MG TBSR 12 hr tablet Take 1 tablet (150 mg total) by mouth 2 (two) times daily. 60 tablet 6    cholecalciferol, vitamin D3, (VITAMIN D3) 50 mcg (2,000 unit) Cap Take 1 capsule by mouth once daily.      clindamycin phosphate 1% (CLINDAGEL) 1 % gel Apply topically 2 (two) times daily. For acne of chest. 30 g 3    diclofenac sodium (VOLTAREN) 1 % Gel Apply 2 g topically 2 (two) times daily. 60 g 1    digoxin (LANOXIN) 125 mcg tablet TAKE 1 TABLET EVERY DAY 90 tablet 2    diphenhydrAMINE (BENADRYL) 25 mg capsule Take 25 mg by mouth every 6 (six) hours as needed.      ergocalciferol (ERGOCALCIFEROL) 50,000 unit Cap Take 50,000 Units by mouth every 7 days.      flurandrenolide 4 mcg/cm2 Tape Apply to fit area involved. Wear 12 hours/day then remove. Reapply next day. 1 each 3    fluticasone propionate (FLONASE) 50 mcg/actuation nasal spray 2 sprays (100 mcg total) by Each Nostril route once daily. 16 g 6    HYDROcodone-acetaminophen (NORCO) 5-325 mg per tablet Take 1 tablet by mouth every 4 to 6 hours as needed for Pain. 25 tablet 0    hydrOXYzine pamoate (VISTARIL) 25 MG Cap Take 25 mg by mouth.       losartan (COZAAR) 100 MG tablet Take 1 tablet (100 mg total) by mouth once daily. 90 tablet 1    metoprolol succinate (TOPROL-XL) 100 MG 24 hr tablet Take 1 tablet (100 mg total) by mouth once daily. 90 tablet 3    mupirocin (BACTROBAN) 2 % ointment Apply topically 3 (three) times daily. For broken skin. 22 g 1    polyethylene glycol (GLYCOLAX) 17 gram PwPk Take by mouth once daily.      rivaroxaban (XARELTO) 20 mg Tab TAKE 1 TABLET BY MOUTH EVERY DAY WITH DINNER      rOPINIRole (REQUIP) 1 MG tablet       betamethasone valerate 0.1% (VALISONE) 0.1 % Oint Apply topically 2 (two) times daily. 15 g 1    nystatin-triamcinolone (MYCOLOG II) cream Apply to affected area 2 times daily 30 g 1    rosuvastatin (CRESTOR) 40 MG Tab Take 1 tablet (40 mg total) by mouth every evening. 90 tablet 3     No current facility-administered medications on file prior to visit.       Medications have been reviewed and reconciled with patient at visit today.      Exam:  Vitals:    06/26/23 1155   BP: (!) 150/78   Pulse: 65     Weight: 99.5 kg (219 lb 6.4 oz)   Body mass index is 37.66 kg/m².      BP Readings from Last 3 Encounters:   06/26/23 (!) 150/78   06/22/23 (!) 166/76   06/21/23 (!) 117/56     Wt Readings from Last 3 Encounters:   06/26/23 1155 99.5 kg (219 lb 6.4 oz)   06/21/23 0616 101.2 kg (223 lb)   04/24/23 1431 101.2 kg (223 lb 3.2 oz)          ROS    Physical Exam  Vitals reviewed.   Constitutional:       General: She is not in acute distress.     Appearance: Normal appearance.   HENT:      Head: Normocephalic and atraumatic.      Nose: Nose normal.      Mouth/Throat:      Mouth: Mucous membranes are moist.   Eyes:      General: No scleral icterus.     Conjunctiva/sclera: Conjunctivae normal.   Cardiovascular:      Rate and Rhythm: Normal rate. Rhythm irregular.      Heart sounds: No murmur heard.  Pulmonary:      Effort: Pulmonary effort is normal. No respiratory distress.      Breath sounds: Examination of the left-lower  field reveals rales. Rales present.   Abdominal:      Palpations: Abdomen is soft. There is no mass.      Tenderness: There is no abdominal tenderness.   Musculoskeletal:         General: No swelling or deformity.      Cervical back: Normal range of motion and neck supple.      Right lower leg: No edema.      Left lower leg: No edema.   Lymphadenopathy:      Cervical: No cervical adenopathy.   Skin:     General: Skin is warm and dry.   Neurological:      Mental Status: She is alert and oriented to person, place, and time. Mental status is at baseline.   Psychiatric:         Mood and Affect: Mood normal.         Thought Content: Thought content normal.       Laboratory Reviewed:   Lab Results   Component Value Date    WBC 8.89 06/22/2023    HGB 12.7 06/22/2023    HCT 40.5 06/22/2023     06/22/2023    CHOL 228 (H) 01/17/2023    TRIG 75 01/17/2023    HDL 57 01/17/2023    ALT 11 06/22/2023    AST 18 06/22/2023     06/22/2023    K 4.6 06/22/2023     06/22/2023    CREATININE 1.1 06/22/2023    BUN 11 06/22/2023    CO2 23 06/22/2023    TSH 1.294 01/17/2023           Health Maintenance  Health Maintenance Topics with due status: Not Due       Topic Last Completion Date    TETANUS VACCINE 09/23/2014    Lipid Panel 01/17/2023    DEXA Scan 01/17/2023     Health Maintenance Due   Topic Date Due    Shingles Vaccine (1 of 2) Never done    COVID-19 Vaccine (4 - Moderna series) 03/31/2022       Assessment and Plan:  1. Pneumonia of left lower lobe due to infectious organism  Assessment & Plan:  Repeat CXR confirmed LLL pneumonia  Levaquin  Hx of asthma - use albuterol inhaler  Pt with BARKER and orthopnea, reporting tachycardia - ruling out PE    Orders:  -     X-Ray Chest PA And Lateral; Future; Expected date: 06/26/2023  -     CBC W/ AUTO DIFFERENTIAL; Future; Expected date: 06/26/2023  -     COMPREHENSIVE METABOLIC PANEL; Future; Expected date: 06/26/2023  -     D-DIMER, QUANTITATIVE; Future; Expected date:  06/26/2023  -     BNP; Future; Expected date: 06/26/2023  -     levoFLOXacin (LEVAQUIN) 750 MG tablet; Take 1 tablet (750 mg total) by mouth once daily. for 10 days  Dispense: 10 tablet; Refill: 0    2. Shortness of breath                 -Patient's lab results were reviewed and discussed with patient  -Treatment options and alternatives were discussed with the patient. Patient expressed understanding. Patient was given the opportunity to ask questions and be an active participant in their medical care. Patient had no further questions or concerns at this time.         Future Appointments   Date Time Provider Department Center   7/5/2023  3:00 PM Alice Booker MD [facility]   7/7/2023 11:45 AM Fiorella Oviedo PA-C [facility]   7/12/2023 11:00 AM Adela Ramírez NP [facility]          After visit summary printed and given to patient upon discharge.  Patient goals and care plan are included in After visit summary.    Total medical decision making time was 34 min.    The following issues were discussed: The primary encounter diagnosis was Pneumonia of left lower lobe due to infectious organism. A diagnosis of Shortness of breath was also pertinent to this visit.    Health maintenance needs, recent test results and goals of care discussed with pt and questions answered.           ISATU Baltazar, NP-C  [facility] [address] [place] [id number]

## 2023-06-27 ENCOUNTER — PATIENT MESSAGE (OUTPATIENT)
Dept: PRIMARY CARE CLINIC | Facility: CLINIC | Age: 78
End: 2023-06-27
Payer: MEDICARE

## 2023-06-27 ENCOUNTER — TELEPHONE (OUTPATIENT)
Dept: PRIMARY CARE CLINIC | Facility: CLINIC | Age: 78
End: 2023-06-27
Payer: MEDICARE

## 2023-06-27 DIAGNOSIS — R06.09 DOE (DYSPNEA ON EXERTION): ICD-10-CM

## 2023-06-27 DIAGNOSIS — R07.9 CHEST PAIN, UNSPECIFIED TYPE: Primary | ICD-10-CM

## 2023-06-27 NOTE — TELEPHONE ENCOUNTER
CTA of Chest to rule of DVT given elevated D dimer  Pt stopped the Xarelto prior to surgical procedure

## 2023-06-28 ENCOUNTER — HOSPITAL ENCOUNTER (OUTPATIENT)
Dept: RADIOLOGY | Facility: HOSPITAL | Age: 78
Discharge: HOME | End: 2023-06-28
Attending: NURSE PRACTITIONER
Payer: MEDICARE

## 2023-06-28 ENCOUNTER — NURSE TRIAGE (OUTPATIENT)
Dept: ADMINISTRATIVE | Facility: CLINIC | Age: 78
End: 2023-06-28
Payer: MEDICARE

## 2023-06-28 ENCOUNTER — HOSPITAL ENCOUNTER (EMERGENCY)
Facility: HOSPITAL | Age: 78
Discharge: HOME | End: 2023-06-28
Attending: FAMILY MEDICINE
Payer: MEDICARE

## 2023-06-28 ENCOUNTER — TELEPHONE (OUTPATIENT)
Dept: PRIMARY CARE CLINIC | Facility: CLINIC | Age: 78
End: 2023-06-28
Payer: MEDICARE

## 2023-06-28 VITALS
DIASTOLIC BLOOD PRESSURE: 68 MMHG | BODY MASS INDEX: 36.9 KG/M2 | HEART RATE: 63 BPM | SYSTOLIC BLOOD PRESSURE: 141 MMHG | OXYGEN SATURATION: 99 % | TEMPERATURE: 98 F | WEIGHT: 214.94 LBS | RESPIRATION RATE: 16 BRPM

## 2023-06-28 DIAGNOSIS — M79.89 LEG SWELLING: ICD-10-CM

## 2023-06-28 DIAGNOSIS — R06.09 DOE (DYSPNEA ON EXERTION): ICD-10-CM

## 2023-06-28 DIAGNOSIS — R60.9 SWELLING: ICD-10-CM

## 2023-06-28 DIAGNOSIS — R07.9 CHEST PAIN, UNSPECIFIED TYPE: ICD-10-CM

## 2023-06-28 DIAGNOSIS — I26.09 ACUTE PULMONARY EMBOLISM WITH ACUTE COR PULMONALE, UNSPECIFIED PULMONARY EMBOLISM TYPE: Primary | ICD-10-CM

## 2023-06-28 LAB
ALBUMIN SERPL BCP-MCNC: 3.6 G/DL (ref 3.5–5.2)
ALP SERPL-CCNC: 104 U/L (ref 55–135)
ALT SERPL W/O P-5'-P-CCNC: 9 U/L (ref 10–44)
ANION GAP SERPL CALC-SCNC: 14 MMOL/L (ref 8–16)
APTT PPP: 28.1 SEC (ref 21–32)
AST SERPL-CCNC: 15 U/L (ref 10–40)
BASOPHILS # BLD AUTO: 0.02 K/UL (ref 0–0.2)
BASOPHILS NFR BLD: 0.2 % (ref 0–1.9)
BILIRUB SERPL-MCNC: 0.7 MG/DL (ref 0.1–1)
BNP SERPL-MCNC: 40 PG/ML (ref 0–99)
BUN SERPL-MCNC: 14 MG/DL (ref 8–23)
CALCIUM SERPL-MCNC: 9.8 MG/DL (ref 8.7–10.5)
CHLORIDE SERPL-SCNC: 103 MMOL/L (ref 95–110)
CO2 SERPL-SCNC: 23 MMOL/L (ref 23–29)
CREAT SERPL-MCNC: 1.2 MG/DL (ref 0.5–1.4)
DIFFERENTIAL METHOD: ABNORMAL
EOSINOPHIL # BLD AUTO: 0.2 K/UL (ref 0–0.5)
EOSINOPHIL NFR BLD: 2.5 % (ref 0–8)
ERYTHROCYTE [DISTWIDTH] IN BLOOD BY AUTOMATED COUNT: 14.3 % (ref 11.5–14.5)
EST. GFR  (NO RACE VARIABLE): 47 ML/MIN/1.73 M^2
FINAL PATHOLOGIC DIAGNOSIS: NORMAL
GLUCOSE SERPL-MCNC: 85 MG/DL (ref 70–110)
GROSS: NORMAL
HCT VFR BLD AUTO: 42.3 % (ref 37–48.5)
HGB BLD-MCNC: 13.5 G/DL (ref 12–16)
IMM GRANULOCYTES # BLD AUTO: 0.06 K/UL (ref 0–0.04)
IMM GRANULOCYTES NFR BLD AUTO: 0.7 % (ref 0–0.5)
INR PPP: 1.2 (ref 0.8–1.2)
LYMPHOCYTES # BLD AUTO: 2.1 K/UL (ref 1–4.8)
LYMPHOCYTES NFR BLD: 25.5 % (ref 18–48)
Lab: NORMAL
MCH RBC QN AUTO: 29.7 PG (ref 27–31)
MCHC RBC AUTO-ENTMCNC: 31.9 G/DL (ref 32–36)
MCV RBC AUTO: 93 FL (ref 82–98)
MICROSCOPIC EXAM: NORMAL
MONOCYTES # BLD AUTO: 0.6 K/UL (ref 0.3–1)
MONOCYTES NFR BLD: 7.7 % (ref 4–15)
NEUTROPHILS # BLD AUTO: 5.2 K/UL (ref 1.8–7.7)
NEUTROPHILS NFR BLD: 63.4 % (ref 38–73)
NRBC BLD-RTO: 0 /100 WBC
PLATELET # BLD AUTO: 248 K/UL (ref 150–450)
PMV BLD AUTO: 11.5 FL (ref 9.2–12.9)
POTASSIUM SERPL-SCNC: 4.4 MMOL/L (ref 3.5–5.1)
PROT SERPL-MCNC: 8.3 G/DL (ref 6–8.4)
PROTHROMBIN TIME: 13 SEC (ref 9–12.5)
RBC # BLD AUTO: 4.55 M/UL (ref 4–5.4)
SODIUM SERPL-SCNC: 140 MMOL/L (ref 136–145)
TROPONIN I SERPL DL<=0.01 NG/ML-MCNC: 0.01 NG/ML (ref 0–0.03)
WBC # BLD AUTO: 8.27 K/UL (ref 3.9–12.7)

## 2023-06-28 PROCEDURE — 85730 THROMBOPLASTIN TIME PARTIAL: CPT | Performed by: NURSE PRACTITIONER

## 2023-06-28 PROCEDURE — 71275 CT ANGIOGRAPHY CHEST: CPT | Mod: TC

## 2023-06-28 PROCEDURE — 71275 CTA CHEST NON CORONARY (PE STUDIES): ICD-10-PCS | Mod: 26,,, | Performed by: RADIOLOGY

## 2023-06-28 PROCEDURE — 25500020 PHARM REV CODE 255: Performed by: NURSE PRACTITIONER

## 2023-06-28 PROCEDURE — 99285 EMERGENCY DEPT VISIT HI MDM: CPT | Mod: 25

## 2023-06-28 PROCEDURE — 93010 EKG 12-LEAD: ICD-10-PCS | Mod: ,,, | Performed by: INTERNAL MEDICINE

## 2023-06-28 PROCEDURE — 71275 CT ANGIOGRAPHY CHEST: CPT | Mod: 26,,, | Performed by: RADIOLOGY

## 2023-06-28 PROCEDURE — 93005 ELECTROCARDIOGRAM TRACING: CPT

## 2023-06-28 PROCEDURE — 80053 COMPREHEN METABOLIC PANEL: CPT | Performed by: NURSE PRACTITIONER

## 2023-06-28 PROCEDURE — 83880 ASSAY OF NATRIURETIC PEPTIDE: CPT | Performed by: NURSE PRACTITIONER

## 2023-06-28 PROCEDURE — 84484 ASSAY OF TROPONIN QUANT: CPT | Performed by: NURSE PRACTITIONER

## 2023-06-28 PROCEDURE — 85025 COMPLETE CBC W/AUTO DIFF WBC: CPT | Performed by: NURSE PRACTITIONER

## 2023-06-28 PROCEDURE — 85610 PROTHROMBIN TIME: CPT | Performed by: NURSE PRACTITIONER

## 2023-06-28 PROCEDURE — 93010 ELECTROCARDIOGRAM REPORT: CPT | Mod: ,,, | Performed by: INTERNAL MEDICINE

## 2023-06-28 RX ADMIN — IOHEXOL 100 ML: 350 INJECTION, SOLUTION INTRAVENOUS at 01:06

## 2023-06-28 NOTE — FIRST PROVIDER EVALUATION
" Emergency Department TeleTriage Encounter Note      CHIEF COMPLAINT    Chief Complaint   Patient presents with    Deep Vein Thrombosis     [facility] to rule out DVT. "I think it is in my lungs." Denies SOB, chest pain. Per Geoffrey, pt has bilateral PE in lungs. Was sent by Dr. Ramírez for further eval.       VITAL SIGNS   Initial Vitals   BP Pulse Resp Temp SpO2   06/28/23 1614 06/28/23 1614 06/28/23 1614 06/28/23 1615 06/28/23 1614   (!) 166/75 71 20 98.2 °F (36.8 °C) 95 %      MAP       --                   ALLERGIES    Review of patient's allergies indicates:   Allergen Reactions    Penicillins Swelling     Generalized swelling  Other reaction(s): Swelling  Generalized swelling  Other reaction(s): Swelling  Generalized swelling       PROVIDER TRIAGE NOTE    77 year old female with hx of  A Fib, sent in due to + CTA PE study as outpatient concerning for multiple PE with right heart strain today. Currently on Xarelto. Recently had a keloid surgery about 1 week ago. Currently denies SOB or CP but reports right leg swelling.       Exam: AAOx3, resp even and non labored, O2 sat 95%, MD notified of patient arrival in ER and outpatient cta results.     ORDERS  Labs Reviewed - No data to display    ED Orders (720h ago, onward)      None              Virtual Visit Note: The provider triage portion of this emergency department evaluation and documentation was performed via [employer], a HIPAA-compliant telemedicine application, in concert with a tele-presenter in the room. A face to face patient evaluation with one of my colleagues will occur once the patient is placed in an emergency department room.      DISCLAIMER: This note was prepared with [employer] voice recognition transcription software. Garbled syntax, mangled pronouns, and other bizarre constructions may be attributed to that software system.    "

## 2023-06-28 NOTE — TELEPHONE ENCOUNTER
Pt had CTA of Chest to rule out PE.    Per Radiologist - bilateral PE with concerns for right heart strain.    Spoke to the patient and advised to present to the ED for evaluation.    She stopped her Xarelto prior to scar revision sx and resumed 24 hours post procedure.    Information relayed to the ED physician.

## 2023-06-29 ENCOUNTER — HOSPITAL ENCOUNTER (OUTPATIENT)
Dept: CARDIOLOGY | Facility: HOSPITAL | Age: 78
Discharge: HOME | End: 2023-06-29
Attending: NURSE PRACTITIONER
Payer: MEDICARE

## 2023-06-29 VITALS
BODY MASS INDEX: 36.54 KG/M2 | DIASTOLIC BLOOD PRESSURE: 68 MMHG | HEIGHT: 64 IN | SYSTOLIC BLOOD PRESSURE: 141 MMHG | WEIGHT: 214 LBS

## 2023-06-29 DIAGNOSIS — I26.99 BILATERAL PULMONARY EMBOLISM: ICD-10-CM

## 2023-06-29 DIAGNOSIS — Z79.01 CURRENT USE OF LONG TERM ANTICOAGULATION: ICD-10-CM

## 2023-06-29 DIAGNOSIS — I26.99 BILATERAL PULMONARY EMBOLISM: Primary | ICD-10-CM

## 2023-06-29 LAB
AORTIC ROOT ANNULUS: 2.88 CM
ASCENDING AORTA: 2.25 CM
AV INDEX (PROSTH): 0.68
AV MEAN GRADIENT: 8 MMHG
AV PEAK GRADIENT: 16 MMHG
AV VALVE AREA: 1.98 CM2
AV VELOCITY RATIO: 0.61
BSA FOR ECHO PROCEDURE: 2.09 M2
CV ECHO LV RWT: 0.46 CM
DOP CALC AO PEAK VEL: 1.99 M/S
DOP CALC AO VTI: 43.7 CM
DOP CALC LVOT AREA: 2.9 CM2
DOP CALC LVOT DIAMETER: 1.92 CM
DOP CALC LVOT PEAK VEL: 1.21 M/S
DOP CALC LVOT STROKE VOLUME: 86.53 CM3
DOP CALC RVOT PEAK VEL: 0.58 M/S
DOP CALC RVOT VTI: 14.2 CM
DOP CALCLVOT PEAK VEL VTI: 29.9 CM
E WAVE DECELERATION TIME: 281.54 MSEC
E/A RATIO: 0.8
E/E' RATIO: 15.82 M/S
ECHO LV POSTERIOR WALL: 1.06 CM (ref 0.6–1.1)
EJECTION FRACTION: 60 %
FRACTIONAL SHORTENING: 29 % (ref 28–44)
INTERVENTRICULAR SEPTUM: 1.23 CM (ref 0.6–1.1)
IVC DIAMETER: 1.28 CM
IVRT: 102.76 MSEC
LA MAJOR: 5.5 CM
LA MINOR: 5.13 CM
LA WIDTH: 3.9 CM
LEFT ATRIUM SIZE: 3.48 CM
LEFT ATRIUM VOLUME INDEX MOD: 30.1 ML/M2
LEFT ATRIUM VOLUME INDEX: 30.5 ML/M2
LEFT ATRIUM VOLUME MOD: 60.5 CM3
LEFT ATRIUM VOLUME: 61.24 CM3
LEFT INTERNAL DIMENSION IN SYSTOLE: 3.28 CM (ref 2.1–4)
LEFT VENTRICLE DIASTOLIC VOLUME INDEX: 48.17 ML/M2
LEFT VENTRICLE DIASTOLIC VOLUME: 96.82 ML
LEFT VENTRICLE MASS INDEX: 95 G/M2
LEFT VENTRICLE SYSTOLIC VOLUME INDEX: 21.6 ML/M2
LEFT VENTRICLE SYSTOLIC VOLUME: 43.41 ML
LEFT VENTRICULAR INTERNAL DIMENSION IN DIASTOLE: 4.59 CM (ref 3.5–6)
LEFT VENTRICULAR MASS: 191.09 G
LV LATERAL E/E' RATIO: 14.5 M/S
LV SEPTAL E/E' RATIO: 17.4 M/S
LVOT MG: 3.9 MMHG
LVOT MV: 0.96 CM/S
MV PEAK A VEL: 1.09 M/S
MV PEAK E VEL: 0.87 M/S
MV STENOSIS PRESSURE HALF TIME: 81.65 MS
MV VALVE AREA P 1/2 METHOD: 2.69 CM2
PISA TR MAX VEL: 3.37 M/S
PULM VEIN S/D RATIO: 1.09
PV MEAN GRADIENT: 0.74 MMHG
PV PEAK D VEL: 0.43 M/S
PV PEAK S VEL: 0.47 M/S
PV PEAK VELOCITY: 0.92 CM/S
RA MAJOR: 4.92 CM
RA WIDTH: 3.05 CM
RIGHT VENTRICULAR END-DIASTOLIC DIMENSION: 3.11 CM
SINUS: 2.8 CM
STJ: 2.21 CM
TDI LATERAL: 0.06 M/S
TDI SEPTAL: 0.05 M/S
TDI: 0.06 M/S
TR MAX PG: 45 MMHG
TRICUSPID ANNULAR PLANE SYSTOLIC EXCURSION: 1.78 CM

## 2023-06-29 PROCEDURE — 93306 TTE W/DOPPLER COMPLETE: CPT

## 2023-06-29 PROCEDURE — 93306 TTE W/DOPPLER COMPLETE: CPT | Mod: 26,,, | Performed by: INTERNAL MEDICINE

## 2023-06-29 PROCEDURE — 93306 ECHO (CUPID ONLY): ICD-10-PCS | Mod: 26,,, | Performed by: INTERNAL MEDICINE

## 2023-06-29 NOTE — TELEPHONE ENCOUNTER
Pt states blood clot on left lung, was told by Dr. Ramírez to check in at [facility] [address]. Pt calling upset and asking for Dr. Ramírez, pt asking why sent to ER and discharged with no medications. Pt states symptoms the same, denies worsening. Per protocol, call pcp now. Secure chat sent to on call MD. No response at this time. Advised pt if felt urgent or wanted to be evaluated, to go to ER or . Pt states will go back to hospital or another hospital.   Reason for Disposition   [1] Follow-up call from patient regarding patient's clinical status AND [2] information urgent    Protocols used: PCP Call - No Triage-A-

## 2023-06-29 NOTE — ED PROVIDER NOTES
"SCRIBE #1 NOTE: I, Gavin Torres and Jesús Hamilton, am scribing for, and in the presence of, Josi Arteaga MD. I have scribed the entire note.       History     Chief Complaint   Patient presents with    Deep Vein Thrombosis     Sent from the [facility] to rule out DVT. "I think it is in my lungs." Denies SOB, chest pain. Per [facility], pt has bilateral PE in lungs. Was sent by Dr. Ramírez for further eval.     Review of patient's allergies indicates:   Allergen Reactions    Penicillins Swelling     Generalized swelling  Other reaction(s): Swelling  Generalized swelling  Other reaction(s): Swelling  Generalized swelling         History of Present Illness     HPI    6/28/2023, 7:51 PM  History obtained from the patient      History of Present Illness: Nathalie Membreno is a 77 y.o. female patient with a PMHx of anemia, GERD, HLD, HTN, LGSIL, obesity, PNA, and CVA who presents to the Emergency Department for evaluation of a possible DVT which onset gradually today. Pt was sent by Dr. Ramírez at the [facility] to rule out a DVT. Pt believes that the problem is in her lungs. Symptoms are constant and moderate in severity. No mitigating or exacerbating factors reported. No additional associated sxs. Patient denies any SOB, chest pain, weakness, diaphoresis, and all other sxs at this time. No prior Tx. No further complaints or concerns at this time.       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 Nuno    CARPAL TUNNEL RELEASE      b/l    CATARACT EXTRACTION EXTRACAPSULAR W/ INTRAOCULAR LENS IMPLANTATION Bilateral 2022    COLONOSCOPY N/A 03/31/2021    Procedure: COLONOSCOPY;  Surgeon: Fozia Dasilva MD;  Location: [facility] ENDO;  Service: Endoscopy;  Laterality: N/A;    DILATION AND CURETTAGE OF UTERUS      10/2021, no cancer per pt.    FOOT SURGERY Right 05/02/2013    wart removal    KELOID EXCISION N/A 6/21/2023    Procedure: EXCISION, KELOID;  Surgeon: Justine Youngblood DO;  Location: [facility] OR;  Service: General;  Laterality: N/A;    TRIGGER FINGER RELEASE Right     TUBAL LIGATION           Family History:  Family History   Problem Relation Age of Onset    Hypertension Mother     Hypertension Father     Cancer Father         prostate    Cataracts Father     Glaucoma Father     Diabetes Father     Hypertension Sister     Diabetes Sister     Breast cancer Neg Hx     Colon cancer Neg Hx     Ovarian cancer Neg Hx     Thrombophilia Neg Hx        Social History:  Social History     Tobacco Use    Smoking status: Never    Smokeless tobacco: Never   Substance and Sexual Activity    Alcohol use: No     Alcohol/week: 0.0 standard drinks    Drug use: No    Sexual activity: Not Currently     Partners: Male     Birth control/protection: None        Review of Systems     Review of Systems   Constitutional:  Negative for diaphoresis and fever.   HENT:  Negative for sore throat.    Respiratory:  Negative for shortness of breath.    Cardiovascular:  Negative for chest pain.   Gastrointestinal:  Negative for nausea.   Genitourinary:  Negative for dysuria.   Musculoskeletal:  Negative for back pain.   Skin:  Negative for rash.   Neurological:  Negative for weakness.   Hematological:  Does not bruise/bleed easily.   All other systems reviewed and  are negative.     Physical Exam     Initial Vitals   BP Pulse Resp Temp SpO2   06/28/23 1614 06/28/23 1614 06/28/23 1614 06/28/23 1615 06/28/23 1614   (!) 166/75 71 20 98.2 °F (36.8 °C) 95 %      MAP       --                 Physical Exam  Nursing Notes and Vital Signs Reviewed.  Constitutional: Patient is in no acute distress. Well-developed and well-nourished.  Head: Atraumatic. Normocephalic.  Eyes: PERRL. EOM intact. Conjunctivae are not pale. No scleral icterus.  ENT: Mucous membranes are moist. Oropharynx is clear and symmetric.    Neck: Supple. Full ROM. No lymphadenopathy.  Cardiovascular: Regular rate. Regular rhythm. No murmurs, rubs, or gallops. Distal pulses are 2+ and symmetric.  Pulmonary/Chest: No respiratory distress. Clear to auscultation bilaterally. No wheezing or rales.   Abdominal: Soft and non-distended.  There is no tenderness.  No rebound, guarding, or rigidity. Good bowel sounds.  Genitourinary: No CVA tenderness  Musculoskeletal: Moves all extremities. No obvious deformities. No edema. No calf tenderness.  Skin: Warm and dry. Anterior chest wall incision intact with no drainage from previous keloid removal.  Neurological:  Alert, awake, and appropriate.  Normal speech.  No acute focal neurological deficits are appreciated.  Psychiatric: Normal affect. Good eye contact. Appropriate in content.     ED Course   Critical Care    Date/Time: 6/28/2023 8:12 PM  Performed by: Josi Arteaga MD  Authorized by: Josi Arteaga MD   Direct patient critical care time: 10 minutes  Additional history critical care time: 10 minutes  Ordering / reviewing critical care time: 10 minutes  Documentation critical care time: 5 minutes  Consulting other physicians critical care time: 5 minutes  Consult with family critical care time: 5 minutes  Total critical care time (exclusive of procedural time) : 45 minutes  Critical care time was exclusive of separately billable procedures and treating other patients  and teaching time.  Critical care was necessary to treat or prevent imminent or life-threatening deterioration of the following conditions: Pulmonary Embolus.  Critical care was time spent personally by me on the following activities: blood draw for specimens, development of treatment plan with patient or surrogate, discussions with consultants, interpretation of cardiac output measurements, evaluation of patient's response to treatment, examination of patient, obtaining history from patient or surrogate, ordering and performing treatments and interventions, ordering and review of laboratory studies, ordering and review of radiographic studies, pulse oximetry, re-evaluation of patient's condition and review of old charts.      ED Vital Signs:  Vitals:    06/28/23 1614 06/28/23 1615 06/28/23 1945 06/28/23 2000   BP: (!) 166/75  (!) 159/69 (!) 146/67   Pulse: 71  62 62   Resp: 20  16 16   Temp:  98.2 °F (36.8 °C)     TempSrc:  Oral     SpO2: 95%  99% 98%   Weight:  97.5 kg (214 lb 15.2 oz)      06/28/23 2035   BP: (!) 141/68   Pulse: 63   Resp: 16   Temp: 98.2 °F (36.8 °C)   TempSrc:    SpO2: 99%   Weight:        Abnormal Lab Results:  Labs Reviewed   CBC W/ AUTO DIFFERENTIAL - Abnormal; Notable for the following components:       Result Value    MCHC 31.9 (*)     Immature Granulocytes 0.7 (*)     Immature Grans (Abs) 0.06 (*)     All other components within normal limits   COMPREHENSIVE METABOLIC PANEL - Abnormal; Notable for the following components:    ALT 9 (*)     eGFR 47 (*)     All other components within normal limits   PROTIME-INR - Abnormal; Notable for the following components:    Prothrombin Time 13.0 (*)     All other components within normal limits   B-TYPE NATRIURETIC PEPTIDE   APTT   TROPONIN I        All Lab Results:  Results for orders placed or performed during the hospital encounter of 06/28/23   Brain natriuretic peptide   Result Value Ref Range    BNP 40 0 - 99 pg/mL   CBC auto differential    Result Value Ref Range    WBC 8.27 3.90 - 12.70 K/uL    RBC 4.55 4.00 - 5.40 M/uL    Hemoglobin 13.5 12.0 - 16.0 g/dL    Hematocrit 42.3 37.0 - 48.5 %    MCV 93 82 - 98 fL    MCH 29.7 27.0 - 31.0 pg    MCHC 31.9 (L) 32.0 - 36.0 g/dL    RDW 14.3 11.5 - 14.5 %    Platelets 248 150 - 450 K/uL    MPV 11.5 9.2 - 12.9 fL    Immature Granulocytes 0.7 (H) 0.0 - 0.5 %    Gran # (ANC) 5.2 1.8 - 7.7 K/uL    Immature Grans (Abs) 0.06 (H) 0.00 - 0.04 K/uL    Lymph # 2.1 1.0 - 4.8 K/uL    Mono # 0.6 0.3 - 1.0 K/uL    Eos # 0.2 0.0 - 0.5 K/uL    Baso # 0.02 0.00 - 0.20 K/uL    nRBC 0 0 /100 WBC    Gran % 63.4 38.0 - 73.0 %    Lymph % 25.5 18.0 - 48.0 %    Mono % 7.7 4.0 - 15.0 %    Eosinophil % 2.5 0.0 - 8.0 %    Basophil % 0.2 0.0 - 1.9 %    Differential Method Automated    Comprehensive metabolic panel   Result Value Ref Range    Sodium 140 136 - 145 mmol/L    Potassium 4.4 3.5 - 5.1 mmol/L    Chloride 103 95 - 110 mmol/L    CO2 23 23 - 29 mmol/L    Glucose 85 70 - 110 mg/dL    BUN 14 8 - 23 mg/dL    Creatinine 1.2 0.5 - 1.4 mg/dL    Calcium 9.8 8.7 - 10.5 mg/dL    Total Protein 8.3 6.0 - 8.4 g/dL    Albumin 3.6 3.5 - 5.2 g/dL    Total Bilirubin 0.7 0.1 - 1.0 mg/dL    Alkaline Phosphatase 104 55 - 135 U/L    AST 15 10 - 40 U/L    ALT 9 (L) 10 - 44 U/L    eGFR 47 (A) >60 mL/min/1.73 m^2    Anion Gap 14 8 - 16 mmol/L   APTT   Result Value Ref Range    aPTT 28.1 21.0 - 32.0 sec   Protime-INR   Result Value Ref Range    Prothrombin Time 13.0 (H) 9.0 - 12.5 sec    INR 1.2 0.8 - 1.2   Troponin I   Result Value Ref Range    Troponin I 0.012 0.000 - 0.026 ng/mL        Imaging Results:  Imaging Results              US Lower Extremity Veins Bilateral (Final result)  Result time 06/28/23 17:59:30      Final result by Huy Sow MD (06/28/23 17:59:30)                   Impression:      No evidence of deep venous thrombosis in either lower extremity.      Electronically signed by: Huy Sow  Date:    06/28/2023  Time:    17:59                Narrative:    EXAMINATION:  US LOWER EXTREMITY VEINS BILATERAL    CLINICAL HISTORY:  Edema, unspecified    TECHNIQUE:  Duplex and color flow Doppler and dynamic compression was performed of the bilateral lower extremity veins was performed.    COMPARISON:  Multiple priors.    FINDINGS:  Right thigh veins: The common femoral, femoral, popliteal, upper greater saphenous, and deep femoral veins are patent and free of thrombus. The veins are normally compressible and have normal phasic flow and augmentation response.    Right calf veins: The visualized calf veins are patent.    Left thigh veins: The common femoral, femoral, popliteal, upper greater saphenous, and deep femoral veins are patent and free of thrombus. The veins are normally compressible and have normal phasic flow and augmentation response.    Left calf veins: The visualized calf veins are patent.    Miscellaneous: None                                       X-Ray Chest AP Portable (Final result)  Result time 06/28/23 17:32:22      Final result by Huy Sow MD (06/28/23 17:32:22)                   Impression:      No acute abnormality.      Electronically signed by: Huy Sow  Date:    06/28/2023  Time:    17:32               Narrative:    EXAMINATION:  XR CHEST AP PORTABLE    CLINICAL HISTORY:  sob;    TECHNIQUE:  Single frontal view of the chest was performed.    COMPARISON:  Multiple priors.    FINDINGS:  The lungs are clear, with normal appearance of pulmonary vasculature and no pleural effusion or pneumothorax.    The cardiac silhouette is normal in size. The hilar and mediastinal contours are unremarkable.    Bones are intact.                                       The EKG was ordered, reviewed, and independently interpreted by the ED provider.  Interpretation time: 16:49  Rate: 61 BPM  Rhythm: normal sinus rhythm  Interpretation: Normal sinus rhythm. Possible Left atrial enlargement T wave abnormality, consider lateral  ischemia. No STEMI.           The Emergency Provider reviewed the vital signs and test results, which are outlined above.     ED Discussion     8:21 PM: Discussed pt's case with Dr. Yu ([facility]) who recommends resuming Eliquis and a f/u as an out-patient. Pt does not require O2 and is on room air. Pt 's management can be done in outpatient per Dr. Yu.    8:21 PM: Reassessed pt at this time.  Discussed with pt all pertinent ED information and results. Discussed pt dx and plan of tx. Gave pt all f/u and return to the ED instructions. All questions and concerns were addressed at this time. Pt expresses understanding of information and instructions, and is comfortable with plan to discharge. Pt is stable for discharge.    I discussed with patient and/or family/caretaker that evaluation in the ED does not suggest any emergent or life threatening medical conditions requiring immediate intervention beyond what was provided in the ED, and I believe patient is safe for discharge.  Regardless, an unremarkable evaluation in the ED does not preclude the development or presence of a serious of life threatening condition. As such, patient was instructed to return immediately for any worsening or change in current symptoms.           Medical Decision Making:   Clinical Tests:   Lab Tests: Ordered and Reviewed  Radiological Study: Ordered and Reviewed  Medical Tests: Ordered and Reviewed         ED Medication(s):  Medications - No data to display    Discharge Medication List as of 6/28/2023  8:21 PM                  Scribe Attestation:   Scribe #1: I performed the above scribed service and the documentation accurately describes the services I performed. I attest to the accuracy of the note.     Attending:   Physician Attestation Statement for Scribe #1: I, Josi Arteaga MD, personally performed the services described in this documentation, as scribed by Gavin Torres and Jesús Hamilton, in my presence,  and it is both accurate and complete.           Clinical Impression       ICD-10-CM ICD-9-CM   1. Acute pulmonary embolism with acute cor pulmonale, unspecified pulmonary embolism type  I26.09 415.19     415.0   2. Leg swelling  M79.89 729.81   3. Swelling  R60.9 782.3       Disposition:   Disposition: Discharged  Condition: Stable       Josi Arteaga MD  06/30/23 [id number]

## 2023-06-29 NOTE — PROGRESS NOTES
Please call Ms. Membreno - need to schedule a STAT ECHO and she needs appointment with Hematology/Oncology for work up for hypercoagulable state.    Thanks,  Rhoda

## 2023-06-30 ENCOUNTER — TELEPHONE (OUTPATIENT)
Dept: PRIMARY CARE CLINIC | Facility: CLINIC | Age: 78
End: 2023-06-30
Payer: MEDICARE

## 2023-06-30 ENCOUNTER — PATIENT MESSAGE (OUTPATIENT)
Dept: PRIMARY CARE CLINIC | Facility: CLINIC | Age: 78
End: 2023-06-30
Payer: MEDICARE

## 2023-06-30 NOTE — TELEPHONE ENCOUNTER
Mary Gregorio,    You are seeing this patient in late July. Reason for visit -     Her Xarelto was held approx 5 days for a surgical procedure. She resumed the Xarelto 24 hours after procedure. She developed SOB, tachycardia the day after sx procedure. Found bilateral PEs - wanting to know if she had a Xarelto failure or pt has hypercoagulable state?    Thank you,  Adela Ramírez, NP  [facility]  [place]

## 2023-07-05 ENCOUNTER — OFFICE VISIT (OUTPATIENT)
Dept: PRIMARY CARE CLINIC | Facility: CLINIC | Age: 78
End: 2023-07-05
Payer: MEDICARE

## 2023-07-05 VITALS
BODY MASS INDEX: 37.33 KG/M2 | TEMPERATURE: 98 F | WEIGHT: 218.63 LBS | DIASTOLIC BLOOD PRESSURE: 62 MMHG | HEART RATE: 56 BPM | OXYGEN SATURATION: 97 % | SYSTOLIC BLOOD PRESSURE: 126 MMHG | HEIGHT: 64 IN

## 2023-07-05 DIAGNOSIS — E66.01 SEVERE OBESITY WITH BODY MASS INDEX (BMI) OF 35.0 TO 39.9 WITH COMORBIDITY: ICD-10-CM

## 2023-07-05 DIAGNOSIS — I10 ESSENTIAL HYPERTENSION: Primary | ICD-10-CM

## 2023-07-05 DIAGNOSIS — Z79.01 CHRONIC ANTICOAGULATION: ICD-10-CM

## 2023-07-05 DIAGNOSIS — J45.20 MILD INTERMITTENT ASTHMA WITHOUT COMPLICATION: ICD-10-CM

## 2023-07-05 DIAGNOSIS — I26.99 PULMONARY EMBOLISM, BILATERAL: ICD-10-CM

## 2023-07-05 DIAGNOSIS — J18.9 PNEUMONIA OF LEFT LOWER LOBE DUE TO INFECTIOUS ORGANISM: ICD-10-CM

## 2023-07-05 DIAGNOSIS — I48.92 ATRIAL FLUTTER WITH RAPID VENTRICULAR RESPONSE: ICD-10-CM

## 2023-07-05 PROCEDURE — 1157F PR ADVANCE CARE PLAN OR EQUIV PRESENT IN MEDICAL RECORD: ICD-10-PCS | Mod: CPTII,S$GLB,, | Performed by: INTERNAL MEDICINE

## 2023-07-05 PROCEDURE — 1159F PR MEDICATION LIST DOCUMENTED IN MEDICAL RECORD: ICD-10-PCS | Mod: CPTII,S$GLB,, | Performed by: INTERNAL MEDICINE

## 2023-07-05 PROCEDURE — 99999 PR PBB SHADOW E&M-EST. PATIENT-LVL IV: ICD-10-PCS | Mod: PBBFAC,,, | Performed by: INTERNAL MEDICINE

## 2023-07-05 PROCEDURE — 3288F FALL RISK ASSESSMENT DOCD: CPT | Mod: CPTII,S$GLB,, | Performed by: INTERNAL MEDICINE

## 2023-07-05 PROCEDURE — 1101F PT FALLS ASSESS-DOCD LE1/YR: CPT | Mod: CPTII,S$GLB,, | Performed by: INTERNAL MEDICINE

## 2023-07-05 PROCEDURE — 3078F PR MOST RECENT DIASTOLIC BLOOD PRESSURE < 80 MM HG: ICD-10-PCS | Mod: CPTII,S$GLB,, | Performed by: INTERNAL MEDICINE

## 2023-07-05 PROCEDURE — 3288F PR FALLS RISK ASSESSMENT DOCUMENTED: ICD-10-PCS | Mod: CPTII,S$GLB,, | Performed by: INTERNAL MEDICINE

## 2023-07-05 PROCEDURE — 3074F SYST BP LT 130 MM HG: CPT | Mod: CPTII,S$GLB,, | Performed by: INTERNAL MEDICINE

## 2023-07-05 PROCEDURE — 1159F MED LIST DOCD IN RCRD: CPT | Mod: CPTII,S$GLB,, | Performed by: INTERNAL MEDICINE

## 2023-07-05 PROCEDURE — 1157F ADVNC CARE PLAN IN RCRD: CPT | Mod: CPTII,S$GLB,, | Performed by: INTERNAL MEDICINE

## 2023-07-05 PROCEDURE — 3074F PR MOST RECENT SYSTOLIC BLOOD PRESSURE < 130 MM HG: ICD-10-PCS | Mod: CPTII,S$GLB,, | Performed by: INTERNAL MEDICINE

## 2023-07-05 PROCEDURE — 99999 PR PBB SHADOW E&M-EST. PATIENT-LVL IV: CPT | Mod: PBBFAC,,, | Performed by: INTERNAL MEDICINE

## 2023-07-05 PROCEDURE — 1101F PR PT FALLS ASSESS DOC 0-1 FALLS W/OUT INJ PAST YR: ICD-10-PCS | Mod: CPTII,S$GLB,, | Performed by: INTERNAL MEDICINE

## 2023-07-05 PROCEDURE — 99214 PR OFFICE/OUTPT VISIT, EST, LEVL IV, 30-39 MIN: ICD-10-PCS | Mod: S$GLB,,, | Performed by: INTERNAL MEDICINE

## 2023-07-05 PROCEDURE — 99214 OFFICE O/P EST MOD 30 MIN: CPT | Mod: S$GLB,,, | Performed by: INTERNAL MEDICINE

## 2023-07-05 PROCEDURE — 3078F DIAST BP <80 MM HG: CPT | Mod: CPTII,S$GLB,, | Performed by: INTERNAL MEDICINE

## 2023-07-07 ENCOUNTER — OFFICE VISIT (OUTPATIENT)
Dept: SURGERY | Facility: CLINIC | Age: 78
End: 2023-07-07
Payer: MEDICARE

## 2023-07-07 VITALS
BODY MASS INDEX: 36.74 KG/M2 | HEART RATE: 59 BPM | TEMPERATURE: 98 F | SYSTOLIC BLOOD PRESSURE: 116 MMHG | HEIGHT: 64 IN | DIASTOLIC BLOOD PRESSURE: 63 MMHG | WEIGHT: 215.19 LBS

## 2023-07-07 DIAGNOSIS — L91.0 KELOID: Primary | ICD-10-CM

## 2023-07-07 DIAGNOSIS — L98.9 SKIN EROSION: ICD-10-CM

## 2023-07-07 PROCEDURE — 1160F RVW MEDS BY RX/DR IN RCRD: CPT | Mod: CPTII,S$GLB,,

## 2023-07-07 PROCEDURE — 1159F PR MEDICATION LIST DOCUMENTED IN MEDICAL RECORD: ICD-10-PCS | Mod: CPTII,S$GLB,,

## 2023-07-07 PROCEDURE — 3078F PR MOST RECENT DIASTOLIC BLOOD PRESSURE < 80 MM HG: ICD-10-PCS | Mod: CPTII,S$GLB,,

## 2023-07-07 PROCEDURE — 99024 POSTOP FOLLOW-UP VISIT: CPT | Mod: S$GLB,,,

## 2023-07-07 PROCEDURE — 1157F PR ADVANCE CARE PLAN OR EQUIV PRESENT IN MEDICAL RECORD: ICD-10-PCS | Mod: CPTII,S$GLB,,

## 2023-07-07 PROCEDURE — 1157F ADVNC CARE PLAN IN RCRD: CPT | Mod: CPTII,S$GLB,,

## 2023-07-07 PROCEDURE — 99999 PR PBB SHADOW E&M-EST. PATIENT-LVL V: CPT | Mod: PBBFAC,,,

## 2023-07-07 PROCEDURE — 1160F PR REVIEW ALL MEDS BY PRESCRIBER/CLIN PHARMACIST DOCUMENTED: ICD-10-PCS | Mod: CPTII,S$GLB,,

## 2023-07-07 PROCEDURE — 1126F AMNT PAIN NOTED NONE PRSNT: CPT | Mod: CPTII,S$GLB,,

## 2023-07-07 PROCEDURE — 3078F DIAST BP <80 MM HG: CPT | Mod: CPTII,S$GLB,,

## 2023-07-07 PROCEDURE — 3074F SYST BP LT 130 MM HG: CPT | Mod: CPTII,S$GLB,,

## 2023-07-07 PROCEDURE — 1159F MED LIST DOCD IN RCRD: CPT | Mod: CPTII,S$GLB,,

## 2023-07-07 PROCEDURE — 3074F PR MOST RECENT SYSTOLIC BLOOD PRESSURE < 130 MM HG: ICD-10-PCS | Mod: CPTII,S$GLB,,

## 2023-07-07 PROCEDURE — 99999 PR PBB SHADOW E&M-EST. PATIENT-LVL V: ICD-10-PCS | Mod: PBBFAC,,,

## 2023-07-07 PROCEDURE — 1126F PR PAIN SEVERITY QUANTIFIED, NO PAIN PRESENT: ICD-10-PCS | Mod: CPTII,S$GLB,,

## 2023-07-07 PROCEDURE — 99024 PR POST-OP FOLLOW-UP VISIT: ICD-10-PCS | Mod: S$GLB,,,

## 2023-07-07 RX ORDER — SULFAMETHOXAZOLE AND TRIMETHOPRIM 800; 160 MG/1; MG/1
1 TABLET ORAL 2 TIMES DAILY
Qty: 14 TABLET | Refills: 0 | Status: SHIPPED | OUTPATIENT
Start: 2023-07-07 | End: 2023-07-14

## 2023-07-07 RX ORDER — MUPIROCIN 20 MG/G
OINTMENT TOPICAL 3 TIMES DAILY
Qty: 22 G | Refills: 1 | Status: SHIPPED | OUTPATIENT
Start: 2023-07-07

## 2023-07-07 NOTE — PROGRESS NOTES
[facility] -   General Surgery History & Physical    SUBJECTIVE:     History of Present Illness:  Patient is a 77 y.o. female presents with a large keloid of her anterior chest. She states it causes her pain and will sometimes become red and swollen, requiring drainage and antibiotics.    Interval History:  Since the last clinic visit, the patient underwent excision of her chest wall keloid scar followed by 3 courses of radiation to the area.  Her postoperative course has been complicated by return trip to the ED with diagnosis of pneumonia.  She was placed on Levaquin for this.  She subsequently returned to the ED complaining of shortness of breath, at which time she was diagnosed with bilateral pulmonary emboli.  She was on Xarelto preoperatively, which she stopped in preparation for the procedure.  She was able to be treated outpatient with continuation of this medication as she was not hypoxic at the time of the exam in the ED. She is feeling okay today with minimal pain at the surgical site.  Her breathing is improving, and she denies any chest pain or shortness of breath at rest.  She still has some shortness of breath on exertion.  She is taking her Xarelto daily without issue.  She has 2 doses left of Levaquin.  She denies any fevers, chills, nausea, and vomiting.      Review of patient's allergies indicates:   Allergen Reactions    Penicillins Swelling     Generalized swelling  Other reaction(s): Swelling  Generalized swelling  Other reaction(s): Swelling  Generalized swelling       Current Outpatient Medications   Medication Sig Dispense Refill    acetaminophen (TYLENOL) 500 MG tablet Take 500 mg by mouth every 6 (six) hours as needed.      albuterol 90 mcg/actuation inhaler Inhale 2 puffs into the lungs every 4 (four) hours as needed for Wheezing. 1 each 6    amLODIPine (NORVASC) 5 MG tablet Take 1 tablet (5 mg total) by mouth 2 (two) times daily. 180 tablet 3    bacitracin 500 unit/gram  ointment Apply topically 2 (two) times daily. 28 g 4    betamethasone dipropionate (DIPROLENE) 0.05 % ointment Apply topically 2 (two) times daily. PRN keloid scar. Strong steroid- do Not use on face. 45 g 0    betamethasone valerate 0.1% (VALISONE) 0.1 % Oint Apply topically 2 (two) times daily. 15 g 1    cholecalciferol, vitamin D3, (VITAMIN D3) 50 mcg (2,000 unit) Cap Take 1 capsule by mouth once daily.      clindamycin phosphate 1% (CLINDAGEL) 1 % gel Apply topically 2 (two) times daily. For acne of chest. 30 g 3    diclofenac sodium (VOLTAREN) 1 % Gel Apply 2 g topically 2 (two) times daily. 60 g 1    digoxin (LANOXIN) 125 mcg tablet TAKE 1 TABLET EVERY DAY 90 tablet 2    diphenhydrAMINE (BENADRYL) 25 mg capsule Take 25 mg by mouth every 6 (six) hours as needed.      ergocalciferol (ERGOCALCIFEROL) 50,000 unit Cap Take 50,000 Units by mouth every 7 days.      flurandrenolide 4 mcg/cm2 Tape Apply to fit area involved. Wear 12 hours/day then remove. Reapply next day. 1 each 3    fluticasone propionate (FLONASE) 50 mcg/actuation nasal spray 2 sprays (100 mcg total) by Each Nostril route once daily. 16 g 6    HYDROcodone-acetaminophen (NORCO) 5-325 mg per tablet Take 1 tablet by mouth every 4 to 6 hours as needed for Pain. 25 tablet 0    hydrOXYzine pamoate (VISTARIL) 25 MG Cap Take 25 mg by mouth.      losartan (COZAAR) 100 MG tablet Take 1 tablet (100 mg total) by mouth once daily. 90 tablet 1    metoprolol succinate (TOPROL-XL) 100 MG 24 hr tablet Take 1 tablet (100 mg total) by mouth once daily. 90 tablet 3    mupirocin (BACTROBAN) 2 % ointment Apply topically 3 (three) times daily. For broken skin. 22 g 1    nystatin-triamcinolone (MYCOLOG II) cream Apply to affected area 2 times daily 30 g 1    polyethylene glycol (GLYCOLAX) 17 gram PwPk Take by mouth once daily.      rivaroxaban (XARELTO) 20 mg Tab TAKE 1 TABLET BY MOUTH EVERY DAY WITH DINNER      rOPINIRole (REQUIP) 1 MG tablet       rosuvastatin (CRESTOR)  "40 MG Tab Take 1 tablet (40 mg total) by mouth every evening. (Patient not taking: Reported on 7/5/2023) 90 tablet 3    sulfamethoxazole-trimethoprim 800-160mg (BACTRIM DS) 800-160 mg Tab Take 1 tablet by mouth 2 (two) times daily. for 7 days 14 tablet 0     No current facility-administered medications for this visit.       Past Medical History:   Diagnosis Date    Allergy     Anemia     Arthritis     knees, hands, back    Back pain     pt reports "mild"    Dysphagia, unspecified(787.20)     GERD (gastroesophageal reflux disease)     History of carpal tunnel release of both wrists 03/10/2016    Hyperlipidemia     Hypertension     LGSIL (low grade squamous intraepithelial dysplasia)     HPV +    Mild intermittent asthma without complication 07/28/2020    Obesity     Pneumonia of left lower lobe due to infectious organism 6/26/2023    Pulmonary embolism, bilateral 7/5/2023    Stroke 02/08/2017     Past Surgical History:   Procedure Laterality Date    CARDIAC DEFIBRILLATOR PLACEMENT  04/2017    Dr. Nuno    CARPAL TUNNEL RELEASE      b/l    CATARACT EXTRACTION EXTRACAPSULAR W/ INTRAOCULAR LENS IMPLANTATION Bilateral 2022    COLONOSCOPY N/A 03/31/2021    Procedure: COLONOSCOPY;  Surgeon: Fozia Dasilva MD;  Location: [facility] ENDO;  Service: Endoscopy;  Laterality: N/A;    DILATION AND CURETTAGE OF UTERUS      10/2021, no cancer per pt.    FOOT SURGERY Right 05/02/2013    wart removal    KELOID EXCISION N/A 6/21/2023    Procedure: EXCISION, KELOID;  Surgeon: Justine Youngblood DO;  Location: [facility] OR;  Service: General;  Laterality: N/A;    TRIGGER FINGER RELEASE Right     TUBAL LIGATION       Family History   Problem Relation Age of Onset    Hypertension Mother     Hypertension Father     Cancer Father         prostate    Cataracts Father     Glaucoma Father     Diabetes Father     Hypertension Sister     Diabetes Sister     Breast cancer Neg Hx     Colon cancer Neg Hx     Ovarian cancer Neg Hx     Thrombophilia Neg Hx  " "    Social History     Tobacco Use    Smoking status: Never    Smokeless tobacco: Never   Substance Use Topics    Alcohol use: No     Alcohol/week: 0.0 standard drinks    Drug use: No        Review of Systems:  Review of Systems   Constitutional:  Negative for chills and fever.   Gastrointestinal:  Negative for nausea and vomiting.   Skin:  Negative for color change and wound.     OBJECTIVE:     Vital Signs (Most Recent)  Temp: 98.3 °F (36.8 °C) (07/07/23 1135)  Pulse: (!) 59 (07/07/23 1135)  BP: 116/63 (07/07/23 1135)  5' 4" (1.626 m)  97.6 kg (215 lb 2.7 oz)     Physical Exam:  Physical Exam  Vitals and nursing note reviewed.   Constitutional:       General: She is not in acute distress.     Appearance: She is not ill-appearing, toxic-appearing or diaphoretic.   HENT:      Head: Normocephalic and atraumatic.      Nose: Nose normal.   Eyes:      General: No scleral icterus.        Right eye: No discharge.         Left eye: No discharge.      Extraocular Movements: Extraocular movements intact.   Cardiovascular:      Rate and Rhythm: Bradycardia present.   Pulmonary:      Effort: Pulmonary effort is normal. No respiratory distress.      Breath sounds: Normal breath sounds. No stridor. No wheezing, rhonchi or rales.      Comments: All lung fields clear on exam  Abdominal:      Palpations: Abdomen is soft.      Tenderness: There is no abdominal tenderness.   Musculoskeletal:      Cervical back: No rigidity.   Skin:     General: Skin is warm and dry.      Coloration: Skin is not jaundiced.      Comments: Excision site with Prolene sutures in place, mild fullness in the midline portion of the incision.  No significant erythema or drainage.  No significant tenderness to palpation.  Stitches removed without issue.   Neurological:      Mental Status: She is alert and oriented to person, place, and time.   Psychiatric:         Mood and Affect: Mood normal.         Behavior: Behavior normal.       Laboratory  WBC   Date " Value Ref Range Status   06/28/2023 8.27 3.90 - 12.70 K/uL Final     Hemoglobin   Date Value Ref Range Status   06/28/2023 13.5 12.0 - 16.0 g/dL Final     Hematocrit   Date Value Ref Range Status   06/28/2023 42.3 37.0 - 48.5 % Final     Platelets   Date Value Ref Range Status   06/28/2023 248 150 - 450 K/uL Final     Sodium   Date Value Ref Range Status   06/28/2023 140 136 - 145 mmol/L Final     Potassium   Date Value Ref Range Status   06/28/2023 4.4 3.5 - 5.1 mmol/L Final     Creatinine   Date Value Ref Range Status   06/28/2023 1.2 0.5 - 1.4 mg/dL Final     Alkaline Phosphatase   Date Value Ref Range Status   06/28/2023 104 55 - 135 U/L Final     AST   Date Value Ref Range Status   06/28/2023 15 10 - 40 U/L Final     ALT   Date Value Ref Range Status   06/28/2023 9 (L) 10 - 44 U/L Final      No results found for: HGBA1C    Diagnostic Results:  Result:  Mammo Digital Screening Bilat w/ Akil     History:  Patient is 77 y.o. and is seen for a screening mammogram.        Films Compared:  Compared to: 01/07/2022 Mammo Digital Screening Bilat w/ Akil and 02/28/2020 Mammo Digital Screening Bilat      Findings:   This procedure was performed using tomosynthesis.   Computer-aided detection was utilized in the interpretation of this examination.     The breasts have scattered areas of fibroglandular density. There is no evidence of suspicious masses, microcalcifications or architectural distortion.     Impression:   No mammographic evidence of malignancy.     BI-RADS Category 1: Negative     Recommendation:  Routine screening mammogram in 1 year is recommended.    Pathology:  Final Pathologic Diagnosis Skin, chest wall, excision:   -KELOID SCAR     This lesion is benign       ASSESSMENT/PLAN:     77-year-old female with keloid of the anterior chest wall and now status post excision with postoperative radiation.  Postop course complicated by both pneumonia and bilateral pulmonary emboli.    - pathology reviewed as above.   Benign.    -local wound care to site as needed.  Counseled on the signs and symptoms of infection and advised patient to let us know should these occur.  We will give course of Bactrim due to fullness in the middle portion of the wound.  - continue topical Bactroban application daily.  - continue Xarelto for pulmonary emboli and follow up with Hematology as scheduled.  - return to clinic 1 week for wound check.      Fiorella Oviedo PA-C  [facility]

## 2023-07-12 ENCOUNTER — OFFICE VISIT (OUTPATIENT)
Dept: PRIMARY CARE CLINIC | Facility: CLINIC | Age: 78
End: 2023-07-12
Payer: MEDICARE

## 2023-07-12 VITALS
TEMPERATURE: 98 F | HEIGHT: 64 IN | OXYGEN SATURATION: 98 % | WEIGHT: 218.5 LBS | DIASTOLIC BLOOD PRESSURE: 60 MMHG | SYSTOLIC BLOOD PRESSURE: 138 MMHG | BODY MASS INDEX: 37.3 KG/M2

## 2023-07-12 DIAGNOSIS — E78.49 OTHER HYPERLIPIDEMIA: ICD-10-CM

## 2023-07-12 DIAGNOSIS — I50.32 CHRONIC DIASTOLIC HEART FAILURE: ICD-10-CM

## 2023-07-12 DIAGNOSIS — I48.92 ATRIAL FLUTTER WITH RAPID VENTRICULAR RESPONSE: ICD-10-CM

## 2023-07-12 DIAGNOSIS — Z91.81 POTENTIAL FOR FALLS: ICD-10-CM

## 2023-07-12 DIAGNOSIS — Z00.00 ENCOUNTER FOR PREVENTIVE HEALTH EXAMINATION: Primary | ICD-10-CM

## 2023-07-12 DIAGNOSIS — I77.1 TORTUOUS AORTA: ICD-10-CM

## 2023-07-12 DIAGNOSIS — I26.99 PULMONARY EMBOLISM, BILATERAL: ICD-10-CM

## 2023-07-12 DIAGNOSIS — Z79.01 CHRONIC ANTICOAGULATION: ICD-10-CM

## 2023-07-12 DIAGNOSIS — Z95.810 PRESENCE OF CARDIAC DEFIBRILLATOR: ICD-10-CM

## 2023-07-12 DIAGNOSIS — J45.20 MILD INTERMITTENT ASTHMA WITHOUT COMPLICATION: ICD-10-CM

## 2023-07-12 DIAGNOSIS — I10 ESSENTIAL HYPERTENSION: ICD-10-CM

## 2023-07-12 DIAGNOSIS — Z13.31 POSITIVE DEPRESSION SCREENING: ICD-10-CM

## 2023-07-12 DIAGNOSIS — F33.9 DEPRESSION, RECURRENT: ICD-10-CM

## 2023-07-12 DIAGNOSIS — E66.01 SEVERE OBESITY WITH BODY MASS INDEX (BMI) OF 35.0 TO 39.9 WITH COMORBIDITY: ICD-10-CM

## 2023-07-12 PROCEDURE — 99999 PR PBB SHADOW E&M-EST. PATIENT-LVL III: ICD-10-PCS | Mod: PBBFAC,,, | Performed by: NURSE PRACTITIONER

## 2023-07-12 PROCEDURE — 1157F PR ADVANCE CARE PLAN OR EQUIV PRESENT IN MEDICAL RECORD: ICD-10-PCS | Mod: CPTII,S$GLB,, | Performed by: NURSE PRACTITIONER

## 2023-07-12 PROCEDURE — G0439 PPPS, SUBSEQ VISIT: HCPCS | Mod: S$GLB,,, | Performed by: NURSE PRACTITIONER

## 2023-07-12 PROCEDURE — 3078F DIAST BP <80 MM HG: CPT | Mod: CPTII,S$GLB,, | Performed by: NURSE PRACTITIONER

## 2023-07-12 PROCEDURE — 1101F PR PT FALLS ASSESS DOC 0-1 FALLS W/OUT INJ PAST YR: ICD-10-PCS | Mod: CPTII,S$GLB,, | Performed by: NURSE PRACTITIONER

## 2023-07-12 PROCEDURE — 1157F ADVNC CARE PLAN IN RCRD: CPT | Mod: CPTII,S$GLB,, | Performed by: NURSE PRACTITIONER

## 2023-07-12 PROCEDURE — 3078F PR MOST RECENT DIASTOLIC BLOOD PRESSURE < 80 MM HG: ICD-10-PCS | Mod: CPTII,S$GLB,, | Performed by: NURSE PRACTITIONER

## 2023-07-12 PROCEDURE — 3288F PR FALLS RISK ASSESSMENT DOCUMENTED: ICD-10-PCS | Mod: CPTII,S$GLB,, | Performed by: NURSE PRACTITIONER

## 2023-07-12 PROCEDURE — 3075F PR MOST RECENT SYSTOLIC BLOOD PRESS GE 130-139MM HG: ICD-10-PCS | Mod: CPTII,S$GLB,, | Performed by: NURSE PRACTITIONER

## 2023-07-12 PROCEDURE — G0439 PR MEDICARE ANNUAL WELLNESS SUBSEQUENT VISIT: ICD-10-PCS | Mod: S$GLB,,, | Performed by: NURSE PRACTITIONER

## 2023-07-12 PROCEDURE — 99999 PR PBB SHADOW E&M-EST. PATIENT-LVL III: CPT | Mod: PBBFAC,,, | Performed by: NURSE PRACTITIONER

## 2023-07-12 PROCEDURE — 3075F SYST BP GE 130 - 139MM HG: CPT | Mod: CPTII,S$GLB,, | Performed by: NURSE PRACTITIONER

## 2023-07-12 PROCEDURE — 3288F FALL RISK ASSESSMENT DOCD: CPT | Mod: CPTII,S$GLB,, | Performed by: NURSE PRACTITIONER

## 2023-07-12 PROCEDURE — 1101F PT FALLS ASSESS-DOCD LE1/YR: CPT | Mod: CPTII,S$GLB,, | Performed by: NURSE PRACTITIONER

## 2023-07-12 NOTE — PROGRESS NOTES
"  Nathalie Membreno presented for a follow up Medicare AWV today. The following components were reviewed and updated:    Medical history  Family History  Social history  Allergies and Current Medications  Health Risk Assessment  Health Maintenance  Care Team    **See Completed Assessments for Annual Wellness visit with in the encounter summary    The following assessments were completed:  Depression Screening  Cognitive function Screening  Timed Get Up Test  Whisper Test  Nutrition Screening    Vitals:    07/12/23 1049   BP: 138/60   BP Location: Right arm   Temp: 98.2 °F (36.8 °C)   SpO2: 98%   Weight: 99.1 kg (218 lb 8 oz)   Height: 5' 4" (1.626 m)     Body mass index is 37.51 kg/m².            Review of Systems   Constitutional:  Positive for fatigue. Negative for activity change, appetite change, chills and fever.   HENT:  Negative for congestion, ear pain, hearing loss, postnasal drip, rhinorrhea, sore throat and trouble swallowing.    Eyes:  Negative for pain and visual disturbance.   Respiratory:  Positive for shortness of breath. Negative for cough and wheezing.    Cardiovascular:  Negative for chest pain, palpitations and leg swelling.   Gastrointestinal:  Positive for nausea. Negative for abdominal pain, blood in stool, constipation, diarrhea and vomiting.   Genitourinary:  Negative for difficulty urinating, dysuria, frequency and hematuria.   Musculoskeletal:  Positive for arthralgias and myalgias. Negative for back pain and neck pain.   Skin:  Positive for wound (healing surgical incision). Negative for rash.   Neurological:  Negative for dizziness, speech difficulty, weakness, light-headedness, numbness and headaches.   Psychiatric/Behavioral:  Positive for dysphoric mood. Negative for confusion and sleep disturbance. The patient is not nervous/anxious.          Physical Exam  Vitals reviewed.   Constitutional:       General: She is not in acute distress.     Appearance: Normal appearance.   HENT:     "  Head: Normocephalic and atraumatic.      Nose: Nose normal.      Mouth/Throat:      Mouth: Mucous membranes are moist.   Eyes:      General: No scleral icterus.     Conjunctiva/sclera: Conjunctivae normal.   Cardiovascular:      Rate and Rhythm: Normal rate and regular rhythm.      Heart sounds: No murmur heard.  Pulmonary:      Effort: Pulmonary effort is normal. No respiratory distress.      Breath sounds: Normal breath sounds.   Chest:      Comments: Healing surgical incision across upper chest  Abdominal:      Palpations: Abdomen is soft. There is no mass.      Tenderness: There is no abdominal tenderness.   Musculoskeletal:         General: No swelling or deformity. Normal range of motion.      Cervical back: Normal range of motion and neck supple.      Right lower leg: No edema.      Left lower leg: No edema.      Comments: Ambulates with assistance of cane   Lymphadenopathy:      Cervical: No cervical adenopathy.   Skin:     General: Skin is warm and dry.   Neurological:      Mental Status: She is alert and oriented to person, place, and time. Mental status is at baseline.      Motor: Weakness present.   Psychiatric:         Mood and Affect: Mood normal.         Thought Content: Thought content normal.          Health Maintenance         Date Due Completion Date    Shingles Vaccine (1 of 2) Never done ---    COVID-19 Vaccine (4 - Moderna series) 03/31/2022 2/3/2022    Influenza Vaccine (1) 09/01/2023 9/14/2022    Lipid Panel 06/26/2024 6/26/2023    TETANUS VACCINE 09/23/2024 9/23/2014    DEXA Scan 01/17/2028 1/17/2023    Override on 7/21/2009: Done (normal; repeat in 5 years)              PROBLEM LIST ITEMS ADDRESSED THIS VISIT:    1. Encounter for preventive health examination  Assessment & Plan:  Up to date with     Review for Opioid Screening: Pt does have Rx for Opioids S/P surgery     Review for Substance Use Disorders: Patient does not use illicit substances as reported       2. Chronic diastolic  heart failure  Assessment & Plan:  Compensated  Results for orders placed during the hospital encounter of 06/29/23    Echo    Interpretation Summary  · Concentric remodeling and normal systolic function.  · The estimated ejection fraction is 60%.  · Grade I left ventricular diastolic dysfunction.  · Normal right ventricular size with normal right ventricular systolic function.  · Mild tricuspid regurgitation.  · There is mild pulmonary hypertension.    Monitor for volume overload  Low sodium diet  Control BP  Follow up with Cardiology        3. Chronic anticoagulation  Assessment & Plan:  Assess and monitor  On Xarelto due to atrial flutter - CVA prophylaxis  Continue anticoagulation  Follow up with PCP      4. Severe obesity with body mass index (BMI) of 35.0 to 39.9 with comorbidity  Assessment & Plan:  Assess and monitor  Encouraged Cardiac diet  Increase activity once recovered  Follow up with PCP      5. Potential for falls  -     WALKER FOR HOME USE    6. Positive depression screening  Comments:  I have reviewed the positive depression score which warrants active treatment with psychotherapy and/or medications.  Orders:  -     Ambulatory referral/consult to Value Based Primary Care Behavioral Health; Future; Expected date: 07/25/2023    7. Atrial flutter with rapid ventricular response  Assessment & Plan:  Assess and monitor  RRR  Continue Xarelto, Toprol XL &  Digoxin  Follow up with Cardiology        8. Presence of cardiac defibrillator  Overview:  Chronic, stable  Follow up with Cardiology      9. Tortuous aorta  Assessment & Plan:  Chronic, stable  Control BP and continue rosuvastatin  Follow up with PcP      10. Other hyperlipidemia  Assessment & Plan:   Latest Reference Range & Units Most Recent   Cholesterol 120 - 199 mg/dL 212 (H)  6/26/23 12:42   HDL 40 - 75 mg/dL 60  6/26/23 12:42   HDL/Cholesterol Ratio 20.0 - 50.0 % 28.3  6/26/23 12:42   LDL Cholesterol External 63.0 - 159.0 mg/dL 133.0  6/26/23  12:42   Non-HDL Cholesterol mg/dL 152  6/26/23 12:42   Total Cholesterol/HDL Ratio 2.0 - 5.0  3.5  6/26/23 12:42   TRIGLYCERIDE (LIPID PAN)  96 (E)  8/22/19 00:00   Triglycerides 30 - 150 mg/dL 95  6/26/23 12:42   Assess and monitor  After recovery from surgery - increase activity  Follow up with PCP      11. Essential hypertension  Assessment & Plan:  Controlled this visit  Continue amlodipine, losartan, Toprol XL  Follow up with PCP      12. Mild intermittent asthma without complication  Assessment & Plan:  Chronic and stable  Continue prn TRUNG  Follow up with PCP      13. Depression, recurrent  Assessment & Plan:  Positive depression screening  Need for SSRI  Referral LCSW   Follow up with PCP      14. Pulmonary embolism, bilateral  Assessment & Plan:  Assess and monitor  6/2023 - Found on CTA of Chest  Pt had stopped her Xarelto for 1 week surrounding recent scar revision sx  Right heart strain ruled out  Has resumed Xarelto  Hypercoagulable W/U by Hematology            Provided Nathalie with a 5-10 year written screening schedule and personal prevention plan. Recommendations were developed using the USPSTF age appropriate recommendations. Education, counseling, and referrals were provided as needed.  After Visit Summary printed and given to patient which includes a list of additional screenings\tests needed.    Future Appointments   Date Time Provider Department Center   7/21/2023 12:00 PM Justine Youngblood DO [facility]  Medical C   7/27/2023  3:40 PM Jefe Gregorio MD [facility]   8/1/2023  2:20 PM Deanne Dale MD [facility]          Adela Ramírez APRN, NP-C  [facility] [address] [place] [address]      I have used clinical judgement based on duration and functional status to consider definite necessity for treatment. I offered to discuss advanced care planning, including how to pick a person who would make decisions for you if you were unable  to make them for yourself, called a health care power of , and what kind of decisions you might make such as use of life sustaining treatments such as ventilators and tube feeding when faced with a life limiting illness recorded on a living will that they will need to know. (How you want to be cared for as you near the end of your natural life)     X  Patient has advanced directives written and agrees to provide copies to the institution.

## 2023-07-12 NOTE — PATIENT INSTRUCTIONS
Counseling and Referral of Other Preventative  (Italic type indicates deductible and co-insurance are waived)    Patient Name: Nathalie Membreno  Today's Date: 7/18/2023    Health Maintenance       Date Due Completion Date    Shingles Vaccine (1 of 2) Never done ---    COVID-19 Vaccine (4 - Moderna series) 03/31/2022 2/3/2022    Influenza Vaccine (1) 09/01/2023 9/14/2022    Lipid Panel 06/26/2024 6/26/2023    TETANUS VACCINE 09/23/2024 9/23/2014    DEXA Scan 01/17/2028 1/17/2023    Override on 7/21/2009: Done (normal; repeat in 5 years)        Orders Placed This Encounter   Procedures    WALKER FOR HOME USE    Ambulatory referral/consult to [facility] Based Primary Care Behavioral Health     The following information is provided to all patients.  This information is to help you find resources for any of the problems found today that may be affecting your health:                Living healthy guide: www.[facility].[place].[facility]      Understanding Diabetes: www.diabetes.org      Eating healthy: www.cdc.gov/healthyweight      [facility] home safety checklist: www.cdc.gov/steadi/patient.html      Agency on Aging: www.goea.[place].[facility]      Alcoholics anonymous (AA): www.aa.org      Physical Activity: www.rogerio.nih.gov/af4kcns      Tobacco use: www.quitwithusla.org     Counseling and Referral of Other Preventative  (Italic type indicates deductible and co-insurance are waived)    Patient Name: Nathalie Membreno  Today's Date: 7/18/2023    Health Maintenance       Date Due Completion Date    Shingles Vaccine (1 of 2) Never done ---    COVID-19 Vaccine (4 - Moderna series) 03/31/2022 2/3/2022    Influenza Vaccine (1) 09/01/2023 9/14/2022    Lipid Panel 06/26/2024 6/26/2023    TETANUS VACCINE 09/23/2024 9/23/2014    DEXA Scan 01/17/2028 1/17/2023    Override on 7/21/2009: Done (normal; repeat in 5 years)        Orders Placed This Encounter   Procedures    WALKER FOR HOME USE    Ambulatory referral/consult to Value Based Primary Care  Behavioral Health     The following information is provided to all patients.  This information is to help you find resources for any of the problems found today that may be affecting your health:                Living healthy guide: www.[facility].[place].[facility]      Understanding Diabetes: www.diabetes.org      Eating healthy: www.cdc.gov/healthyweight      CDC home safety checklist: www.cdc.gov/steadi/patient.html      Agency on Aging: www.goea.[place].[facility]      Alcoholics anonymous (AA): www.aa.org      Physical Activity: www.rogerio.nih.gov/st3mekw      Tobacco use: www.quitwithusla.org

## 2023-07-17 PROBLEM — Z00.00 ENCOUNTER FOR PREVENTIVE HEALTH EXAMINATION: Status: ACTIVE | Noted: 2023-07-17

## 2023-07-17 NOTE — ASSESSMENT & PLAN NOTE
Compensated  Results for orders placed during the hospital encounter of 06/29/23    Echo    Interpretation Summary  · Concentric remodeling and normal systolic function.  · The estimated ejection fraction is 60%.  · Grade I left ventricular diastolic dysfunction.  · Normal right ventricular size with normal right ventricular systolic function.  · Mild tricuspid regurgitation.  · There is mild pulmonary hypertension.    Monitor for volume overload  Low sodium diet  Control BP  Follow up with Cardiology

## 2023-07-17 NOTE — ASSESSMENT & PLAN NOTE
Up to date with     Review for Opioid Screening: Pt does have Rx for Opioids S/P surgery     Review for Substance Use Disorders: Patient does not use illicit substances as reported

## 2023-07-17 NOTE — ASSESSMENT & PLAN NOTE
Assess and monitor  On Xarelto due to atrial flutter - CVA prophylaxis  Continue anticoagulation  Follow up with PCP

## 2023-07-17 NOTE — ASSESSMENT & PLAN NOTE
Assess and monitor  6/2023 - Found on CTA of Chest  Pt had stopped her Xarelto for 1 week surrounding recent scar revision sx  Right heart strain ruled out  Has resumed Xarelto  Hypercoagulable W/U by Hematology

## 2023-07-18 NOTE — ASSESSMENT & PLAN NOTE
Latest Reference Range & Units Most Recent   Cholesterol 120 - 199 mg/dL 212 (H)  6/26/23 12:42   HDL 40 - 75 mg/dL 60  6/26/23 12:42   HDL/Cholesterol Ratio 20.0 - 50.0 % 28.3  6/26/23 12:42   LDL Cholesterol External 63.0 - 159.0 mg/dL 133.0  6/26/23 12:42   Non-HDL Cholesterol mg/dL 152  6/26/23 12:42   Total Cholesterol/HDL Ratio 2.0 - 5.0  3.5  6/26/23 12:42   TRIGLYCERIDE (LIPID PAN)  96 (E)  8/22/19 00:00   Triglycerides 30 - 150 mg/dL 95  6/26/23 12:42   Assess and monitor  After recovery from surgery - increase activity  Follow up with PCP

## 2023-07-19 DIAGNOSIS — R19.00 ABDOMINAL MASS, UNSPECIFIED ABDOMINAL LOCATION: Primary | ICD-10-CM

## 2023-07-19 NOTE — PROGRESS NOTES
Pt notified of CT results indicating 3.4 cm cystic lesion area adjacent to gastric fundus.       Stated she would repeat her CT ABD/Pelvis in September to further characterize the the area.  Pt verbalized understanding.

## 2023-07-20 ENCOUNTER — PATIENT MESSAGE (OUTPATIENT)
Dept: PRIMARY CARE CLINIC | Facility: CLINIC | Age: 78
End: 2023-07-20
Payer: MEDICARE

## 2023-07-21 ENCOUNTER — OFFICE VISIT (OUTPATIENT)
Dept: SURGERY | Facility: CLINIC | Age: 78
End: 2023-07-21
Payer: MEDICARE

## 2023-07-21 VITALS
DIASTOLIC BLOOD PRESSURE: 60 MMHG | TEMPERATURE: 99 F | HEART RATE: 57 BPM | HEIGHT: 64 IN | SYSTOLIC BLOOD PRESSURE: 113 MMHG | WEIGHT: 218.5 LBS | BODY MASS INDEX: 37.3 KG/M2

## 2023-07-21 DIAGNOSIS — L91.0 KELOID: Primary | ICD-10-CM

## 2023-07-21 PROCEDURE — 3074F SYST BP LT 130 MM HG: CPT | Mod: CPTII,S$GLB,, | Performed by: SURGERY

## 2023-07-21 PROCEDURE — 99024 POSTOP FOLLOW-UP VISIT: CPT | Mod: S$GLB,,, | Performed by: SURGERY

## 2023-07-21 PROCEDURE — 1159F MED LIST DOCD IN RCRD: CPT | Mod: CPTII,S$GLB,, | Performed by: SURGERY

## 2023-07-21 PROCEDURE — 3074F PR MOST RECENT SYSTOLIC BLOOD PRESSURE < 130 MM HG: ICD-10-PCS | Mod: CPTII,S$GLB,, | Performed by: SURGERY

## 2023-07-21 PROCEDURE — 3078F PR MOST RECENT DIASTOLIC BLOOD PRESSURE < 80 MM HG: ICD-10-PCS | Mod: CPTII,S$GLB,, | Performed by: SURGERY

## 2023-07-21 PROCEDURE — 1126F PR PAIN SEVERITY QUANTIFIED, NO PAIN PRESENT: ICD-10-PCS | Mod: CPTII,S$GLB,, | Performed by: SURGERY

## 2023-07-21 PROCEDURE — 1126F AMNT PAIN NOTED NONE PRSNT: CPT | Mod: CPTII,S$GLB,, | Performed by: SURGERY

## 2023-07-21 PROCEDURE — 1157F ADVNC CARE PLAN IN RCRD: CPT | Mod: CPTII,S$GLB,, | Performed by: SURGERY

## 2023-07-21 PROCEDURE — 99999 PR PBB SHADOW E&M-EST. PATIENT-LVL IV: ICD-10-PCS | Mod: PBBFAC,,, | Performed by: SURGERY

## 2023-07-21 PROCEDURE — 99024 PR POST-OP FOLLOW-UP VISIT: ICD-10-PCS | Mod: S$GLB,,, | Performed by: SURGERY

## 2023-07-21 PROCEDURE — 1157F PR ADVANCE CARE PLAN OR EQUIV PRESENT IN MEDICAL RECORD: ICD-10-PCS | Mod: CPTII,S$GLB,, | Performed by: SURGERY

## 2023-07-21 PROCEDURE — 1159F PR MEDICATION LIST DOCUMENTED IN MEDICAL RECORD: ICD-10-PCS | Mod: CPTII,S$GLB,, | Performed by: SURGERY

## 2023-07-21 PROCEDURE — 3078F DIAST BP <80 MM HG: CPT | Mod: CPTII,S$GLB,, | Performed by: SURGERY

## 2023-07-21 PROCEDURE — 99999 PR PBB SHADOW E&M-EST. PATIENT-LVL IV: CPT | Mod: PBBFAC,,, | Performed by: SURGERY

## 2023-07-21 NOTE — PROGRESS NOTES
[facility] -   General Surgery History & Physical    SUBJECTIVE:     History of Present Illness:  Patient is a 78 y.o. female presents with a large keloid of her anterior chest. She states it causes her pain and will sometimes become red and swollen, requiring drainage and antibiotics.    Interval History:  7/7/23 Since the last clinic visit, the patient underwent excision of her chest wall keloid scar followed by 3 courses of radiation to the area.  Her postoperative course has been complicated by return trip to the ED with diagnosis of pneumonia.  She was placed on Levaquin for this.  She subsequently returned to the ED complaining of shortness of breath, at which time she was diagnosed with bilateral pulmonary emboli.  She was on Xarelto preoperatively, which she stopped in preparation for the procedure.  She was able to be treated outpatient with continuation of this medication as she was not hypoxic at the time of the exam in the ED. She is feeling okay today with minimal pain at the surgical site.  Her breathing is improving, and she denies any chest pain or shortness of breath at rest.  She still has some shortness of breath on exertion.  She is taking her Xarelto daily without issue.  She has 2 doses left of Levaquin.  She denies any fevers, chills, nausea, and vomiting.    7/21/23 Doing well. Denies pain or fevers. Has been keeping up with the local wound care.      Review of patient's allergies indicates:   Allergen Reactions    Penicillins Swelling     Generalized swelling  Other reaction(s): Swelling  Generalized swelling  Other reaction(s): Swelling  Generalized swelling       Current Outpatient Medications   Medication Sig Dispense Refill    acetaminophen (TYLENOL) 500 MG tablet Take 500 mg by mouth every 6 (six) hours as needed.      albuterol 90 mcg/actuation inhaler Inhale 2 puffs into the lungs every 4 (four) hours as needed for Wheezing. 1 each 6    amLODIPine (NORVASC) 5 MG tablet  Take 1 tablet (5 mg total) by mouth 2 (two) times daily. 180 tablet 3    bacitracin 500 unit/gram ointment Apply topically 2 (two) times daily. 28 g 4    betamethasone dipropionate (DIPROLENE) 0.05 % ointment Apply topically 2 (two) times daily. PRN keloid scar. Strong steroid- do Not use on face. 45 g 0    cholecalciferol, vitamin D3, (VITAMIN D3) 50 mcg (2,000 unit) Cap Take 1 capsule by mouth once daily.      clindamycin phosphate 1% (CLINDAGEL) 1 % gel Apply topically 2 (two) times daily. For acne of chest. 30 g 3    diclofenac sodium (VOLTAREN) 1 % Gel Apply 2 g topically 2 (two) times daily. 60 g 1    digoxin (LANOXIN) 125 mcg tablet TAKE 1 TABLET EVERY DAY 90 tablet 2    diphenhydrAMINE (BENADRYL) 25 mg capsule Take 25 mg by mouth every 6 (six) hours as needed.      ergocalciferol (ERGOCALCIFEROL) 50,000 unit Cap Take 50,000 Units by mouth every 7 days.      flurandrenolide 4 mcg/cm2 Tape Apply to fit area involved. Wear 12 hours/day then remove. Reapply next day. 1 each 3    fluticasone propionate (FLONASE) 50 mcg/actuation nasal spray 2 sprays (100 mcg total) by Each Nostril route once daily. 16 g 6    HYDROcodone-acetaminophen (NORCO) 5-325 mg per tablet Take 1 tablet by mouth every 4 to 6 hours as needed for Pain. 25 tablet 0    hydrOXYzine pamoate (VISTARIL) 25 MG Cap Take 25 mg by mouth.      losartan (COZAAR) 100 MG tablet Take 1 tablet (100 mg total) by mouth once daily. 90 tablet 1    metoprolol succinate (TOPROL-XL) 100 MG 24 hr tablet Take 1 tablet (100 mg total) by mouth once daily. 90 tablet 3    mupirocin (BACTROBAN) 2 % ointment Apply topically 3 (three) times daily. For broken skin. 22 g 1    polyethylene glycol (GLYCOLAX) 17 gram PwPk Take by mouth once daily.      rivaroxaban (XARELTO) 20 mg Tab TAKE 1 TABLET BY MOUTH EVERY DAY WITH DINNER      rOPINIRole (REQUIP) 1 MG tablet       rosuvastatin (CRESTOR) 40 MG Tab Take 1 tablet (40 mg total) by mouth every evening. 90 tablet 3     "betamethasone valerate 0.1% (VALISONE) 0.1 % Oint Apply topically 2 (two) times daily. 15 g 1    nystatin-triamcinolone (MYCOLOG II) cream Apply to affected area 2 times daily 30 g 1     No current facility-administered medications for this visit.       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 Nuno    CARPAL TUNNEL RELEASE      b/l    CATARACT EXTRACTION EXTRACAPSULAR W/ INTRAOCULAR LENS IMPLANTATION Bilateral 2022    COLONOSCOPY N/A 03/31/2021    Procedure: COLONOSCOPY;  Surgeon: Fozia Dasilva MD;  Location: [facility] ENDO;  Service: Endoscopy;  Laterality: N/A;    DILATION AND CURETTAGE OF UTERUS      10/2021, no cancer per pt.    FOOT SURGERY Right 05/02/2013    wart removal    KELOID EXCISION N/A 6/21/2023    Procedure: EXCISION, KELOID;  Surgeon: Justine Youngblood DO;  Location: [facility] OR;  Service: General;  Laterality: N/A;    TRIGGER FINGER RELEASE Right     TUBAL LIGATION       Family History   Problem Relation Age of Onset    Hypertension Mother     Hypertension Father     Cancer Father         prostate    Cataracts Father     Glaucoma Father     Diabetes Father     Hypertension Sister     Diabetes Sister     Breast cancer Neg Hx     Colon cancer Neg Hx     Ovarian cancer Neg Hx     Thrombophilia " "Neg Hx      Social History     Tobacco Use    Smoking status: Never    Smokeless tobacco: Never   Substance Use Topics    Alcohol use: No     Alcohol/week: 0.0 standard drinks    Drug use: No        Review of Systems:  Review of Systems   Constitutional:  Negative for chills and fever.   Gastrointestinal:  Negative for nausea and vomiting.     OBJECTIVE:     Vital Signs (Most Recent)  Temp: 98.7 °F (37.1 °C) (07/21/23 1141)  Pulse: (!) 57 (07/21/23 1141)  BP: 113/60 (07/21/23 1141)  5' 4" (1.626 m)  99.1 kg (218 lb 7.6 oz)     Physical Exam:  Physical Exam  Vitals and nursing note reviewed.   Constitutional:       General: She is not in acute distress.  HENT:      Head: Normocephalic and atraumatic.      Nose: Nose normal.   Eyes:      General: No scleral icterus.        Right eye: No discharge.         Left eye: No discharge.      Extraocular Movements: Extraocular movements intact.   Cardiovascular:      Rate and Rhythm: Bradycardia present.   Pulmonary:      Effort: No respiratory distress.      Breath sounds: No rales.   Abdominal:      Palpations: Abdomen is soft.      Tenderness: There is no abdominal tenderness.   Musculoskeletal:      Cervical back: No rigidity.   Skin:     General: Skin is warm.      Coloration: Skin is not jaundiced.      Comments: Chest incision without erythema, induration, or drainage. The mid portion has some tan-colored eschar which was debrided to healthy granulation tissue.   Neurological:      Mental Status: She is alert and oriented to person, place, and time.   Psychiatric:         Mood and Affect: Mood normal.         Behavior: Behavior normal.       Laboratory  WBC   Date Value Ref Range Status   06/28/2023 8.27 3.90 - 12.70 K/uL Final     Hemoglobin   Date Value Ref Range Status   06/28/2023 13.5 12.0 - 16.0 g/dL Final     Hematocrit   Date Value Ref Range Status   06/28/2023 42.3 37.0 - 48.5 % Final     Platelets   Date Value Ref Range Status   06/28/2023 248 150 - 450 K/uL " Final     Sodium   Date Value Ref Range Status   06/28/2023 140 136 - 145 mmol/L Final     Potassium   Date Value Ref Range Status   06/28/2023 4.4 3.5 - 5.1 mmol/L Final     Creatinine   Date Value Ref Range Status   06/28/2023 1.2 0.5 - 1.4 mg/dL Final     Alkaline Phosphatase   Date Value Ref Range Status   06/28/2023 104 55 - 135 U/L Final     AST   Date Value Ref Range Status   06/28/2023 15 10 - 40 U/L Final     ALT   Date Value Ref Range Status   06/28/2023 9 (L) 10 - 44 U/L Final      No results found for: HGBA1C    Diagnostic Results:  Result:  Mammo Digital Screening Bilat w/ Akil     History:  Patient is 77 y.o. and is seen for a screening mammogram.        Films Compared:  Compared to: 01/07/2022 Mammo Digital Screening Bilat w/ Akil and 02/28/2020 Mammo Digital Screening Bilat      Findings:   This procedure was performed using tomosynthesis.   Computer-aided detection was utilized in the interpretation of this examination.     The breasts have scattered areas of fibroglandular density. There is no evidence of suspicious masses, microcalcifications or architectural distortion.     Impression:   No mammographic evidence of malignancy.     BI-RADS Category 1: Negative     Recommendation:  Routine screening mammogram in 1 year is recommended.    Pathology:  Final Pathologic Diagnosis Skin, chest wall, excision:   -KELOID SCAR     This lesion is benign       ASSESSMENT/PLAN:     77-year-old female with keloid of the anterior chest wall and now status post excision with postoperative radiation.  Postop course complicated by both pneumonia and bilateral pulmonary emboli.    - Middle aspect of the incision had some mild overlying eschar which was debrided to healthy underlying granulation tissue. Instructed to continue local wound care to this area.  - Apply silicone scar sheets to the healed portion of the incision  - return to clinic 4 weeks for wound check.      Justine Youngblood, DO  General Surgery  [facility]  [facility]  7/21/2023

## 2023-07-26 NOTE — PROGRESS NOTES
"       HEMATOLOGY / ONCOLOGY   CLINIC NOTE     ONCOLOGICAL HISTORY:     Diagnosis:  - pulmonary embolism    Current Treatment:   - Xarelto    Subjective:       Chief Complaint: Evaluation for pulmonary embolism      HPI    Nathalie Membreno  78 y.o.  female with past medical history significant for CHF, hypertension, CVA, GERD, depression referred for evaluation of newly diagnosed PE     According to patient, she had been on Xarelto since 2018 after being diagnosed with CVA and irregular heart rhythm.  Her Xarelto was held from 06/18/2023 till 06/24/2023 when she had surgery for her keloid on the chest and then had radiation therapy done to it.  During this time she was also diagnosed with pneumonia  and then diagnosed with a PE.  Patient was then restarted back on Xarelto and had been doing well.  Denies any chest pain, shortness her breath, palpitations.  Denies any bruising or bleeding.  Denies any swelling in her extremities.  Denies any previous history of blood clots or family history of blood clots.  Denies any family history or previous history of bleeding          Interval History:         Past Medical History:   Diagnosis Date    Allergy     Anemia     Arthritis     knees, hands, back    Back pain     pt reports "mild"    Chronic diastolic heart failure 7/12/2023    Depression, recurrent 7/12/2023    Dysphagia, unspecified(787.20)     GERD (gastroesophageal reflux disease)     History of carpal tunnel release of both wrists 03/10/2016    Hyperlipidemia     Hypertension     LGSIL (low grade squamous intraepithelial dysplasia)     HPV +    Mild intermittent asthma without complication 07/28/2020    Obesity     Pneumonia of left lower lobe due to infectious organism 6/26/2023    Pulmonary embolism, bilateral 7/5/2023    Stroke 02/08/2017      Past Surgical History:   Procedure Laterality Date    CARDIAC DEFIBRILLATOR PLACEMENT  04/2017    Dr. Nuno    CARPAL TUNNEL RELEASE      b/l    CATARACT EXTRACTION " EXTRACAPSULAR W/ INTRAOCULAR LENS IMPLANTATION Bilateral 2022    COLONOSCOPY N/A 03/31/2021    Procedure: COLONOSCOPY;  Surgeon: Fozia Dasilva MD;  Location: [facility] ENDO;  Service: Endoscopy;  Laterality: N/A;    DILATION AND CURETTAGE OF UTERUS      10/2021, no cancer per pt.    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     Employer: [employer]    Tobacco Use    Smoking status: Never    Smokeless tobacco: Never   Substance and Sexual Activity    Alcohol use: No     Alcohol/week: 0.0 standard drinks    Drug use: No    Sexual activity: Not Currently     Partners: Male     Birth control/protection: None   Social History Narrative    Live alone     Social Determinants of Health     Financial Resource Strain: Low Risk     Difficulty of Paying Living Expenses: Not hard at all   Food Insecurity: No Food Insecurity    Worried About Running Out of Food in the Last Year: Never true    Ran Out of Food in the Last Year: Never true   Transportation Needs: No Transportation Needs    Lack of Transportation (Medical): No    Lack of Transportation (Non-Medical): No   Physical Activity: Inactive    Days of Exercise per Week: 0 days    Minutes of Exercise per Session: 0 min   Stress: Stress Concern Present    Feeling of Stress : Rather much   Social Connections: Moderately Isolated    Frequency of Communication with Friends and Family: More than three times a week    Frequency of Social Gatherings with Friends and Family: Twice a week    Attends Taoist Services: More than 4 times per year    Active Member of Clubs or Organizations: No    Attends Club or Organization  Meetings: Never    Marital Status:    Housing Stability: Low Risk     Unable to Pay for Housing in the Last Year: No    Number of Places Lived in the Last Year: 1    Unstable Housing in the Last Year: No      Family History   Problem Relation Age of Onset    Hypertension Mother     Hypertension Father     Cancer Father         prostate    Cataracts Father     Glaucoma Father     Diabetes Father     Hypertension Sister     Diabetes Sister     Breast cancer Neg Hx     Colon cancer Neg Hx     Ovarian cancer Neg Hx     Thrombophilia Neg Hx       Review of patient's allergies indicates:   Allergen Reactions    Penicillins Swelling     Generalized swelling  Other reaction(s): Swelling  Generalized swelling  Other reaction(s): Swelling  Generalized swelling      Review of Systems   Constitutional: Negative.  Negative for activity change, chills, fatigue and fever.   HENT: Negative.     Eyes: Negative.    Respiratory:  Negative for cough and shortness of breath.    Cardiovascular:  Negative for chest pain and leg swelling.   Gastrointestinal:  Negative for constipation, diarrhea, nausea and vomiting.   Endocrine: Negative.    Genitourinary: Negative.    Musculoskeletal:  Negative for arthralgias and myalgias.   Integumentary:  Negative.   Allergic/Immunologic: Negative.    Neurological:  Positive for coordination difficulties and coordination difficulties. Negative for light-headedness, numbness and headaches.   Hematological: Negative.    Psychiatric/Behavioral: Negative.         Objective:        Vitals:    07/27/23 1441   BP: 115/67   Pulse: (!) 56   Temp: 97.4 °F (36.3 °C)        Physical Exam  Constitutional:       General: She is not in acute distress.     Appearance: She is well-developed. She is not ill-appearing.   HENT:      Head: Normocephalic and atraumatic.      Mouth/Throat:      Mouth: Mucous membranes are moist.   Eyes:      Extraocular Movements: Extraocular movements intact.       Conjunctiva/sclera: Conjunctivae normal.   Cardiovascular:      Rate and Rhythm: Normal rate.      Heart sounds: Normal heart sounds.   Pulmonary:      Effort: Pulmonary effort is normal. No respiratory distress.      Breath sounds: Normal breath sounds. No wheezing.   Chest:      Comments: Dressing on the anterior chest after keloid surgery  Abdominal:      General: There is no distension.      Palpations: Abdomen is soft.      Tenderness: There is no abdominal tenderness.   Musculoskeletal:         General: Normal range of motion.      Cervical back: Normal range of motion and neck supple.   Skin:     General: Skin is warm.   Neurological:      General: No focal deficit present.      Mental Status: She is alert and oriented to person, place, and time. Mental status is at baseline.      Cranial Nerves: No cranial nerve deficit.      Gait: Gait abnormal.   Psychiatric:         Mood and Affect: Mood normal.         LABS / IMAGING      - 06/28/2023 CTA CHEST:   1. Bilateral acute pulmonary emboli as detailed above  2. Dilatation of the main pulmonary artery and reflux of contrast into the IVC and hepatic veins suggest possible right heart strain.  Cystic appearing structure measuring approximately 3.4 cm adjacent to the gastric fundus which is of uncertain etiology.  Consider short interval follow-up CT of the abdomen and pelvis in 3-6 months to ensure stability.  3. Multiple fluid attenuating lesions seen throughout the liver in keeping with cysts.        - 06/28/2023 U/S: No evidence of deep venous thrombosis in either lower extremity.      Assessment:     Bilateral pulmonary embolism  - diagnosed with bilateral pulmonary embolism on 06/28/2023, just after her dose of Xarelto was discontinued for a week when she had surgery and radiation therapy of keloid on the anterior chest        Plan:     - continue with Xarelto  - no history of blood clots or family history of blood clots, thrombophilia workup requested and  will have it done on follow up visit   - MD / LABS VISIT - 12 WEEKS         Med Onc Chart Routing      Follow up with physician 3 months.   Follow up with CALEB    Infusion scheduling note    Injection scheduling note    Labs CBC and CMP   Scheduling:  Preferred lab:  Lab interval:  Labs 1 week before next visit - factor 5, antithrombin 3, protein C, protein S, prothrombin gene, DRVVT, cardiolipin, beta 2 glycoprotein, D-dimer   Imaging    Pharmacy appointment    Other referrals              The patient was seen, interviewed and examined. Pertinent lab and radiology studies were reviewed. Pt instructed to call should develop concerning signs/symptoms or have further questions.       Portions of the record may have been created with voice recognition software. Occasional wrong-word or sound-a-like substitutions may have occurred due to the inherent limitations of voice recognition software. Read the chart carefully and recognize, using context, where substitutions have occurred.    Jefe Gregorio MD  Hematology / Oncology

## 2023-07-27 ENCOUNTER — OFFICE VISIT (OUTPATIENT)
Dept: HEMATOLOGY/ONCOLOGY | Facility: CLINIC | Age: 78
End: 2023-07-27
Payer: MEDICARE

## 2023-07-27 VITALS
WEIGHT: 218 LBS | OXYGEN SATURATION: 98 % | SYSTOLIC BLOOD PRESSURE: 115 MMHG | HEIGHT: 64 IN | BODY MASS INDEX: 37.22 KG/M2 | TEMPERATURE: 97 F | DIASTOLIC BLOOD PRESSURE: 67 MMHG | HEART RATE: 56 BPM

## 2023-07-27 DIAGNOSIS — Z79.01 CURRENT USE OF LONG TERM ANTICOAGULATION: ICD-10-CM

## 2023-07-27 DIAGNOSIS — I26.99 BILATERAL PULMONARY EMBOLISM: ICD-10-CM

## 2023-07-27 PROCEDURE — 3074F PR MOST RECENT SYSTOLIC BLOOD PRESSURE < 130 MM HG: ICD-10-PCS | Mod: CPTII,S$GLB,, | Performed by: INTERNAL MEDICINE

## 2023-07-27 PROCEDURE — 1159F MED LIST DOCD IN RCRD: CPT | Mod: CPTII,S$GLB,, | Performed by: INTERNAL MEDICINE

## 2023-07-27 PROCEDURE — 3074F SYST BP LT 130 MM HG: CPT | Mod: CPTII,S$GLB,, | Performed by: INTERNAL MEDICINE

## 2023-07-27 PROCEDURE — 1126F AMNT PAIN NOTED NONE PRSNT: CPT | Mod: CPTII,S$GLB,, | Performed by: INTERNAL MEDICINE

## 2023-07-27 PROCEDURE — 1157F ADVNC CARE PLAN IN RCRD: CPT | Mod: CPTII,S$GLB,, | Performed by: INTERNAL MEDICINE

## 2023-07-27 PROCEDURE — 99999 PR PBB SHADOW E&M-EST. PATIENT-LVL V: CPT | Mod: PBBFAC,,, | Performed by: INTERNAL MEDICINE

## 2023-07-27 PROCEDURE — 1157F PR ADVANCE CARE PLAN OR EQUIV PRESENT IN MEDICAL RECORD: ICD-10-PCS | Mod: CPTII,S$GLB,, | Performed by: INTERNAL MEDICINE

## 2023-07-27 PROCEDURE — 1101F PT FALLS ASSESS-DOCD LE1/YR: CPT | Mod: CPTII,S$GLB,, | Performed by: INTERNAL MEDICINE

## 2023-07-27 PROCEDURE — 99213 PR OFFICE/OUTPT VISIT, EST, LEVL III, 20-29 MIN: ICD-10-PCS | Mod: S$GLB,,, | Performed by: INTERNAL MEDICINE

## 2023-07-27 PROCEDURE — 3078F DIAST BP <80 MM HG: CPT | Mod: CPTII,S$GLB,, | Performed by: INTERNAL MEDICINE

## 2023-07-27 PROCEDURE — 1126F PR PAIN SEVERITY QUANTIFIED, NO PAIN PRESENT: ICD-10-PCS | Mod: CPTII,S$GLB,, | Performed by: INTERNAL MEDICINE

## 2023-07-27 PROCEDURE — 99213 OFFICE O/P EST LOW 20 MIN: CPT | Mod: S$GLB,,, | Performed by: INTERNAL MEDICINE

## 2023-07-27 PROCEDURE — 1159F PR MEDICATION LIST DOCUMENTED IN MEDICAL RECORD: ICD-10-PCS | Mod: CPTII,S$GLB,, | Performed by: INTERNAL MEDICINE

## 2023-07-27 PROCEDURE — 3288F FALL RISK ASSESSMENT DOCD: CPT | Mod: CPTII,S$GLB,, | Performed by: INTERNAL MEDICINE

## 2023-07-27 PROCEDURE — 3288F PR FALLS RISK ASSESSMENT DOCUMENTED: ICD-10-PCS | Mod: CPTII,S$GLB,, | Performed by: INTERNAL MEDICINE

## 2023-07-27 PROCEDURE — 99999 PR PBB SHADOW E&M-EST. PATIENT-LVL V: ICD-10-PCS | Mod: PBBFAC,,, | Performed by: INTERNAL MEDICINE

## 2023-07-27 PROCEDURE — 1101F PR PT FALLS ASSESS DOC 0-1 FALLS W/OUT INJ PAST YR: ICD-10-PCS | Mod: CPTII,S$GLB,, | Performed by: INTERNAL MEDICINE

## 2023-07-27 PROCEDURE — 3078F PR MOST RECENT DIASTOLIC BLOOD PRESSURE < 80 MM HG: ICD-10-PCS | Mod: CPTII,S$GLB,, | Performed by: INTERNAL MEDICINE

## 2023-07-28 ENCOUNTER — PATIENT MESSAGE (OUTPATIENT)
Dept: PRIMARY CARE CLINIC | Facility: CLINIC | Age: 78
End: 2023-07-28
Payer: MEDICARE

## 2023-08-01 ENCOUNTER — OFFICE VISIT (OUTPATIENT)
Dept: PRIMARY CARE CLINIC | Facility: CLINIC | Age: 78
End: 2023-08-01
Payer: MEDICARE

## 2023-08-01 ENCOUNTER — DOCUMENTATION ONLY (OUTPATIENT)
Dept: PRIMARY CARE CLINIC | Facility: CLINIC | Age: 78
End: 2023-08-01
Payer: MEDICARE

## 2023-08-01 VITALS
WEIGHT: 215.69 LBS | HEIGHT: 64 IN | BODY MASS INDEX: 36.82 KG/M2 | DIASTOLIC BLOOD PRESSURE: 65 MMHG | TEMPERATURE: 99 F | HEART RATE: 60 BPM | SYSTOLIC BLOOD PRESSURE: 120 MMHG | OXYGEN SATURATION: 97 %

## 2023-08-01 DIAGNOSIS — Z78.9 STATIN INTOLERANCE: ICD-10-CM

## 2023-08-01 DIAGNOSIS — I10 ESSENTIAL HYPERTENSION: ICD-10-CM

## 2023-08-01 DIAGNOSIS — E78.49 OTHER HYPERLIPIDEMIA: Primary | ICD-10-CM

## 2023-08-01 PROBLEM — E78.00 PURE HYPERCHOLESTEROLEMIA: Status: ACTIVE | Noted: 2018-07-29

## 2023-08-01 PROBLEM — M51.369 DDD (DEGENERATIVE DISC DISEASE), LUMBAR: Status: ACTIVE | Noted: 2018-07-29

## 2023-08-01 PROBLEM — M51.36 DDD (DEGENERATIVE DISC DISEASE), LUMBAR: Status: ACTIVE | Noted: 2018-07-29

## 2023-08-01 PROCEDURE — 3288F PR FALLS RISK ASSESSMENT DOCUMENTED: ICD-10-PCS | Mod: CPTII,S$GLB,, | Performed by: FAMILY MEDICINE

## 2023-08-01 PROCEDURE — 99214 PR OFFICE/OUTPT VISIT, EST, LEVL IV, 30-39 MIN: ICD-10-PCS | Mod: S$GLB,,, | Performed by: FAMILY MEDICINE

## 2023-08-01 PROCEDURE — 3288F FALL RISK ASSESSMENT DOCD: CPT | Mod: CPTII,S$GLB,, | Performed by: FAMILY MEDICINE

## 2023-08-01 PROCEDURE — 1159F MED LIST DOCD IN RCRD: CPT | Mod: CPTII,S$GLB,, | Performed by: FAMILY MEDICINE

## 2023-08-01 PROCEDURE — 3074F PR MOST RECENT SYSTOLIC BLOOD PRESSURE < 130 MM HG: ICD-10-PCS | Mod: CPTII,S$GLB,, | Performed by: FAMILY MEDICINE

## 2023-08-01 PROCEDURE — 1157F ADVNC CARE PLAN IN RCRD: CPT | Mod: CPTII,S$GLB,, | Performed by: FAMILY MEDICINE

## 2023-08-01 PROCEDURE — 1160F PR REVIEW ALL MEDS BY PRESCRIBER/CLIN PHARMACIST DOCUMENTED: ICD-10-PCS | Mod: CPTII,S$GLB,, | Performed by: FAMILY MEDICINE

## 2023-08-01 PROCEDURE — 1101F PT FALLS ASSESS-DOCD LE1/YR: CPT | Mod: CPTII,S$GLB,, | Performed by: FAMILY MEDICINE

## 2023-08-01 PROCEDURE — 1157F PR ADVANCE CARE PLAN OR EQUIV PRESENT IN MEDICAL RECORD: ICD-10-PCS | Mod: CPTII,S$GLB,, | Performed by: FAMILY MEDICINE

## 2023-08-01 PROCEDURE — 1160F RVW MEDS BY RX/DR IN RCRD: CPT | Mod: CPTII,S$GLB,, | Performed by: FAMILY MEDICINE

## 2023-08-01 PROCEDURE — 99999 PR PBB SHADOW E&M-EST. PATIENT-LVL V: CPT | Mod: PBBFAC,,, | Performed by: FAMILY MEDICINE

## 2023-08-01 PROCEDURE — 1101F PR PT FALLS ASSESS DOC 0-1 FALLS W/OUT INJ PAST YR: ICD-10-PCS | Mod: CPTII,S$GLB,, | Performed by: FAMILY MEDICINE

## 2023-08-01 PROCEDURE — 1159F PR MEDICATION LIST DOCUMENTED IN MEDICAL RECORD: ICD-10-PCS | Mod: CPTII,S$GLB,, | Performed by: FAMILY MEDICINE

## 2023-08-01 PROCEDURE — 99999 PR PBB SHADOW E&M-EST. PATIENT-LVL V: ICD-10-PCS | Mod: PBBFAC,,, | Performed by: FAMILY MEDICINE

## 2023-08-01 PROCEDURE — 3074F SYST BP LT 130 MM HG: CPT | Mod: CPTII,S$GLB,, | Performed by: FAMILY MEDICINE

## 2023-08-01 PROCEDURE — 99214 OFFICE O/P EST MOD 30 MIN: CPT | Mod: S$GLB,,, | Performed by: FAMILY MEDICINE

## 2023-08-01 PROCEDURE — 3078F DIAST BP <80 MM HG: CPT | Mod: CPTII,S$GLB,, | Performed by: FAMILY MEDICINE

## 2023-08-01 PROCEDURE — 3078F PR MOST RECENT DIASTOLIC BLOOD PRESSURE < 80 MM HG: ICD-10-PCS | Mod: CPTII,S$GLB,, | Performed by: FAMILY MEDICINE

## 2023-08-01 NOTE — PATIENT INSTRUCTIONS
Shingles shot is recommended and available at [facility] and any retail pharmacy.     COVID-19 Moderna # 4 vaccine is due and available at pharmacies.

## 2023-08-01 NOTE — PROGRESS NOTES
Met patient during her MD appointment today. Patient is agreeable to scheduling appointment with LCSW. Arranged appointment for August 8, 2023.

## 2023-08-01 NOTE — Clinical Note
Will you call to check on her to see if she was able to get Repatha from the pharmacy or not. Sending to mail order. She'll need an RN visit to learn to give the shot if she's able to get the med.

## 2023-08-01 NOTE — PROGRESS NOTES
"Subjective:       Patient ID: Nathalie Membreno is a 78 y.o. female.    Chief Complaint: Follow-up (One month follow up. No issues or concerns. )    HPI:     Here for follow up of medical problems. Saw heme/onc 7/27/23 with thrombophilia work up planned with follow up to discuss in October. Developed bilat PEs after xarelto held for 4 days off for keloid surgery.   No longer needing albuterol inhaler.  No f/c.  No BARKER, no CP.  BMs good on miralax.  Down 8#, not on contrave because of nausea side effect. Open to starting back on it at lower dose  Stopped rosuvastatin because felt "fuzzy" after 3 doses. Said she felt awful and is convinced it was the medication. Discussed ASCVD risk and modifiables. She is open to non-statin lipid lowering medication  Still with depressed feeling; frustrated over inactivity. Referred to behavioral health but did not schedule appt     The 10-year ASCVD risk score (Manolo BROOKS, et al., 2019) is: 17%    Values used to calculate the score:      Age: 78 years      Sex: Female      Is Non- : Yes      Diabetic: No      Tobacco smoker: No      Systolic Blood Pressure: 120 mmHg      Is BP treated: Yes      HDL Cholesterol: 60 mg/dL      Total Cholesterol: 212 mg/dL      Updated/ annual due 1/24:  HM: 9/22 fluvax, 3/21 covid vaccines, 6/19 HAV, 6/15 [id number], 11/16 booster [id number], 1/23 [id number], 9/14 TDaP, 1/23 BMD rep 5y, 3/21 Cscope rep 3y, 1/23 MMG/me, 10/20 Gyn Dr. Holden, 8/22 Eye Dr. Fiore, 11/16 HCV neg, Card Dr. Leach at [facility].    Objective:     Vitals:    08/01/23 1418   BP: 120/65   Pulse: 60   Temp: 98.7 °F (37.1 °C)     Wt Readings from Last 3 Encounters:   08/01/23 97.8 kg (215 lb 11.2 oz)   07/27/23 98.9 kg (218 lb)   07/21/23 99.1 kg (218 lb 7.6 oz)     Temp Readings from Last 3 Encounters:   08/01/23 98.7 °F (37.1 °C) (Oral)   07/27/23 97.4 °F (36.3 °C) (Temporal)   07/21/23 98.7 °F (37.1 °C) (Oral)     BP Readings from Last 3 Encounters: "   08/01/23 120/65   07/27/23 115/67   07/21/23 113/60     Pulse Readings from Last 3 Encounters:   08/01/23 60   07/27/23 (!) 56   07/21/23 (!) 57          Physical Exam  Vitals and nursing note reviewed.   Constitutional:       Appearance: She is well-developed. She is obese.   HENT:      Head: Normocephalic and atraumatic.   Eyes:      Pupils: Pupils are equal, round, and reactive to light.   Cardiovascular:      Rate and Rhythm: Normal rate and regular rhythm.   Pulmonary:      Effort: Pulmonary effort is normal.      Breath sounds: Normal breath sounds.   Abdominal:      Palpations: Abdomen is soft. There is mass.   Musculoskeletal:         General: No deformity. Normal range of motion.      Cervical back: Normal range of motion and neck supple.      Right lower leg: No edema.      Left lower leg: No edema.   Skin:     General: Skin is warm and dry.      Coloration: Skin is not pale.      Findings: Lesion (healing keloid revision upper chest) present.   Neurological:      General: No focal deficit present.      Mental Status: She is alert and oriented to person, place, and time.   Psychiatric:         Behavior: Behavior normal.         Thought Content: Thought content normal.         Judgment: Judgment normal.           Albumin   Date Value Ref Range Status   06/28/2023 3.6 3.5 - 5.2 g/dL Final     eGFR   Date Value Ref Range Status   06/28/2023 47 (A) >60 mL/min/1.73 m^2 Final       Assessment:       1. Other hyperlipidemia    2. Statin intolerance    3. Essential hypertension        Plan:           Problem List Items Addressed This Visit          Cardiac/Vascular    Essential hypertension    Relevant Orders    Ambulatory referral/consult to Ophthalmology    Other hyperlipidemia - Primary    Relevant Medications    evolocumab (REPATHA SURECLICK) 140 mg/mL PnIj     Other Visit Diagnoses       Statin intolerance        Relevant Medications    evolocumab (REPATHA SURECLICK) 140 mg/mL PnIj          3 month f/u; RN  will call to schedule nurse visit for teaching on Repatha once received from pharmacy.   Shingles shot is recommended and available at [facility] and any retail pharmacy.     COVID-19 Moderna # 4 vaccine is due and available at pharmacies.   Introduced to HANSA Alicea today. Hopeful she'll follow up with future appts for ongoing therapy for depression and emotional eating.

## 2023-08-08 ENCOUNTER — CLINICAL SUPPORT (OUTPATIENT)
Dept: PRIMARY CARE CLINIC | Facility: CLINIC | Age: 78
End: 2023-08-08
Payer: MEDICARE

## 2023-08-08 DIAGNOSIS — E78.49 OTHER HYPERLIPIDEMIA: Primary | ICD-10-CM

## 2023-08-08 PROCEDURE — 99999 PR PBB SHADOW E&M-EST. PATIENT-LVL III: ICD-10-PCS | Mod: PBBFAC,,,

## 2023-08-08 PROCEDURE — 99999 PR PBB SHADOW E&M-EST. PATIENT-LVL III: CPT | Mod: PBBFAC,,,

## 2023-08-10 NOTE — TELEPHONE ENCOUNTER
----- Message from Erna Faye MA sent at 11/21/2022  4:21 PM CST -----  Contact: Pt [phone]    ----- Message -----  From: Batsheva Wise  Sent: 11/21/2022   3:46 PM CST  To: Jhony KINNEY Staff    [email or web address]     Patient is calling for Medical Advice regarding:Diarrhea     How long has patient had these symptoms:2 days     Pharmacy [facility] and phone#:  [id number] - [place] - [address] AT [address] [id number] [place] [address]  Phone: [phone] Fax: [phone]      Would like response via [employer]:  Call Back     Comments:           Placed on RA

## 2023-09-01 ENCOUNTER — OFFICE VISIT (OUTPATIENT)
Dept: SURGERY | Facility: CLINIC | Age: 78
End: 2023-09-01
Payer: MEDICARE

## 2023-09-01 VITALS
SYSTOLIC BLOOD PRESSURE: 132 MMHG | BODY MASS INDEX: 37.16 KG/M2 | WEIGHT: 216.5 LBS | DIASTOLIC BLOOD PRESSURE: 62 MMHG | HEART RATE: 56 BPM

## 2023-09-01 DIAGNOSIS — L91.0 KELOID: Primary | ICD-10-CM

## 2023-09-01 PROCEDURE — 99999 PR PBB SHADOW E&M-EST. PATIENT-LVL III: CPT | Mod: PBBFAC,,, | Performed by: SURGERY

## 2023-09-01 PROCEDURE — 1126F PR PAIN SEVERITY QUANTIFIED, NO PAIN PRESENT: ICD-10-PCS | Mod: CPTII,S$GLB,, | Performed by: SURGERY

## 2023-09-01 PROCEDURE — 99024 PR POST-OP FOLLOW-UP VISIT: ICD-10-PCS | Mod: S$GLB,,, | Performed by: SURGERY

## 2023-09-01 PROCEDURE — 99024 POSTOP FOLLOW-UP VISIT: CPT | Mod: S$GLB,,, | Performed by: SURGERY

## 2023-09-01 PROCEDURE — 3078F PR MOST RECENT DIASTOLIC BLOOD PRESSURE < 80 MM HG: ICD-10-PCS | Mod: CPTII,S$GLB,, | Performed by: SURGERY

## 2023-09-01 PROCEDURE — 1157F PR ADVANCE CARE PLAN OR EQUIV PRESENT IN MEDICAL RECORD: ICD-10-PCS | Mod: CPTII,S$GLB,, | Performed by: SURGERY

## 2023-09-01 PROCEDURE — 1126F AMNT PAIN NOTED NONE PRSNT: CPT | Mod: CPTII,S$GLB,, | Performed by: SURGERY

## 2023-09-01 PROCEDURE — 3075F PR MOST RECENT SYSTOLIC BLOOD PRESS GE 130-139MM HG: ICD-10-PCS | Mod: CPTII,S$GLB,, | Performed by: SURGERY

## 2023-09-01 PROCEDURE — 99999 PR PBB SHADOW E&M-EST. PATIENT-LVL III: ICD-10-PCS | Mod: PBBFAC,,, | Performed by: SURGERY

## 2023-09-01 PROCEDURE — 3078F DIAST BP <80 MM HG: CPT | Mod: CPTII,S$GLB,, | Performed by: SURGERY

## 2023-09-01 PROCEDURE — 3075F SYST BP GE 130 - 139MM HG: CPT | Mod: CPTII,S$GLB,, | Performed by: SURGERY

## 2023-09-01 PROCEDURE — 1157F ADVNC CARE PLAN IN RCRD: CPT | Mod: CPTII,S$GLB,, | Performed by: SURGERY

## 2023-09-11 ENCOUNTER — TELEPHONE (OUTPATIENT)
Dept: PRIMARY CARE CLINIC | Facility: CLINIC | Age: 78
End: 2023-09-11
Payer: MEDICARE

## 2023-09-11 DIAGNOSIS — R19.00 ABDOMINAL MASS, UNSPECIFIED ABDOMINAL LOCATION: Primary | ICD-10-CM

## 2023-09-11 NOTE — TELEPHONE ENCOUNTER
MARIFER Sharpe was ordered, please assist with scheduling.    Rhoda              ----- Message from Annemarie Ordaz RN sent at 9/11/2023 10:08 AM CDT -----  Regarding: FW: Stat labs  Adela,  Patient needs order for lab prior to CT on Wednesday. Please let us know once orders are in so we can schedule.  Thanks,  Annemarie Ordaz RN  ----- Message -----  From: Ai Hartman, RT  Sent: 9/11/2023  10:07 AM CDT  To: Darrell ORTEGA Staff  Subject: Stat labs                                        Good Morning,   Ms Zelaya has a CT scan with us on Wednesday. She will need STAT labs before we can give her IV contrast. Please order a STAT creatinine and serum (lab66), and schedule it at least one hour prior to her scan time here at [facility].    Thank You,   Ai Hartman   CT Tech, [facility]  [phone]

## 2023-09-13 ENCOUNTER — HOSPITAL ENCOUNTER (OUTPATIENT)
Dept: RADIOLOGY | Facility: HOSPITAL | Age: 78
Discharge: HOME | End: 2023-09-13
Attending: NURSE PRACTITIONER
Payer: MEDICARE

## 2023-09-13 DIAGNOSIS — R19.00 ABDOMINAL MASS, UNSPECIFIED ABDOMINAL LOCATION: ICD-10-CM

## 2023-09-13 PROCEDURE — 74177 CT ABD & PELVIS W/CONTRAST: CPT | Mod: TC

## 2023-09-13 PROCEDURE — 74177 CT ABDOMEN PELVIS WITH CONTRAST: ICD-10-PCS | Mod: 26,,, | Performed by: RADIOLOGY

## 2023-09-13 PROCEDURE — A9698 NON-RAD CONTRAST MATERIALNOC: HCPCS | Performed by: NURSE PRACTITIONER

## 2023-09-13 PROCEDURE — 25500020 PHARM REV CODE 255: Performed by: NURSE PRACTITIONER

## 2023-09-13 PROCEDURE — 74177 CT ABD & PELVIS W/CONTRAST: CPT | Mod: 26,,, | Performed by: RADIOLOGY

## 2023-09-13 RX ADMIN — IOHEXOL 100 ML: 350 INJECTION, SOLUTION INTRAVENOUS at 12:09

## 2023-09-13 RX ADMIN — IOHEXOL 1000 ML: 12 SOLUTION ORAL at 10:09

## 2023-09-22 ENCOUNTER — OFFICE VISIT (OUTPATIENT)
Dept: OPHTHALMOLOGY | Facility: CLINIC | Age: 78
End: 2023-09-22
Payer: MEDICARE

## 2023-09-22 DIAGNOSIS — H40.013 OPEN ANGLE WITH BORDERLINE FINDINGS OF BOTH EYES: ICD-10-CM

## 2023-09-22 DIAGNOSIS — Z96.1 PSEUDOPHAKIA OF BOTH EYES: Primary | ICD-10-CM

## 2023-09-22 DIAGNOSIS — I10 ESSENTIAL HYPERTENSION: ICD-10-CM

## 2023-09-22 PROCEDURE — 92014 COMPRE OPH EXAM EST PT 1/>: CPT | Mod: S$GLB,,, | Performed by: OPTOMETRIST

## 2023-09-22 PROCEDURE — 99999 PR PBB SHADOW E&M-EST. PATIENT-LVL III: CPT | Mod: PBBFAC,,, | Performed by: OPTOMETRIST

## 2023-09-22 PROCEDURE — 92133 POSTERIOR SEGMENT OCT OPTIC NERVE(OCULAR COHERENCE TOMOGRAPHY) - OU - BOTH EYES: ICD-10-PCS | Mod: S$GLB,,, | Performed by: OPTOMETRIST

## 2023-09-22 PROCEDURE — 1126F AMNT PAIN NOTED NONE PRSNT: CPT | Mod: CPTII,S$GLB,, | Performed by: OPTOMETRIST

## 2023-09-22 PROCEDURE — 1157F ADVNC CARE PLAN IN RCRD: CPT | Mod: CPTII,S$GLB,, | Performed by: OPTOMETRIST

## 2023-09-22 PROCEDURE — 92133 CPTRZD OPH DX IMG PST SGM ON: CPT | Mod: S$GLB,,, | Performed by: OPTOMETRIST

## 2023-09-22 PROCEDURE — 1157F PR ADVANCE CARE PLAN OR EQUIV PRESENT IN MEDICAL RECORD: ICD-10-PCS | Mod: CPTII,S$GLB,, | Performed by: OPTOMETRIST

## 2023-09-22 PROCEDURE — 1126F PR PAIN SEVERITY QUANTIFIED, NO PAIN PRESENT: ICD-10-PCS | Mod: CPTII,S$GLB,, | Performed by: OPTOMETRIST

## 2023-09-22 PROCEDURE — 99999 PR PBB SHADOW E&M-EST. PATIENT-LVL III: ICD-10-PCS | Mod: PBBFAC,,, | Performed by: OPTOMETRIST

## 2023-09-22 PROCEDURE — 92014 PR EYE EXAM, EST PATIENT,COMPREHESV: ICD-10-PCS | Mod: S$GLB,,, | Performed by: OPTOMETRIST

## 2023-09-22 PROCEDURE — 1159F PR MEDICATION LIST DOCUMENTED IN MEDICAL RECORD: ICD-10-PCS | Mod: CPTII,S$GLB,, | Performed by: OPTOMETRIST

## 2023-09-22 PROCEDURE — 1159F MED LIST DOCD IN RCRD: CPT | Mod: CPTII,S$GLB,, | Performed by: OPTOMETRIST

## 2023-09-22 PROCEDURE — 1160F RVW MEDS BY RX/DR IN RCRD: CPT | Mod: CPTII,S$GLB,, | Performed by: OPTOMETRIST

## 2023-09-22 PROCEDURE — 1160F PR REVIEW ALL MEDS BY PRESCRIBER/CLIN PHARMACIST DOCUMENTED: ICD-10-PCS | Mod: CPTII,S$GLB,, | Performed by: OPTOMETRIST

## 2023-09-22 NOTE — PROGRESS NOTES
HPI     encounter for eye exam            Comments: 6 months check up          Comments    1. PCIOL OD 7/21/22  PCIOL OS 08/18/2022  2. Open Angle borderline findings OU    OS- Pred QID     Pataday prn            Last edited by Tiffanie Neal on 9/22/2023 10:07 AM.            Assessment /Plan     For exam results, see Encounter Report.    Pseudophakia of both eyes  Stable OU  Monitor 12 months    Open angle with borderline findings of both eyes      Monitor 6 months      RTC 6 months for 24-2VF, gOCT, IOP check and pach or PRN  Discussed above and all questions were answered.

## 2023-09-22 NOTE — PROGRESS NOTES
HPI     encounter for eye exam            Comments: 6 months check up          Comments    1. PCIOL OD 7/21/22  PCIOL OS 08/18/2022  2. Open Angle borderline findings OU    OS- Pred QID     Pataday prn            Last edited by Tiffanie Neal on 9/22/2023 10:07 AM.            Assessment /Plan     For exam results, see Encounter Report.    Pseudophakia of both eyes  Doing well OU  Monitor 12 months    Open angle with borderline findings of both eyes  -     Posterior Segment OCT Optic Nerve- Both eyes  Normal IOP today OD, OS  Some thinning/progression on NFL scan OS, stable OD and stable GCL OU  Monitor 6 months      RTC 6 months for 24-2VF, gOCT, IOP check and pach or PRN  Discussed above and all questions were answered.

## 2023-09-25 PROBLEM — J18.9 PNEUMONIA OF LEFT LOWER LOBE DUE TO INFECTIOUS ORGANISM: Status: RESOLVED | Noted: 2023-06-26 | Resolved: 2023-09-25

## 2023-09-27 DIAGNOSIS — R19.00 ABDOMINAL MASS, UNSPECIFIED ABDOMINAL LOCATION: Primary | ICD-10-CM

## 2023-09-27 NOTE — PROGRESS NOTES
Order for repeat CT of ABD/Pelvis with IV contrast in 1 year. Reevaluate cyst adjacent to gastric fundus?

## 2023-10-11 ENCOUNTER — DOCUMENTATION ONLY (OUTPATIENT)
Dept: PRIMARY CARE CLINIC | Facility: CLINIC | Age: 78
End: 2023-10-11
Payer: MEDICARE

## 2023-10-11 NOTE — PROGRESS NOTES
L/M for patient today re: rescheduling initial appointment with LCSW.  Await call back from patient.

## 2023-10-16 PROBLEM — Z00.00 ENCOUNTER FOR PREVENTIVE HEALTH EXAMINATION: Status: RESOLVED | Noted: 2023-07-17 | Resolved: 2023-10-16

## 2023-10-24 ENCOUNTER — LAB VISIT (OUTPATIENT)
Dept: LAB | Facility: HOSPITAL | Age: 78
End: 2023-10-24
Attending: INTERNAL MEDICINE
Payer: MEDICARE

## 2023-10-24 DIAGNOSIS — Z79.01 CURRENT USE OF LONG TERM ANTICOAGULATION: ICD-10-CM

## 2023-10-24 DIAGNOSIS — I26.99 BILATERAL PULMONARY EMBOLISM: ICD-10-CM

## 2023-10-24 LAB
ALBUMIN SERPL BCP-MCNC: 3.7 G/DL (ref 3.5–5.2)
ALP SERPL-CCNC: 102 U/L (ref 55–135)
ALT SERPL W/O P-5'-P-CCNC: 12 U/L (ref 10–44)
ANION GAP SERPL CALC-SCNC: 10 MMOL/L (ref 8–16)
AST SERPL-CCNC: 14 U/L (ref 10–40)
BASOPHILS # BLD AUTO: 0.04 K/UL (ref 0–0.2)
BASOPHILS NFR BLD: 0.6 % (ref 0–1.9)
BILIRUB SERPL-MCNC: 1 MG/DL (ref 0.1–1)
BUN SERPL-MCNC: 13 MG/DL (ref 8–23)
CALCIUM SERPL-MCNC: 9.5 MG/DL (ref 8.7–10.5)
CHLORIDE SERPL-SCNC: 106 MMOL/L (ref 95–110)
CO2 SERPL-SCNC: 23 MMOL/L (ref 23–29)
CREAT SERPL-MCNC: 1 MG/DL (ref 0.5–1.4)
D DIMER PPP IA.FEU-MCNC: 0.31 MG/L FEU
DIFFERENTIAL METHOD: ABNORMAL
EOSINOPHIL # BLD AUTO: 0.1 K/UL (ref 0–0.5)
EOSINOPHIL NFR BLD: 1.9 % (ref 0–8)
ERYTHROCYTE [DISTWIDTH] IN BLOOD BY AUTOMATED COUNT: 14.7 % (ref 11.5–14.5)
EST. GFR  (NO RACE VARIABLE): 58 ML/MIN/1.73 M^2
GLUCOSE SERPL-MCNC: 92 MG/DL (ref 70–110)
HCT VFR BLD AUTO: 43.9 % (ref 37–48.5)
HGB BLD-MCNC: 14.1 G/DL (ref 12–16)
IMM GRANULOCYTES # BLD AUTO: 0.03 K/UL (ref 0–0.04)
IMM GRANULOCYTES NFR BLD AUTO: 0.4 % (ref 0–0.5)
LYMPHOCYTES # BLD AUTO: 2.6 K/UL (ref 1–4.8)
LYMPHOCYTES NFR BLD: 35.8 % (ref 18–48)
MCH RBC QN AUTO: 30.5 PG (ref 27–31)
MCHC RBC AUTO-ENTMCNC: 32.1 G/DL (ref 32–36)
MCV RBC AUTO: 95 FL (ref 82–98)
MONOCYTES # BLD AUTO: 0.5 K/UL (ref 0.3–1)
MONOCYTES NFR BLD: 6.7 % (ref 4–15)
NEUTROPHILS # BLD AUTO: 4 K/UL (ref 1.8–7.7)
NEUTROPHILS NFR BLD: 54.6 % (ref 38–73)
NRBC BLD-RTO: 0 /100 WBC
PLATELET # BLD AUTO: 109 K/UL (ref 150–450)
PMV BLD AUTO: 12.7 FL (ref 9.2–12.9)
POTASSIUM SERPL-SCNC: 4.5 MMOL/L (ref 3.5–5.1)
PROT SERPL-MCNC: 8.2 G/DL (ref 6–8.4)
RBC # BLD AUTO: 4.62 M/UL (ref 4–5.4)
SODIUM SERPL-SCNC: 139 MMOL/L (ref 136–145)
WBC # BLD AUTO: 7.26 K/UL (ref 3.9–12.7)

## 2023-10-24 PROCEDURE — 85730 THROMBOPLASTIN TIME PARTIAL: CPT | Performed by: INTERNAL MEDICINE

## 2023-10-24 PROCEDURE — 85306 CLOT INHIBIT PROT S FREE: CPT | Performed by: INTERNAL MEDICINE

## 2023-10-24 PROCEDURE — 85379 FIBRIN DEGRADATION QUANT: CPT | Performed by: INTERNAL MEDICINE

## 2023-10-24 PROCEDURE — 85300 ANTITHROMBIN III ACTIVITY: CPT | Performed by: INTERNAL MEDICINE

## 2023-10-24 PROCEDURE — 85613 RUSSELL VIPER VENOM DILUTED: CPT | Performed by: INTERNAL MEDICINE

## 2023-10-24 PROCEDURE — 85613 RUSSELL VIPER VENOM DILUTED: CPT | Mod: 91 | Performed by: INTERNAL MEDICINE

## 2023-10-24 PROCEDURE — 80053 COMPREHEN METABOLIC PANEL: CPT | Performed by: INTERNAL MEDICINE

## 2023-10-24 PROCEDURE — 86147 CARDIOLIPIN ANTIBODY EA IG: CPT | Performed by: INTERNAL MEDICINE

## 2023-10-24 PROCEDURE — 86146 BETA-2 GLYCOPROTEIN ANTIBODY: CPT | Performed by: INTERNAL MEDICINE

## 2023-10-24 PROCEDURE — 85303 CLOT INHIBIT PROT C ACTIVITY: CPT | Performed by: INTERNAL MEDICINE

## 2023-10-24 PROCEDURE — 85025 COMPLETE CBC W/AUTO DIFF WBC: CPT | Performed by: INTERNAL MEDICINE

## 2023-10-24 PROCEDURE — 36415 COLL VENOUS BLD VENIPUNCTURE: CPT | Performed by: INTERNAL MEDICINE

## 2023-10-26 LAB
APTT IMM NP PPP: ABNORMAL SEC (ref 32–48)
APTT P HEP NEUT PPP: ABNORMAL SEC (ref 32–48)
AT III ACT/NOR PPP CHRO: 107 % (ref 83–118)
B2 GLYCOPROT1 IGG SERPL IA-ACNC: <9.4 SGU
B2 GLYCOPROT1 IGM SERPL IA-ACNC: <9.4 SMU
CONFIRM APTT STACLOT: ABNORMAL
DRVVT SCREEN TO CONFIRM RATIO: POSITIVE RATIO
LA 3 SCREEN W REFLEX-IMP: ABNORMAL
LA APTT+DRVVT+PT W REFLEX PPP: ABNORMAL
MIXING DRVVT: 50 SEC (ref 33–44)
PROT C ACT/NOR PPP CHRO: 114 % (ref 70–150)
PROT S ACT/NOR PPP: >150 % (ref 65–150)
PROTHROMBIN TIME: 16.9 SEC (ref 12–15.5)
REPTILASE TIME: ABNORMAL SEC
SCREEN APTT: 44 SEC (ref 32–48)
SCREEN DRVVT: 55 SEC (ref 33–44)
THROMBIN TIME: ABNORMAL SEC (ref 14.7–19.5)

## 2023-10-27 DIAGNOSIS — I10 ESSENTIAL HYPERTENSION: ICD-10-CM

## 2023-10-27 LAB
CARDIOLIPIN IGG SER IA-ACNC: <9.4 GPL (ref 0–14.99)
CARDIOLIPIN IGM SER IA-ACNC: <9.4 MPL (ref 0–12.49)

## 2023-10-27 RX ORDER — METOPROLOL TARTRATE AND HYDROCHLOROTHIAZIDE 100; 25 MG/1; MG/1
TABLET ORAL
Qty: 90 TABLET | Refills: 0 | OUTPATIENT
Start: 2023-10-27

## 2023-10-27 RX ORDER — AMLODIPINE BESYLATE 5 MG/1
TABLET ORAL
Qty: 180 TABLET | Refills: 0 | OUTPATIENT
Start: 2023-10-27

## 2023-10-28 NOTE — TELEPHONE ENCOUNTER
Refill Decision Note   [facility] [place]  [place] a refill authorization.  Brief Assessment and Rationale for Refill:  Quick Discontinue     Medication Therapy Plan: Pharmacy is requesting new scripts for the following medications without required information, (sig/ frequency/qty/etc)     Medication Reconciliation Completed: No   Comments:     No Care Gaps recommended.     Note composed:10:17 PM 10/27/2023

## 2023-10-30 PROBLEM — I26.99 PULMONARY EMBOLISM, BILATERAL: Status: RESOLVED | Noted: 2023-07-05 | Resolved: 2023-10-30

## 2023-10-30 RX ORDER — ATORVASTATIN CALCIUM 40 MG/1
TABLET, FILM COATED ORAL
Qty: 90 TABLET | Refills: 10 | OUTPATIENT
Start: 2023-10-30

## 2023-10-30 NOTE — TELEPHONE ENCOUNTER
No care due was identified.  Health [facility] Embedded Care Due Messages. Reference number: [id number].   10/29/2023 10:34:01 PM CDT

## 2023-10-30 NOTE — TELEPHONE ENCOUNTER
Refill Decision Note   Nathalie Membreno  is requesting a refill authorization.  Brief Assessment and Rationale for Refill:  Quick Discontinue     Medication Therapy Plan:  The original prescription was discontinued on 4/24/2023 by Deanne Dale MD      Comments:     Note composed:4:22 AM 10/30/2023

## 2023-11-02 ENCOUNTER — OFFICE VISIT (OUTPATIENT)
Dept: PRIMARY CARE CLINIC | Facility: CLINIC | Age: 78
End: 2023-11-02
Payer: MEDICARE

## 2023-11-02 ENCOUNTER — TELEPHONE (OUTPATIENT)
Dept: PRIMARY CARE CLINIC | Facility: CLINIC | Age: 78
End: 2023-11-02
Payer: MEDICARE

## 2023-11-02 VITALS
HEART RATE: 71 BPM | HEIGHT: 64 IN | OXYGEN SATURATION: 98 % | TEMPERATURE: 98 F | BODY MASS INDEX: 36.69 KG/M2 | WEIGHT: 214.88 LBS | SYSTOLIC BLOOD PRESSURE: 132 MMHG | DIASTOLIC BLOOD PRESSURE: 64 MMHG

## 2023-11-02 DIAGNOSIS — N18.31 CHRONIC KIDNEY DISEASE, STAGE 3A: Primary | ICD-10-CM

## 2023-11-02 DIAGNOSIS — E78.49 OTHER HYPERLIPIDEMIA: ICD-10-CM

## 2023-11-02 PROCEDURE — 3288F FALL RISK ASSESSMENT DOCD: CPT | Mod: CPTII,S$GLB,, | Performed by: FAMILY MEDICINE

## 2023-11-02 PROCEDURE — 99214 OFFICE O/P EST MOD 30 MIN: CPT | Mod: S$GLB,,, | Performed by: FAMILY MEDICINE

## 2023-11-02 PROCEDURE — 99214 PR OFFICE/OUTPT VISIT, EST, LEVL IV, 30-39 MIN: ICD-10-PCS | Mod: S$GLB,,, | Performed by: FAMILY MEDICINE

## 2023-11-02 PROCEDURE — G0008 FLU VACCINE - QUADRIVALENT - ADJUVANTED: ICD-10-PCS | Mod: S$GLB,,, | Performed by: FAMILY MEDICINE

## 2023-11-02 PROCEDURE — 1159F MED LIST DOCD IN RCRD: CPT | Mod: CPTII,S$GLB,, | Performed by: FAMILY MEDICINE

## 2023-11-02 PROCEDURE — 99999 PR PBB SHADOW E&M-EST. PATIENT-LVL V: ICD-10-PCS | Mod: PBBFAC,,, | Performed by: FAMILY MEDICINE

## 2023-11-02 PROCEDURE — 3078F DIAST BP <80 MM HG: CPT | Mod: CPTII,S$GLB,, | Performed by: FAMILY MEDICINE

## 2023-11-02 PROCEDURE — 3075F PR MOST RECENT SYSTOLIC BLOOD PRESS GE 130-139MM HG: ICD-10-PCS | Mod: CPTII,S$GLB,, | Performed by: FAMILY MEDICINE

## 2023-11-02 PROCEDURE — G0008 ADMIN INFLUENZA VIRUS VAC: HCPCS | Mod: S$GLB,,, | Performed by: FAMILY MEDICINE

## 2023-11-02 PROCEDURE — 1160F RVW MEDS BY RX/DR IN RCRD: CPT | Mod: CPTII,S$GLB,, | Performed by: FAMILY MEDICINE

## 2023-11-02 PROCEDURE — 1101F PT FALLS ASSESS-DOCD LE1/YR: CPT | Mod: CPTII,S$GLB,, | Performed by: FAMILY MEDICINE

## 2023-11-02 PROCEDURE — 1159F PR MEDICATION LIST DOCUMENTED IN MEDICAL RECORD: ICD-10-PCS | Mod: CPTII,S$GLB,, | Performed by: FAMILY MEDICINE

## 2023-11-02 PROCEDURE — 1101F PR PT FALLS ASSESS DOC 0-1 FALLS W/OUT INJ PAST YR: ICD-10-PCS | Mod: CPTII,S$GLB,, | Performed by: FAMILY MEDICINE

## 2023-11-02 PROCEDURE — 1157F ADVNC CARE PLAN IN RCRD: CPT | Mod: CPTII,S$GLB,, | Performed by: FAMILY MEDICINE

## 2023-11-02 PROCEDURE — 99999 PR PBB SHADOW E&M-EST. PATIENT-LVL V: CPT | Mod: PBBFAC,,, | Performed by: FAMILY MEDICINE

## 2023-11-02 PROCEDURE — 1160F PR REVIEW ALL MEDS BY PRESCRIBER/CLIN PHARMACIST DOCUMENTED: ICD-10-PCS | Mod: CPTII,S$GLB,, | Performed by: FAMILY MEDICINE

## 2023-11-02 PROCEDURE — 3075F SYST BP GE 130 - 139MM HG: CPT | Mod: CPTII,S$GLB,, | Performed by: FAMILY MEDICINE

## 2023-11-02 PROCEDURE — 3078F PR MOST RECENT DIASTOLIC BLOOD PRESSURE < 80 MM HG: ICD-10-PCS | Mod: CPTII,S$GLB,, | Performed by: FAMILY MEDICINE

## 2023-11-02 PROCEDURE — 3288F PR FALLS RISK ASSESSMENT DOCUMENTED: ICD-10-PCS | Mod: CPTII,S$GLB,, | Performed by: FAMILY MEDICINE

## 2023-11-02 PROCEDURE — 90694 VACC AIIV4 NO PRSRV 0.5ML IM: CPT | Mod: S$GLB,,, | Performed by: FAMILY MEDICINE

## 2023-11-02 PROCEDURE — 1157F PR ADVANCE CARE PLAN OR EQUIV PRESENT IN MEDICAL RECORD: ICD-10-PCS | Mod: CPTII,S$GLB,, | Performed by: FAMILY MEDICINE

## 2023-11-02 PROCEDURE — 90694 FLU VACCINE - QUADRIVALENT - ADJUVANTED: ICD-10-PCS | Mod: S$GLB,,, | Performed by: FAMILY MEDICINE

## 2023-11-02 NOTE — PROGRESS NOTES
"Subjective:       Patient ID: Nathalie Membreno is a 78 y.o. female.    Chief Complaint: Follow-up (Three month follow up. No issues or concerns. )    HPI:     Here for follow up of medical problems. Saw heme/onc 7/27/23 with thrombophilia work up just done with f/u scheduled. Developed bilat PEs after xarelto held for 4 days off for keloid surgery. She is feeling so much better and has improved energy.   Started on Repatha with good tolerance; has not had repeat lipid yet. Stopped rosuvastatin because felt "fuzzy" after 3 doses. Said she felt awful and is convinced it was the medication.   No f/c.  No BARKER, no CP.  BMs good on miralax.  Referred to behavioral health but she cancelled her appointment.   Has cut back on sweets. Walking for exercise. Chair yoga twice a week.     The 10-year ASCVD risk score (Manolo BROOKS, et al., 2019) is: 17%    Values used to calculate the score:      Age: 78 years      Sex: Female      Is Non- : Yes      Diabetic: No      Tobacco smoker: No      Systolic Blood Pressure: 120 mmHg      Is BP treated: Yes      HDL Cholesterol: 60 mg/dL      Total Cholesterol: 212 mg/dL      Updated/ annual due 1/24:  HM: 10/23 fluvax, 3/21 covid vaccines, 6/19 HAV, 6/15 [id number], 11/16 booster [id number], 1/23 [id number], 9/14 TDaP, 1/23 BMD rep 5y, 3/21 Cscope rep 3y, 1/23 MMG/me, 10/20 Gyn Dr. Holden, 8/22 Eye Dr. Fiore, 11/16 HCV neg, Card Dr. Leach at [facility].    Objective:     Vitals:    11/02/23 1250   BP: 132/64   Pulse: 71   Temp: 97.7 °F (36.5 °C)     Wt Readings from Last 3 Encounters:   11/02/23 97.5 kg (214 lb 14.4 oz)   09/01/23 98.2 kg (216 lb 7.9 oz)   08/01/23 97.8 kg (215 lb 11.2 oz)     Temp Readings from Last 3 Encounters:   11/02/23 97.7 °F (36.5 °C) (Oral)   08/01/23 98.7 °F (37.1 °C) (Oral)   07/27/23 97.4 °F (36.3 °C) (Temporal)     BP Readings from Last 3 Encounters:   11/02/23 132/64   09/01/23 132/62   08/01/23 120/65     Pulse Readings from Last 3 " Encounters:   11/02/23 71   09/01/23 (!) 56   08/01/23 60          Physical Exam  Vitals and nursing note reviewed.   Constitutional:       Appearance: She is well-developed.   HENT:      Head: Normocephalic and atraumatic.   Eyes:      Pupils: Pupils are equal, round, and reactive to light.   Cardiovascular:      Rate and Rhythm: Normal rate and regular rhythm.   Pulmonary:      Effort: Pulmonary effort is normal.      Breath sounds: Normal breath sounds.   Musculoskeletal:         General: No deformity. Normal range of motion.      Cervical back: Normal range of motion and neck supple.   Skin:     General: Skin is warm and dry.   Neurological:      Mental Status: She is alert and oriented to person, place, and time.   Psychiatric:         Behavior: Behavior normal.           Albumin   Date Value Ref Range Status   10/24/2023 3.7 3.5 - 5.2 g/dL Final     eGFR   Date Value Ref Range Status   10/24/2023 58 (A) >60 mL/min/1.73 m^2 Final       Assessment:       1. Chronic kidney disease, stage 3a    2. Other hyperlipidemia        Plan:           Problem List Items Addressed This Visit          Cardiac/Vascular    Other hyperlipidemia    Relevant Orders    Lipid Panel       Renal/    Chronic kidney disease, stage 3a - Primary    Relevant Orders    Microalbumin/creatinine urine ratio     Flu shot given today; 3 month f/u

## 2023-11-02 NOTE — PATIENT INSTRUCTIONS
If you are feeling unwell, we'd like to be the first ones to know here at [facility]! Please give us a call. Same day appointments are our top priority to keep you well and out of the emergency rooms and hospitals. Call [phone] for our direct line. After hours advice is always available. Please call [phone] after hours to speak to the on-call team.       COVID 19 and RSV are both vaccines recommended.

## 2023-11-02 NOTE — PROGRESS NOTES
[facility] -   General Surgery History & Physical    SUBJECTIVE:     History of Present Illness:  Patient is a 78 y.o. female presents with a large keloid of her anterior chest. She states it causes her pain and will sometimes become red and swollen, requiring drainage and antibiotics.    Interval History:  7/7/23 Since the last clinic visit, the patient underwent excision of her chest wall keloid scar followed by 3 courses of radiation to the area.  Her postoperative course has been complicated by return trip to the ED with diagnosis of pneumonia.  She was placed on Levaquin for this.  She subsequently returned to the ED complaining of shortness of breath, at which time she was diagnosed with bilateral pulmonary emboli.  She was on Xarelto preoperatively, which she stopped in preparation for the procedure.  She was able to be treated outpatient with continuation of this medication as she was not hypoxic at the time of the exam in the ED. She is feeling okay today with minimal pain at the surgical site.  Her breathing is improving, and she denies any chest pain or shortness of breath at rest.  She still has some shortness of breath on exertion.  She is taking her Xarelto daily without issue.  She has 2 doses left of Levaquin.  She denies any fevers, chills, nausea, and vomiting.    7/21/23 Doing well. Denies pain or fevers. Has been keeping up with the local wound care.    9/1/23 Doing well. No pain or fevers. States the area is healing well. No longer has the pain that she was having from the keloid.      Review of patient's allergies indicates:   Allergen Reactions    Penicillins Swelling     Generalized swelling  Other reaction(s): Swelling  Generalized swelling  Other reaction(s): Swelling  Generalized swelling       Current Outpatient Medications   Medication Sig Dispense Refill    acetaminophen (TYLENOL) 500 MG tablet Take 500 mg by mouth every 6 (six) hours as needed.      albuterol 90  mcg/actuation inhaler Inhale 2 puffs into the lungs every 4 (four) hours as needed for Wheezing. 1 each 6    amLODIPine (NORVASC) 5 MG tablet Take 1 tablet (5 mg total) by mouth 2 (two) times daily. 180 tablet 3    bacitracin 500 unit/gram ointment Apply topically 2 (two) times daily. 28 g 4    betamethasone dipropionate (DIPROLENE) 0.05 % ointment Apply topically 2 (two) times daily. PRN keloid scar. Strong steroid- do Not use on face. 45 g 0    cholecalciferol, vitamin D3, (VITAMIN D3) 50 mcg (2,000 unit) Cap Take 1 capsule by mouth once daily.      clindamycin phosphate 1% (CLINDAGEL) 1 % gel Apply topically 2 (two) times daily. For acne of chest. 30 g 3    diclofenac sodium (VOLTAREN) 1 % Gel Apply 2 g topically 2 (two) times daily. 60 g 1    digoxin (LANOXIN) 125 mcg tablet TAKE 1 TABLET EVERY DAY 90 tablet 2    ergocalciferol (ERGOCALCIFEROL) 50,000 unit Cap Take 50,000 Units by mouth every 7 days.      evolocumab (REPATHA SURECLICK) 140 mg/mL PnIj Inject 1 mL (140 mg total) into the skin every 14 (fourteen) days. For high cholesterol 6 mL 3    flurandrenolide 4 mcg/cm2 Tape Apply to fit area involved. Wear 12 hours/day then remove. Reapply next day. 1 each 3    fluticasone propionate (FLONASE) 50 mcg/actuation nasal spray 2 sprays (100 mcg total) by Each Nostril route once daily. 16 g 6    HYDROcodone-acetaminophen (NORCO) 5-325 mg per tablet Take 1 tablet by mouth every 4 to 6 hours as needed for Pain. 25 tablet 0    hydrOXYzine pamoate (VISTARIL) 25 MG Cap Take 25 mg by mouth.      losartan (COZAAR) 100 MG tablet Take 1 tablet (100 mg total) by mouth once daily. 90 tablet 1    metoprolol succinate (TOPROL-XL) 100 MG 24 hr tablet Take 1 tablet (100 mg total) by mouth once daily. 90 tablet 3    mupirocin (BACTROBAN) 2 % ointment Apply topically 3 (three) times daily. For broken skin. 22 g 1    polyethylene glycol (GLYCOLAX) 17 gram PwPk Take by mouth once daily.      rivaroxaban (XARELTO) 20 mg Tab TAKE 1  "TABLET BY MOUTH EVERY DAY WITH DINNER      rOPINIRole (REQUIP) 1 MG tablet       betamethasone valerate 0.1% (VALISONE) 0.1 % Oint Apply topically 2 (two) times daily. 15 g 1    nystatin-triamcinolone (MYCOLOG II) cream Apply to affected area 2 times daily 30 g 1     No current facility-administered medications for this visit.       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 Nuno    CARPAL TUNNEL RELEASE      b/l    CATARACT EXTRACTION EXTRACAPSULAR W/ INTRAOCULAR LENS IMPLANTATION Bilateral 2022    COLONOSCOPY N/A 03/31/2021    Procedure: COLONOSCOPY;  Surgeon: Fozia Dasilva MD;  Location: [facility] ENDO;  Service: Endoscopy;  Laterality: N/A;    DILATION AND CURETTAGE OF UTERUS      10/2021, no cancer per pt.    FOOT SURGERY Right 05/02/2013    wart removal    KELOID EXCISION N/A 6/21/2023    Procedure: EXCISION, KELOID;  Surgeon: Justine Youngblood DO;  Location: [facility] OR;  Service: General;  Laterality: N/A;    TRIGGER FINGER RELEASE Right     TUBAL LIGATION       Family History   Problem Relation Age of Onset    Hypertension Mother     Hypertension Father     Cancer Father         prostate    Cataracts Father     Glaucoma Father     Diabetes Father     Hypertension Sister     Diabetes Sister     Breast " cancer Neg Hx     Colon cancer Neg Hx     Ovarian cancer Neg Hx     Thrombophilia Neg Hx      Social History     Tobacco Use    Smoking status: Never    Smokeless tobacco: Never   Substance Use Topics    Alcohol use: No     Alcohol/week: 0.0 standard drinks of alcohol    Drug use: No        Review of Systems:  Review of Systems   Constitutional:  Negative for chills and fever.   Gastrointestinal:  Negative for nausea and vomiting.       OBJECTIVE:     Vital Signs (Most Recent)  Pulse: (!) 56 (09/01/23 1053)  BP: 132/62 (09/01/23 1053)     98.2 kg (216 lb 7.9 oz)     Physical Exam:  Physical Exam  Vitals and nursing note reviewed.   Constitutional:       General: She is not in acute distress.  HENT:      Head: Normocephalic and atraumatic.      Nose: Nose normal.   Eyes:      General: No scleral icterus.        Right eye: No discharge.         Left eye: No discharge.      Extraocular Movements: Extraocular movements intact.   Cardiovascular:      Rate and Rhythm: Bradycardia present.   Pulmonary:      Effort: No respiratory distress.      Breath sounds: No rales.   Abdominal:      Palpations: Abdomen is soft.      Tenderness: There is no abdominal tenderness.   Musculoskeletal:      Cervical back: No rigidity.   Skin:     General: Skin is warm.      Coloration: Skin is not jaundiced.      Comments: Chest incision without erythema, induration, or drainage. Healing well and is quite nearly complete. No longer requires wound care.   Neurological:      Mental Status: She is alert and oriented to person, place, and time.   Psychiatric:         Mood and Affect: Mood normal.         Behavior: Behavior normal.         Laboratory  WBC   Date Value Ref Range Status   10/24/2023 7.26 3.90 - 12.70 K/uL Final     Hemoglobin   Date Value Ref Range Status   10/24/2023 14.1 12.0 - 16.0 g/dL Final     Hematocrit   Date Value Ref Range Status   10/24/2023 43.9 37.0 - 48.5 % Final     Platelets   Date Value Ref Range Status  "  10/24/2023 109 (L) 150 - 450 K/uL Final     Sodium   Date Value Ref Range Status   10/24/2023 139 136 - 145 mmol/L Final     Potassium   Date Value Ref Range Status   10/24/2023 4.5 3.5 - 5.1 mmol/L Final     Creatinine   Date Value Ref Range Status   10/24/2023 1.0 0.5 - 1.4 mg/dL Final     Alkaline Phosphatase   Date Value Ref Range Status   10/24/2023 102 55 - 135 U/L Final     AST   Date Value Ref Range Status   10/24/2023 14 10 - 40 U/L Final     ALT   Date Value Ref Range Status   10/24/2023 12 10 - 44 U/L Final      No results found for: "HGBA1C"    Diagnostic Results:  Result:  Mammo Digital Screening Bilat w/ Akil     History:  Patient is 77 y.o. and is seen for a screening mammogram.        Films Compared:  Compared to: 01/07/2022 Mammo Digital Screening Bilat w/ Akil and 02/28/2020 Mammo Digital Screening Bilat      Findings:   This procedure was performed using tomosynthesis.   Computer-aided detection was utilized in the interpretation of this examination.     The breasts have scattered areas of fibroglandular density. There is no evidence of suspicious masses, microcalcifications or architectural distortion.     Impression:   No mammographic evidence of malignancy.     BI-RADS Category 1: Negative     Recommendation:  Routine screening mammogram in 1 year is recommended.    Pathology:  Final Pathologic Diagnosis Skin, chest wall, excision:   -KELOID SCAR     This lesion is benign       ASSESSMENT/PLAN:     77-year-old female with keloid of the anterior chest wall and now status post excision with postoperative radiation.  Postop course complicated by both pneumonia and bilateral pulmonary emboli.    - Incision healing well. No longer requiring wound care.  - Apply silicone scar sheets to the healed portion of the incision. Wear sunscreen when outside.  - Return prn    Justine Youngblood, DO  General Surgery  [facility]        "

## 2023-11-06 RX ORDER — LOSARTAN POTASSIUM 100 MG/1
100 TABLET ORAL DAILY
Qty: 90 TABLET | Refills: 2 | Status: SHIPPED | OUTPATIENT
Start: 2023-11-06

## 2023-11-06 NOTE — TELEPHONE ENCOUNTER
Refill Decision Note   Nathalie Temi  is requesting a refill authorization.  Brief Assessment and Rationale for Refill:  Approve     Medication Therapy Plan:         Comments:   Comments:     No Care Gaps recommended.     Note composed:5:31 PM 11/06/2023

## 2023-11-06 NOTE — TELEPHONE ENCOUNTER
No care due was identified.  Health [facility] Embedded Care Due Messages. Reference number: [id number].   11/06/2023 11:08:58 AM CST

## 2023-11-07 ENCOUNTER — LAB VISIT (OUTPATIENT)
Dept: LAB | Facility: HOSPITAL | Age: 78
End: 2023-11-07
Attending: FAMILY MEDICINE
Payer: MEDICARE

## 2023-11-07 DIAGNOSIS — E78.49 OTHER HYPERLIPIDEMIA: ICD-10-CM

## 2023-11-07 LAB
CHOLEST SERPL-MCNC: 197 MG/DL (ref 120–199)
CHOLEST/HDLC SERPL: 3.2 {RATIO} (ref 2–5)
HDLC SERPL-MCNC: 61 MG/DL (ref 40–75)
HDLC SERPL: 31 % (ref 20–50)
LDLC SERPL CALC-MCNC: 115 MG/DL (ref 63–159)
NONHDLC SERPL-MCNC: 136 MG/DL
TRIGL SERPL-MCNC: 105 MG/DL (ref 30–150)

## 2023-11-07 PROCEDURE — 36415 COLL VENOUS BLD VENIPUNCTURE: CPT | Performed by: FAMILY MEDICINE

## 2023-11-07 PROCEDURE — 80061 LIPID PANEL: CPT | Performed by: FAMILY MEDICINE

## 2024-01-24 NOTE — PROGRESS NOTES
"Subjective:      Patient ID: Nathalie Membreno is a 78 y.o. female.    Chief Complaint: Follow-up (3 month f/u)      HPI  Here for f/u medical problems and preventive exam.  Walking 15min, 3x per week, and active daily.  Energy good.  No f/c/sw/cough.  No cp/sob/palp.  BMs normal on miralax, no b/b.  Urine normal.  Depression doing well on rx.  On Repatha x 2mo.  Hasn't needed albuterol in months.  REquip working well for RLS.            6/23 ECHO:  Summary    Concentric remodeling and normal systolic function.  The estimated ejection fraction is 60%.  Grade I left ventricular diastolic dysfunction.  Normal right ventricular size with normal right ventricular systolic function.  Mild tricuspid regurgitation.  There is mild pulmonary hypertension.         HM: 11/23 fluvax, 11/23 RSV, 3/21 covid vaccines, 6/19 HAV, 6/15 [id number], 11/16 booster [id number], 1/23 [id number], 9/14 TDaP, 1/23 BMD rep 5y, 3/21 Cscope rep 3y, 1/23 MMG/no more, 10/20 Gyn Dr. Holden, 3/24 sched Eye Dr. Fiore, 11/16 HCV neg, Card Dr. Leach at [facility].     Review of Systems   Constitutional:  Negative for appetite change, chills, diaphoresis and fever.   HENT:  Negative for congestion, ear pain, rhinorrhea, sinus pressure and sore throat.    Respiratory:  Negative for cough, chest tightness and shortness of breath.    Cardiovascular:  Negative for chest pain and palpitations.   Gastrointestinal:  Negative for blood in stool, constipation, diarrhea, nausea and vomiting.   Genitourinary:  Negative for dysuria, frequency, hematuria, menstrual problem, urgency and vaginal discharge.   Musculoskeletal:  Negative for arthralgias.   Skin:  Negative for rash.   Neurological:  Negative for dizziness and headaches.   Psychiatric/Behavioral:  Negative for sleep disturbance. The patient is not nervous/anxious.          Objective:   /78 (BP Location: Right arm, Patient Position: Sitting)   Pulse 60   Temp 98.1 °F (36.7 °C) (Oral)   Ht 5' 4" " (1.626 m)   Wt 98.6 kg (217 lb 6 oz)   SpO2 100%   BMI 37.31 kg/m²     Physical Exam  Constitutional:       Appearance: She is well-developed.   HENT:      Right Ear: External ear normal. Tympanic membrane is not injected.      Left Ear: External ear normal. Tympanic membrane is not injected.   Eyes:      Conjunctiva/sclera: Conjunctivae normal.   Neck:      Thyroid: No thyromegaly.   Cardiovascular:      Rate and Rhythm: Normal rate and regular rhythm.      Heart sounds: No murmur heard.     No friction rub. No gallop.   Pulmonary:      Effort: Pulmonary effort is normal.      Breath sounds: Normal breath sounds. No wheezing or rales.   Abdominal:      General: Bowel sounds are normal.      Palpations: Abdomen is soft. There is no mass.      Tenderness: There is no abdominal tenderness.   Musculoskeletal:      Cervical back: Normal range of motion and neck supple.   Lymphadenopathy:      Cervical: No cervical adenopathy.   Skin:     General: Skin is warm.      Findings: No rash.   Neurological:      Mental Status: She is alert and oriented to person, place, and time.             Assessment:       1. Essential hypertension    2. Depression, recurrent    3. Other hyperlipidemia    4. Severe obesity with body mass index (BMI) of 35.0 to 39.9 with comorbidity    5. Atrial flutter with rapid ventricular response    6. Chronic anticoagulation    7. Tortuous aorta    8. Slow transit constipation    9. Chronic kidney disease, stage 3a    10. History of TIA (transient ischemic attack)    11. Chronic diastolic heart failure    12. Vitamin D deficiency    13. Presence of cardiac defibrillator    14. Mild intermittent asthma without complication    15. Encounter for preventive health examination          Plan:     Essential hypertension- good control, cont rxs.  -     amLODIPine (NORVASC) 5 MG tablet; Take 1 tablet (5 mg total) by mouth 2 (two) times daily.  Dispense: 180 tablet; Refill: 3    Depression, recurrent- now  doing well off rx.    Tortuous aorta, Other hyperlipidemia- now on repatha, check lab.  -     CBC Auto Differential; Future; Expected date: 02/02/2024  -     Comprehensive Metabolic Panel; Future; Expected date: 02/02/2024  -     Lipid Panel; Future; Expected date: 02/02/2024  -     TSH; Future; Expected date: 02/02/2024    Severe obesity with body mass index (BMI) of 35.0 to 39.9 with comorbidity- working on diet and more activity.    Atrial flutter with rapid ventricular response, Chronic anticoagulation, History of TIA (transient ischemic attack), Chronic diastolic heart failure, Presence of cardiac defibrillator- utd with outside Cards.  -     DIGOXIN LEVEL; Future; Expected date: 02/02/2024    Slow transit constipation- doing well on miralax.    Chronic kidney disease, stage 3a    Vitamin D deficiency- cont supp.    Mild intermittent asthma without complication- doing well, no alb lately.    Gastroesophageal reflux disease without esophagitis    Encounter for preventive health examination- utd.    RTC  3mo.

## 2024-01-26 RX ORDER — METOPROLOL SUCCINATE 100 MG/1
100 TABLET, EXTENDED RELEASE ORAL
Qty: 90 TABLET | Refills: 3 | Status: SHIPPED | OUTPATIENT
Start: 2024-01-26

## 2024-02-02 ENCOUNTER — LAB VISIT (OUTPATIENT)
Dept: LAB | Facility: HOSPITAL | Age: 79
End: 2024-02-02
Attending: INTERNAL MEDICINE
Payer: MEDICARE

## 2024-02-02 ENCOUNTER — OFFICE VISIT (OUTPATIENT)
Dept: PRIMARY CARE CLINIC | Facility: CLINIC | Age: 79
End: 2024-02-02
Payer: MEDICARE

## 2024-02-02 VITALS
OXYGEN SATURATION: 100 % | WEIGHT: 217.38 LBS | DIASTOLIC BLOOD PRESSURE: 78 MMHG | BODY MASS INDEX: 37.11 KG/M2 | TEMPERATURE: 98 F | SYSTOLIC BLOOD PRESSURE: 128 MMHG | HEART RATE: 60 BPM | HEIGHT: 64 IN

## 2024-02-02 DIAGNOSIS — I10 ESSENTIAL HYPERTENSION: Primary | ICD-10-CM

## 2024-02-02 DIAGNOSIS — Z00.00 ENCOUNTER FOR PREVENTIVE HEALTH EXAMINATION: ICD-10-CM

## 2024-02-02 DIAGNOSIS — F33.9 DEPRESSION, RECURRENT: ICD-10-CM

## 2024-02-02 DIAGNOSIS — Z86.73 HISTORY OF TIA (TRANSIENT ISCHEMIC ATTACK): ICD-10-CM

## 2024-02-02 DIAGNOSIS — E78.49 OTHER HYPERLIPIDEMIA: ICD-10-CM

## 2024-02-02 DIAGNOSIS — N18.31 CHRONIC KIDNEY DISEASE, STAGE 3A: ICD-10-CM

## 2024-02-02 DIAGNOSIS — Z79.01 CHRONIC ANTICOAGULATION: ICD-10-CM

## 2024-02-02 DIAGNOSIS — I48.92 ATRIAL FLUTTER WITH RAPID VENTRICULAR RESPONSE: ICD-10-CM

## 2024-02-02 DIAGNOSIS — Z95.810 PRESENCE OF CARDIAC DEFIBRILLATOR: ICD-10-CM

## 2024-02-02 DIAGNOSIS — E55.9 VITAMIN D DEFICIENCY: ICD-10-CM

## 2024-02-02 DIAGNOSIS — K59.01 SLOW TRANSIT CONSTIPATION: ICD-10-CM

## 2024-02-02 DIAGNOSIS — I50.32 CHRONIC DIASTOLIC HEART FAILURE: ICD-10-CM

## 2024-02-02 DIAGNOSIS — E66.01 SEVERE OBESITY WITH BODY MASS INDEX (BMI) OF 35.0 TO 39.9 WITH COMORBIDITY: ICD-10-CM

## 2024-02-02 DIAGNOSIS — J45.20 MILD INTERMITTENT ASTHMA WITHOUT COMPLICATION: ICD-10-CM

## 2024-02-02 DIAGNOSIS — I77.1 TORTUOUS AORTA: ICD-10-CM

## 2024-02-02 PROBLEM — E78.00 PURE HYPERCHOLESTEROLEMIA: Status: RESOLVED | Noted: 2018-07-29 | Resolved: 2024-02-02

## 2024-02-02 LAB
ALBUMIN SERPL BCP-MCNC: 3.6 G/DL (ref 3.5–5.2)
ALP SERPL-CCNC: 88 U/L (ref 55–135)
ALT SERPL W/O P-5'-P-CCNC: 13 U/L (ref 10–44)
ANION GAP SERPL CALC-SCNC: 10 MMOL/L (ref 8–16)
AST SERPL-CCNC: 15 U/L (ref 10–40)
BASOPHILS # BLD AUTO: 0.04 K/UL (ref 0–0.2)
BASOPHILS NFR BLD: 0.7 % (ref 0–1.9)
BILIRUB SERPL-MCNC: 0.6 MG/DL (ref 0.1–1)
BUN SERPL-MCNC: 13 MG/DL (ref 8–23)
CALCIUM SERPL-MCNC: 10 MG/DL (ref 8.7–10.5)
CHLORIDE SERPL-SCNC: 105 MMOL/L (ref 95–110)
CHOLEST SERPL-MCNC: 163 MG/DL (ref 120–199)
CHOLEST/HDLC SERPL: 2.5 {RATIO} (ref 2–5)
CO2 SERPL-SCNC: 25 MMOL/L (ref 23–29)
CREAT SERPL-MCNC: 1 MG/DL (ref 0.5–1.4)
DIFFERENTIAL METHOD BLD: ABNORMAL
DIGOXIN SERPL-MCNC: 0.6 NG/ML (ref 0.8–2)
EOSINOPHIL # BLD AUTO: 0.2 K/UL (ref 0–0.5)
EOSINOPHIL NFR BLD: 2.7 % (ref 0–8)
ERYTHROCYTE [DISTWIDTH] IN BLOOD BY AUTOMATED COUNT: 14.6 % (ref 11.5–14.5)
EST. GFR  (NO RACE VARIABLE): 58 ML/MIN/1.73 M^2
GLUCOSE SERPL-MCNC: 84 MG/DL (ref 70–110)
HCT VFR BLD AUTO: 47.3 % (ref 37–48.5)
HDLC SERPL-MCNC: 65 MG/DL (ref 40–75)
HDLC SERPL: 39.9 % (ref 20–50)
HGB BLD-MCNC: 14.5 G/DL (ref 12–16)
IMM GRANULOCYTES # BLD AUTO: 0.01 K/UL (ref 0–0.04)
IMM GRANULOCYTES NFR BLD AUTO: 0.2 % (ref 0–0.5)
LDLC SERPL CALC-MCNC: 82 MG/DL (ref 63–159)
LYMPHOCYTES # BLD AUTO: 2.2 K/UL (ref 1–4.8)
LYMPHOCYTES NFR BLD: 36.5 % (ref 18–48)
MCH RBC QN AUTO: 30.1 PG (ref 27–31)
MCHC RBC AUTO-ENTMCNC: 30.7 G/DL (ref 32–36)
MCV RBC AUTO: 98 FL (ref 82–98)
MONOCYTES # BLD AUTO: 0.5 K/UL (ref 0.3–1)
MONOCYTES NFR BLD: 8.4 % (ref 4–15)
NEUTROPHILS # BLD AUTO: 3.1 K/UL (ref 1.8–7.7)
NEUTROPHILS NFR BLD: 51.5 % (ref 38–73)
NONHDLC SERPL-MCNC: 98 MG/DL
NRBC BLD-RTO: 0 /100 WBC
PLATELET # BLD AUTO: 210 K/UL (ref 150–450)
PMV BLD AUTO: 13.5 FL (ref 9.2–12.9)
POTASSIUM SERPL-SCNC: 4.9 MMOL/L (ref 3.5–5.1)
PROT SERPL-MCNC: 7.8 G/DL (ref 6–8.4)
RBC # BLD AUTO: 4.81 M/UL (ref 4–5.4)
SODIUM SERPL-SCNC: 140 MMOL/L (ref 136–145)
TRIGL SERPL-MCNC: 80 MG/DL (ref 30–150)
TSH SERPL DL<=0.005 MIU/L-ACNC: 1.04 UIU/ML (ref 0.4–4)
WBC # BLD AUTO: 5.95 K/UL (ref 3.9–12.7)

## 2024-02-02 PROCEDURE — 3074F SYST BP LT 130 MM HG: CPT | Mod: CPTII,S$GLB,, | Performed by: INTERNAL MEDICINE

## 2024-02-02 PROCEDURE — 99999 PR PBB SHADOW E&M-EST. PATIENT-LVL III: CPT | Mod: PBBFAC,,, | Performed by: INTERNAL MEDICINE

## 2024-02-02 PROCEDURE — 1126F AMNT PAIN NOTED NONE PRSNT: CPT | Mod: CPTII,S$GLB,, | Performed by: INTERNAL MEDICINE

## 2024-02-02 PROCEDURE — 3078F DIAST BP <80 MM HG: CPT | Mod: CPTII,S$GLB,, | Performed by: INTERNAL MEDICINE

## 2024-02-02 PROCEDURE — 80053 COMPREHEN METABOLIC PANEL: CPT | Performed by: INTERNAL MEDICINE

## 2024-02-02 PROCEDURE — 80162 ASSAY OF DIGOXIN TOTAL: CPT | Performed by: INTERNAL MEDICINE

## 2024-02-02 PROCEDURE — 3288F FALL RISK ASSESSMENT DOCD: CPT | Mod: CPTII,S$GLB,, | Performed by: INTERNAL MEDICINE

## 2024-02-02 PROCEDURE — 80061 LIPID PANEL: CPT | Performed by: INTERNAL MEDICINE

## 2024-02-02 PROCEDURE — 36415 COLL VENOUS BLD VENIPUNCTURE: CPT | Mod: PO | Performed by: INTERNAL MEDICINE

## 2024-02-02 PROCEDURE — 1101F PT FALLS ASSESS-DOCD LE1/YR: CPT | Mod: CPTII,S$GLB,, | Performed by: INTERNAL MEDICINE

## 2024-02-02 PROCEDURE — 99214 OFFICE O/P EST MOD 30 MIN: CPT | Mod: S$GLB,,, | Performed by: INTERNAL MEDICINE

## 2024-02-02 PROCEDURE — 85025 COMPLETE CBC W/AUTO DIFF WBC: CPT | Performed by: INTERNAL MEDICINE

## 2024-02-02 PROCEDURE — 84443 ASSAY THYROID STIM HORMONE: CPT | Performed by: INTERNAL MEDICINE

## 2024-02-02 RX ORDER — AMLODIPINE BESYLATE 5 MG/1
5 TABLET ORAL 2 TIMES DAILY
Qty: 180 TABLET | Refills: 3 | Status: SHIPPED | OUTPATIENT
Start: 2024-02-02 | End: 2024-02-21

## 2024-02-07 ENCOUNTER — PATIENT MESSAGE (OUTPATIENT)
Dept: PRIMARY CARE CLINIC | Facility: CLINIC | Age: 79
End: 2024-02-07
Payer: MEDICARE

## 2024-02-21 DIAGNOSIS — I10 ESSENTIAL HYPERTENSION: ICD-10-CM

## 2024-02-21 RX ORDER — AMLODIPINE BESYLATE 5 MG/1
5 TABLET ORAL 2 TIMES DAILY
Qty: 180 TABLET | Refills: 3 | Status: SHIPPED | OUTPATIENT
Start: 2024-02-21

## 2024-03-26 ENCOUNTER — OFFICE VISIT (OUTPATIENT)
Dept: OPHTHALMOLOGY | Facility: CLINIC | Age: 79
End: 2024-03-26
Payer: MEDICARE

## 2024-03-26 DIAGNOSIS — H40.013 OPEN ANGLE WITH BORDERLINE FINDINGS OF BOTH EYES: Primary | ICD-10-CM

## 2024-03-26 PROCEDURE — 1159F MED LIST DOCD IN RCRD: CPT | Mod: CPTII,S$GLB,, | Performed by: OPTOMETRIST

## 2024-03-26 PROCEDURE — 92083 EXTENDED VISUAL FIELD XM: CPT | Mod: S$GLB,,, | Performed by: OPTOMETRIST

## 2024-03-26 PROCEDURE — 92012 INTRM OPH EXAM EST PATIENT: CPT | Mod: S$GLB,,, | Performed by: OPTOMETRIST

## 2024-03-26 PROCEDURE — 99999 PR PBB SHADOW E&M-EST. PATIENT-LVL III: CPT | Mod: PBBFAC,,, | Performed by: OPTOMETRIST

## 2024-03-26 PROCEDURE — 1160F RVW MEDS BY RX/DR IN RCRD: CPT | Mod: CPTII,S$GLB,, | Performed by: OPTOMETRIST

## 2024-03-26 PROCEDURE — 1126F AMNT PAIN NOTED NONE PRSNT: CPT | Mod: CPTII,S$GLB,, | Performed by: OPTOMETRIST

## 2024-03-26 PROCEDURE — 76514 ECHO EXAM OF EYE THICKNESS: CPT | Mod: S$GLB,,, | Performed by: OPTOMETRIST

## 2024-03-26 NOTE — PROGRESS NOTES
HPI     Glaucoma Suspect            Comments: Medication eye drops: none  Last HVF: 3/26/24  Last gOCT: 3/26/24  Last SDP: none                  Last edited by Radha Flores MA on 3/26/2024 10:12 AM.            Assessment /Plan     For exam results, see Encounter Report.    Open angle with borderline findings of both eyes  -     Hutson Visual Field - OU - Extended - Both Eyes    Normal IOP today OD, OS  Stable gOCT compared to 2022 baseline scan  VF OS today shows nasal defect but does not correspond with intact GCL OS  No OAG defects OD  No evidence of glaucoma at this time but based on risk factors recommend to continue monitoring.   Will consider yearly checks if everything stable at next visit  Monitor 6 months      RTC 6 months for dilated exam and gOCT or PRN  Discussed above and all questions were answered.

## 2024-05-02 ENCOUNTER — TELEPHONE (OUTPATIENT)
Dept: PRIMARY CARE CLINIC | Facility: CLINIC | Age: 79
End: 2024-05-02
Payer: MEDICARE

## 2024-05-09 ENCOUNTER — OFFICE VISIT (OUTPATIENT)
Dept: PRIMARY CARE CLINIC | Facility: CLINIC | Age: 79
End: 2024-05-09
Payer: MEDICARE

## 2024-05-09 VITALS
TEMPERATURE: 98 F | WEIGHT: 218.38 LBS | BODY MASS INDEX: 37.48 KG/M2 | OXYGEN SATURATION: 98 % | DIASTOLIC BLOOD PRESSURE: 68 MMHG | SYSTOLIC BLOOD PRESSURE: 122 MMHG | HEART RATE: 59 BPM

## 2024-05-09 DIAGNOSIS — I48.92 ATRIAL FLUTTER WITH RAPID VENTRICULAR RESPONSE: ICD-10-CM

## 2024-05-09 DIAGNOSIS — Z79.01 CHRONIC ANTICOAGULATION: ICD-10-CM

## 2024-05-09 DIAGNOSIS — J45.20 MILD INTERMITTENT ASTHMA WITHOUT COMPLICATION: ICD-10-CM

## 2024-05-09 DIAGNOSIS — I50.32 CHRONIC DIASTOLIC HEART FAILURE: ICD-10-CM

## 2024-05-09 DIAGNOSIS — Z12.11 SCREEN FOR COLON CANCER: ICD-10-CM

## 2024-05-09 DIAGNOSIS — E78.49 OTHER HYPERLIPIDEMIA: ICD-10-CM

## 2024-05-09 DIAGNOSIS — E66.01 SEVERE OBESITY WITH BODY MASS INDEX (BMI) OF 35.0 TO 39.9 WITH COMORBIDITY: ICD-10-CM

## 2024-05-09 DIAGNOSIS — I10 ESSENTIAL HYPERTENSION: Primary | ICD-10-CM

## 2024-05-09 DIAGNOSIS — N18.31 CHRONIC KIDNEY DISEASE, STAGE 3A: ICD-10-CM

## 2024-05-09 PROCEDURE — 3078F DIAST BP <80 MM HG: CPT | Mod: CPTII,S$GLB,, | Performed by: INTERNAL MEDICINE

## 2024-05-09 PROCEDURE — 99999 PR PBB SHADOW E&M-EST. PATIENT-LVL IV: CPT | Mod: PBBFAC,,, | Performed by: INTERNAL MEDICINE

## 2024-05-09 PROCEDURE — 3288F FALL RISK ASSESSMENT DOCD: CPT | Mod: CPTII,S$GLB,, | Performed by: INTERNAL MEDICINE

## 2024-05-09 PROCEDURE — 1126F AMNT PAIN NOTED NONE PRSNT: CPT | Mod: CPTII,S$GLB,, | Performed by: INTERNAL MEDICINE

## 2024-05-09 PROCEDURE — 99214 OFFICE O/P EST MOD 30 MIN: CPT | Mod: S$GLB,,, | Performed by: INTERNAL MEDICINE

## 2024-05-09 PROCEDURE — 1101F PT FALLS ASSESS-DOCD LE1/YR: CPT | Mod: CPTII,S$GLB,, | Performed by: INTERNAL MEDICINE

## 2024-05-09 PROCEDURE — 1159F MED LIST DOCD IN RCRD: CPT | Mod: CPTII,S$GLB,, | Performed by: INTERNAL MEDICINE

## 2024-05-09 PROCEDURE — 3074F SYST BP LT 130 MM HG: CPT | Mod: CPTII,S$GLB,, | Performed by: INTERNAL MEDICINE

## 2024-05-09 NOTE — PROGRESS NOTES
Subjective:      Patient ID: Nathalie Membreno is a 78 y.o. female.    Chief Complaint: Follow-up (3 month follow up)      HPI  Here for follow up of medical problems.  No albuterol lately.  Energy good.  BMs normal.  Walking 45min, 3d a week.  No exertional cp/sob/palp.  Denies depression.  No reflux.    Updated/ annual due 2/25:  HM: 11/23 fluvax, 11/23 RSV, 3/21 covid vaccines, 6/19 HAV, 6/15 [id number], 11/16 booster [id number], 1/23 [id number], 9/14 Tdap, 1/23 BMD rep 5y, 3/21 Cscope rep 3y, 1/23 MMG/no more, 10/20 Gyn Dr. Holden, 3/24 Eye Dr. Fiore, 11/16 HCV neg, Card Dr. Leach at [facility].  Addendum: 6/24 Cologuard positive.     Review of Systems   Constitutional:  Negative for chills, diaphoresis and fever.   Respiratory:  Negative for cough and shortness of breath.    Cardiovascular:  Negative for chest pain, palpitations and leg swelling.   Gastrointestinal:  Negative for blood in stool, constipation, diarrhea, nausea and vomiting.   Genitourinary:  Negative for dysuria, frequency and hematuria.   Psychiatric/Behavioral:  The patient is not nervous/anxious.          Objective:   /68 (BP Location: Right arm, Patient Position: Sitting)   Pulse (!) 59   Temp 98.1 °F (36.7 °C) (Oral)   Wt 99 kg (218 lb 5.9 oz)   SpO2 98%   BMI 37.48 kg/m²     Physical Exam  Constitutional:       Appearance: She is well-developed.   Neck:      Thyroid: No thyroid mass.      Vascular: No carotid bruit.   Cardiovascular:      Rate and Rhythm: Normal rate and regular rhythm.      Heart sounds: No murmur heard.     No friction rub. No gallop.   Pulmonary:      Effort: Pulmonary effort is normal.      Breath sounds: Normal breath sounds. No wheezing or rales.   Abdominal:      General: Bowel sounds are normal.      Palpations: Abdomen is soft. There is no mass.      Tenderness: There is no abdominal tenderness.   Musculoskeletal:      Cervical back: Neck supple.   Lymphadenopathy:      Cervical: No cervical  adenopathy.   Neurological:      Mental Status: She is alert and oriented to person, place, and time.            Latest Reference Range & Units 02/02/24 10:25   WBC 3.90 - 12.70 K/uL 5.95   RBC 4.00 - 5.40 M/uL 4.81   Hemoglobin 12.0 - 16.0 g/dL 14.5   Hematocrit 37.0 - 48.5 % 47.3   MCV 82 - 98 fL 98   MCH 27.0 - 31.0 pg 30.1   MCHC 32.0 - 36.0 g/dL 30.7 (L)   RDW 11.5 - 14.5 % 14.6 (H)   Platelet Count 150 - 450 K/uL 210   MPV 9.2 - 12.9 fL 13.5 (H)   Gran % 38.0 - 73.0 % 51.5   Lymph % 18.0 - 48.0 % 36.5   Mono % 4.0 - 15.0 % 8.4   Eos % 0.0 - 8.0 % 2.7   Basophil % 0.0 - 1.9 % 0.7   Immature Granulocytes 0.0 - 0.5 % 0.2   Gran # (ANC) 1.8 - 7.7 K/uL 3.1   Lymph # 1.0 - 4.8 K/uL 2.2   Mono # 0.3 - 1.0 K/uL 0.5   Eos # 0.0 - 0.5 K/uL 0.2   Baso # 0.00 - 0.20 K/uL 0.04   Immature Grans (Abs) 0.00 - 0.04 K/uL 0.01   nRBC 0 /100 WBC 0   Differential Method  Automated   Sodium 136 - 145 mmol/L 140   Potassium 3.5 - 5.1 mmol/L 4.9   Chloride 95 - 110 mmol/L 105   CO2 23 - 29 mmol/L 25   Anion Gap 8 - 16 mmol/L 10   BUN 8 - 23 mg/dL 13   Creatinine 0.5 - 1.4 mg/dL 1.0   eGFR >60 mL/min/1.73 m^2 58.0 !   Glucose 70 - 110 mg/dL 84   Calcium 8.7 - 10.5 mg/dL 10.0   ALP 55 - 135 U/L 88   PROTEIN TOTAL 6.0 - 8.4 g/dL 7.8   Albumin 3.5 - 5.2 g/dL 3.6   BILIRUBIN TOTAL 0.1 - 1.0 mg/dL 0.6   AST 10 - 40 U/L 15   ALT 10 - 44 U/L 13   Cholesterol Total 120 - 199 mg/dL 163   HDL 40 - 75 mg/dL 65   HDL/Cholesterol Ratio 20.0 - 50.0 % 39.9   Non-HDL Cholesterol mg/dL 98   Total Cholesterol/HDL Ratio 2.0 - 5.0  2.5   Triglycerides 30 - 150 mg/dL 80   LDL Cholesterol 63.0 - 159.0 mg/dL 82.0     Assessment:       1. Essential hypertension    2. Other hyperlipidemia    3. Mild intermittent asthma without complication    4. Atrial flutter with rapid ventricular response    5. Chronic anticoagulation    6. Chronic diastolic heart failure    7. Screen for colon cancer    8. Severe obesity with body mass index (BMI) of 35.0 to 39.9 with  comorbidity    9. Chronic kidney disease, stage 3a          Plan:     Essential hypertension- stable, cont rx.    Other hyperlipidemia- on repatha.    Mild intermittent asthma without complication, no symptoms in many months.    Atrial flutter with rapid ventricular response, Chronic anticoagulation, Chronic diastolic heart failure- cont meds per cards.    Screen for colon cancer  -     Cologuard Screening (Multitarget Stool DNA); Future; Expected date: 05/09/2024    Severe obesity with body mass index (BMI) of 35.0 to 39.9 with comorbidity- work on diet/increase activity.    CKD 3a- start SGLT2i, recheck labs in 3mo.    RTC 3mo.

## 2024-06-05 DIAGNOSIS — Z78.9 STATIN INTOLERANCE: ICD-10-CM

## 2024-06-05 DIAGNOSIS — E78.49 OTHER HYPERLIPIDEMIA: ICD-10-CM

## 2024-06-05 RX ORDER — EVOLOCUMAB 140 MG/ML
INJECTION, SOLUTION SUBCUTANEOUS
Qty: 6 ML | Refills: 3 | Status: SHIPPED | OUTPATIENT
Start: 2024-06-05

## 2024-06-08 LAB — NONINV COLON CA DNA+OCC BLD SCRN STL QL: POSITIVE

## 2024-06-12 ENCOUNTER — PATIENT MESSAGE (OUTPATIENT)
Dept: PRIMARY CARE CLINIC | Facility: CLINIC | Age: 79
End: 2024-06-12
Payer: MEDICARE

## 2024-06-12 DIAGNOSIS — Z12.11 SCREEN FOR COLON CANCER: Primary | ICD-10-CM

## 2024-06-17 ENCOUNTER — HOSPITAL ENCOUNTER (OUTPATIENT)
Dept: PREADMISSION TESTING | Facility: HOSPITAL | Age: 79
Discharge: HOME | End: 2024-06-17
Attending: INTERNAL MEDICINE
Payer: MEDICARE

## 2024-06-17 DIAGNOSIS — Z12.11 SCREEN FOR COLON CANCER: ICD-10-CM

## 2024-06-25 ENCOUNTER — TELEPHONE (OUTPATIENT)
Dept: PREADMISSION TESTING | Facility: HOSPITAL | Age: 79
End: 2024-06-25
Payer: MEDICARE

## 2024-06-26 ENCOUNTER — TELEPHONE (OUTPATIENT)
Dept: PREADMISSION TESTING | Facility: HOSPITAL | Age: 79
End: 2024-06-26
Payer: MEDICARE

## 2024-07-01 ENCOUNTER — TELEPHONE (OUTPATIENT)
Dept: PREADMISSION TESTING | Facility: HOSPITAL | Age: 79
End: 2024-07-01
Payer: MEDICARE

## 2024-07-09 ENCOUNTER — TELEPHONE (OUTPATIENT)
Dept: PREADMISSION TESTING | Facility: HOSPITAL | Age: 79
End: 2024-07-09
Payer: MEDICARE

## 2024-07-09 DIAGNOSIS — Z12.11 SCREENING FOR COLON CANCER: Primary | ICD-10-CM

## 2024-07-09 RX ORDER — SODIUM, POTASSIUM,MAG SULFATES 17.5-3.13G
1 SOLUTION, RECONSTITUTED, ORAL ORAL DAILY
Qty: 1 KIT | Refills: 0 | Status: SHIPPED | OUTPATIENT
Start: 2024-07-09 | End: 2024-07-11

## 2024-07-17 RX ORDER — LOSARTAN POTASSIUM 100 MG/1
100 TABLET ORAL
Qty: 90 TABLET | Refills: 3 | Status: SHIPPED | OUTPATIENT
Start: 2024-07-17

## 2024-08-02 ENCOUNTER — CLINICAL SUPPORT (OUTPATIENT)
Dept: PRIMARY CARE CLINIC | Facility: CLINIC | Age: 79
End: 2024-08-02
Payer: MEDICARE

## 2024-08-02 DIAGNOSIS — N18.31 CHRONIC KIDNEY DISEASE, STAGE 3A: ICD-10-CM

## 2024-08-02 LAB
ALBUMIN/CREAT UR: 5.4 UG/MG (ref 0–30)
ANION GAP SERPL CALC-SCNC: 9 MMOL/L (ref 8–16)
BUN SERPL-MCNC: 12 MG/DL (ref 8–23)
CALCIUM SERPL-MCNC: 9.5 MG/DL (ref 8.7–10.5)
CHLORIDE SERPL-SCNC: 106 MMOL/L (ref 95–110)
CO2 SERPL-SCNC: 26 MMOL/L (ref 23–29)
CREAT SERPL-MCNC: 1.1 MG/DL (ref 0.5–1.4)
CREAT UR-MCNC: 316 MG/DL (ref 15–325)
EST. GFR  (NO RACE VARIABLE): 51.4 ML/MIN/1.73 M^2
GLUCOSE SERPL-MCNC: 85 MG/DL (ref 70–110)
MICROALBUMIN UR DL<=1MG/L-MCNC: 17 UG/ML
POTASSIUM SERPL-SCNC: 4.2 MMOL/L (ref 3.5–5.1)
SODIUM SERPL-SCNC: 141 MMOL/L (ref 136–145)

## 2024-08-02 PROCEDURE — 80048 BASIC METABOLIC PNL TOTAL CA: CPT | Performed by: INTERNAL MEDICINE

## 2024-08-02 PROCEDURE — 82570 ASSAY OF URINE CREATININE: CPT | Performed by: INTERNAL MEDICINE

## 2024-08-09 ENCOUNTER — OFFICE VISIT (OUTPATIENT)
Dept: PRIMARY CARE CLINIC | Facility: CLINIC | Age: 79
End: 2024-08-09
Payer: MEDICARE

## 2024-08-09 VITALS
BODY MASS INDEX: 37.99 KG/M2 | DIASTOLIC BLOOD PRESSURE: 60 MMHG | OXYGEN SATURATION: 97 % | SYSTOLIC BLOOD PRESSURE: 120 MMHG | HEART RATE: 52 BPM | WEIGHT: 221.31 LBS

## 2024-08-09 DIAGNOSIS — I48.92 ATRIAL FLUTTER WITH RAPID VENTRICULAR RESPONSE: ICD-10-CM

## 2024-08-09 DIAGNOSIS — E66.01 SEVERE OBESITY WITH BODY MASS INDEX (BMI) OF 35.0 TO 39.9 WITH COMORBIDITY: ICD-10-CM

## 2024-08-09 DIAGNOSIS — I10 ESSENTIAL HYPERTENSION: Primary | ICD-10-CM

## 2024-08-09 DIAGNOSIS — I77.1 TORTUOUS AORTA: ICD-10-CM

## 2024-08-09 DIAGNOSIS — J45.20 MILD INTERMITTENT ASTHMA WITHOUT COMPLICATION: ICD-10-CM

## 2024-08-09 DIAGNOSIS — E78.49 OTHER HYPERLIPIDEMIA: ICD-10-CM

## 2024-08-09 DIAGNOSIS — Z79.01 CHRONIC ANTICOAGULATION: ICD-10-CM

## 2024-08-09 DIAGNOSIS — N18.31 CHRONIC KIDNEY DISEASE, STAGE 3A: ICD-10-CM

## 2024-08-09 PROCEDURE — 99999 PR PBB SHADOW E&M-EST. PATIENT-LVL III: CPT | Mod: PBBFAC,,, | Performed by: INTERNAL MEDICINE

## 2024-08-12 ENCOUNTER — ANESTHESIA EVENT (OUTPATIENT)
Dept: ENDOSCOPY | Facility: HOSPITAL | Age: 79
End: 2024-08-12
Payer: MEDICARE

## 2024-08-12 ENCOUNTER — ANESTHESIA (OUTPATIENT)
Dept: ENDOSCOPY | Facility: HOSPITAL | Age: 79
End: 2024-08-12
Payer: MEDICARE

## 2024-08-12 ENCOUNTER — HOSPITAL ENCOUNTER (OUTPATIENT)
Facility: HOSPITAL | Age: 79
Discharge: HOME | End: 2024-08-12
Attending: INTERNAL MEDICINE | Admitting: INTERNAL MEDICINE
Payer: MEDICARE

## 2024-08-12 DIAGNOSIS — R93.3 ABNORMAL COLONOSCOPY: Primary | ICD-10-CM

## 2024-08-12 DIAGNOSIS — R19.5 POSITIVE COLORECTAL CANCER SCREENING USING COLOGUARD TEST: Primary | ICD-10-CM

## 2024-08-12 LAB
GLUCOSE SERPL-MCNC: 82 MG/DL (ref 70–110)
POCT GLUCOSE: 82 MG/DL (ref 70–110)

## 2024-08-12 PROCEDURE — 27201089 HC SNARE, DISP (ANY): Performed by: INTERNAL MEDICINE

## 2024-08-12 PROCEDURE — 37000009 HC ANESTHESIA EA ADD 15 MINS: Performed by: INTERNAL MEDICINE

## 2024-08-12 PROCEDURE — 88305 TISSUE EXAM BY PATHOLOGIST: CPT | Mod: 59 | Performed by: STUDENT IN AN ORGANIZED HEALTH CARE EDUCATION/TRAINING PROGRAM

## 2024-08-12 PROCEDURE — 37000008 HC ANESTHESIA 1ST 15 MINUTES: Performed by: INTERNAL MEDICINE

## 2024-08-12 PROCEDURE — 25000003 PHARM REV CODE 250: Performed by: NURSE ANESTHETIST, CERTIFIED REGISTERED

## 2024-08-12 PROCEDURE — 45380 COLONOSCOPY AND BIOPSY: CPT | Mod: PT,KX,59, | Performed by: INTERNAL MEDICINE

## 2024-08-12 PROCEDURE — 27201012 HC FORCEPS, HOT/COLD, DISP: Performed by: INTERNAL MEDICINE

## 2024-08-12 PROCEDURE — 45385 COLONOSCOPY W/LESION REMOVAL: CPT | Mod: PT,KX,, | Performed by: INTERNAL MEDICINE

## 2024-08-12 PROCEDURE — 63600175 PHARM REV CODE 636 W HCPCS: Performed by: NURSE ANESTHETIST, CERTIFIED REGISTERED

## 2024-08-12 PROCEDURE — 45380 COLONOSCOPY AND BIOPSY: CPT | Mod: PT,59 | Performed by: INTERNAL MEDICINE

## 2024-08-12 PROCEDURE — 45385 COLONOSCOPY W/LESION REMOVAL: CPT | Mod: PT | Performed by: INTERNAL MEDICINE

## 2024-08-12 RX ORDER — LIDOCAINE HYDROCHLORIDE 10 MG/ML
INJECTION, SOLUTION EPIDURAL; INFILTRATION; INTRACAUDAL; PERINEURAL
Status: DISCONTINUED | OUTPATIENT
Start: 2024-08-12 | End: 2024-08-12

## 2024-08-12 RX ORDER — PROPOFOL 10 MG/ML
VIAL (ML) INTRAVENOUS
Status: DISCONTINUED | OUTPATIENT
Start: 2024-08-12 | End: 2024-08-12

## 2024-08-12 RX ADMIN — PROPOFOL 30 MG: 10 INJECTION, EMULSION INTRAVENOUS at 07:08

## 2024-08-12 RX ADMIN — PROPOFOL 70 MG: 10 INJECTION, EMULSION INTRAVENOUS at 07:08

## 2024-08-12 RX ADMIN — PROPOFOL 20 MG: 10 INJECTION, EMULSION INTRAVENOUS at 07:08

## 2024-08-12 RX ADMIN — LIDOCAINE HYDROCHLORIDE 50 MG: 10 SOLUTION INTRAVENOUS at 07:08

## 2024-08-12 RX ADMIN — SODIUM CHLORIDE, SODIUM LACTATE, POTASSIUM CHLORIDE, AND CALCIUM CHLORIDE: .6; .31; .03; .02 INJECTION, SOLUTION INTRAVENOUS at 06:08

## 2024-08-12 RX ADMIN — PROPOFOL 50 MG: 10 INJECTION, EMULSION INTRAVENOUS at 07:08

## 2024-08-12 NOTE — ANESTHESIA POSTPROCEDURE EVALUATION
Anesthesia Post Evaluation    Patient: Nathalie Pattonckenridge    Procedure(s) Performed: Procedure(s) (LRB):  COLONOSCOPY - pre op clearance in media 7/1/24 (N/A)    Final Anesthesia Type: MAC      Patient location during evaluation: PACU  Patient participation: Yes- Able to Participate  Level of consciousness: awake and alert  Post-procedure vital signs: reviewed and stable  Pain management: adequate  Airway patency: patent    PONV status at discharge: No PONV  Anesthetic complications: no      Cardiovascular status: blood pressure returned to baseline  Respiratory status: unassisted  Hydration status: euvolemic  Follow-up not needed.              Vitals Value Taken Time   /66 08/12/24 0747   Temp 98 08/12/24 0747   Pulse 66 08/12/24 0747   Resp 12 08/12/24 0747   SpO2 98 08/12/24 0747         No case tracking events are documented in the log.      Pain/Kapil Score: No data recorded

## 2024-08-12 NOTE — H&P
"PRE PROCEDURE H&P    Patient Name: Nathalie Membreno  MRN: [id number]  : 1945  Date of Procedure:  2024  Referring Physician: Alice Booker MD  Primary Physician: Alice Booker MD  Procedure Physician: Robbie Pereyra MD       Planned Procedure: Colonoscopy  Diagnosis: positive cologuard  Chief Complaint: Same as above    HPI: Patient is an 79 y.o. female is here for the above.     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 Nuno    CARPAL TUNNEL RELEASE      b/l    CATARACT EXTRACTION EXTRACAPSULAR W/ INTRAOCULAR LENS IMPLANTATION Bilateral     COLONOSCOPY N/A 2021    Procedure: COLONOSCOPY;  Surgeon: Fozia Dasilva MD;  Location: [facility] ENDO;  Service: Endoscopy;  Laterality: N/A;    DILATION AND CURETTAGE OF UTERUS      10/2021, no cancer per pt.    FOOT SURGERY Right 2013    wart removal    KELOID EXCISION N/A 2023    Procedure: EXCISION, KELOID;  Surgeon: Justine Youngblood DO;  Location: [facility] OR;  Service: General;  Laterality: N/A;    TRIGGER FINGER RELEASE Right     " TUBAL LIGATION          Home Medications:  Prior to Admission medications    Medication Sig Start Date End Date Taking? Authorizing Provider   albuterol 90 mcg/actuation inhaler Inhale 2 puffs into the lungs every 4 (four) hours as needed for Wheezing. 12/21/17  Yes Alice Booker MD   amLODIPine (NORVASC) 5 MG tablet TAKE 1 TABLET TWICE DAILY 2/21/24  Yes Deanne Dale MD   betamethasone valerate 0.1% (VALISONE) 0.1 % Oint Apply topically 2 (two) times daily. 12/22/22 8/6/24 Yes Melanie Albert NP   cholecalciferol, vitamin D3, (VITAMIN D3) 50 mcg (2,000 unit) Cap Take 1 capsule by mouth once daily.   Yes Provider, Historical   clindamycin phosphate 1% (CLINDAGEL) 1 % gel Apply topically 2 (two) times daily. For acne of chest. 2/2/23  Yes Mary Rodriguez PA-C   digoxin (LANOXIN) 125 mcg tablet TAKE 1 TABLET EVERY DAY 9/19/22  Yes Alice Booker MD   empagliflozin (JARDIANCE) 10 mg tablet Take 1 tablet (10 mg total) by mouth once daily. 5/9/24  Yes Alice Booker MD   fluticasone propionate (FLONASE) 50 mcg/actuation nasal spray 2 sprays (100 mcg total) by Each Nostril route once daily. 6/23/21  Yes Alice Booker MD   losartan (COZAAR) 100 MG tablet TAKE 1 TABLET ONE TIME DAILY 7/17/24  Yes Alice Booker MD   metoprolol succinate (TOPROL-XL) 100 MG 24 hr tablet TAKE 1 TABLET ONE TIME DAILY 1/26/24  Yes Alice Booker MD   mupirocin (BACTROBAN) 2 % ointment Apply topically 3 (three) times daily. For broken skin. 7/7/23  Yes Fiorella Richardson PA-C   REPATHA SURECLICK 140 mg/mL PnIj INJECT 140MG UNDER THE SKIN EVERY 2 WEEKS FOR HIGH CHOLESTEROL 6/5/24  Yes Alice Booker MD   rivaroxaban (XARELTO) 20 mg Tab TAKE 1 TABLET BY MOUTH EVERY DAY WITH DINNER 8/15/19  Yes Provider, Historical   rOPINIRole (REQUIP) 1 MG tablet  5/31/22  Yes Provider, Historical   nystatin-triamcinolone (MYCOLOG II) cream Apply to affected area 2 times daily 5/9/18 5/9/19  Monique Gomes.,  MD   polyethylene glycol (GLYCOLAX) 17 gram PwPk Take by mouth once daily.    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     Employer: [employer]    Tobacco Use    Smoking status: Never    Smokeless tobacco: Never   Substance and Sexual Activity    Alcohol use: No     Alcohol/week: 0.0 standard drinks of alcohol    Drug use: No    Sexual activity: Not Currently     Partners: Male     Birth control/protection: None   Social History Narrative    Live alone     Social Determinants of Health     Financial Resource Strain: Low Risk  (7/12/2023)    Overall Financial Resource Strain (CARDIA)     Difficulty of Paying Living Expenses: Not hard at all   Food Insecurity: No Food Insecurity (7/12/2023)    Hunger Vital Sign     Worried About Running Out of Food in the Last Year: Never true     Ran Out of Food in the Last Year: Never true   Transportation Needs: No Transportation Needs (7/12/2023)    PRAPARE - Transportation     Lack of Transportation (Medical): No     Lack of Transportation (Non-Medical): No   Physical Activity: Inactive (7/12/2023)    Exercise Vital Sign     Days of Exercise per Week: 0 days     Minutes of Exercise per Session: 0 min   Stress: Stress Concern Present (7/12/2023)    [employer] [place] [employer] - Occupational Stress Questionnaire     Feeling of Stress : Rather much   Housing Stability: Low Risk  (7/12/2023)    Housing Stability Vital Sign     Unable to Pay for Housing in the Last Year: No     Number of Places Lived in the Last Year: 1     Unstable Housing in the Last Year: No       Family History:  Family  "History   Problem Relation Name Age of Onset    Hypertension Mother      Hypertension Father      Cancer Father          prostate    Cataracts Father      Glaucoma Father      Diabetes Father      Hypertension Sister      Diabetes Sister      Breast cancer Neg Hx      Colon cancer Neg Hx      Ovarian cancer Neg Hx      Thrombophilia Neg Hx         ROS: No acute cardiac events, no acute respiratory complaints.     Physical Exam (all patients):    BP (!) 145/75 (BP Location: Left arm, Patient Position: Lying)   Pulse 77   Temp 97.8 °F (36.6 °C) (Temporal)   Resp 18   Ht 5' 4" (1.626 m)   Wt 99.8 kg (220 lb)   SpO2 97%   Breastfeeding No   BMI 37.76 kg/m²   Lungs: Clear to auscultation bilaterally, respirations unlabored  Heart: Regular rate and rhythm, S1 and S2 normal, no obvious murmurs  Abdomen:         Soft, non-tender, bowel sounds normal, no masses, no organomegaly    Lab Results   Component Value Date    WBC 5.95 02/02/2024    MCV 98 02/02/2024    RDW 14.6 (H) 02/02/2024     02/02/2024    INR 1.2 06/28/2023    GLU 85 08/02/2024    BUN 12 08/02/2024     08/02/2024    K 4.2 08/02/2024     08/02/2024        SEDATION PLAN: per anesthesia      History reviewed, vital signs satisfactory, cardiopulmonary status satisfactory, sedation options, risks and plans have been discussed with the patient  All their questions were answered and the patient agrees to the sedation procedures as planned and the patient is deemed an appropriate candidate for the sedation as planned.    Procedure explained to patient, informed consent obtained and placed in chart.    Robbie Pereyra  8/12/2024  7:23 AM     "

## 2024-08-12 NOTE — TRANSFER OF CARE
"Anesthesia Transfer of Care Note    Patient: Nathalie Membreno    Procedure(s) Performed: Procedure(s) (LRB):  COLONOSCOPY - pre op clearance in media 7/1/24 (N/A)    Patient location: PACU    Anesthesia Type: MAC    Transport from OR: Transported from OR on room air with adequate spontaneous ventilation    Post pain: adequate analgesia    Post assessment: no apparent anesthetic complications    Post vital signs: stable    Level of consciousness: awake    Nausea/Vomiting: no nausea/vomiting    Complications: none    Transfer of care protocol was followed      Last vitals: Visit Vitals  BP (!) 145/75 (BP Location: Left arm, Patient Position: Lying)   Pulse 77   Temp 36.6 °C (97.8 °F) (Temporal)   Resp 18   Ht 5' 4" (1.626 m)   Wt 99.8 kg (220 lb)   SpO2 97%   Breastfeeding No   BMI 37.76 kg/m²     "

## 2024-08-12 NOTE — ANESTHESIA PREPROCEDURE EVALUATION
08/12/2024  Nathalie Membreno is a 79 y.o., female.      Pre-op Assessment    I have reviewed the Patient Summary Reports.     I have reviewed the Nursing Notes. I have reviewed the NPO Status.   I have reviewed the Medications.     Review of Systems  Anesthesia Hx:  No problems with previous Anesthesia                Hematology/Oncology:    Oncology Normal    -- Anemia:                                  EENT/Dental:  EENT/Dental Normal           Cardiovascular:    Pacemaker Hypertension, well controlled           hyperlipidemia          Mitral Regurgitation (MR), mild     Congestive Heart Failure (CHF)   , LV Diastolic HF                   Pulmonary:  Pneumonia  Asthma moderate Shortness of breath              Pulmonary Infection:  Pneumonia.  Pulmonary Embolism, > 6 months ago      Renal/:  Chronic Renal Disease, CKD                Hepatic/GI:     GERD, poorly controlled             Musculoskeletal:  Arthritis               Neurological:   CVA, no residual symptoms Neuromuscular Disease,                                   Endocrine:        Morbid Obesity / BMI > 40  Dermatological:  Skin Normal    Psych:  Psychiatric History anxiety depression                Physical Exam  General: Well nourished    Airway:  Mallampati: II   Mouth Opening: Normal  TM Distance: Normal  Tongue: Normal  Neck ROM: Normal ROM    Dental:  Intact    Chest/Lungs:  Clear to auscultation    Heart:  Rate: Normal    Abdomen:  Normal        Anesthesia Plan  Type of Anesthesia, risks & benefits discussed:    Anesthesia Type: MAC  Intra-op Monitoring Plan: Standard ASA Monitors  Induction:  IV  Informed Consent: Informed consent signed with the Patient and all parties understand the risks and agree with anesthesia plan.  All questions answered. Patient consented to blood products? Yes  ASA Score: 3    Ready For Surgery From Anesthesia  Perspective.     .

## 2024-08-12 NOTE — PROVATION PATIENT INSTRUCTIONS
Discharge Summary/Instructions after an Endoscopic Procedure  Patient Name: Nathalie Membreno  Patient MRN: [id number]  Patient YOB: 1945 Monday, August 12, 2024 Robbie Pereyra MD  Dear patient,  As a result of recent federal legislation (The Federal Cures Act), you may   receive lab or pathology results from your procedure in your [facility]   account before your physician is able to contact you. Your physician or   their representative will relay the results to you with their   recommendations at their soonest availability.  Thank you,  RESTRICTIONS:  During your procedure today, you received medications for sedation.  These   medications may affect your judgment, balance and coordination.  Therefore,   for 24 hours, you have the following restrictions:   - DO NOT drive a car, operate machinery, make legal/financial decisions,   sign important papers or drink alcohol.    ACTIVITY:  Today: no heavy lifting, straining or running due to procedural   sedation/anesthesia.  The following day: return to full activity including work.  DIET:  Eat and drink normally unless instructed otherwise.     TREATMENT FOR COMMON SIDE EFFECTS:  - Mild abdominal pain, nausea, belching, bloating or excessive gas:  rest,   eat lightly and use a heating pad.  - Sore Throat: treat with throat lozenges and/or gargle with warm salt   water.  - Because air was used during the procedure, expelling large amounts of air   from your rectum or belching is normal.  - If a bowel prep was taken, you may not have a bowel movement for 1-3 days.    This is normal.  SYMPTOMS TO WATCH FOR AND REPORT TO YOUR PHYSICIAN:  1. Abdominal pain or bloating, other than gas cramps.  2. Chest pain.  3. Back pain.  4. Signs of infection such as: chills or fever occurring within 24 hours   after the procedure.  5. Rectal bleeding, which would show as bright red, maroon, or black stools.   (A tablespoon of blood from the rectum is not serious,  especially if   hemorrhoids are present.)  6. Vomiting.  7. Weakness or dizziness.  GO DIRECTLY TO THE NEAREST EMERGENCY ROOM IF YOU HAVE ANY OF THE FOLLOWING:      Difficulty breathing              Chills and/or fever over 101 F   Persistent vomiting and/or vomiting blood   Severe abdominal pain   Severe chest pain   Black, tarry stools   Bleeding- more than one tablespoon   Any other symptom or condition that you feel may need urgent attention  Your doctor recommends these additional instructions:  If any biopsies were taken, your doctors clinic will contact you in 1 to 2   weeks with any results.  - Discharge patient to home.   - Resume previous diet.   - Continue present medications.   - Await pathology results.   - Repeat colonoscopy in 5 years for surveillance.   - Refer to a colo-rectal surgeon.   - Can reusume Xarelto tomorrow.   - Patient has a contact number available for emergencies.  The signs and   symptoms of potential delayed complications were discussed with the   patient.  Return to normal activities tomorrow.  Written discharge   instructions were provided to the patient.  For questions, problems or results please call your physician Robbie Pereyra MD at Work:  [phone]  If you have any questions about the above instructions, call the GI   department at [phone] or call the endoscopy unit at [phone]   from 7am until 3 pm.  [facility], EMERGENCY ROOM PHONE NUMBER:   [phone]  IF A COMPLICATION OR EMERGENCY SITUATION ARISES AND YOU ARE UNABLE TO REACH   YOUR PHYSICIAN - GO DIRECTLY TO THE EMERGENCY ROOM.  I have read or have had read to me these discharge instructions for my   procedure and have received a written copy.  I understand these   instructions and will follow-up with my physician if I have any questions.     __________________________________       _____________________________________  Nurse Signature                                           Patient/Designated   Responsible Party Signature  Robbie Pereyra MD  8/12/2024 7:48:08 AM  This report has been verified and signed electronically.  Dear patient,  As a result of recent federal legislation (The Federal Cures Act), you may   receive lab or pathology results from your procedure in your [facility]   account before your physician is able to contact you. Your physician or   their representative will relay the results to you with their   recommendations at their soonest availability.  Thank you,  PROVATION

## 2024-08-13 VITALS
OXYGEN SATURATION: 98 % | WEIGHT: 220 LBS | TEMPERATURE: 98 F | HEIGHT: 64 IN | HEART RATE: 60 BPM | SYSTOLIC BLOOD PRESSURE: 133 MMHG | DIASTOLIC BLOOD PRESSURE: 63 MMHG | BODY MASS INDEX: 37.56 KG/M2 | RESPIRATION RATE: 18 BRPM

## 2024-08-13 LAB
FINAL PATHOLOGIC DIAGNOSIS: NORMAL
GROSS: NORMAL
Lab: NORMAL

## 2024-08-14 ENCOUNTER — PATIENT MESSAGE (OUTPATIENT)
Dept: GASTROENTEROLOGY | Facility: CLINIC | Age: 79
End: 2024-08-14
Payer: MEDICARE

## 2024-08-26 ENCOUNTER — OFFICE VISIT (OUTPATIENT)
Dept: SURGERY | Facility: CLINIC | Age: 79
End: 2024-08-26
Payer: MEDICARE

## 2024-08-26 VITALS
OXYGEN SATURATION: 96 % | SYSTOLIC BLOOD PRESSURE: 121 MMHG | HEART RATE: 60 BPM | DIASTOLIC BLOOD PRESSURE: 76 MMHG | BODY MASS INDEX: 37.07 KG/M2 | TEMPERATURE: 99 F | HEIGHT: 64 IN | WEIGHT: 217.13 LBS

## 2024-08-26 DIAGNOSIS — R93.3 ABNORMAL COLONOSCOPY: ICD-10-CM

## 2024-08-26 DIAGNOSIS — K62.0 ANORECTAL POLYP: Primary | ICD-10-CM

## 2024-08-26 DIAGNOSIS — K62.1 ANORECTAL POLYP: Primary | ICD-10-CM

## 2024-08-26 PROCEDURE — 1126F AMNT PAIN NOTED NONE PRSNT: CPT | Mod: CPTII,S$GLB,, | Performed by: COLON & RECTAL SURGERY

## 2024-08-26 PROCEDURE — 1159F MED LIST DOCD IN RCRD: CPT | Mod: CPTII,S$GLB,, | Performed by: COLON & RECTAL SURGERY

## 2024-08-26 PROCEDURE — 1101F PT FALLS ASSESS-DOCD LE1/YR: CPT | Mod: CPTII,S$GLB,, | Performed by: COLON & RECTAL SURGERY

## 2024-08-26 PROCEDURE — 99999 PR PBB SHADOW E&M-EST. PATIENT-LVL V: CPT | Mod: PBBFAC,,, | Performed by: COLON & RECTAL SURGERY

## 2024-08-26 PROCEDURE — 99204 OFFICE O/P NEW MOD 45 MIN: CPT | Mod: 25,S$GLB,, | Performed by: COLON & RECTAL SURGERY

## 2024-08-26 PROCEDURE — 3074F SYST BP LT 130 MM HG: CPT | Mod: CPTII,S$GLB,, | Performed by: COLON & RECTAL SURGERY

## 2024-08-26 PROCEDURE — 3078F DIAST BP <80 MM HG: CPT | Mod: CPTII,S$GLB,, | Performed by: COLON & RECTAL SURGERY

## 2024-08-26 PROCEDURE — 46600 DIAGNOSTIC ANOSCOPY SPX: CPT | Mod: S$GLB,,, | Performed by: COLON & RECTAL SURGERY

## 2024-08-26 PROCEDURE — 3288F FALL RISK ASSESSMENT DOCD: CPT | Mod: CPTII,S$GLB,, | Performed by: COLON & RECTAL SURGERY

## 2024-08-26 RX ORDER — ONDANSETRON HYDROCHLORIDE 2 MG/ML
4 INJECTION, SOLUTION INTRAVENOUS EVERY 6 HOURS PRN
OUTPATIENT
Start: 2024-08-26

## 2024-08-26 RX ORDER — SODIUM CHLORIDE, SODIUM LACTATE, POTASSIUM CHLORIDE, CALCIUM CHLORIDE 600; 310; 30; 20 MG/100ML; MG/100ML; MG/100ML; MG/100ML
INJECTION, SOLUTION INTRAVENOUS CONTINUOUS
OUTPATIENT
Start: 2024-08-26

## 2024-08-26 NOTE — PROGRESS NOTES
History & Physical    Subjective     CC: anorectal lesion   Ref: Robbie Pereyra MD    History of Present Illness:  Patient is a 79 y.o. female presents with distal rectal lesion.  Patient was undergoing screening colonoscopy 08/12/2024 where distal rectal lesion noted that was not removed.  In addition, other tubular adenomas were removed.  Patient denies any rectal bleeding or pain.  Patient is on Xarelto daily for history of TIA (2018) & afib; cardiologist Dr. Mansfield at [facility]. Denies famhx of CRC, IBD, polyps. Denies nausea, vomiting, fevers, chills.     Review of patient's allergies indicates:   Allergen Reactions    Penicillins Swelling     Generalized swelling  Other reaction(s): Swelling  Generalized swelling  Other reaction(s): Swelling  Generalized swelling       Current Outpatient Medications   Medication Sig Dispense Refill    albuterol 90 mcg/actuation inhaler Inhale 2 puffs into the lungs every 4 (four) hours as needed for Wheezing. 1 each 6    amLODIPine (NORVASC) 5 MG tablet TAKE 1 TABLET TWICE DAILY 180 tablet 3    cholecalciferol, vitamin D3, (VITAMIN D3) 50 mcg (2,000 unit) Cap Take 1 capsule by mouth once daily.      clindamycin phosphate 1% (CLINDAGEL) 1 % gel Apply topically 2 (two) times daily. For acne of chest. 30 g 3    digoxin (LANOXIN) 125 mcg tablet TAKE 1 TABLET EVERY DAY 90 tablet 2    empagliflozin (JARDIANCE) 10 mg tablet Take 1 tablet (10 mg total) by mouth once daily. 30 tablet 6    fluticasone propionate (FLONASE) 50 mcg/actuation nasal spray 2 sprays (100 mcg total) by Each Nostril route once daily. 16 g 6    losartan (COZAAR) 100 MG tablet TAKE 1 TABLET ONE TIME DAILY 90 tablet 3    metoprolol succinate (TOPROL-XL) 100 MG 24 hr tablet TAKE 1 TABLET ONE TIME DAILY 90 tablet 3    mupirocin (BACTROBAN) 2 % ointment Apply topically 3 (three) times daily. For broken skin. 22 g 1    polyethylene glycol (GLYCOLAX) 17 gram PwPk Take by mouth once daily.      REPATHA SURECLICK 140  "mg/mL PnIj INJECT 140MG UNDER THE SKIN EVERY 2 WEEKS FOR HIGH CHOLESTEROL 6 mL 3    rivaroxaban (XARELTO) 20 mg Tab TAKE 1 TABLET BY MOUTH EVERY DAY WITH DINNER      rOPINIRole (REQUIP) 1 MG tablet       betamethasone valerate 0.1% (VALISONE) 0.1 % Oint Apply topically 2 (two) times daily. 15 g 1    nystatin-triamcinolone (MYCOLOG II) cream Apply to affected area 2 times daily 30 g 1     No current facility-administered medications for this visit.       Past Medical History:   Diagnosis Date    Allergy     Anemia     Arthritis     knees, hands, back    Back pain     pt reports "mild"    Chronic diastolic heart failure 7/12/2023    Chronic kidney disease, stage 3a 11/2/2023    Depression, recurrent 7/12/2023    Dysphagia, unspecified(787.20)     GERD (gastroesophageal reflux disease)     History of carpal tunnel release of both wrists 03/10/2016    Hyperlipidemia     Hypertension     LGSIL (low grade squamous intraepithelial dysplasia)     HPV +    Mild intermittent asthma without complication 07/28/2020    Obesity     Pneumonia of left lower lobe due to infectious organism 6/26/2023    Pulmonary embolism, bilateral 7/5/2023    Stroke 02/08/2017     Past Surgical History:   Procedure Laterality Date    CARDIAC DEFIBRILLATOR PLACEMENT  04/2017    Dr. Nuno    CARPAL TUNNEL RELEASE      b/l    CATARACT EXTRACTION EXTRACAPSULAR W/ INTRAOCULAR LENS IMPLANTATION Bilateral 2022    COLONOSCOPY N/A 03/31/2021    Procedure: COLONOSCOPY;  Surgeon: Fozia Dasilva MD;  Location: [facility];  Service: Endoscopy;  Laterality: N/A;    COLONOSCOPY N/A 8/12/2024    Procedure: COLONOSCOPY - pre op clearance in media 7/1/24;  Surgeon: Robbie Pereyra MD;  Location: [facility];  Service: Gastroenterology;  Laterality: N/A;    DILATION AND CURETTAGE OF UTERUS      10/2021, no cancer per pt.    FOOT SURGERY Right 05/02/2013    wart removal    KELOID EXCISION N/A 6/21/2023    Procedure: EXCISION, KELOID;  Surgeon: Justine Youngblood, " DO;  Location: [facility] OR;  Service: General;  Laterality: N/A;    TRIGGER FINGER RELEASE Right     TUBAL LIGATION       Family History   Problem Relation Name Age of Onset    Hypertension Mother      Hypertension Father      Cancer Father          prostate    Cataracts Father      Glaucoma Father      Diabetes Father      Hypertension Sister      Diabetes Sister      Breast cancer Neg Hx      Colon cancer Neg Hx      Ovarian cancer Neg Hx      Thrombophilia Neg Hx       Social History     Tobacco Use    Smoking status: Never    Smokeless tobacco: Never   Substance Use Topics    Alcohol use: No     Alcohol/week: 0.0 standard drinks of alcohol    Drug use: No        Review of Systems:  Review of Systems   Constitutional:  Negative for activity change, appetite change, chills, fatigue, fever and unexpected weight change.   HENT:  Negative for congestion, ear pain, sore throat and trouble swallowing.    Eyes:  Negative for pain, redness and itching.   Respiratory:  Negative for cough, shortness of breath and wheezing.    Cardiovascular:  Negative for chest pain, palpitations and leg swelling.   Gastrointestinal:  Negative for abdominal distention, abdominal pain, anal bleeding, blood in stool, constipation, diarrhea, nausea, rectal pain and vomiting.   Endocrine: Negative for cold intolerance, heat intolerance and polyuria.   Genitourinary:  Negative for dysuria, flank pain, frequency and hematuria.   Musculoskeletal:  Negative for gait problem, joint swelling and neck pain.   Skin:  Negative for color change, rash and wound.   Allergic/Immunologic: Negative for environmental allergies and immunocompromised state.   Neurological:  Negative for dizziness, speech difficulty, weakness and numbness.   Psychiatric/Behavioral:  Negative for agitation, confusion and hallucinations.           Objective     Vital Signs (Most Recent)  Temp: 98.5 °F (36.9 °C) (08/26/24 1424)  Pulse: 60 (08/26/24 1424)  BP: 121/76 (08/26/24  "1424)  SpO2: 96 % (08/26/24 1424)  5' 4" (1.626 m)  98.5 kg (217 lb 2.5 oz)     Physical Exam:  Physical Exam  Vitals reviewed. Exam conducted with a chaperone present.   Constitutional:       General: She is not in acute distress.     Appearance: Normal appearance. She is well-developed. She is not ill-appearing or toxic-appearing.   HENT:      Head: Normocephalic and atraumatic.      Right Ear: External ear normal.      Left Ear: External ear normal.      Nose: Nose normal.   Cardiovascular:      Rate and Rhythm: Normal rate.   Pulmonary:      Effort: Pulmonary effort is normal. No respiratory distress.   Abdominal:      General: Abdomen is flat. There is no distension.      Palpations: Abdomen is soft.      Tenderness: There is no abdominal tenderness.   Genitourinary:     Comments: Anorectal: +left posterior external hemorrhoid - soft/NT/NT. No other external masses or lesions. No TTP. SUASNA with +palpable posterior anal canal 7mm mobile, soft lesion.   Musculoskeletal:         General: Normal range of motion.      Cervical back: Normal range of motion and neck supple.   Skin:     General: Skin is warm and dry.   Neurological:      Mental Status: She is alert and oriented to person, place, and time.   Psychiatric:         Mood and Affect: Mood normal.         Behavior: Behavior normal.       Anoscopy Procedure Note    Pre-procedure diagnosis: Anorectal Lesion    Post-procedure diagnosis: Anal lesion    Procedure: Anoscopy    Surgeon: Sae Garza MD    Assistant: Sweta Huang PA-C    Specimen: none    Findings: Anoscope inserted and all 4 quadrants examined. +posterior anal canal lesion, 7mm. No other internal masses or lesions visualized.     Patient tolerated procedure well.      Diagnostic Results:  Colonoscopy/pathology 08/2024: reviewed       Assessment and Plan     79 y.o. female with distal anorectal lesion     -Discussed with the patient this is likely a benign lesion in the anal canal.  Given her of " the precancerous lesions, it was reasonable to consider surgical excision which can be performed as an outpatient and she was amenable to this.  -plan for EUA, transanal excision 09/10/24. 1 enema prep   -All risks, benefits and alternatives fully explained to patient.  Risks include, but are not limited to, bleeding, infection, fecal incontinence, damage to the sphincter muscles, postoperative abscess, postoperative pain, urinary incontinence, urinary retention, perioperative MI, CVA and death.  All questions appropriately answered to patient's satisfaction.  Consent signed and placed on chart.  -cardiac clearance sent to Dr. Mansfield at [facility] post op     Sae Garza MD  Colon and Rectal Surgery  [facility]

## 2024-08-26 NOTE — H&P (VIEW-ONLY)
History & Physical    Subjective     CC: anorectal lesion   Ref: Robbie Pereyra MD    History of Present Illness:  Patient is a 79 y.o. female presents with distal rectal lesion.  Patient was undergoing screening colonoscopy 08/12/2024 where distal rectal lesion noted that was not removed.  In addition, other tubular adenomas were removed.  Patient denies any rectal bleeding or pain.  Patient is on Xarelto daily for history of TIA (2018) & afib; cardiologist Dr. Mansfield at [facility]. Denies famhx of CRC, IBD, polyps. Denies nausea, vomiting, fevers, chills.     Review of patient's allergies indicates:   Allergen Reactions    Penicillins Swelling     Generalized swelling  Other reaction(s): Swelling  Generalized swelling  Other reaction(s): Swelling  Generalized swelling       Current Outpatient Medications   Medication Sig Dispense Refill    albuterol 90 mcg/actuation inhaler Inhale 2 puffs into the lungs every 4 (four) hours as needed for Wheezing. 1 each 6    amLODIPine (NORVASC) 5 MG tablet TAKE 1 TABLET TWICE DAILY 180 tablet 3    cholecalciferol, vitamin D3, (VITAMIN D3) 50 mcg (2,000 unit) Cap Take 1 capsule by mouth once daily.      clindamycin phosphate 1% (CLINDAGEL) 1 % gel Apply topically 2 (two) times daily. For acne of chest. 30 g 3    digoxin (LANOXIN) 125 mcg tablet TAKE 1 TABLET EVERY DAY 90 tablet 2    empagliflozin (JARDIANCE) 10 mg tablet Take 1 tablet (10 mg total) by mouth once daily. 30 tablet 6    fluticasone propionate (FLONASE) 50 mcg/actuation nasal spray 2 sprays (100 mcg total) by Each Nostril route once daily. 16 g 6    losartan (COZAAR) 100 MG tablet TAKE 1 TABLET ONE TIME DAILY 90 tablet 3    metoprolol succinate (TOPROL-XL) 100 MG 24 hr tablet TAKE 1 TABLET ONE TIME DAILY 90 tablet 3    mupirocin (BACTROBAN) 2 % ointment Apply topically 3 (three) times daily. For broken skin. 22 g 1    polyethylene glycol (GLYCOLAX) 17 gram PwPk Take by mouth once daily.      REPATHA SURECLICK 140  "mg/mL PnIj INJECT 140MG UNDER THE SKIN EVERY 2 WEEKS FOR HIGH CHOLESTEROL 6 mL 3    rivaroxaban (XARELTO) 20 mg Tab TAKE 1 TABLET BY MOUTH EVERY DAY WITH DINNER      rOPINIRole (REQUIP) 1 MG tablet       betamethasone valerate 0.1% (VALISONE) 0.1 % Oint Apply topically 2 (two) times daily. 15 g 1    nystatin-triamcinolone (MYCOLOG II) cream Apply to affected area 2 times daily 30 g 1     No current facility-administered medications for this visit.       Past Medical History:   Diagnosis Date    Allergy     Anemia     Arthritis     knees, hands, back    Back pain     pt reports "mild"    Chronic diastolic heart failure 7/12/2023    Chronic kidney disease, stage 3a 11/2/2023    Depression, recurrent 7/12/2023    Dysphagia, unspecified(787.20)     GERD (gastroesophageal reflux disease)     History of carpal tunnel release of both wrists 03/10/2016    Hyperlipidemia     Hypertension     LGSIL (low grade squamous intraepithelial dysplasia)     HPV +    Mild intermittent asthma without complication 07/28/2020    Obesity     Pneumonia of left lower lobe due to infectious organism 6/26/2023    Pulmonary embolism, bilateral 7/5/2023    Stroke 02/08/2017     Past Surgical History:   Procedure Laterality Date    CARDIAC DEFIBRILLATOR PLACEMENT  04/2017    Dr. Nuno    CARPAL TUNNEL RELEASE      b/l    CATARACT EXTRACTION EXTRACAPSULAR W/ INTRAOCULAR LENS IMPLANTATION Bilateral 2022    COLONOSCOPY N/A 03/31/2021    Procedure: COLONOSCOPY;  Surgeon: Fozia Dasilva MD;  Location: [facility];  Service: Endoscopy;  Laterality: N/A;    COLONOSCOPY N/A 8/12/2024    Procedure: COLONOSCOPY - pre op clearance in media 7/1/24;  Surgeon: Robbie Pereyra MD;  Location: [facility];  Service: Gastroenterology;  Laterality: N/A;    DILATION AND CURETTAGE OF UTERUS      10/2021, no cancer per pt.    FOOT SURGERY Right 05/02/2013    wart removal    KELOID EXCISION N/A 6/21/2023    Procedure: EXCISION, KELOID;  Surgeon: Justine Youngblood, " DO;  Location: [facility] OR;  Service: General;  Laterality: N/A;    TRIGGER FINGER RELEASE Right     TUBAL LIGATION       Family History   Problem Relation Name Age of Onset    Hypertension Mother      Hypertension Father      Cancer Father          prostate    Cataracts Father      Glaucoma Father      Diabetes Father      Hypertension Sister      Diabetes Sister      Breast cancer Neg Hx      Colon cancer Neg Hx      Ovarian cancer Neg Hx      Thrombophilia Neg Hx       Social History     Tobacco Use    Smoking status: Never    Smokeless tobacco: Never   Substance Use Topics    Alcohol use: No     Alcohol/week: 0.0 standard drinks of alcohol    Drug use: No        Review of Systems:  Review of Systems   Constitutional:  Negative for activity change, appetite change, chills, fatigue, fever and unexpected weight change.   HENT:  Negative for congestion, ear pain, sore throat and trouble swallowing.    Eyes:  Negative for pain, redness and itching.   Respiratory:  Negative for cough, shortness of breath and wheezing.    Cardiovascular:  Negative for chest pain, palpitations and leg swelling.   Gastrointestinal:  Negative for abdominal distention, abdominal pain, anal bleeding, blood in stool, constipation, diarrhea, nausea, rectal pain and vomiting.   Endocrine: Negative for cold intolerance, heat intolerance and polyuria.   Genitourinary:  Negative for dysuria, flank pain, frequency and hematuria.   Musculoskeletal:  Negative for gait problem, joint swelling and neck pain.   Skin:  Negative for color change, rash and wound.   Allergic/Immunologic: Negative for environmental allergies and immunocompromised state.   Neurological:  Negative for dizziness, speech difficulty, weakness and numbness.   Psychiatric/Behavioral:  Negative for agitation, confusion and hallucinations.           Objective     Vital Signs (Most Recent)  Temp: 98.5 °F (36.9 °C) (08/26/24 1424)  Pulse: 60 (08/26/24 1424)  BP: 121/76 (08/26/24  "1424)  SpO2: 96 % (08/26/24 1424)  5' 4" (1.626 m)  98.5 kg (217 lb 2.5 oz)     Physical Exam:  Physical Exam  Vitals reviewed. Exam conducted with a chaperone present.   Constitutional:       General: She is not in acute distress.     Appearance: Normal appearance. She is well-developed. She is not ill-appearing or toxic-appearing.   HENT:      Head: Normocephalic and atraumatic.      Right Ear: External ear normal.      Left Ear: External ear normal.      Nose: Nose normal.   Cardiovascular:      Rate and Rhythm: Normal rate.   Pulmonary:      Effort: Pulmonary effort is normal. No respiratory distress.   Abdominal:      General: Abdomen is flat. There is no distension.      Palpations: Abdomen is soft.      Tenderness: There is no abdominal tenderness.   Genitourinary:     Comments: Anorectal: +left posterior external hemorrhoid - soft/NT/NT. No other external masses or lesions. No TTP. SUSANA with +palpable posterior anal canal 7mm mobile, soft lesion.   Musculoskeletal:         General: Normal range of motion.      Cervical back: Normal range of motion and neck supple.   Skin:     General: Skin is warm and dry.   Neurological:      Mental Status: She is alert and oriented to person, place, and time.   Psychiatric:         Mood and Affect: Mood normal.         Behavior: Behavior normal.       Anoscopy Procedure Note    Pre-procedure diagnosis: Anorectal Lesion    Post-procedure diagnosis: Anal lesion    Procedure: Anoscopy    Surgeon: Sae Garza MD    Assistant: Sweta Huang PA-C    Specimen: none    Findings: Anoscope inserted and all 4 quadrants examined. +posterior anal canal lesion, 7mm. No other internal masses or lesions visualized.     Patient tolerated procedure well.      Diagnostic Results:  Colonoscopy/pathology 08/2024: reviewed       Assessment and Plan     79 y.o. female with distal anorectal lesion     -Discussed with the patient this is likely a benign lesion in the anal canal.  Given her of " the precancerous lesions, it was reasonable to consider surgical excision which can be performed as an outpatient and she was amenable to this.  -plan for EUA, transanal excision 09/10/24. 1 enema prep   -All risks, benefits and alternatives fully explained to patient.  Risks include, but are not limited to, bleeding, infection, fecal incontinence, damage to the sphincter muscles, postoperative abscess, postoperative pain, urinary incontinence, urinary retention, perioperative MI, CVA and death.  All questions appropriately answered to patient's satisfaction.  Consent signed and placed on chart.  -cardiac clearance sent to Dr. Mansfield at [facility] post op     Sae Garza MD  Colon and Rectal Surgery  [facility]

## 2024-08-29 DIAGNOSIS — Z01.818 PRE-OP TESTING: Primary | ICD-10-CM

## 2024-09-04 ENCOUNTER — LAB VISIT (OUTPATIENT)
Dept: LAB | Facility: HOSPITAL | Age: 79
End: 2024-09-04
Attending: NURSE PRACTITIONER
Payer: MEDICARE

## 2024-09-04 ENCOUNTER — OFFICE VISIT (OUTPATIENT)
Dept: INTERNAL MEDICINE | Facility: CLINIC | Age: 79
End: 2024-09-04
Payer: MEDICARE

## 2024-09-04 VITALS
HEART RATE: 82 BPM | TEMPERATURE: 98 F | RESPIRATION RATE: 20 BRPM | OXYGEN SATURATION: 96 % | SYSTOLIC BLOOD PRESSURE: 136 MMHG | DIASTOLIC BLOOD PRESSURE: 81 MMHG

## 2024-09-04 DIAGNOSIS — K62.1 ANORECTAL POLYP: Primary | ICD-10-CM

## 2024-09-04 DIAGNOSIS — I48.92 ATRIAL FLUTTER WITH RAPID VENTRICULAR RESPONSE: ICD-10-CM

## 2024-09-04 DIAGNOSIS — N18.31 CHRONIC KIDNEY DISEASE, STAGE 3A: ICD-10-CM

## 2024-09-04 DIAGNOSIS — I50.32 CHRONIC DIASTOLIC HEART FAILURE: ICD-10-CM

## 2024-09-04 DIAGNOSIS — Z86.73 HISTORY OF TIA (TRANSIENT ISCHEMIC ATTACK): ICD-10-CM

## 2024-09-04 DIAGNOSIS — Z01.818 PRE-OP TESTING: ICD-10-CM

## 2024-09-04 DIAGNOSIS — I10 ESSENTIAL HYPERTENSION: ICD-10-CM

## 2024-09-04 DIAGNOSIS — Z01.818 PREOP TESTING: ICD-10-CM

## 2024-09-04 DIAGNOSIS — K62.0 ANORECTAL POLYP: Primary | ICD-10-CM

## 2024-09-04 DIAGNOSIS — E66.01 SEVERE OBESITY WITH BODY MASS INDEX (BMI) OF 35.0 TO 39.9 WITH COMORBIDITY: ICD-10-CM

## 2024-09-04 LAB
BASOPHILS # BLD AUTO: 0.06 K/UL (ref 0–0.2)
BASOPHILS NFR BLD: 0.8 % (ref 0–1.9)
DIFFERENTIAL METHOD BLD: NORMAL
EOSINOPHIL # BLD AUTO: 0.2 K/UL (ref 0–0.5)
EOSINOPHIL NFR BLD: 3.1 % (ref 0–8)
ERYTHROCYTE [DISTWIDTH] IN BLOOD BY AUTOMATED COUNT: 14.2 % (ref 11.5–14.5)
HCT VFR BLD AUTO: 44.4 % (ref 37–48.5)
HGB BLD-MCNC: 14.3 G/DL (ref 12–16)
IMM GRANULOCYTES # BLD AUTO: 0.02 K/UL (ref 0–0.04)
IMM GRANULOCYTES NFR BLD AUTO: 0.3 % (ref 0–0.5)
LYMPHOCYTES # BLD AUTO: 2.5 K/UL (ref 1–4.8)
LYMPHOCYTES NFR BLD: 32.3 % (ref 18–48)
MCH RBC QN AUTO: 30.5 PG (ref 27–31)
MCHC RBC AUTO-ENTMCNC: 32.2 G/DL (ref 32–36)
MCV RBC AUTO: 95 FL (ref 82–98)
MONOCYTES # BLD AUTO: 0.6 K/UL (ref 0.3–1)
MONOCYTES NFR BLD: 8 % (ref 4–15)
NEUTROPHILS # BLD AUTO: 4.4 K/UL (ref 1.8–7.7)
NEUTROPHILS NFR BLD: 55.5 % (ref 38–73)
NRBC BLD-RTO: 0 /100 WBC
PLATELET # BLD AUTO: 285 K/UL (ref 150–450)
PMV BLD AUTO: 11.3 FL (ref 9.2–12.9)
RBC # BLD AUTO: 4.69 M/UL (ref 4–5.4)
WBC # BLD AUTO: 7.86 K/UL (ref 3.9–12.7)

## 2024-09-04 PROCEDURE — 85025 COMPLETE CBC W/AUTO DIFF WBC: CPT | Performed by: NURSE PRACTITIONER

## 2024-09-04 PROCEDURE — 99999 PR PBB SHADOW E&M-EST. PATIENT-LVL II: CPT | Mod: PBBFAC,,,

## 2024-09-04 PROCEDURE — 36415 COLL VENOUS BLD VENIPUNCTURE: CPT | Performed by: NURSE PRACTITIONER

## 2024-09-04 NOTE — DISCHARGE INSTRUCTIONS
Pre op instructions reviewed with patient face to face during Clinic Visit with Provider, verbalized understanding.    To confirm, Surgery is scheduled on 9/10/24. We will call you after 2pm the day before Surgery with your arrival time.    *Please report to the [facility]obby (1st Floor) located off of [facility] (2nd Entrance/Building on the left, in front of the flag pole).  Address: [address] [place] [address] [place] [address]      !!!INSTRUCTIONS IMPORTANT!!!  DO NOT Eat, Drink, or Smoke after 12 midnight unless instructed otherwise by your Surgeon. OK to brush teeth, no gum, candy or mints!    MORNING OF SURGERY, drink small sip of water with the following medications instructed by Surgery Pre-Admit Provider:  Albuterol  Amlodipine  Flonase spray  Metoprolol     *Additional Medication Instructions: HOLD Xarelto 48 hrs prior to surgery    Diabetic/Prediabetic Patients: If you take diabetic or weight loss medication, Do NOT take morning of surgery unless instructed by Doctor. Metformin to be stopped 24 hrs prior to surgery. Ozempic/ Mounjaro/ Wegovy/ Trulicity/ Semaglutide or any weight loss injections to be stopped 7 days prior to surgery. DO NOT take long-acting insulin the evening before surgery. Blood sugars will be checked in pre-op by Nurse.    !!!STOP ALL Aspirins, NSAIDS, WEIGHT LOSS INJECTIONS/PILLS, Herbal supplements, & Vitamins 7 DAYS BEFORE SURGERY!!!    ____  Avoid Alcoholic beverages 3 days prior to surgery, as it can thin the blood.  ____  NO Acrylic/fake nails or nail polish worn day of surgery (specifically hand/arm & foot surgeries).  ____  NO powder, lotions, deodorants, oils or cream on body.  ____  Remove all jewelry, piercings, & foreign objects prior to arrival and leave at home.  ____  Remove Dentures, Hearing Aids & Contact Lens prior to surgery.  ____  Bring photo ID and insurance information to hospital (Leave Valuables at Home).  ____  If going home the same day, arrange  for a ride home. You will not be able to drive for 24 hrs if Anesthesia was used.   ____  Females (ages 11-60): may need to give a urine sample the morning of surgery; please see Pre op Nurse prior to using the restroom.  ____  Males: Stop ED medications (Viagra, Cialis) 24 hrs prior to surgery.  ____  Wear clean, loose fitting clothing to allow for dressings/ bandages.      Bathing Instructions:    -Shower with anti-bacterial Soap (Hibiclens or Dial) the night before surgery & the morning of surgery!   -Do not use Hibiclens soap on your face or genitals.   -Apply clean clothes after shower.  -Do not shave your face or body 2 days prior to surgery unless instructed otherwise by your Surgeon.  -Do not shave pubic hair 7 days prior to surgery (gyn pt's).    [facility]/Ride Policy:  Only 2 adults allowed in pre op/recovery area during your procedure. You MUST HAVE A RIDE HOME from a responsible adult that you know and trust. Medical Transport, Uber or Lyft can ONLY be used if patient has a responsible adult to accompany them during ride home.       *Signs and symptoms of Infection Before or After Surgery:               !!!If you experience any fever, chills, nausea/ vomiting, foul odor/ excessive drainage from surgical site, flu-like symptoms, new wounds or cuts, PLEASE CALL THE SURGEON OFFICE at [phone] or SEND MESSAGE THROUGH [employer] PORTAL!!!       *If you are running late day of surgery, please call the Surgery Dept @ [phone].    *Billing question, please call:  [phone] or [phone]       Thank you,  -[facility] Pre Admit Dept.  [phone] or [phone]  M-F 7:30 am-4:00 pm (Closed Major Holidays)    Additional Tests Scheduled Today:  Labs (1st Floor) Check in at the !  Clearances/ Appointments Requested before Surgery: Cardiac Clearance appt 9/9/24 per patient request

## 2024-09-04 NOTE — PROGRESS NOTES
"                                               Preoperative History and Physical                                                              Hospital Medicine                                              Chief Complaint: Preoperative evaluation     History of Present Illness:      Nathalie Membreno is a 79 y.o. female who presents to the office today for a preoperative consultation at the request of Dr. Garza who plans on performing rectum lesion excision on September 10.     Functional Status:      The patient is able to climb a flight of stairs. The patient is able to ambulate with cane, walker, or knee brace without difficulty. The patient's functional status is affected by the surgical problem. The patient's functional status is not affected by shortness of breath, chest pain, dyspnea on exertion and fatigue.    MET score greater than 4    Past Medical History:      Past Medical History:   Diagnosis Date    Allergy     Anemia     Arthritis     knees, hands, back    Back pain     pt reports "mild"    Chronic diastolic heart failure 7/12/2023    Chronic kidney disease, stage 3a 11/2/2023    Depression, recurrent 7/12/2023    Dysphagia, unspecified(787.20)     GERD (gastroesophageal reflux disease)     History of carpal tunnel release of both wrists 03/10/2016    Hyperlipidemia     Hypertension     LGSIL (low grade squamous intraepithelial dysplasia)     HPV +    Mild intermittent asthma without complication 07/28/2020    Obesity     Pneumonia of left lower lobe due to infectious organism 6/26/2023    Pulmonary embolism, bilateral 7/5/2023    Stroke 02/08/2017        Past Surgical History:      Past Surgical History:   Procedure Laterality Date    CARDIAC DEFIBRILLATOR PLACEMENT  04/2017    Dr. Nuno    CARPAL TUNNEL RELEASE      b/l    CATARACT EXTRACTION EXTRACAPSULAR W/ INTRAOCULAR LENS IMPLANTATION Bilateral 2022    COLONOSCOPY N/A 03/31/2021    Procedure: COLONOSCOPY;  Surgeon: Fozia Dasilva MD;  " Location: [facility] ENDO;  Service: Endoscopy;  Laterality: N/A;    COLONOSCOPY N/A 8/12/2024    Procedure: COLONOSCOPY - pre op clearance in media 7/1/24;  Surgeon: Robbie Pereyra MD;  Location: [facility] ENDO;  Service: Gastroenterology;  Laterality: N/A;    DILATION AND CURETTAGE OF UTERUS      10/2021, no cancer per pt.    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     Employer: [employer]    Tobacco Use    Smoking status: Never    Smokeless tobacco: Never   Substance and Sexual Activity    Alcohol use: No     Alcohol/week: 0.0 standard drinks of alcohol    Drug use: No    Sexual activity: Not Currently     Partners: Male     Birth control/protection: None   Social History Narrative    Live alone     Social Determinants of Health     Financial Resource Strain: Low Risk  (7/12/2023)    Overall Financial Resource Strain (CARDIA)     Difficulty of Paying Living Expenses: Not hard at all   Food Insecurity: No Food Insecurity (7/12/2023)    Hunger Vital Sign     Worried About Running Out of Food in the Last Year: Never true     Ran Out of Food in the Last Year: Never true   Transportation Needs: No Transportation Needs (7/12/2023)    PRAPARE - Transportation     Lack of Transportation (Medical): No     Lack of Transportation (Non-Medical): No   Physical Activity: Inactive (7/12/2023)    Exercise Vital Sign     Days of Exercise per Week: 0 days     Minutes of Exercise per Session: 0 min   Stress: Stress Concern Present (7/12/2023)    [employer] - Occupational Stress Questionnaire     Feeling of Stress : Rather  much   Housing Stability: Low Risk  (7/12/2023)    Housing Stability Vital Sign     Unable to Pay for Housing in the Last Year: No     Number of Places Lived in the Last Year: 1     Unstable Housing in the Last Year: No        Family History:      Family History   Problem Relation Name Age of Onset    Hypertension Mother      Hypertension Father      Cancer Father          prostate    Cataracts Father      Glaucoma Father      Diabetes Father      Hypertension Sister      Diabetes Sister      Breast cancer Neg Hx      Colon cancer Neg Hx      Ovarian cancer Neg Hx      Thrombophilia Neg Hx         Allergies:      Review of patient's allergies indicates:   Allergen Reactions    Penicillins Swelling     Generalized swelling  Other reaction(s): Swelling  Generalized swelling  Other reaction(s): Swelling  Generalized swelling       Medications:      Current Outpatient Medications   Medication Sig    albuterol 90 mcg/actuation inhaler Inhale 2 puffs into the lungs every 4 (four) hours as needed for Wheezing.    amLODIPine (NORVASC) 5 MG tablet TAKE 1 TABLET TWICE DAILY    betamethasone valerate 0.1% (VALISONE) 0.1 % Oint Apply topically 2 (two) times daily.    cholecalciferol, vitamin D3, (VITAMIN D3) 50 mcg (2,000 unit) Cap Take 1 capsule by mouth once daily.    digoxin (LANOXIN) 125 mcg tablet TAKE 1 TABLET EVERY DAY    empagliflozin (JARDIANCE) 10 mg tablet Take 1 tablet (10 mg total) by mouth once daily.    fluticasone propionate (FLONASE) 50 mcg/actuation nasal spray 2 sprays (100 mcg total) by Each Nostril route once daily.    losartan (COZAAR) 100 MG tablet TAKE 1 TABLET ONE TIME DAILY    metoprolol succinate (TOPROL-XL) 100 MG 24 hr tablet TAKE 1 TABLET ONE TIME DAILY    mupirocin (BACTROBAN) 2 % ointment Apply topically 3 (three) times daily. For broken skin. (Patient not taking: Reported on 9/4/2024)    polyethylene glycol (GLYCOLAX) 17 gram PwPk Take by mouth once daily.    REPATHA SURECLICK 140 mg/mL PnIj  INJECT 140MG UNDER THE SKIN EVERY 2 WEEKS FOR HIGH CHOLESTEROL    rivaroxaban (XARELTO) 20 mg Tab TAKE 1 TABLET BY MOUTH EVERY DAY WITH DINNER     No current facility-administered medications for this visit.       Vitals:      Vitals:    09/04/24 1412   BP: 136/81   Pulse: 82   Resp: 20   Temp: 97.7 °F (36.5 °C)       Review of Systems:        Constitutional: Negative for fever, chills, weight loss, malaise/fatigue and diaphoresis.   HENT: Negative for hearing loss, ear pain, nosebleeds, congestion, sore throat, neck pain, tinnitus and ear discharge.    Eyes: Negative for blurred vision, double vision, photophobia, pain, discharge and redness.   Respiratory: Negative for cough, hemoptysis, sputum production, shortness of breath, wheezing and stridor.    Cardiovascular: Negative for chest pain, palpitations, orthopnea, claudication, leg swelling and PND.   Gastrointestinal: Negative for heartburn, nausea, vomiting, abdominal pain, diarrhea, constipation, blood in stool and melena.   Genitourinary: Negative for dysuria, urgency, frequency, hematuria and flank pain.   Musculoskeletal: Negative for myalgias, back pain, joint pain and falls.   Skin: Negative for itching and rash.   Neurological: Negative for dizziness, tingling, tremors, sensory change, speech change, focal weakness, seizures, loss of consciousness, weakness and headaches.   Endo/Heme/Allergies: Negative for environmental allergies and polydipsia. Does not bruise/bleed easily.   Psychiatric/Behavioral: Negative for depression, suicidal ideas, hallucinations, memory loss and substance abuse. The patient is not nervous/anxious and does not have insomnia.    All 14 systems reviewed and negative except as noted above.    Physical Exam:      Constitutional: Appears well-developed, well-nourished, obese and in no acute distress.  Patient is oriented to person, place, and time.   Head: Normocephalic and atraumatic. Mucous membranes moist.  Neck: Neck supple no  mass.   Cardiovascular: Normal rate and regular rhythm.  S1 S2 appreciated by ascultation. ICD left chest wall  Pulmonary/Chest: Effort normal and clear to auscultation bilaterally. No respiratory distress.   Abdomen: Soft. Non-tender and non-distended. Bowel sounds are normal.   Neurological: Patient is alert and oriented to person, place and time. Moves all extremities.  Skin: Warm and dry. No lesions.  Extremities: No clubbing, cyanosis or edema.    Laboratory data:      Reviewed and noted in plan where applicable. Please see chart for full laboratory data.    Lab Results   Component Value Date     08/02/2024    K 4.2 08/02/2024     08/02/2024    CO2 26 08/02/2024    CALCIUM 9.5 08/02/2024    BUN 12 08/02/2024    CREATININE 1.1 08/02/2024    GLU 85 08/02/2024        Lab Results   Component Value Date    INR 1.2 06/28/2023       Lab Results   Component Value Date    WBC 7.86 09/04/2024    HGB 14.3 09/04/2024    HCT 44.4 09/04/2024    MCV 95 09/04/2024     09/04/2024       Predictors of intubation difficulty:       Morbid obesity? no   Anatomically abnormal facies? no   Prominent incisors? no   Receding mandible? no   Short, thick neck? no   Neck range of motion: normal   Dentition: No chipped, loose, or missing teeth.  Based on the Modified Mallampati, patient is a mallampati score: II (hard and soft palate, upper portion of tonsils anduvula visible)    Cardiographics:      ECG: Care Everywhere  Normal sinus rhythm   T wave abnormality, consider inferolateral ischemia   Compared to ECG 07/01/2024 12:24:46   T-wave abnormality now present   Possible ischemia now present   ST (T wave) deviation no longer present   Electronically Signed On 8-8-2024 8:37:17 CDT by José Weiss     Echocardiogram: 7/2023  CONCLUSIONS:   1. Normal left ventricular cavity size. Normal left ventricular systolic function. LVEF   55 - 65%. Moderate (Grade II) diastolic dysfunction (pseudonormal filling). Normal wall    motion.   2. Normal right ventricular size. Normal right ventricular systolic function.   3. Mild mitral valve regurgitation.   4. Mild tricuspid valve regurgitation.   5. The ascending aorta is normal in size.     Imaging:      Chest x-ray:   XR CHEST 2 VIEW PA AND LATERAL     INDICATION: cough              R68.83:Chills (without fever)       Comparison: 9/26/2019     Discussion:    There is mild cardiomegaly.  There is no evidence of pneumothorax. A cardiac event monitor projects over the left heart.     Assessment and Plan:      Anorectal polyp  Rectum lesion excision by Dr. Garza on 9/10/24    1. Pre-operative (colonoscopy) cardiovascular examination by José Weiss MD  on 07/01/2024   - No recent cardiac complaints  - Patient may proceed with planned procedure with intermediate risk of significant cardiac events perioperatively.  - Instructed patient to hold Xarelto 48 hours prior to procedure, and resume Xarelto the following day if no bleeding complications.   -patient prefers to be seen by Dr. Goddard again before her procedure. Appt scheduled for 9/9/24.     Known risk factors for perioperative complications: Congestive heart failure  Renal dysfunction  Obesity, atrial fibrillation     Difficulty with intubation is not anticipated.    Cardiac Risk Estimation: Based on the Revised Cardiac Risk index, patient is a Class II risk with a 6.0% risk of a major cardiac event in a low risk procedure.    1.) Preoperative workup as follows: chest x-ray, ECG, hemoglobin, hematocrit, electrolytes, creatinine.  2.) Change in medication regimen before surgery: Hold Xarelto 48 hours prior to surgery, hold Januvia 24 hours prior to surgery.  3.) Prophylaxis for cardiac events with perioperative beta-blockers: metoprolol.  4.) Invasive hemodynamic monitoring perioperatively: at the discretion of anesthesiologist.  5.) Deep vein thrombosis prophylaxis postoperatively: intermittent pneumatic compression boots and  regimen to be chosen by surgical team.  6.) Surveillance for postoperative MI with ECG immediately postoperatively and on postoperati ve days 1 and 2 AND troponin levels 24 hours postoperatively and on day 4 or hospital discharge (whichever comes first): at the discretion of anesthesiologist.  7.) Current medications which may produce withdrawal symptoms if withheld perioperatively: N/A  8.) Other measures: Postoperative incentive spirometry to prevent pneumonia.    --Morning of Procedure medications: albuterol, amlodipine, digoxin, metoprolol,  --Hold all other medications morning of surgery: Jardiance  --Resume all medications post-operatively  --Hold ASA, NSAIDs and all vitamins/supplements 7 days prior to procedure  --Hold Jardiance 24 hours prior to surgery   --Hold Ozempic 7 days prior to surgery (patient denies use)  --Hold Xarelto 48 hours prior to surgery       Atrial flutter with rapid ventricular response  Rate controlled. She will continue metoprolol and digoxin on AM of surgery. She was asked to hold Xarelto for 48 hours prior to procedure.    Chronic kidney disease, stage 3a  Kidney function. Avoid nephrotoxic medications perioperatively.    Essential hypertension  Stable on exam today. Her home regimen is amlodipine, losartan, and metoprolol. She was asked to hold losartan AM of surgery.    History of TIA (transient ischemic attack)  She is currently on Xarelto. She was asked to hold 48 hour prior to procedure and resume as soon as possible.     Severe obesity with body mass index (BMI) of 35.0 to 39.9 with comorbidity  BMI 37.Morbid obesity complicates all aspects of disease management from diagnostic modalities to treatment. Weight loss encouraged and health benefits explained to patient.       Chronic diastolic heart failure  Euvolemic on exam. Cardiac clearance obtained on 7/1/2024 from Dr. Forman. She will seen him again on 9/9/24 prior to procedure.   Avoid volume overload  perioperatively.    Electronically signed by KAREN Matias on 9/5/2024 at 2:59 PM.     Time spent seeing patient (greater than 1/2 spent in direct contact) 45 minutes

## 2024-09-04 NOTE — ASSESSMENT & PLAN NOTE
She is currently on Xarelto. She was asked to hold 48 hour prior to procedure and resume as soon as possible.

## 2024-09-04 NOTE — ASSESSMENT & PLAN NOTE
Euvolemic on exam. Cardiac clearance obtained on 7/1/2024 from Dr. Forman. She will seen him again on 9/9/24 prior to procedure.   Avoid volume overload perioperatively.

## 2024-09-04 NOTE — ASSESSMENT & PLAN NOTE
Rectum lesion excision by Dr. Garza on 9/10/24    1. Pre-operative (colonoscopy) cardiovascular examination by José Weiss MD  on 08/01/2024   - No recent cardiac complaints  - Patient may proceed with planned procedure with intermediate risk of significant cardiac events perioperatively.  - Instructed patient to hold Xarelto 48 hours prior to procedure, and resume Xarelto the following day if no bleeding complications.   -patient prefers to be seen by Dr. Goddard again before her procedure. Appt scheduled for 9/9/24.     Known risk factors for perioperative complications: Congestive heart failure  Renal dysfunction  Obesity, atrial fibrillation     Difficulty with intubation is not anticipated.    Cardiac Risk Estimation: Based on the Revised Cardiac Risk index, patient is a Class II risk with a 6.0% risk of a major cardiac event in a low risk procedure.    1.) Preoperative workup as follows: chest x-ray, ECG, hemoglobin, hematocrit, electrolytes, creatinine.  2.) Change in medication regimen before surgery: Hold Xarelto 48 hours prior to surgery, hold Januvia 24 hours prior to surgery.  3.) Prophylaxis for cardiac events with perioperative beta-blockers: metoprolol.  4.) Invasive hemodynamic monitoring perioperatively: at the discretion of anesthesiologist.  5.) Deep vein thrombosis prophylaxis postoperatively: intermittent pneumatic compression boots and regimen to be chosen by surgical team.  6.) Surveillance for postoperative MI with ECG immediately postoperatively and on postoperati ve days 1 and 2 AND troponin levels 24 hours postoperatively and on day 4 or hospital discharge (whichever comes first): at the discretion of anesthesiologist.  7.) Current medications which may produce withdrawal symptoms if withheld perioperatively: N/A  8.) Other measures: Postoperative incentive spirometry to prevent pneumonia.    --Morning of Procedure medications: albuterol, amlodipine, digoxin, metoprolol,  --Hold  all other medications morning of surgery: Jardiance  --Resume all medications post-operatively  --Hold ASA, NSAIDs and all vitamins/supplements 7 days prior to procedure  --Hold Jardiance 24 hours prior to surgery   --Hold Ozempic 7 days prior to surgery (patient denies use)  --Hold Xarelto 48 hours prior to surgery

## 2024-09-04 NOTE — ASSESSMENT & PLAN NOTE
Rate controlled. She will continue metoprolol and digoxin on AM of surgery. She was asked to hold Xarelto for 48 hours prior to procedure.

## 2024-09-04 NOTE — ASSESSMENT & PLAN NOTE
BMI 37.Morbid obesity complicates all aspects of disease management from diagnostic modalities to treatment. Weight loss encouraged and health benefits explained to patient.

## 2024-09-04 NOTE — ASSESSMENT & PLAN NOTE
Stable on exam today. Her home regimen is amlodipine, losartan, and metoprolol. She was asked to hold losartan AM of surgery.

## 2024-09-04 NOTE — PRE-PROCEDURE INSTRUCTIONS
Pre op instructions reviewed with patient face to face during Clinic Visit with Provider, verbalized understanding.    To confirm, Surgery is scheduled on 9/10/24. We will call you after 2pm the day before Surgery with your arrival time.    *Please report to the [facility] Lobby (1st Floor) located off of [facility] (2nd Entrance/Building on the left, in front of the flag pole).  Address: [address] [place] [address] [place] [id number]      !!!INSTRUCTIONS IMPORTANT!!!  DO NOT Eat, Drink, or Smoke after 12 midnight unless instructed otherwise by your Surgeon. OK to brush teeth, no gum, candy or mints!    MORNING OF SURGERY, drink small sip of water with the following medications instructed by Surgery Pre-Admit Provider:  Albuterol  Amlodipine  Flonase spray  Metoprolol     *Additional Medication Instructions: HOLD Xarelto 48 hrs prior to surgery    Diabetic/Prediabetic Patients: If you take diabetic or weight loss medication, Do NOT take morning of surgery unless instructed by Doctor. Metformin to be stopped 24 hrs prior to surgery. Ozempic/ Mounjaro/ Wegovy/ Trulicity/ Semaglutide or any weight loss injections to be stopped 7 days prior to surgery. DO NOT take long-acting insulin the evening before surgery. Blood sugars will be checked in pre-op by Nurse.    !!!STOP ALL Aspirins, NSAIDS, WEIGHT LOSS INJECTIONS/PILLS, Herbal supplements, & Vitamins 7 DAYS BEFORE SURGERY!!!    ____  Avoid Alcoholic beverages 3 days prior to surgery, as it can thin the blood.  ____  NO Acrylic/fake nails or nail polish worn day of surgery (specifically hand/arm & foot surgeries).  ____  NO powder, lotions, deodorants, oils or cream on body.  ____  Remove all jewelry, piercings, & foreign objects prior to arrival and leave at home.  ____  Remove Dentures, Hearing Aids & Contact Lens prior to surgery.  ____  Bring photo ID and insurance information to hospital (Leave Valuables at Home).  ____  If going home the same day, arrange  for a ride home. You will not be able to drive for 24 hrs if Anesthesia was used.   ____  Females (ages 11-60): may need to give a urine sample the morning of surgery; please see Pre op Nurse prior to using the restroom.  ____  Males: Stop ED medications (Viagra, Cialis) 24 hrs prior to surgery.  ____  Wear clean, loose fitting clothing to allow for dressings/ bandages.      Bathing Instructions:    -Shower with anti-bacterial Soap (Hibiclens or Dial) the night before surgery & the morning of surgery!   -Do not use Hibiclens soap on your face or genitals.   -Apply clean clothes after shower.  -Do not shave your face or body 2 days prior to surgery unless instructed otherwise by your Surgeon.  -Do not shave pubic hair 7 days prior to surgery (gyn pt's).    [facility] Visitor/Ride Policy:  Only 2 adults allowed in pre op/recovery area during your procedure. You MUST HAVE A RIDE HOME from a responsible adult that you know and trust. Medical Transport, Uber or Lyft can ONLY be used if patient has a responsible adult to accompany them during ride home.       *Signs and symptoms of Infection Before or After Surgery:               !!!If you experience any fever, chills, nausea/ vomiting, foul odor/ excessive drainage from surgical site, flu-like symptoms, new wounds or cuts, PLEASE CALL THE SURGEON OFFICE at [phone] or SEND MESSAGE THROUGH [employer] PORTAL!!!       *If you are running late day of surgery, please call the Surgery Dept @ [phone].    *Billing question, please call:  [phone] or [phone]       Thank you,  -[facility] Pre Admit Dept.  [phone] or [phone]  M-F 7:30 am-4:00 pm (Closed Major Holidays)    Additional Tests Scheduled Today:  Labs (1st Floor) Check in at the !  Clearances/ Appointments Requested before Surgery: Cardiac Clearance appt 9/9/24 per patient request

## 2024-09-09 ENCOUNTER — ANESTHESIA EVENT (OUTPATIENT)
Dept: SURGERY | Facility: HOSPITAL | Age: 79
End: 2024-09-09
Payer: MEDICARE

## 2024-09-09 ENCOUNTER — TELEPHONE (OUTPATIENT)
Dept: PREADMISSION TESTING | Facility: HOSPITAL | Age: 79
End: 2024-09-09
Payer: MEDICARE

## 2024-09-09 NOTE — TELEPHONE ENCOUNTER
Called and spoke with Pt about the following:     Please arrive to [facility] ([facility] at 5:30 am on 9/10/2024 for your scheduled procedure.  Address: [address] [place] [address] [place]. [id number] (2nd Building on left, 1st Floor Lobby)  >>>NO eating or drinking after midnight unless instructed otherwise by your Surgeon<<<

## 2024-09-10 ENCOUNTER — HOSPITAL ENCOUNTER (OUTPATIENT)
Facility: HOSPITAL | Age: 79
Discharge: HOME | End: 2024-09-10
Attending: COLON & RECTAL SURGERY | Admitting: COLON & RECTAL SURGERY
Payer: MEDICARE

## 2024-09-10 ENCOUNTER — ANESTHESIA (OUTPATIENT)
Dept: SURGERY | Facility: HOSPITAL | Age: 79
End: 2024-09-10
Payer: MEDICARE

## 2024-09-10 VITALS
HEIGHT: 64 IN | OXYGEN SATURATION: 100 % | RESPIRATION RATE: 18 BRPM | DIASTOLIC BLOOD PRESSURE: 89 MMHG | HEART RATE: 64 BPM | TEMPERATURE: 98 F | SYSTOLIC BLOOD PRESSURE: 161 MMHG | BODY MASS INDEX: 37.16 KG/M2 | WEIGHT: 217.69 LBS

## 2024-09-10 DIAGNOSIS — K62.1 ANORECTAL POLYP: ICD-10-CM

## 2024-09-10 DIAGNOSIS — K62.0 ANORECTAL POLYP: ICD-10-CM

## 2024-09-10 DIAGNOSIS — R93.3 ABNORMAL COLONOSCOPY: ICD-10-CM

## 2024-09-10 DIAGNOSIS — Z01.818 PREOP TESTING: Primary | ICD-10-CM

## 2024-09-10 PROCEDURE — 36000706: Performed by: COLON & RECTAL SURGERY

## 2024-09-10 PROCEDURE — 88309 TISSUE EXAM BY PATHOLOGIST: CPT | Performed by: PATHOLOGY

## 2024-09-10 PROCEDURE — 63600175 PHARM REV CODE 636 W HCPCS: Performed by: COLON & RECTAL SURGERY

## 2024-09-10 PROCEDURE — 37000009 HC ANESTHESIA EA ADD 15 MINS: Performed by: COLON & RECTAL SURGERY

## 2024-09-10 PROCEDURE — C9290 INJ, BUPIVACAINE LIPOSOME: HCPCS | Performed by: COLON & RECTAL SURGERY

## 2024-09-10 PROCEDURE — 88309 TISSUE EXAM BY PATHOLOGIST: CPT | Mod: 26,,, | Performed by: PATHOLOGY

## 2024-09-10 PROCEDURE — 45171 EXC RECT TUM TRANSANAL PART: CPT | Mod: ,,, | Performed by: COLON & RECTAL SURGERY

## 2024-09-10 PROCEDURE — 37000008 HC ANESTHESIA 1ST 15 MINUTES: Performed by: COLON & RECTAL SURGERY

## 2024-09-10 PROCEDURE — 71000015 HC POSTOP RECOV 1ST HR: Performed by: COLON & RECTAL SURGERY

## 2024-09-10 PROCEDURE — 27201423 OPTIME MED/SURG SUP & DEVICES STERILE SUPPLY: Performed by: COLON & RECTAL SURGERY

## 2024-09-10 PROCEDURE — 63600175 PHARM REV CODE 636 W HCPCS: Performed by: NURSE ANESTHETIST, CERTIFIED REGISTERED

## 2024-09-10 PROCEDURE — 36000707: Performed by: COLON & RECTAL SURGERY

## 2024-09-10 PROCEDURE — 71000033 HC RECOVERY, INTIAL HOUR: Performed by: COLON & RECTAL SURGERY

## 2024-09-10 RX ORDER — PROPOFOL 10 MG/ML
VIAL (ML) INTRAVENOUS CONTINUOUS PRN
Status: DISCONTINUED | OUTPATIENT
Start: 2024-09-10 | End: 2024-09-10

## 2024-09-10 RX ORDER — BUPIVACAINE HYDROCHLORIDE 2.5 MG/ML
INJECTION, SOLUTION EPIDURAL; INFILTRATION; INTRACAUDAL
Status: DISCONTINUED | OUTPATIENT
Start: 2024-09-10 | End: 2024-09-10 | Stop reason: HOSPADM

## 2024-09-10 RX ORDER — SODIUM CHLORIDE 9 MG/ML
INJECTION, SOLUTION INTRAVENOUS CONTINUOUS
Status: DISCONTINUED | OUTPATIENT
Start: 2024-09-10 | End: 2024-09-10 | Stop reason: HOSPADM

## 2024-09-10 RX ORDER — ONDANSETRON HYDROCHLORIDE 2 MG/ML
4 INJECTION, SOLUTION INTRAVENOUS EVERY 6 HOURS PRN
Status: DISCONTINUED | OUTPATIENT
Start: 2024-09-10 | End: 2024-09-10 | Stop reason: HOSPADM

## 2024-09-10 RX ORDER — ONDANSETRON HYDROCHLORIDE 2 MG/ML
4 INJECTION, SOLUTION INTRAVENOUS EVERY 12 HOURS PRN
Status: DISCONTINUED | OUTPATIENT
Start: 2024-09-10 | End: 2024-09-10 | Stop reason: HOSPADM

## 2024-09-10 RX ORDER — AMOXICILLIN 250 MG
1 CAPSULE ORAL 2 TIMES DAILY
Qty: 60 TABLET | Refills: 0 | Status: SHIPPED | OUTPATIENT
Start: 2024-09-10

## 2024-09-10 RX ORDER — PROPOFOL 10 MG/ML
VIAL (ML) INTRAVENOUS
Status: DISCONTINUED | OUTPATIENT
Start: 2024-09-10 | End: 2024-09-10

## 2024-09-10 RX ORDER — SODIUM CHLORIDE, SODIUM LACTATE, POTASSIUM CHLORIDE, CALCIUM CHLORIDE 600; 310; 30; 20 MG/100ML; MG/100ML; MG/100ML; MG/100ML
INJECTION, SOLUTION INTRAVENOUS CONTINUOUS
Status: DISCONTINUED | OUTPATIENT
Start: 2024-09-10 | End: 2024-09-10 | Stop reason: HOSPADM

## 2024-09-10 RX ORDER — OXYCODONE HYDROCHLORIDE 5 MG/1
5 TABLET ORAL EVERY 6 HOURS PRN
Qty: 15 TABLET | Refills: 0 | Status: SHIPPED | OUTPATIENT
Start: 2024-09-10

## 2024-09-10 RX ADMIN — SODIUM CHLORIDE, POTASSIUM CHLORIDE, SODIUM LACTATE AND CALCIUM CHLORIDE: 600; 310; 30; 20 INJECTION, SOLUTION INTRAVENOUS at 07:09

## 2024-09-10 RX ADMIN — PROPOFOL 75 MCG/KG/MIN: 10 INJECTION, EMULSION INTRAVENOUS at 07:09

## 2024-09-10 RX ADMIN — PROPOFOL 50 MG: 10 INJECTION, EMULSION INTRAVENOUS at 07:09

## 2024-09-10 NOTE — OP NOTE
[facility] - Surgery ([facility])  Surgery Department  Operative Note    SUMMARY     Date of Procedure: 9/10/2024     Procedure:  Transanal excision  Pudendal nerve block    Surgeons and Role:     * Sae Garza MD - Primary    Assisting Surgeon: None    Pre-Operative Diagnosis: Anorectal polyp [K62.0, K62.1]    Post-Operative Diagnosis: Post-Op Diagnosis Codes:     * Anorectal polyp [K62.0, K62.1]    Anesthesia: Local MAC    Technical Procedures Used:   Transanal excision  Pudendal nerve block    Indications for Procedure:  79-year-old female with posterior anal canal lesion who presents for definitive surgical management    Findings of the Procedure:  1 cm pedunculated posterior anal canal lesion amenable to transanal excision    Description of the Procedure:  Patient was brought to the operating room and placed supine on table.  Mac anesthesia was then induced.  The patient was then moved to lithotomy position.  The anus and perineum were then prepped and draped usual sterile fashion.  A preop surgical time-out was performed confirming the correct patient, procedure and preop medications given.  A digital rectal exam was performed confirming the palpable pedunculated mobile lesion in the posterior anal canal just inside the anal verge.  Was no other abnormalities on digital rectal exam.  A pudendal nerve block was then performed using a mixture of 20 cc of Exparel and 30 cc of 0.25% bupivacaine plain.  10 cc injected subdermally around the anal verge, 20 cc injected bilaterally into the intersphincteric space and 20 cc injected bilaterally into the ischial fossa for total of 50 cc given for the block.  A lighted Hill-Gregg retractor was inserted to the anus in all 4 quadrants were examined.  The pedunculated lesion was seen easily within the posterior anal canal.  It was grasped with an Allis and everted into the field.  It was excised at its base using electrocautery with a healthy margin of tissue around  including some hemorrhoid tissue.  It was passed off the field for final pathology.  It was made hemostatic with electrocautery.  The defect was then closed with 2-0 Vicryl suture in usual fashion.  All areas were checked and found to be hemostatic.  Surgical dressings were applied.  The patient was moved back in the supine position.  She was woken from mac anesthesia and taken to the postanesthesia care in stable condition.  She tolerated procedure well.  All sponge, needle instrument counts were correct at the end of the case.    Significant Surgical Tasks Conducted by the Assistant(s), if Applicable: Assisted with all portions of the operation as it was required to help with the positioning, exposure and closure to complete the operation    Complications: No    Estimated Blood Loss (EBL): 5mL           Implants: * No implants in log *    Specimens:   Specimen (24h ago, onward)       Start     Ordered    09/10/24 0740  Specimen to Pathology, Surgery General Surgery  Once        Comments: Pre-op Diagnosis: Anorectal polyp [K62.0, K62.1]Procedure(s):EXCISION, LESION, RECTUM, ANAL APPROACH (lithotomy)BLOCK, NERVE, PUDENDAL Number of specimens: 1Name of specimens: 1)  Posterior Anal Lesion -- perm.     References:    Click here for ordering Quick Tip   Question Answer Comment   Procedure Type: General Surgery    Specimen Class: Routine/Screening    Release to patient Immediate        09/10/24 0740                            Condition: Good    Disposition: PACU - hemodynamically stable.    Attestation: I performed the procedure.

## 2024-09-10 NOTE — ANESTHESIA POSTPROCEDURE EVALUATION
Anesthesia Post Evaluation    Patient: Nathalie Membreno    Procedure(s) Performed: Procedure(s) (LRB):  EXCISION, LESION, RECTUM, ANAL APPROACH (lithotomy) (N/A)  BLOCK, NERVE, PUDENDAL (N/A)    Final Anesthesia Type: MAC      Patient location during evaluation: PACU  Patient participation: Yes- Able to Participate  Level of consciousness: awake and alert and oriented  Post-procedure vital signs: reviewed and stable  Pain management: adequate  Airway patency: patent  LUIS mitigation strategies: Multimodal analgesia  PONV status at discharge: No PONV  Anesthetic complications: no      Cardiovascular status: blood pressure returned to baseline and hemodynamically stable  Respiratory status: unassisted  Hydration status: euvolemic  Follow-up not needed.              Vitals Value Taken Time   /89 09/10/24 0814   Temp 36.8 °C (98.2 °F) 09/10/24 0813   Pulse 64 09/10/24 0814   Resp 18 09/10/24 0814   SpO2 100 % 09/10/24 0814   Vitals shown include unfiled device data.      Event Time   Out of Recovery 08:17:09         Pain/Kapil Score: Kapil Score: 10 (9/10/2024  8:20 AM)

## 2024-09-10 NOTE — TRANSFER OF CARE
"Anesthesia Transfer of Care Note    Patient: Nathalie Membreno    Procedure(s) Performed: Procedure(s) (LRB):  EXCISION, LESION, RECTUM, ANAL APPROACH (lithotomy) (N/A)  BLOCK, NERVE, PUDENDAL (N/A)    Patient location: PACU    Anesthesia Type: MAC    Transport from OR: Transported from OR on room air with adequate spontaneous ventilation    Post pain: adequate analgesia    Post assessment: no apparent anesthetic complications    Post vital signs: stable    Level of consciousness: sedated    Nausea/Vomiting: no nausea/vomiting    Complications: none    Transfer of care protocol was followed      Last vitals: Visit Vitals  BP (!) 188/86 (BP Location: Right arm, Patient Position: Sitting)   Pulse 66   Temp 36.4 °C (97.5 °F) (Temporal)   Resp 18   Ht 5' 4" (1.626 m)   Wt 98.8 kg (217 lb 11.3 oz)   SpO2 98%   Breastfeeding No   BMI 37.37 kg/m²     "

## 2024-09-10 NOTE — ANESTHESIA PREPROCEDURE EVALUATION
"                                                                                                             09/09/2024  Nathalie Membreno is a 79 y.o., female     Patient Active Problem List   Diagnosis    Essential hypertension    Other hyperlipidemia    GERD (gastroesophageal reflux disease)    Allergy    Lumbar spinal stenosis    Vitamin D deficiency    Bilateral carpal tunnel syndrome    Tortuous aorta    Severe obesity with body mass index (BMI) of 35.0 to 39.9 with comorbidity    Atrial flutter with rapid ventricular response    Diverticulosis of large intestine without hemorrhage    History of colon polyps    Slow transit constipation    History of TIA (transient ischemic attack)    Presence of cardiac defibrillator    Chronic anticoagulation    Mild intermittent asthma without complication    Endometrial thickening on ultrasound    Fibroid tumor    RLS (restless legs syndrome)    Decreased strength, endurance, and mobility    Decreased functional activity tolerance    Decreased range of motion    Balance problem    Keloid of skin    ACP (advance care planning)    Preoperative examination    Shortness of breath    Chronic diastolic heart failure    DDD (degenerative disc disease), lumbar    Chronic kidney disease, stage 3a    Anorectal polyp     Past Medical History:   Diagnosis Date    Allergy     Anemia     Arthritis     knees, hands, back    Back pain     pt reports "mild"    Chronic diastolic heart failure 7/12/2023    Chronic kidney disease, stage 3a 11/2/2023    Depression, recurrent 7/12/2023    Dysphagia, unspecified(787.20)     GERD (gastroesophageal reflux disease)     History of carpal tunnel release of both wrists 03/10/2016    Hyperlipidemia     Hypertension     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 Nuno    CARPAL TUNNEL RELEASE      b/l    CATARACT EXTRACTION EXTRACAPSULAR W/ INTRAOCULAR LENS IMPLANTATION Bilateral 2022    COLONOSCOPY N/A 03/31/2021    Procedure: COLONOSCOPY;  Surgeon: Fozia Dasilva MD;  Location: [facility] ENDO;  Service: Endoscopy;  Laterality: N/A;    COLONOSCOPY N/A 8/12/2024    Procedure: COLONOSCOPY - pre op clearance in media 7/1/24;  Surgeon: Robbie Pereyra MD;  Location: [facility] ENDO;  Service: Gastroenterology;  Laterality: N/A;    DILATION AND CURETTAGE OF UTERUS      10/2021, no cancer per pt.    FOOT SURGERY Right 05/02/2013    wart removal    KELOID EXCISION N/A 6/21/2023    Procedure: EXCISION, KELOID;  Surgeon: Justine Youngblood DO;  Location: [facility] OR;  Service: General;  Laterality: N/A;    TRIGGER FINGER RELEASE Right     TUBAL LIGATION       TTE (2023):  CONCLUSIONS:   1. Normal left ventricular cavity size. Normal left ventricular systolic function. LVEF   55 - 65%. Moderate (Grade II) diastolic dysfunction (pseudonormal filling). Normal wall   motion.   2. Normal right ventricular size. Normal right ventricular systolic function.   3. Mild mitral valve regurgitation.   4. Mild tricuspid valve regurgitation.   5. The ascending aorta is normal in size.       Chemistry        Component Value Date/Time     08/02/2024 0922    K 4.2 08/02/2024 0922     08/02/2024 0922    CO2 26 08/02/2024 0922    BUN 12 08/02/2024 0922    CREATININE 1.1 08/02/2024 0922    GLU 85 08/02/2024 0922        Component Value Date/Time    CALCIUM 9.5 08/02/2024 0922    ALKPHOS 88 02/02/2024 1025    AST 15 02/02/2024 1025    ALT 13 02/02/2024 1025    BILITOT 0.6 02/02/2024 1025    ESTGFRAFRICA >60.0 01/25/2022 0858    EGFRNONAA >60.0 01/25/2022 0858        Lab Results   Component Value Date    WBC 7.86 09/04/2024    HGB 14.3 09/04/2024    HCT  44.4 09/04/2024    MCV 95 09/04/2024     09/04/2024       Pre-op Assessment    I have reviewed the Patient Summary Reports.    I have reviewed the NPO Status.   I have reviewed the Medications.     Review of Systems  Anesthesia Hx:  No problems with previous Anesthesia   History of prior surgery of interest to airway management or planning:  Previous anesthesia: MAC, General        Denies Family Hx of Anesthesia complications.    Denies Personal Hx of Anesthesia complications.                    Social:  Non-Smoker       Hematology/Oncology:  Hematology Normal   Oncology Normal                                   Cardiovascular:  Exercise tolerance: poor  Pacemaker Hypertension    Dysrhythmias (AFlutter w/ RVR)         Denies BARKER.  ECG has been reviewed. Sinus bradycardia   ST and T wave abnormality, consider inferolateral ischemia   Abnormal ECG   When compared with ECG of 31-MAR-2023 11:27,   Inverted T waves have replaced nonspecific T wave abnormality in Inferior   leads   Inverted T waves have replaced nonspecific T wave abnormality in Anterior   leads   Confirmed by LONNIE FINNEY MD ([id number]) on 6/16/2023 3:02:02 PM                          Pulmonary:  Pneumonia  Asthma mild Shortness of breath  Denies Recent URI.  Mild Pulm HTN               Renal/:  Chronic Renal Disease   CKD-3             Hepatic/GI:     GERD             Musculoskeletal:  Arthritis          Spine Disorders:  Disc disease and Degenerative disease           Neurological:   CVA                                    Endocrine:     BMI 37      Obesity / BMI > 30  Psych:    depression              Physical Exam  General: Well nourished, Cooperative, Alert and Oriented    Airway:  Mallampati: III   Mouth Opening: Normal  TM Distance: Normal  Tongue: Large  Neck ROM: Normal ROM    Dental:  Intact  Patient denies any currently loose or chipped teeth; Patient denies any removable dental appliances      Anesthesia Plan  Type of Anesthesia, risks &  benefits discussed:    Anesthesia Type: MAC  Intra-op Monitoring Plan: Standard ASA Monitors  Post Op Pain Control Plan: multimodal analgesia and IV/PO Opioids PRN  Induction:  IV  Airway Plan: Direct, Post-Induction  Informed Consent: Informed consent signed with the Patient and all parties understand the risks and agree with anesthesia plan.  All questions answered.   ASA Score: 3  Day of Surgery Review of History & Physical: H&P Update referred to the surgeon/provider.  Anesthesia Plan Notes: -- Morning of Procedure: took her amlodipine, metoprolol; has NOT taken digoxin within last week   -- has been holding Xarelto for 3 days    *Has not felt her ICD fire in more than a year     Intubation     Date/Time: 6/21/2023 6:57 AM  Performed by: Mami Medellin CRNA  Authorized by: Elian Robert MD      Intubation:     Induction:  Intravenous    Intubated:  Postinduction    Mask Ventilation:  Easy mask    Attempts:  1    Attempted By:  CRNA    Method of Intubation:  Direct and bougie    Blade:  Sams 2    Laryngeal View Grade: Grade IIb - only the arytenoids and epiglottis seen      Difficult Airway Encountered?: No      Complications:  None    Airway Device:  Oral endotracheal tube    Airway Device Size:  7.0    Style/Cuff Inflation:  Cuffed    Inflation Amount (mL):  8    Tube secured:  21    Secured at:  The lips    Placement Verified By:  Capnometry    Complicating Factors:  None    Findings Post-Intubation:  BS equal bilateral      Ready For Surgery From Anesthesia Perspective.     .

## 2024-09-10 NOTE — DISCHARGE SUMMARY
[facility]  Discharge Note  Short Stay    Admit Date: 9/10/2024    Discharge Date and Time: 9/10/2024  8:40 AM     Attending Physician: Sae Garza     Discharge Provider: Sae Garza    Diagnoses:  Anorectal lesion    Discharged Condition: good    Hospital Course: Patient was admitted for an outpatient procedure and tolerated the procedure well with no complications.    Final Diagnoses: Same as principal problem.    Disposition: Home or Self Care    Follow up/Patient Instructions:    Medications:  Reconciled Home Medications:      Medication List        START taking these medications      oxyCODONE 5 MG immediate release tablet  Commonly known as: ROXICODONE  Take 1 tablet (5 mg total) by mouth every 6 (six) hours as needed for Pain.     STOOL SOFTENER-LAXATIVE 8.6-50 mg per tablet  Generic drug: senna-docusate 8.6-50 mg  Take 1 tablet by mouth 2 (two) times a day. Stop taking if having diarrhea            CONTINUE taking these medications      albuterol 90 mcg/actuation inhaler  Commonly known as: PROVENTIL/VENTOLIN HFA  Inhale 2 puffs into the lungs every 4 (four) hours as needed for Wheezing.     amLODIPine 5 MG tablet  Commonly known as: NORVASC  TAKE 1 TABLET TWICE DAILY     betamethasone valerate 0.1% 0.1 % Oint  Commonly known as: VALISONE  Apply topically 2 (two) times daily.     cholecalciferol (vitamin D3) 50 mcg (2,000 unit) Cap capsule  Commonly known as: VITAMIN D3  Take 1 capsule by mouth once daily.     digoxin 125 mcg tablet  Commonly known as: LANOXIN  TAKE 1 TABLET EVERY DAY     empagliflozin 10 mg tablet  Commonly known as: JARDIANCE  Take 1 tablet (10 mg total) by mouth once daily.     fluticasone propionate 50 mcg/actuation nasal spray  Commonly known as: FLONASE  2 sprays (100 mcg total) by Each Nostril route once daily.     losartan 100 MG tablet  Commonly known as: COZAAR  TAKE 1 TABLET ONE TIME DAILY     metoprolol succinate 100 MG 24 hr tablet  Commonly known as:  TOPROL-XL  TAKE 1 TABLET ONE TIME DAILY     polyethylene glycol 17 gram Pwpk  Commonly known as: GLYCOLAX  Take by mouth once daily.     REPATHA SURECLICK 140 mg/mL Pnij  Generic drug: evolocumab  INJECT 140MG UNDER THE SKIN EVERY 2 WEEKS FOR HIGH CHOLESTEROL     rivaroxaban 20 mg Tab  Commonly known as: XARELTO  TAKE 1 TABLET BY MOUTH EVERY DAY WITH DINNER            ASK your doctor about these medications      mupirocin 2 % ointment  Commonly known as: BACTROBAN  Apply topically 3 (three) times daily. For broken skin.            Discharge Procedure Orders   CBC Auto Differential   Standing Status: Future Number of Occurrences: 1 Standing Exp. Date: 11/03/25     Diet general     Other restrictions (specify):   Order Comments: No driving until off of pain medications & can safely react to other drivers.     Call MD for:  extreme fatigue     Call MD for:  persistent dizziness or light-headedness     Call MD for:  hives     Call MD for:  redness, tenderness, or signs of infection (pain, swelling, redness, odor or green/yellow discharge around incision site)     Call MD for:  difficulty breathing, headache or visual disturbances     Call MD for:  severe uncontrolled pain     Call MD for:  persistent nausea and vomiting     Call MD for:  temperature >100.4     Wound care routine (specify)   Order Comments: Wound care routine: Ok to take baths or showers. Take warm baths as needed for pain. May leave gauze/pad in underwear to catch any minor bleeding/drainage as needed.     Activity as tolerated   Order Comments: Ok to take tylenol as needed for pain      Follow-up Information       Sae Garza MD Follow up in 6 week(s).    Specialty: Colon and Rectal Surgery  Contact information:  [address] [place] [address] [address] [address] [phone]                             Discharge Procedure Orders (must include Diet, Follow-up, Activity):   Discharge Procedure Orders (must include Diet, Follow-up, Activity)   CBC  Auto Differential   Standing Status: Future Number of Occurrences: 1 Standing Exp. Date: 11/03/25     Diet general     Other restrictions (specify):   Order Comments: No driving until off of pain medications & can safely react to other drivers.     Call MD for:  extreme fatigue     Call MD for:  persistent dizziness or light-headedness     Call MD for:  hives     Call MD for:  redness, tenderness, or signs of infection (pain, swelling, redness, odor or green/yellow discharge around incision site)     Call MD for:  difficulty breathing, headache or visual disturbances     Call MD for:  severe uncontrolled pain     Call MD for:  persistent nausea and vomiting     Call MD for:  temperature >100.4     Wound care routine (specify)   Order Comments: Wound care routine: Ok to take baths or showers. Take warm baths as needed for pain. May leave gauze/pad in underwear to catch any minor bleeding/drainage as needed.     Activity as tolerated   Order Comments: Ok to take tylenol as needed for pain

## 2024-09-13 LAB
FINAL PATHOLOGIC DIAGNOSIS: NORMAL
GROSS: NORMAL
Lab: NORMAL

## 2024-10-01 ENCOUNTER — OFFICE VISIT (OUTPATIENT)
Dept: OPHTHALMOLOGY | Facility: CLINIC | Age: 79
End: 2024-10-01
Payer: MEDICARE

## 2024-10-01 ENCOUNTER — PATIENT MESSAGE (OUTPATIENT)
Dept: OPHTHALMOLOGY | Facility: CLINIC | Age: 79
End: 2024-10-01

## 2024-10-01 DIAGNOSIS — Z96.1 PSEUDOPHAKIA OF BOTH EYES: ICD-10-CM

## 2024-10-01 DIAGNOSIS — H40.013 OPEN ANGLE WITH BORDERLINE FINDINGS OF BOTH EYES: Primary | ICD-10-CM

## 2024-10-01 PROCEDURE — 1126F AMNT PAIN NOTED NONE PRSNT: CPT | Mod: CPTII,S$GLB,, | Performed by: OPTOMETRIST

## 2024-10-01 PROCEDURE — 92133 CPTRZD OPH DX IMG PST SGM ON: CPT | Mod: S$GLB,,, | Performed by: OPTOMETRIST

## 2024-10-01 PROCEDURE — 99999 PR PBB SHADOW E&M-EST. PATIENT-LVL III: CPT | Mod: PBBFAC,,, | Performed by: OPTOMETRIST

## 2024-10-01 PROCEDURE — 92014 COMPRE OPH EXAM EST PT 1/>: CPT | Mod: S$GLB,,, | Performed by: OPTOMETRIST

## 2024-10-01 PROCEDURE — 1160F RVW MEDS BY RX/DR IN RCRD: CPT | Mod: CPTII,S$GLB,, | Performed by: OPTOMETRIST

## 2024-10-01 PROCEDURE — 1159F MED LIST DOCD IN RCRD: CPT | Mod: CPTII,S$GLB,, | Performed by: OPTOMETRIST

## 2024-10-01 NOTE — PROGRESS NOTES
HPI     Glaucoma Suspect            Comments:   Vision changes since last eye exam?: no  Any eye pain today: no  Other ocular symptoms: no  Interested in contact lens fitting today? no           Last edited by Radha Flores MA on 10/1/2024  9:48 AM.            Assessment /Plan     For exam results, see Encounter Report.    Open angle with borderline findings of both eyes  -     Posterior Segment OCT Optic Nerve- Both eyes  Normal IOP today OD, OS  Overall stable gOCT today OU  No evidence of glaucoma at this time but based on risk factors recommend to continue monitoring.   Monitor 12 months    Pseudophakia of both eyes  Stable OU  Monitor 12 months      RTC 1 yr for dilated eye exam and gOCT or PRN if any problems.   Discussed above and answered questions.

## 2024-10-08 ENCOUNTER — OFFICE VISIT (OUTPATIENT)
Dept: SURGERY | Facility: CLINIC | Age: 79
End: 2024-10-08
Payer: MEDICARE

## 2024-10-08 VITALS
SYSTOLIC BLOOD PRESSURE: 146 MMHG | WEIGHT: 218.56 LBS | TEMPERATURE: 98 F | BODY MASS INDEX: 37.31 KG/M2 | OXYGEN SATURATION: 99 % | HEART RATE: 54 BPM | DIASTOLIC BLOOD PRESSURE: 79 MMHG | HEIGHT: 64 IN

## 2024-10-08 DIAGNOSIS — K62.0 ANORECTAL POLYP: Primary | ICD-10-CM

## 2024-10-08 DIAGNOSIS — R93.3 ABNORMAL COLONOSCOPY: ICD-10-CM

## 2024-10-08 DIAGNOSIS — K62.1 ANORECTAL POLYP: Primary | ICD-10-CM

## 2024-10-08 PROCEDURE — 99999 PR PBB SHADOW E&M-EST. PATIENT-LVL III: CPT | Mod: PBBFAC,,,

## 2024-10-08 PROCEDURE — 3077F SYST BP >= 140 MM HG: CPT | Mod: CPTII,S$GLB,,

## 2024-10-08 PROCEDURE — 3078F DIAST BP <80 MM HG: CPT | Mod: CPTII,S$GLB,,

## 2024-10-08 PROCEDURE — 1126F AMNT PAIN NOTED NONE PRSNT: CPT | Mod: CPTII,S$GLB,,

## 2024-10-08 PROCEDURE — 1159F MED LIST DOCD IN RCRD: CPT | Mod: CPTII,S$GLB,,

## 2024-10-08 PROCEDURE — 99024 POSTOP FOLLOW-UP VISIT: CPT | Mod: S$GLB,,,

## 2024-10-08 RX ORDER — AMOXICILLIN 250 MG
1 CAPSULE ORAL 2 TIMES DAILY
Qty: 60 TABLET | Refills: 0 | Status: SHIPPED | OUTPATIENT
Start: 2024-10-08

## 2024-10-08 NOTE — PROGRESS NOTES
History & Physical    Subjective     CC: anorectal lesion   Ref: Robbie Pereyra MD    History of Present Illness:  Patient is a 79 y.o. female presents with distal rectal lesion.  Patient was undergoing screening colonoscopy 08/12/2024 where distal rectal lesion noted that was not removed.  In addition, other tubular adenomas were removed.  Patient denies any rectal bleeding or pain.  Patient is on Xarelto daily for history of TIA (2018) & afib; cardiologist Dr. Mansfield at [facility]. Denies famhx of CRC, IBD, polyps. Denies nausea, vomiting, fevers, chills.     09/10/24: transanal excision (path: benign, skin tag/hypertrophied papillae)     Interval History:  Since last clinic visit, the patient underwent a transanal excision of the distal rectal lesion previously seen on colonoscopy with final pathology consistent with benign skin tag/hypertrophied papillae.  Patient has done well since surgery.  Denies any hematochezia or melena.  Minimal anorectal pain after surgery which has completely resolved.      Review of patient's allergies indicates:   Allergen Reactions    Penicillins Swelling     Generalized swelling  Other reaction(s): Swelling  Generalized swelling  Other reaction(s): Swelling  Generalized swelling       Current Outpatient Medications   Medication Sig Dispense Refill    albuterol 90 mcg/actuation inhaler Inhale 2 puffs into the lungs every 4 (four) hours as needed for Wheezing. 1 each 6    amLODIPine (NORVASC) 5 MG tablet TAKE 1 TABLET TWICE DAILY 180 tablet 3    cholecalciferol, vitamin D3, (VITAMIN D3) 50 mcg (2,000 unit) Cap Take 1 capsule by mouth once daily.      digoxin (LANOXIN) 125 mcg tablet TAKE 1 TABLET EVERY DAY 90 tablet 2    empagliflozin (JARDIANCE) 10 mg tablet Take 1 tablet (10 mg total) by mouth once daily. 30 tablet 6    fluticasone propionate (FLONASE) 50 mcg/actuation nasal spray 2 sprays (100 mcg total) by Each Nostril route once daily. 16 g 6    losartan (COZAAR) 100 MG tablet  "TAKE 1 TABLET ONE TIME DAILY 90 tablet 3    metoprolol succinate (TOPROL-XL) 100 MG 24 hr tablet TAKE 1 TABLET ONE TIME DAILY 90 tablet 3    polyethylene glycol (GLYCOLAX) 17 gram PwPk Take by mouth once daily.      REPATHA SURECLICK 140 mg/mL PnIj INJECT 140MG UNDER THE SKIN EVERY 2 WEEKS FOR HIGH CHOLESTEROL 6 mL 3    rivaroxaban (XARELTO) 20 mg Tab TAKE 1 TABLET BY MOUTH EVERY DAY WITH DINNER      betamethasone valerate 0.1% (VALISONE) 0.1 % Oint Apply topically 2 (two) times daily. 15 g 1    mupirocin (BACTROBAN) 2 % ointment Apply topically 3 (three) times daily. For broken skin. (Patient not taking: Reported on 10/8/2024) 22 g 1    senna-docusate 8.6-50 mg (SENNA WITH DOCUSATE SODIUM) 8.6-50 mg per tablet Take 1 tablet by mouth 2 (two) times a day. Stop taking if having diarrhea 60 tablet 0     No current facility-administered medications for this visit.       Past Medical History:   Diagnosis Date    Allergy     Anemia     Arthritis     knees, hands, back    Back pain     pt reports "mild"    Chronic diastolic heart failure 7/12/2023    Chronic kidney disease, stage 3a 11/2/2023    Depression, recurrent 7/12/2023    Dysphagia, unspecified(787.20)     GERD (gastroesophageal reflux disease)     History of carpal tunnel release of both wrists 03/10/2016    Hyperlipidemia     Hypertension     LGSIL (low grade squamous intraepithelial dysplasia)     HPV +    Mild intermittent asthma without complication 07/28/2020    Obesity     Pneumonia of left lower lobe due to infectious organism 6/26/2023    Pulmonary embolism, bilateral 7/5/2023    Stroke 02/08/2017     Past Surgical History:   Procedure Laterality Date    CARDIAC DEFIBRILLATOR PLACEMENT  04/2017    Dr. Nuno    CARPAL TUNNEL RELEASE      b/l    CATARACT EXTRACTION EXTRACAPSULAR W/ INTRAOCULAR LENS IMPLANTATION Bilateral 2022    COLONOSCOPY N/A 03/31/2021    Procedure: COLONOSCOPY;  Surgeon: Fozia Dasilva MD;  [facility];  Service: Endoscopy;  " Laterality: N/A;    COLONOSCOPY N/A 8/12/2024    Procedure: COLONOSCOPY - pre op clearance in media 7/1/24;  Surgeon: Robbie Pereyra MD;  Location: [facility] ENDO;  Service: Gastroenterology;  Laterality: N/A;    DILATION AND CURETTAGE OF UTERUS      10/2021, no cancer per pt.    FOOT SURGERY Right 05/02/2013    wart removal    INJECTION OF ANESTHETIC AGENT AROUND PUDENDAL NERVE N/A 9/10/2024    Procedure: BLOCK, NERVE, PUDENDAL;  Surgeon: Sae Garza MD;  Location: [facility] OR;  Service: Colon and Rectal;  Laterality: N/A;    KELOID EXCISION N/A 6/21/2023    Procedure: EXCISION, KELOID;  Surgeon: Justine Youngblood DO;  Location: [facility] OR;  Service: General;  Laterality: N/A;    TRANSANAL RECTAL RESECTION N/A 9/10/2024    Procedure: EXCISION, LESION, RECTUM, ANAL APPROACH (lithotomy);  Surgeon: Sae Garza MD;  Location: [facility] OR;  Service: Colon and Rectal;  Laterality: N/A;    TRIGGER FINGER RELEASE Right     TUBAL LIGATION       Family History   Problem Relation Name Age of Onset    Hypertension Mother      Hypertension Father      Cancer Father          prostate    Cataracts Father      Glaucoma Father      Diabetes Father      Hypertension Sister      Diabetes Sister      Breast cancer Neg Hx      Colon cancer Neg Hx      Ovarian cancer Neg Hx      Thrombophilia Neg Hx       Social History     Tobacco Use    Smoking status: Never    Smokeless tobacco: Never   Substance Use Topics    Alcohol use: No     Alcohol/week: 0.0 standard drinks of alcohol    Drug use: No        Review of Systems:  Review of Systems   Constitutional:  Negative for activity change, appetite change, chills, fatigue, fever and unexpected weight change.   HENT:  Negative for congestion, ear pain, sore throat and trouble swallowing.    Eyes:  Negative for pain, redness and itching.   Respiratory:  Negative for cough, shortness of breath and wheezing.    Cardiovascular:  Negative for chest pain, palpitations and leg swelling.  "  Gastrointestinal:  Negative for abdominal distention, abdominal pain, anal bleeding, blood in stool, constipation, diarrhea, nausea, rectal pain and vomiting.   Endocrine: Negative for cold intolerance, heat intolerance and polyuria.   Genitourinary:  Negative for dysuria, flank pain, frequency and hematuria.   Musculoskeletal:  Negative for gait problem, joint swelling and neck pain.   Skin:  Negative for color change, rash and wound.   Allergic/Immunologic: Negative for environmental allergies and immunocompromised state.   Neurological:  Negative for dizziness, speech difficulty, weakness and numbness.   Psychiatric/Behavioral:  Negative for agitation, confusion and hallucinations.           Objective     Vital Signs (Most Recent)  Temp: 98.2 °F (36.8 °C) (10/08/24 1345)  Pulse: (!) 54 (10/08/24 1345)  BP: (!) 146/79 (10/08/24 1345)  SpO2: 99 % (10/08/24 1345)  5' 4" (1.626 m)  99.2 kg (218 lb 9.4 oz)     Physical Exam:  Physical Exam  Vitals reviewed.   Constitutional:       General: She is not in acute distress.     Appearance: Normal appearance. She is well-developed. She is not ill-appearing or toxic-appearing.   HENT:      Head: Normocephalic and atraumatic.      Right Ear: External ear normal.      Left Ear: External ear normal.      Nose: Nose normal.   Cardiovascular:      Rate and Rhythm: Bradycardia present.   Pulmonary:      Effort: Pulmonary effort is normal. No respiratory distress.   Abdominal:      General: Abdomen is flat. There is no distension.      Palpations: Abdomen is soft.      Tenderness: There is no abdominal tenderness.   Genitourinary:     Comments: Anorectal: offered but deferred  Musculoskeletal:         General: Normal range of motion.      Cervical back: Normal range of motion and neck supple.   Skin:     General: Skin is warm and dry.   Neurological:      Mental Status: She is alert and oriented to person, place, and time.   Psychiatric:         Mood and Affect: Mood normal.    "      Behavior: Behavior normal.       Diagnostic Results:  Colonoscopy/pathology 08/2024: reviewed  Component 4 wk ago   Final Pathologic Diagnosis POSTERIOR ANAL LESION, EXCISION:  Polypoid keratinizing squamous mucosa with intense lichenoid inflammation and underlying stromal fibrosis with occasional thrombosed vessels, consistent with skin tag or hypertrophied papillae.    Negative for dysplasia or malignancy.          Assessment and Plan     79 y.o. female with distal anorectal lesion s/p transanal excision 09/10/2024 (path: benign)     - final pathology showing a benign hypertrophied anal papillae/skin tag  - patient states that stool softeners after surgery have helped significantly with bowel movements. Discussed that a minimum I would recommend behavioral, lifestyle and medication modifications to improve bowel habits. Usual bowel management handout given to patient. This includes a stool softener twice per day, fiber powder supplementation daily, drinking at least 64oz of water/day, avoiding straining with bowel movements, spending less than 5 min on toilet per bowel movement, eating a high fiber diet, using miralax as needed to achieve a bowel movement daily and using wet wipes to wipe after bowel movements when irritated.   - repeat colonoscopy in 5 years  - RTC PRN     Sweta Navarro PA-C  Colon and Rectal Surgery  [facility]

## 2024-11-13 RX ORDER — METOPROLOL SUCCINATE 100 MG/1
100 TABLET, EXTENDED RELEASE ORAL
Qty: 90 TABLET | Refills: 3 | Status: SHIPPED | OUTPATIENT
Start: 2024-11-13

## 2024-12-03 ENCOUNTER — OFFICE VISIT (OUTPATIENT)
Dept: PRIMARY CARE CLINIC | Facility: CLINIC | Age: 79
End: 2024-12-03
Payer: MEDICARE

## 2024-12-03 VITALS
BODY MASS INDEX: 37.04 KG/M2 | SYSTOLIC BLOOD PRESSURE: 120 MMHG | HEIGHT: 64 IN | TEMPERATURE: 98 F | WEIGHT: 216.94 LBS | DIASTOLIC BLOOD PRESSURE: 58 MMHG | OXYGEN SATURATION: 98 % | HEART RATE: 69 BPM

## 2024-12-03 DIAGNOSIS — Z23 NEED FOR VACCINATION: ICD-10-CM

## 2024-12-03 DIAGNOSIS — E66.01 SEVERE OBESITY WITH BODY MASS INDEX (BMI) OF 35.0 TO 39.9 WITH COMORBIDITY: ICD-10-CM

## 2024-12-03 DIAGNOSIS — N18.31 CHRONIC KIDNEY DISEASE, STAGE 3A: Primary | ICD-10-CM

## 2024-12-03 DIAGNOSIS — I10 ESSENTIAL HYPERTENSION: ICD-10-CM

## 2024-12-03 DIAGNOSIS — I48.92 ATRIAL FLUTTER WITH RAPID VENTRICULAR RESPONSE: ICD-10-CM

## 2024-12-03 DIAGNOSIS — R79.89 OTHER SPECIFIED ABNORMAL FINDINGS OF BLOOD CHEMISTRY: ICD-10-CM

## 2024-12-03 DIAGNOSIS — I26.99 PULMONARY EMBOLISM, BILATERAL: ICD-10-CM

## 2024-12-03 DIAGNOSIS — E78.49 OTHER HYPERLIPIDEMIA: ICD-10-CM

## 2024-12-03 PROCEDURE — 1101F PT FALLS ASSESS-DOCD LE1/YR: CPT | Mod: CPTII,S$GLB,, | Performed by: NURSE PRACTITIONER

## 2024-12-03 PROCEDURE — 3074F SYST BP LT 130 MM HG: CPT | Mod: CPTII,S$GLB,, | Performed by: NURSE PRACTITIONER

## 2024-12-03 PROCEDURE — 80048 BASIC METABOLIC PNL TOTAL CA: CPT | Performed by: NURSE PRACTITIONER

## 2024-12-03 PROCEDURE — G0008 ADMIN INFLUENZA VIRUS VAC: HCPCS | Mod: S$GLB,,, | Performed by: NURSE PRACTITIONER

## 2024-12-03 PROCEDURE — 99214 OFFICE O/P EST MOD 30 MIN: CPT | Mod: S$GLB,,, | Performed by: NURSE PRACTITIONER

## 2024-12-03 PROCEDURE — 3288F FALL RISK ASSESSMENT DOCD: CPT | Mod: CPTII,S$GLB,, | Performed by: NURSE PRACTITIONER

## 2024-12-03 PROCEDURE — 99999 PR PBB SHADOW E&M-EST. PATIENT-LVL V: CPT | Mod: PBBFAC,,, | Performed by: NURSE PRACTITIONER

## 2024-12-03 PROCEDURE — 3078F DIAST BP <80 MM HG: CPT | Mod: CPTII,S$GLB,, | Performed by: NURSE PRACTITIONER

## 2024-12-03 PROCEDURE — 90653 IIV ADJUVANT VACCINE IM: CPT | Mod: S$GLB,,, | Performed by: NURSE PRACTITIONER

## 2024-12-03 PROCEDURE — 1159F MED LIST DOCD IN RCRD: CPT | Mod: CPTII,S$GLB,, | Performed by: NURSE PRACTITIONER

## 2024-12-03 PROCEDURE — 1126F AMNT PAIN NOTED NONE PRSNT: CPT | Mod: CPTII,S$GLB,, | Performed by: NURSE PRACTITIONER

## 2024-12-03 NOTE — PROGRESS NOTES
Flu vaccine given , tolerated well, no pian ,allergies and medication verified, 15 min wait instructed,. HANNAH

## 2024-12-04 LAB
ANION GAP SERPL CALC-SCNC: 12 MMOL/L (ref 8–16)
BUN SERPL-MCNC: 15 MG/DL (ref 8–23)
CALCIUM SERPL-MCNC: 9.3 MG/DL (ref 8.7–10.5)
CHLORIDE SERPL-SCNC: 106 MMOL/L (ref 95–110)
CO2 SERPL-SCNC: 21 MMOL/L (ref 23–29)
CREAT SERPL-MCNC: 1.1 MG/DL (ref 0.5–1.4)
EST. GFR  (NO RACE VARIABLE): 51.4 ML/MIN/1.73 M^2
GLUCOSE SERPL-MCNC: 81 MG/DL (ref 70–110)
POTASSIUM SERPL-SCNC: 4.2 MMOL/L (ref 3.5–5.1)
SODIUM SERPL-SCNC: 139 MMOL/L (ref 136–145)

## 2024-12-05 ENCOUNTER — TELEPHONE (OUTPATIENT)
Dept: PRIMARY CARE CLINIC | Facility: CLINIC | Age: 79
End: 2024-12-05
Payer: MEDICARE

## 2024-12-05 ENCOUNTER — PATIENT MESSAGE (OUTPATIENT)
Dept: HEMATOLOGY/ONCOLOGY | Facility: CLINIC | Age: 79
End: 2024-12-05
Payer: MEDICARE

## 2024-12-05 NOTE — TELEPHONE ENCOUNTER
I placed a referral for patient to see Shayla Abdi NP with Hematology for pt to return for the results of her blood clot work up that was dated back to 7/23.    Pt did not receive the results and does not know why she is predisposed to blood clots.      Please assist with appointment and let pt know.      I plan to message Shayla Abdi

## 2024-12-07 ENCOUNTER — TELEPHONE (OUTPATIENT)
Dept: PRIMARY CARE CLINIC | Facility: CLINIC | Age: 79
End: 2024-12-07
Payer: MEDICARE

## 2024-12-09 ENCOUNTER — TELEPHONE (OUTPATIENT)
Dept: PRIMARY CARE CLINIC | Facility: CLINIC | Age: 79
End: 2024-12-09
Payer: MEDICARE

## 2024-12-10 ENCOUNTER — LAB VISIT (OUTPATIENT)
Dept: LAB | Facility: HOSPITAL | Age: 79
End: 2024-12-10
Attending: NURSE PRACTITIONER
Payer: MEDICARE

## 2024-12-10 DIAGNOSIS — I26.99 BILATERAL PULMONARY EMBOLISM: Primary | ICD-10-CM

## 2024-12-10 DIAGNOSIS — I26.99 BILATERAL PULMONARY EMBOLISM: ICD-10-CM

## 2024-12-10 LAB
ALBUMIN SERPL BCP-MCNC: 3.7 G/DL (ref 3.5–5.2)
ALP SERPL-CCNC: 100 U/L (ref 40–150)
ALT SERPL W/O P-5'-P-CCNC: 11 U/L (ref 10–44)
ANION GAP SERPL CALC-SCNC: 10 MMOL/L (ref 8–16)
AST SERPL-CCNC: 15 U/L (ref 10–40)
BASOPHILS # BLD AUTO: 0.04 K/UL (ref 0–0.2)
BASOPHILS NFR BLD: 0.6 % (ref 0–1.9)
BILIRUB SERPL-MCNC: 0.6 MG/DL (ref 0.1–1)
BUN SERPL-MCNC: 9 MG/DL (ref 8–23)
CALCIUM SERPL-MCNC: 9.4 MG/DL (ref 8.7–10.5)
CHLORIDE SERPL-SCNC: 105 MMOL/L (ref 95–110)
CO2 SERPL-SCNC: 24 MMOL/L (ref 23–29)
CREAT SERPL-MCNC: 1 MG/DL (ref 0.5–1.4)
DIFFERENTIAL METHOD BLD: NORMAL
EOSINOPHIL # BLD AUTO: 0.1 K/UL (ref 0–0.5)
EOSINOPHIL NFR BLD: 2 % (ref 0–8)
ERYTHROCYTE [DISTWIDTH] IN BLOOD BY AUTOMATED COUNT: 14.3 % (ref 11.5–14.5)
EST. GFR  (NO RACE VARIABLE): 58 ML/MIN/1.73 M^2
GLUCOSE SERPL-MCNC: 83 MG/DL (ref 70–110)
HCT VFR BLD AUTO: 46.3 % (ref 37–48.5)
HGB BLD-MCNC: 14.8 G/DL (ref 12–16)
IMM GRANULOCYTES # BLD AUTO: 0.01 K/UL (ref 0–0.04)
IMM GRANULOCYTES NFR BLD AUTO: 0.1 % (ref 0–0.5)
LYMPHOCYTES # BLD AUTO: 2.5 K/UL (ref 1–4.8)
LYMPHOCYTES NFR BLD: 35.5 % (ref 18–48)
MCH RBC QN AUTO: 30.2 PG (ref 27–31)
MCHC RBC AUTO-ENTMCNC: 32 G/DL (ref 32–36)
MCV RBC AUTO: 95 FL (ref 82–98)
MONOCYTES # BLD AUTO: 0.6 K/UL (ref 0.3–1)
MONOCYTES NFR BLD: 7.9 % (ref 4–15)
NEUTROPHILS # BLD AUTO: 3.8 K/UL (ref 1.8–7.7)
NEUTROPHILS NFR BLD: 53.9 % (ref 38–73)
NRBC BLD-RTO: 0 /100 WBC
PLATELET # BLD AUTO: 233 K/UL (ref 150–450)
PMV BLD AUTO: 12.1 FL (ref 9.2–12.9)
POTASSIUM SERPL-SCNC: 4.4 MMOL/L (ref 3.5–5.1)
PROT SERPL-MCNC: 8.1 G/DL (ref 6–8.4)
RBC # BLD AUTO: 4.9 M/UL (ref 4–5.4)
SODIUM SERPL-SCNC: 139 MMOL/L (ref 136–145)
WBC # BLD AUTO: 7.09 K/UL (ref 3.9–12.7)

## 2024-12-10 PROCEDURE — 86147 CARDIOLIPIN ANTIBODY EA IG: CPT | Performed by: NURSE PRACTITIONER

## 2024-12-10 PROCEDURE — 85610 PROTHROMBIN TIME: CPT | Performed by: NURSE PRACTITIONER

## 2024-12-10 PROCEDURE — 86146 BETA-2 GLYCOPROTEIN ANTIBODY: CPT | Performed by: NURSE PRACTITIONER

## 2024-12-10 PROCEDURE — 85306 CLOT INHIBIT PROT S FREE: CPT | Performed by: NURSE PRACTITIONER

## 2024-12-10 PROCEDURE — 86147 CARDIOLIPIN ANTIBODY EA IG: CPT | Mod: 59 | Performed by: NURSE PRACTITIONER

## 2024-12-10 PROCEDURE — 85613 RUSSELL VIPER VENOM DILUTED: CPT | Mod: 59 | Performed by: NURSE PRACTITIONER

## 2024-12-10 PROCEDURE — 85520 HEPARIN ASSAY: CPT | Performed by: NURSE PRACTITIONER

## 2024-12-10 PROCEDURE — 36415 COLL VENOUS BLD VENIPUNCTURE: CPT | Performed by: NURSE PRACTITIONER

## 2024-12-10 PROCEDURE — 85303 CLOT INHIBIT PROT C ACTIVITY: CPT | Performed by: NURSE PRACTITIONER

## 2024-12-10 PROCEDURE — 85300 ANTITHROMBIN III ACTIVITY: CPT | Performed by: NURSE PRACTITIONER

## 2024-12-10 PROCEDURE — 85025 COMPLETE CBC W/AUTO DIFF WBC: CPT | Performed by: NURSE PRACTITIONER

## 2024-12-10 PROCEDURE — 81240 F2 GENE: CPT | Performed by: NURSE PRACTITIONER

## 2024-12-10 PROCEDURE — 86148 ANTI-PHOSPHOLIPID ANTIBODY: CPT | Mod: 91 | Performed by: NURSE PRACTITIONER

## 2024-12-10 PROCEDURE — 80053 COMPREHEN METABOLIC PANEL: CPT | Performed by: NURSE PRACTITIONER

## 2024-12-11 LAB — AT III ACT/NOR PPP CHRO: 110 % (ref 83–118)

## 2024-12-12 LAB
PROT C ACT/NOR PPP CHRO: 132 % (ref 70–150)
PROT S ACT/NOR PPP: >150 % (ref 65–150)

## 2024-12-13 LAB
B2 GLYCOPROT1 IGA SER QL: 3.3 U/ML
B2 GLYCOPROT1 IGG SER QL: 0.9 U/ML
B2 GLYCOPROT1 IGM SER QL: 2.8 U/ML
CARDIOLIPIN IGA SER IA-ACNC: 3 APL
CARDIOLIPIN IGG SER IA-ACNC: <9.4 GPL (ref 0–14.99)
CARDIOLIPIN IGM SER IA-ACNC: <9.4 MPL (ref 0–12.49)
CONFIRM DRVVT STA-STACLOT: ABNORMAL S
DRVVT SCREEN TO CONFIRM RATIO: ABNORMAL {RATIO}
F2 C.20210G>A GENO BLD/T: NEGATIVE
HEPARIN NT PPP QL: ABNORMAL
LA 3 SCREEN W REFLEX-IMP: ABNORMAL
LMW HEPARIN IND PLT AB SER QL: PRESENT
MIXING DRVVT/NORMAL: ABNORMAL %
NEUTRALIZED DRVVT SCREEN RATIO: 1.14
PROTHROMBIN TIME: 15.5 S (ref 12–15.5)
SCREEN APTT/NORMAL: 1.08
SCREEN APTT/NORMAL: ABNORMAL
SCREEN DRVVT/NORMAL: 2.08 %
THROMBIN TIME: ABNORMAL S

## 2024-12-19 LAB
PS IGA SER-ACNC: 1 APS (ref 0–19)
PS IGG SER-ACNC: 0 GPS (ref 0–15)
PS IGM SER-ACNC: 0 MPS (ref 0–21)

## 2024-12-30 ENCOUNTER — OFFICE VISIT (OUTPATIENT)
Dept: HEMATOLOGY/ONCOLOGY | Facility: CLINIC | Age: 79
End: 2024-12-30
Payer: MEDICARE

## 2024-12-30 VITALS
TEMPERATURE: 98 F | BODY MASS INDEX: 37.04 KG/M2 | DIASTOLIC BLOOD PRESSURE: 74 MMHG | HEIGHT: 64 IN | HEART RATE: 67 BPM | WEIGHT: 216.94 LBS | OXYGEN SATURATION: 95 % | SYSTOLIC BLOOD PRESSURE: 135 MMHG

## 2024-12-30 DIAGNOSIS — Z86.711 HISTORY OF PULMONARY EMBOLUS (PE): Primary | ICD-10-CM

## 2024-12-30 PROCEDURE — 99999 PR PBB SHADOW E&M-EST. PATIENT-LVL III: CPT | Mod: PBBFAC,,, | Performed by: NURSE PRACTITIONER

## 2024-12-30 NOTE — PROGRESS NOTES
Subjective:       Patient ID: Nathalie Membreno is a 79 y.o. female.    Chief Complaint: h/o PE while of AC    HPI: 79 y.o. female with medical history significant for CHF, HTN, CVA, GERD, depression originally referred for evaluation of newly diagnosed PE 6/2023. Patient evaluated by Dr. Gregorio, Hematology. Hypercoagulable evaluation was negative. She did not have follow up.     According to patient, she had been on Xarelto since 2018 after being diagnosed with CVA and irregular heart rhythm.  Her Xarelto was held from 06/18/2023 till 06/24/2023 when she had surgery for her keloid on the chest and then had radiation therapy done to it.  During this time she was also diagnosed with pneumonia and then diagnosed with bilateral PE.  Patient was then restarted back on Xarelto without any further events.  Denies any previous history of blood clots or family history of blood clots.  Denies any family history or previous history of bleeding disorder. Patient notes 3 surgeries within that year requiring interruption of her anticoagulation.  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     Employer: [employer]    Tobacco Use    Smoking status: Never    Smokeless tobacco: Never   Substance and Sexual Activity    Alcohol use: No     Alcohol/week: 0.0 standard drinks of alcohol    Drug use: No    Sexual activity: Not Currently     Partners: Male     Birth control/protection: None   Social History Narrative    Live alone     Social Drivers of Health     Financial Resource Strain: Low Risk  (7/12/2023)    Overall Financial Resource Strain (CARDIA)     Difficulty of Paying Living Expenses: Not hard at all   Food Insecurity: No Food Insecurity (7/12/2023)    Hunger Vital Sign     Worried About Running Out of Food in the Last Year: Never true     Ran Out of Food in the Last Year: Never true   Transportation Needs:  "No Transportation Needs (7/12/2023)    PRAPARE - Transportation     Lack of Transportation (Medical): No     Lack of Transportation (Non-Medical): No   Physical Activity: Inactive (7/12/2023)    Exercise Vital Sign     Days of Exercise per Week: 0 days     Minutes of Exercise per Session: 0 min   Stress: Stress Concern Present (7/12/2023)    [facility] - Occupational Stress Questionnaire     Feeling of Stress : Rather much   Housing Stability: Low Risk  (7/12/2023)    Housing Stability Vital Sign     Unable to Pay for Housing in the Last Year: No     Number of Places Lived in the Last Year: 1     Unstable Housing in the Last Year: No       Past Medical History:   Diagnosis Date    Allergy     Anemia     Arthritis     knees, hands, back    Back pain     pt reports "mild"    Chronic diastolic heart failure 7/12/2023    Chronic kidney disease, stage 3a 11/2/2023    Depression, recurrent 7/12/2023    Dysphagia, unspecified(787.20)     GERD (gastroesophageal reflux disease)     History of carpal tunnel release of both wrists 03/10/2016    Hyperlipidemia     Hypertension     LGSIL (low grade squamous intraepithelial dysplasia)     HPV +    Mild intermittent asthma without complication 07/28/2020    Obesity     Pneumonia of left lower lobe due to infectious organism 6/26/2023    Pulmonary embolism, bilateral 7/5/2023    Stroke 02/08/2017       Family History   Problem Relation Name Age of Onset    Hypertension Mother      Hypertension Father      Cancer Father          prostate    Cataracts Father      Glaucoma Father      Diabetes Father      Hypertension Sister      Diabetes Sister      Breast cancer Neg Hx      Colon cancer Neg Hx      Ovarian cancer Neg Hx      Thrombophilia Neg Hx         Past Surgical History:   Procedure Laterality Date    CARDIAC DEFIBRILLATOR PLACEMENT  04/2017    Dr. Nuno    CARPAL TUNNEL RELEASE      b/l    CATARACT " EXTRACTION EXTRACAPSULAR W/ INTRAOCULAR LENS IMPLANTATION Bilateral 2022    COLONOSCOPY N/A 03/31/2021    Procedure: COLONOSCOPY;  Surgeon: Fozia Dasilva MD;  Location: [facility] ENDO;  Service: Endoscopy;  Laterality: N/A;    COLONOSCOPY N/A 8/12/2024    Procedure: COLONOSCOPY - pre op clearance in media 7/1/24;  Surgeon: Robbie Pereyra MD;  Location: [facility] ENDO;  Service: Gastroenterology;  Laterality: N/A;    DILATION AND CURETTAGE OF UTERUS      10/2021, no cancer per pt.    FOOT SURGERY Right 05/02/2013    wart removal    INJECTION OF ANESTHETIC AGENT AROUND PUDENDAL NERVE N/A 9/10/2024    Procedure: BLOCK, NERVE, PUDENDAL;  Surgeon: Sae Garza MD;  Location: [facility] OR;  Service: Colon and Rectal;  Laterality: N/A;    KELOID EXCISION N/A 6/21/2023    Procedure: EXCISION, KELOID;  Surgeon: Justine Youngblood DO;  Location: [facility] OR;  Service: General;  Laterality: N/A;    TRANSANAL RECTAL RESECTION N/A 9/10/2024    Procedure: EXCISION, LESION, RECTUM, ANAL APPROACH (lithotomy);  Surgeon: Sae Garza MD;  Location: [facility] OR;  Service: Colon and Rectal;  Laterality: N/A;    TRIGGER FINGER RELEASE Right     TUBAL LIGATION         Review of Systems   Constitutional:  Negative for appetite change, chills, fatigue, fever and unexpected weight change.   HENT:  Negative for congestion, mouth sores, nosebleeds, sore throat, trouble swallowing and voice change.    Eyes:  Negative for visual disturbance.   Respiratory:  Negative for cough, chest tightness, shortness of breath and wheezing.    Cardiovascular:  Negative for chest pain and leg swelling.   Gastrointestinal:  Negative for abdominal distention, abdominal pain, blood in stool, constipation, diarrhea, nausea and vomiting.   Genitourinary:  Negative for difficulty urinating, dysuria and hematuria.   Musculoskeletal:  Negative for arthralgias, back pain and myalgias.   Skin:  Negative for pallor, rash and wound.   Neurological:  Negative  for dizziness, syncope, weakness and headaches.   Hematological:  Negative for adenopathy. Does not bruise/bleed easily.   Psychiatric/Behavioral:  The patient is nervous/anxious.          Medication List with Changes/Refills   Current Medications    ALBUTEROL 90 MCG/ACTUATION INHALER    Inhale 2 puffs into the lungs every 4 (four) hours as needed for Wheezing.    AMLODIPINE (NORVASC) 5 MG TABLET    TAKE 1 TABLET TWICE DAILY    BETAMETHASONE VALERATE 0.1% (VALISONE) 0.1 % OINT    Apply topically 2 (two) times daily.    CHOLECALCIFEROL, VITAMIN D3, (VITAMIN D3) 50 MCG (2,000 UNIT) CAP    Take 1 capsule by mouth once daily.    DIGOXIN (LANOXIN) 125 MCG TABLET    TAKE 1 TABLET EVERY DAY    EMPAGLIFLOZIN (JARDIANCE) 10 MG TABLET    Take 1 tablet (10 mg total) by mouth once daily.    FLUTICASONE PROPIONATE (FLONASE) 50 MCG/ACTUATION NASAL SPRAY    2 sprays (100 mcg total) by Each Nostril route once daily.    LOSARTAN (COZAAR) 100 MG TABLET    TAKE 1 TABLET ONE TIME DAILY    METOPROLOL SUCCINATE (TOPROL-XL) 100 MG 24 HR TABLET    TAKE 1 TABLET EVERY DAY    MUPIROCIN (BACTROBAN) 2 % OINTMENT    Apply topically 3 (three) times daily. For broken skin.    POLYETHYLENE GLYCOL (GLYCOLAX) 17 GRAM PWPK    Take by mouth once daily.    REPATHA SURECLICK 140 MG/ML PNIJ    INJECT 140MG UNDER THE SKIN EVERY 2 WEEKS FOR HIGH CHOLESTEROL    RIVAROXABAN (XARELTO) 20 MG TAB    TAKE 1 TABLET BY MOUTH EVERY DAY WITH DINNER     Objective:     Vitals:    12/30/24 1547   BP: 135/74   Pulse: 67   Temp: 97.8 °F (36.6 °C)       Physical Exam  HENT:      Right Ear: External ear normal.      Left Ear: External ear normal.   Pulmonary:      Effort: Pulmonary effort is normal. No respiratory distress.   Neurological:      Mental Status: She is alert and oriented to person, place, and time.        Assessment:     Problem List Items Addressed This Visit          Hematology    History of pulmonary embolus (PE) - Primary     Per patient she had been on  Xarelto since 2018 after being diagnosed with CVA and irregular heart rhythm.  Her Xarelto was held from 06/18/2023 till 06/24/2023 when she had surgery for her keloid on the chest and then had radiation therapy done to it.  During this time she was also diagnosed with pneumonia  and then diagnosed with a PE.  She notes intermittently being off xarelto that year due to 3 surgeries in close proximity to each other. Patient has continued Xarelto without adverse events. Denies prior blood clots or family history    Hypercoagulable evaluation negative. Recommend continuation of Xarelto. F/u with PCP. Re refer back if future surgery for Lovenox              Plan:     History of pulmonary embolus (PE)          Med Onc Chart Routing      Follow up with physician    Follow up with CALEB No follow up needed. f/u with PCP   Infusion scheduling note    Injection scheduling note    Labs    Imaging    Pharmacy appointment    Other referrals              CARRIE Zhu

## 2024-12-30 NOTE — ASSESSMENT & PLAN NOTE
Per patient she had been on Xarelto since 2018 after being diagnosed with CVA and irregular heart rhythm.  Her Xarelto was held from 06/18/2023 till 06/24/2023 when she had surgery for her keloid on the chest and then had radiation therapy done to it.  During this time she was also diagnosed with pneumonia  and then diagnosed with a PE.  She notes intermittently being off xarelto that year due to 3 surgeries in close proximity to each other. Patient has continued Xarelto without adverse events. Denies prior blood clots or family history    Hypercoagulable evaluation negative. Recommend continuation of Xarelto. F/u with PCP. Re refer back if future surgery for Lovenox

## 2025-01-14 DIAGNOSIS — Z00.00 ENCOUNTER FOR MEDICARE ANNUAL WELLNESS EXAM: ICD-10-CM

## 2025-02-07 ENCOUNTER — OFFICE VISIT (OUTPATIENT)
Dept: HOME HEALTH SERVICES | Facility: CLINIC | Age: 80
End: 2025-02-07
Payer: MEDICARE

## 2025-02-07 VITALS
SYSTOLIC BLOOD PRESSURE: 135 MMHG | HEIGHT: 64 IN | HEART RATE: 63 BPM | DIASTOLIC BLOOD PRESSURE: 66 MMHG | WEIGHT: 216 LBS | OXYGEN SATURATION: 97 % | BODY MASS INDEX: 36.88 KG/M2 | TEMPERATURE: 98 F

## 2025-02-07 DIAGNOSIS — H66.91 RIGHT OTITIS MEDIA, UNSPECIFIED OTITIS MEDIA TYPE: ICD-10-CM

## 2025-02-07 DIAGNOSIS — I50.32 CHRONIC DIASTOLIC HEART FAILURE: ICD-10-CM

## 2025-02-07 DIAGNOSIS — I10 ESSENTIAL HYPERTENSION: ICD-10-CM

## 2025-02-07 DIAGNOSIS — Z86.73 HISTORY OF TIA (TRANSIENT ISCHEMIC ATTACK): ICD-10-CM

## 2025-02-07 DIAGNOSIS — M48.061 SPINAL STENOSIS OF LUMBAR REGION, UNSPECIFIED WHETHER NEUROGENIC CLAUDICATION PRESENT: ICD-10-CM

## 2025-02-07 DIAGNOSIS — Z79.01 CHRONIC ANTICOAGULATION: ICD-10-CM

## 2025-02-07 DIAGNOSIS — K59.01 SLOW TRANSIT CONSTIPATION: ICD-10-CM

## 2025-02-07 DIAGNOSIS — J45.20 MILD INTERMITTENT ASTHMA WITHOUT COMPLICATION: ICD-10-CM

## 2025-02-07 DIAGNOSIS — K21.9 GASTROESOPHAGEAL REFLUX DISEASE WITHOUT ESOPHAGITIS: ICD-10-CM

## 2025-02-07 DIAGNOSIS — E66.01 SEVERE OBESITY WITH BODY MASS INDEX (BMI) OF 35.0 TO 39.9 WITH COMORBIDITY: ICD-10-CM

## 2025-02-07 DIAGNOSIS — N18.31 CHRONIC KIDNEY DISEASE, STAGE 3A: ICD-10-CM

## 2025-02-07 DIAGNOSIS — I77.1 TORTUOUS AORTA: ICD-10-CM

## 2025-02-07 DIAGNOSIS — R68.89 DECREASED FUNCTIONAL ACTIVITY TOLERANCE: ICD-10-CM

## 2025-02-07 DIAGNOSIS — Z95.810 PRESENCE OF CARDIAC DEFIBRILLATOR: ICD-10-CM

## 2025-02-07 DIAGNOSIS — Z00.00 ENCOUNTER FOR PREVENTIVE HEALTH EXAMINATION: Primary | ICD-10-CM

## 2025-02-07 DIAGNOSIS — K57.30 DIVERTICULOSIS OF LARGE INTESTINE WITHOUT HEMORRHAGE: ICD-10-CM

## 2025-02-07 DIAGNOSIS — M51.369 DEGENERATION OF INTERVERTEBRAL DISC OF LUMBAR REGION, UNSPECIFIED WHETHER PAIN PRESENT: ICD-10-CM

## 2025-02-07 DIAGNOSIS — I48.92 ATRIAL FLUTTER WITH RAPID VENTRICULAR RESPONSE: ICD-10-CM

## 2025-02-07 DIAGNOSIS — E55.9 VITAMIN D DEFICIENCY: ICD-10-CM

## 2025-02-07 DIAGNOSIS — E78.49 OTHER HYPERLIPIDEMIA: ICD-10-CM

## 2025-02-07 DIAGNOSIS — D21.9 FIBROID TUMOR: ICD-10-CM

## 2025-02-07 PROBLEM — R26.89 BALANCE PROBLEM: Status: RESOLVED | Noted: 2023-02-09 | Resolved: 2025-02-07

## 2025-02-07 PROBLEM — R06.02 SHORTNESS OF BREATH: Status: RESOLVED | Noted: 2023-06-26 | Resolved: 2025-02-07

## 2025-02-07 PROBLEM — Z01.818 PREOPERATIVE EXAMINATION: Status: RESOLVED | Noted: 2023-06-16 | Resolved: 2025-02-07

## 2025-02-07 PROBLEM — G25.81 RLS (RESTLESS LEGS SYNDROME): Status: RESOLVED | Noted: 2022-05-31 | Resolved: 2025-02-07

## 2025-02-07 PROCEDURE — 3078F DIAST BP <80 MM HG: CPT | Mod: CPTII,S$GLB,, | Performed by: NURSE PRACTITIONER

## 2025-02-07 PROCEDURE — 1160F RVW MEDS BY RX/DR IN RCRD: CPT | Mod: CPTII,S$GLB,, | Performed by: NURSE PRACTITIONER

## 2025-02-07 PROCEDURE — 1125F AMNT PAIN NOTED PAIN PRSNT: CPT | Mod: CPTII,S$GLB,, | Performed by: NURSE PRACTITIONER

## 2025-02-07 PROCEDURE — 3288F FALL RISK ASSESSMENT DOCD: CPT | Mod: CPTII,S$GLB,, | Performed by: NURSE PRACTITIONER

## 2025-02-07 PROCEDURE — 1101F PT FALLS ASSESS-DOCD LE1/YR: CPT | Mod: CPTII,S$GLB,, | Performed by: NURSE PRACTITIONER

## 2025-02-07 PROCEDURE — G9919 SCRN ND POS ND PROV OF REC: HCPCS | Mod: CPTII,S$GLB,, | Performed by: NURSE PRACTITIONER

## 2025-02-07 PROCEDURE — 1159F MED LIST DOCD IN RCRD: CPT | Mod: CPTII,S$GLB,, | Performed by: NURSE PRACTITIONER

## 2025-02-07 PROCEDURE — 3075F SYST BP GE 130 - 139MM HG: CPT | Mod: CPTII,S$GLB,, | Performed by: NURSE PRACTITIONER

## 2025-02-07 PROCEDURE — 1158F ADVNC CARE PLAN TLK DOCD: CPT | Mod: CPTII,S$GLB,, | Performed by: NURSE PRACTITIONER

## 2025-02-07 PROCEDURE — 1170F FXNL STATUS ASSESSED: CPT | Mod: CPTII,S$GLB,, | Performed by: NURSE PRACTITIONER

## 2025-02-07 PROCEDURE — G0439 PPPS, SUBSEQ VISIT: HCPCS | Mod: S$GLB,,, | Performed by: NURSE PRACTITIONER

## 2025-02-07 RX ORDER — CLARITHROMYCIN 500 MG/1
500 TABLET, FILM COATED ORAL 2 TIMES DAILY
Qty: 28 TABLET | Refills: 0 | Status: SHIPPED | OUTPATIENT
Start: 2025-02-07 | End: 2025-02-21

## 2025-02-07 RX ORDER — ACETAMINOPHEN 325 MG/1
650 TABLET ORAL EVERY 6 HOURS PRN
COMMUNITY

## 2025-02-07 NOTE — PROGRESS NOTES
"Nathalie Membreno presented for a follow-up Medicare AWV today. The following components were reviewed and updated:    Medical history  Family History  Social history  Allergies and Current Medications  Health Risk Assessment  Health Maintenance  Care Team    **See Completed Assessments for Annual Wellness visit with in the encounter summary    The following assessments were completed:  Depression Screening  Cognitive function Screening  Timed Get Up Test  Whisper Test      Opioid documentation:      Patient does not have a current opioid prescription.          Vitals:    02/07/25 1114   BP: 135/66   Pulse: 63   Temp: 97.5 °F (36.4 °C)   TempSrc: Temporal   SpO2: 97%   Weight: 98 kg (216 lb)   Height: 5' 4" (1.626 m)     Body mass index is 37.08 kg/m².       Physical Exam  Constitutional:       Appearance: She is obese.   HENT:      Right Ear: There is no impacted cerumen.      Left Ear: There is no impacted cerumen.      Ears:      Comments: Bulging tm with fluid to right ear.      Mouth/Throat:      Mouth: Mucous membranes are moist.   Cardiovascular:      Rate and Rhythm: Normal rate and regular rhythm.      Pulses: Normal pulses.      Heart sounds: Normal heart sounds.   Pulmonary:      Effort: Pulmonary effort is normal.      Breath sounds: Normal breath sounds.   Skin:     General: Skin is warm and dry.   Neurological:      General: No focal deficit present.      Mental Status: She is alert and oriented to person, place, and time.   Psychiatric:         Mood and Affect: Mood normal.         Behavior: Behavior normal.           Diagnoses and health risks identified today and associated recommendations/orders:  1. Encounter for preventive health examination  Medicare awv complete. Health maintenance:  shingles and tetanus vaccines due-encouraged pt to obtain at a pharmacy.      2. Chronic diastolic heart failure  Bp and symptoms stable. Continue current management on amlodipine, digoxin, losartan, metoprolol.  "  Follow up with cardiology.      3. Severe obesity with body mass index (BMI) of 35.0 to 39.9 with comorbidity  Recommend diet and exercise to lose weight. Follow up with your PCP as planned to discuss adjustments to your treatment plan.      4. Atrial flutter with rapid ventricular response  Presence of cardiac defibrillator  Chronic anticoagulation  Hr controlled. Continue current management. Xarelto and metoprolol. Follow up with cardiology.      5. Chronic kidney disease, stage 3a  Lab stable.  Continue routine monitoring with labs.  Recommend avoiding nsaids and other nephrotoxic medications. Follow up with PCP/nephrologist.       6. Right otitis media, unspecified otitis media type  Right ear fullness and scratchy for 1 month.   decreased hearing on the right. clarithyromycin rx sent. will follow up with pcp on hearing.   - clarithromycin (BIAXIN) 500 MG tablet; Take 1 tablet (500 mg total) by mouth 2 (two) times daily. for 14 days  Dispense: 28 tablet; Refill: 0    7. Mild intermittent asthma without complication  Symptoms stable. Continue  albuterol prn. Managed by PCP.       8. Essential hypertension  BP stable. Continue amlodipine, digoxin, losartan, metoprolol.. Managed by PCP.       9. Other hyperlipidemia  Lab Results   Component Value Date    CHOL 163 02/02/2024    CHOL 197 11/07/2023    CHOL 212 (H) 06/26/2023     Lab Results   Component Value Date    HDL 65 02/02/2024    HDL 61 11/07/2023    HDL 60 06/26/2023     Lab Results   Component Value Date    LDLCALC 82.0 02/02/2024    LDLCALC 115.0 11/07/2023    LDLCALC 133.0 06/26/2023     Lab Results   Component Value Date    TRIG 80 02/02/2024    TRIG 105 11/07/2023    TRIG 95 06/26/2023       Lab Results   Component Value Date    CHOLHDL 39.9 02/02/2024    CHOLHDL 31.0 11/07/2023    CHOLHDL 28.3 06/26/2023    Chronic and stable on repatha. Continue current. F/u with pcp.      11. Tortuous aorta  BP stable. Continue  repatha. Managed by PCP.       13.  Vitamin D deficiency  Lab stable.  Continue routine monitoring with labs.  Follow up with PCP.      14. Fibroid tumor  Dr. Orozco, [place] -removed tumor 2023.     15. Diverticulosis of large intestine without hemorrhage  Symptoms stable. Continue miralax for constipation . Managed by PCP.       16. Gastroesophageal reflux disease without esophagitis  Symptoms stable. Diet controlled. Managed by PCP.       17. Slow transit constipation  Symptoms stable. Continue miralax for constipation . Managed by PCP.       18. Degeneration of intervertebral disc of lumbar region, unspecified whether pain present  Pain constrolled. Continue tylenol prn . Managed by PCP.       19. History of TIA (transient ischemic attack)  Continue repatha. Bp stable.     20. Spinal stenosis of lumbar region, unspecified whether neurogenic claudication present  Symptoms stable. Pain controlled. Continue Tylenol back pain. . Managed by PCP.      21. Decreased functional activity tolerance  Chronic. Fall precautions recommended and discussed. Follow up with PCP.            Provided Nathalie with a 5-10 year written screening schedule and personal prevention plan. Recommendations were developed using the USPSTF age appropriate recommendations. Education, counseling, and referrals were provided as needed.  After Visit Summary printed and given to patient which includes a list of additional screenings\tests needed.    Follow up in about 1 year (around 2/7/2026) for annual wellness visit.      Emily Garza, ELOINA  I offered to discuss advanced care planning, including how to pick a person who would make decisions for you if you were unable to make them for yourself, called a health care power of , and what kind of decisions you might make such as use of life sustaining treatments such as ventilators and tube feeding when faced with a life limiting illness recorded on a living will that they will need to know. (How you  want to be cared for as you near the end of your natural life)     X  Patient has advanced directives on file, they would like to make changes. I provided information on how to revoke their previous directives and make new ones.

## 2025-02-07 NOTE — Clinical Note
Medicare awv complete. Health maintenance:  shingles and tetanus vaccines due-encouraged pt to obtain at a pharmacy.

## 2025-02-07 NOTE — PATIENT INSTRUCTIONS
Counseling and Referral of Other Preventative  (Italic type indicates deductible and co-insurance are waived)    Patient Name: Nathalie Membreno  Today's Date: 2/7/2025    Health Maintenance       Date Due Completion Date    Shingles Vaccine (1 of 2) Never done ---    COVID-19 Vaccine (3 - Moderna risk series) 03/03/2022 2/3/2022    TETANUS VACCINE 09/23/2024 9/23/2014    Lipid Panel 02/02/2025 2/2/2024    DEXA Scan 01/17/2028 1/17/2023    Override on 7/21/2009: Done (normal; repeat in 5 years)    Colonoscopy 08/12/2029 8/12/2024        No orders of the defined types were placed in this encounter.    The following information is provided to all patients.  This information is to help you find resources for any of the problems found today that may be affecting your health:                  Living healthy guide: www.[facility]      Understanding Diabetes: www.diabetes.org      Eating healthy: www.cdc.gov/healthyweight      CDC home safety checklist: www.cdc.gov/steadi/patient.html      Agency on Aging: www.goea.[place].[facility]      Alcoholics anonymous (AA): www.aa.org      Physical Activity: www.rogerio.nih.gov/wj4mpgf      Tobacco use: www.quitwithusla.org

## 2025-02-10 ENCOUNTER — PATIENT MESSAGE (OUTPATIENT)
Dept: INTERNAL MEDICINE | Facility: CLINIC | Age: 80
End: 2025-02-10
Payer: MEDICARE

## 2025-02-10 DIAGNOSIS — I10 ESSENTIAL HYPERTENSION: ICD-10-CM

## 2025-02-10 RX ORDER — AMLODIPINE BESYLATE 5 MG/1
5 TABLET ORAL 2 TIMES DAILY
Qty: 180 TABLET | Refills: 3 | Status: SHIPPED | OUTPATIENT
Start: 2025-02-10

## 2025-02-24 ENCOUNTER — LAB VISIT (OUTPATIENT)
Dept: LAB | Facility: HOSPITAL | Age: 80
End: 2025-02-24
Attending: INTERNAL MEDICINE
Payer: MEDICARE

## 2025-02-24 DIAGNOSIS — R79.89 OTHER SPECIFIED ABNORMAL FINDINGS OF BLOOD CHEMISTRY: ICD-10-CM

## 2025-02-24 DIAGNOSIS — N18.31 CHRONIC KIDNEY DISEASE, STAGE 3A: ICD-10-CM

## 2025-02-24 DIAGNOSIS — E66.01 SEVERE OBESITY WITH BODY MASS INDEX (BMI) OF 35.0 TO 39.9 WITH COMORBIDITY: ICD-10-CM

## 2025-02-24 DIAGNOSIS — I10 ESSENTIAL HYPERTENSION: ICD-10-CM

## 2025-02-24 DIAGNOSIS — E78.49 OTHER HYPERLIPIDEMIA: ICD-10-CM

## 2025-02-24 DIAGNOSIS — I48.92 ATRIAL FLUTTER WITH RAPID VENTRICULAR RESPONSE: ICD-10-CM

## 2025-02-24 PROCEDURE — 83036 HEMOGLOBIN GLYCOSYLATED A1C: CPT | Mod: HCNC | Performed by: NURSE PRACTITIONER

## 2025-02-24 PROCEDURE — 85025 COMPLETE CBC W/AUTO DIFF WBC: CPT | Mod: HCNC | Performed by: NURSE PRACTITIONER

## 2025-02-24 PROCEDURE — 36415 COLL VENOUS BLD VENIPUNCTURE: CPT | Mod: HCNC | Performed by: NURSE PRACTITIONER

## 2025-02-24 PROCEDURE — 80061 LIPID PANEL: CPT | Mod: HCNC | Performed by: NURSE PRACTITIONER

## 2025-02-24 PROCEDURE — 84443 ASSAY THYROID STIM HORMONE: CPT | Mod: HCNC | Performed by: NURSE PRACTITIONER

## 2025-02-24 PROCEDURE — 80053 COMPREHEN METABOLIC PANEL: CPT | Mod: HCNC | Performed by: NURSE PRACTITIONER

## 2025-02-24 PROCEDURE — 80162 ASSAY OF DIGOXIN TOTAL: CPT | Mod: HCNC | Performed by: NURSE PRACTITIONER

## 2025-02-25 DIAGNOSIS — I10 ESSENTIAL HYPERTENSION: ICD-10-CM

## 2025-02-25 LAB
ALBUMIN SERPL BCP-MCNC: 3.6 G/DL (ref 3.5–5.2)
ALP SERPL-CCNC: 97 U/L (ref 40–150)
ALT SERPL W/O P-5'-P-CCNC: 13 U/L (ref 10–44)
ANION GAP SERPL CALC-SCNC: 11 MMOL/L (ref 8–16)
AST SERPL-CCNC: 23 U/L (ref 10–40)
BASOPHILS # BLD AUTO: 0.04 K/UL (ref 0–0.2)
BASOPHILS NFR BLD: 0.6 % (ref 0–1.9)
BILIRUB SERPL-MCNC: 0.5 MG/DL (ref 0.1–1)
BUN SERPL-MCNC: 15 MG/DL (ref 8–23)
CALCIUM SERPL-MCNC: 9.6 MG/DL (ref 8.7–10.5)
CHLORIDE SERPL-SCNC: 105 MMOL/L (ref 95–110)
CHOLEST SERPL-MCNC: 165 MG/DL (ref 120–199)
CHOLEST/HDLC SERPL: 2.2 {RATIO} (ref 2–5)
CO2 SERPL-SCNC: 25 MMOL/L (ref 23–29)
CREAT SERPL-MCNC: 1 MG/DL (ref 0.5–1.4)
DIFFERENTIAL METHOD BLD: ABNORMAL
DIGOXIN SERPL-MCNC: 0.6 NG/ML (ref 0.8–2)
EOSINOPHIL # BLD AUTO: 0.2 K/UL (ref 0–0.5)
EOSINOPHIL NFR BLD: 2.7 % (ref 0–8)
ERYTHROCYTE [DISTWIDTH] IN BLOOD BY AUTOMATED COUNT: 15.4 % (ref 11.5–14.5)
EST. GFR  (NO RACE VARIABLE): 57.7 ML/MIN/1.73 M^2
ESTIMATED AVG GLUCOSE: 114 MG/DL (ref 68–131)
GLUCOSE SERPL-MCNC: 80 MG/DL (ref 70–110)
HBA1C MFR BLD: 5.6 % (ref 4–5.6)
HCT VFR BLD AUTO: 47.8 % (ref 37–48.5)
HDLC SERPL-MCNC: 75 MG/DL (ref 40–75)
HDLC SERPL: 45.5 % (ref 20–50)
HGB BLD-MCNC: 14.9 G/DL (ref 12–16)
IMM GRANULOCYTES # BLD AUTO: 0.02 K/UL (ref 0–0.04)
IMM GRANULOCYTES NFR BLD AUTO: 0.3 % (ref 0–0.5)
LDLC SERPL CALC-MCNC: 70.8 MG/DL (ref 63–159)
LYMPHOCYTES # BLD AUTO: 2.8 K/UL (ref 1–4.8)
LYMPHOCYTES NFR BLD: 39.1 % (ref 18–48)
MCH RBC QN AUTO: 30.4 PG (ref 27–31)
MCHC RBC AUTO-ENTMCNC: 31.2 G/DL (ref 32–36)
MCV RBC AUTO: 98 FL (ref 82–98)
MONOCYTES # BLD AUTO: 0.6 K/UL (ref 0.3–1)
MONOCYTES NFR BLD: 8 % (ref 4–15)
NEUTROPHILS # BLD AUTO: 3.5 K/UL (ref 1.8–7.7)
NEUTROPHILS NFR BLD: 49.3 % (ref 38–73)
NONHDLC SERPL-MCNC: 90 MG/DL
NRBC BLD-RTO: 0 /100 WBC
PLATELET # BLD AUTO: 206 K/UL (ref 150–450)
PMV BLD AUTO: 13.6 FL (ref 9.2–12.9)
POTASSIUM SERPL-SCNC: 4.3 MMOL/L (ref 3.5–5.1)
PROT SERPL-MCNC: 8.1 G/DL (ref 6–8.4)
RBC # BLD AUTO: 4.9 M/UL (ref 4–5.4)
SODIUM SERPL-SCNC: 141 MMOL/L (ref 136–145)
TRIGL SERPL-MCNC: 96 MG/DL (ref 30–150)
TSH SERPL DL<=0.005 MIU/L-ACNC: 2.1 UIU/ML (ref 0.4–4)
WBC # BLD AUTO: 7.16 K/UL (ref 3.9–12.7)

## 2025-02-25 RX ORDER — AMLODIPINE BESYLATE 5 MG/1
5 TABLET ORAL 2 TIMES DAILY
Qty: 180 TABLET | Refills: 3 | Status: SHIPPED | OUTPATIENT
Start: 2025-02-25

## 2025-03-06 NOTE — PROGRESS NOTES
Subjective:      Patient ID: Nathalie Membreno is a 79 y.o. female.    Chief Complaint: Follow-up (3 month f/u )      HPI  Here for f/u medical problems and preventive exam.  Right knee pain, tylenol daily helps some, but now wants to see Ortho.  No f/c/sw/cough.  Asthma doing well, rare albuterol needed.  No cp/sob/palp.  BMs and urine normal.  On Miralax.  Denies stress.      Advance Care Planning     Date: 03/07/2025    ACP Reviewed/No Changes  Voluntary advance care planning discussion had today with patient. Previously completed HCPOA in electronic medical record is current, no changes made.      A total of 5 min was spent on advance care planning, goals of care discussion, emotional support, formulating and communicating prognosis and exploring burden/benefit of various approaches of treatment. This discussion occurred on a fully voluntary basis with the verbal consent of the patient and/or family.           HM: 12/24 fluvax, 11/23 RSV, 3/21 covid vaccines, 11/23 RSV, 6/19 HAV, 6/15 [id number], 11/16 booster [id number], 1/23 [id number], 9/14 Tdap, 1/23 BMD rep 5y, 8/24 Cscope rep 5y, 1/23 MMG/no more, 10/20 Gyn Dr. Holden, 3/24 Eye Dr. Fiore, 11/16 HCV neg, Card Dr. Leach at [facility].  Addendum: 6/24 Cologuard positive.     Review of Systems   Constitutional:  Negative for appetite change, chills, diaphoresis and fever.   HENT:  Negative for congestion, ear pain, rhinorrhea, sinus pressure and sore throat.    Respiratory:  Negative for cough, chest tightness and shortness of breath.    Cardiovascular:  Negative for chest pain and palpitations.   Gastrointestinal:  Negative for blood in stool, constipation, diarrhea, nausea and vomiting.   Genitourinary:  Negative for dysuria, frequency, hematuria, menstrual problem, urgency and vaginal discharge.   Musculoskeletal:  Negative for arthralgias.   Skin:  Negative for rash.   Neurological:  Negative for dizziness and headaches.   Psychiatric/Behavioral:   "Negative for sleep disturbance. The patient is not nervous/anxious.        Objective:   /60 (BP Location: Left arm, Patient Position: Sitting)   Pulse 69   Temp 98.3 °F (36.8 °C) (Skin)   Ht 5' 4" (1.626 m)   Wt 99.5 kg (219 lb 5.7 oz)   SpO2 95%   BMI 37.65 kg/m²     Physical Exam  Constitutional:       Appearance: She is well-developed.   HENT:      Right Ear: External ear normal. Tympanic membrane is not injected.      Left Ear: External ear normal. Tympanic membrane is not injected.   Eyes:      Conjunctiva/sclera: Conjunctivae normal.   Neck:      Thyroid: No thyromegaly.   Cardiovascular:      Rate and Rhythm: Normal rate and regular rhythm.      Heart sounds: No murmur heard.     No friction rub. No gallop.   Pulmonary:      Effort: Pulmonary effort is normal.      Breath sounds: Normal breath sounds. No wheezing or rales.   Abdominal:      General: Bowel sounds are normal.      Palpations: Abdomen is soft. There is no mass.      Tenderness: There is no abdominal tenderness.   Musculoskeletal:      Cervical back: Normal range of motion and neck supple.   Lymphadenopathy:      Cervical: No cervical adenopathy.   Skin:     General: Skin is warm.      Findings: No rash.   Neurological:      Mental Status: She is alert and oriented to person, place, and time.         Assessment:       1. Essential hypertension    2. Mild intermittent asthma without complication    3. Other hyperlipidemia    4. History of pulmonary embolus (PE)    5. Chronic anticoagulation    6. Atrial flutter with rapid ventricular response    7. Chronic diastolic heart failure    8. Chronic kidney disease, stage 3a    9. Tortuous aorta    10. Severe obesity with body mass index (BMI) of 35.0 to 39.9 with comorbidity    11. Chronic pain of right knee          Plan:     1. Essential hypertension- stable, cont rx.  Overview:  Hypertension Medications               amLODIPine (NORVASC) 5 MG tablet TAKE 1 TABLET TWICE DAILY    losartan " (COZAAR) 100 MG tablet TAKE 1 TABLET ONE TIME DAILY    metoprolol succinate (TOPROL-XL) 100 MG 24 hr tablet TAKE 1 TABLET EVERY DAY           2. Mild intermittent asthma without complication- doing well, cont rx.  Overview:  Albuterol MDI prn.    3. Other hyperlipidemia- recheck now, cont rx.  Overview:  Repatha q14d.    Orders:  -     Comprehensive Metabolic Panel; Future; Expected date: 03/07/2025  -     Lipid Panel; Future; Expected date: 03/07/2025  -     TSH; Future; Expected date: 03/07/2025    4. History of pulmonary embolus (PE), Chronic anticoagulation  Overview:  Xarelto 20mg daily.    6. Atrial flutter with rapid ventricular response, Chronic diastolic heart failure- doing well on meds, cont.  Overview:  Digoxin 125mcg daily,  Metoprolol XL 100mg daily,  Xarelto 20mg daily.    Cards Dr. Parekh.  Overview:  On SGLT2i.    8. Chronic kidney disease, stage 3a- on SGLT2i, cont, recheck now.    9. Tortuous aorta- cont rx.  Overview:  Repatha.    10. Severe obesity with body mass index (BMI) of 35.0 to 39.9 with comorbidity    11. Chronic pain of right knee  -     Ambulatory referral/consult to Orthopedics; Future; Expected date: 03/14/2025  -     X-Ray Knee 1 or 2 View Right; Future; Expected date: 03/07/2025     FAX LABS TO [facility].  Tdap at pharmacy.  RTC  3mo.

## 2025-03-07 ENCOUNTER — OFFICE VISIT (OUTPATIENT)
Dept: PRIMARY CARE CLINIC | Facility: CLINIC | Age: 80
End: 2025-03-07
Payer: MEDICARE

## 2025-03-07 VITALS
HEIGHT: 64 IN | SYSTOLIC BLOOD PRESSURE: 122 MMHG | WEIGHT: 219.38 LBS | BODY MASS INDEX: 37.45 KG/M2 | TEMPERATURE: 98 F | DIASTOLIC BLOOD PRESSURE: 60 MMHG | OXYGEN SATURATION: 95 % | HEART RATE: 69 BPM

## 2025-03-07 DIAGNOSIS — M25.561 CHRONIC PAIN OF RIGHT KNEE: ICD-10-CM

## 2025-03-07 DIAGNOSIS — I10 ESSENTIAL HYPERTENSION: Primary | ICD-10-CM

## 2025-03-07 DIAGNOSIS — E66.01 SEVERE OBESITY WITH BODY MASS INDEX (BMI) OF 35.0 TO 39.9 WITH COMORBIDITY: ICD-10-CM

## 2025-03-07 DIAGNOSIS — I48.92 ATRIAL FLUTTER WITH RAPID VENTRICULAR RESPONSE: ICD-10-CM

## 2025-03-07 DIAGNOSIS — J45.20 MILD INTERMITTENT ASTHMA WITHOUT COMPLICATION: ICD-10-CM

## 2025-03-07 DIAGNOSIS — I77.1 TORTUOUS AORTA: ICD-10-CM

## 2025-03-07 DIAGNOSIS — E78.49 OTHER HYPERLIPIDEMIA: ICD-10-CM

## 2025-03-07 DIAGNOSIS — I50.32 CHRONIC DIASTOLIC HEART FAILURE: ICD-10-CM

## 2025-03-07 DIAGNOSIS — G89.29 CHRONIC PAIN OF RIGHT KNEE: ICD-10-CM

## 2025-03-07 DIAGNOSIS — N18.31 CHRONIC KIDNEY DISEASE, STAGE 3A: ICD-10-CM

## 2025-03-07 DIAGNOSIS — Z86.711 HISTORY OF PULMONARY EMBOLUS (PE): ICD-10-CM

## 2025-03-07 DIAGNOSIS — Z79.01 CHRONIC ANTICOAGULATION: ICD-10-CM

## 2025-03-07 PROCEDURE — 80053 COMPREHEN METABOLIC PANEL: CPT | Mod: HCNC | Performed by: INTERNAL MEDICINE

## 2025-03-07 PROCEDURE — 84443 ASSAY THYROID STIM HORMONE: CPT | Mod: HCNC | Performed by: INTERNAL MEDICINE

## 2025-03-07 PROCEDURE — 99999 PR PBB SHADOW E&M-EST. PATIENT-LVL V: CPT | Mod: PBBFAC,HCNC,, | Performed by: INTERNAL MEDICINE

## 2025-03-07 PROCEDURE — 80061 LIPID PANEL: CPT | Mod: HCNC | Performed by: INTERNAL MEDICINE

## 2025-03-08 LAB
ALBUMIN SERPL BCP-MCNC: 3.6 G/DL (ref 3.5–5.2)
ALP SERPL-CCNC: 87 U/L (ref 40–150)
ALT SERPL W/O P-5'-P-CCNC: 12 U/L (ref 10–44)
ANION GAP SERPL CALC-SCNC: 11 MMOL/L (ref 8–16)
AST SERPL-CCNC: 22 U/L (ref 10–40)
BILIRUB SERPL-MCNC: 0.5 MG/DL (ref 0.1–1)
BUN SERPL-MCNC: 12 MG/DL (ref 8–23)
CALCIUM SERPL-MCNC: 9.1 MG/DL (ref 8.7–10.5)
CHLORIDE SERPL-SCNC: 105 MMOL/L (ref 95–110)
CHOLEST SERPL-MCNC: 169 MG/DL (ref 120–199)
CHOLEST/HDLC SERPL: 2.4 {RATIO} (ref 2–5)
CO2 SERPL-SCNC: 24 MMOL/L (ref 23–29)
CREAT SERPL-MCNC: 1 MG/DL (ref 0.5–1.4)
EST. GFR  (NO RACE VARIABLE): 57.7 ML/MIN/1.73 M^2
GLUCOSE SERPL-MCNC: 82 MG/DL (ref 70–110)
HDLC SERPL-MCNC: 69 MG/DL (ref 40–75)
HDLC SERPL: 40.8 % (ref 20–50)
LDLC SERPL CALC-MCNC: 84.6 MG/DL (ref 63–159)
NONHDLC SERPL-MCNC: 100 MG/DL
POTASSIUM SERPL-SCNC: 4.7 MMOL/L (ref 3.5–5.1)
PROT SERPL-MCNC: 7.9 G/DL (ref 6–8.4)
SODIUM SERPL-SCNC: 140 MMOL/L (ref 136–145)
TRIGL SERPL-MCNC: 77 MG/DL (ref 30–150)
TSH SERPL DL<=0.005 MIU/L-ACNC: 1.11 UIU/ML (ref 0.4–4)

## 2025-03-12 ENCOUNTER — HOSPITAL ENCOUNTER (OUTPATIENT)
Dept: RADIOLOGY | Facility: HOSPITAL | Age: 80
Discharge: HOME | End: 2025-03-12
Attending: INTERNAL MEDICINE
Payer: MEDICARE

## 2025-03-12 ENCOUNTER — OFFICE VISIT (OUTPATIENT)
Dept: ORTHOPEDICS | Facility: CLINIC | Age: 80
End: 2025-03-12
Payer: MEDICARE

## 2025-03-12 VITALS
HEIGHT: 64 IN | DIASTOLIC BLOOD PRESSURE: 68 MMHG | BODY MASS INDEX: 37.39 KG/M2 | HEART RATE: 56 BPM | SYSTOLIC BLOOD PRESSURE: 141 MMHG | WEIGHT: 219 LBS

## 2025-03-12 DIAGNOSIS — M25.569 KNEE PAIN, UNSPECIFIED CHRONICITY, UNSPECIFIED LATERALITY: ICD-10-CM

## 2025-03-12 DIAGNOSIS — M25.561 CHRONIC PAIN OF RIGHT KNEE: ICD-10-CM

## 2025-03-12 DIAGNOSIS — M25.569 KNEE PAIN, UNSPECIFIED CHRONICITY, UNSPECIFIED LATERALITY: Primary | ICD-10-CM

## 2025-03-12 DIAGNOSIS — M17.0 BILATERAL PRIMARY OSTEOARTHRITIS OF KNEE: Primary | ICD-10-CM

## 2025-03-12 DIAGNOSIS — G89.29 CHRONIC PAIN OF RIGHT KNEE: ICD-10-CM

## 2025-03-12 PROCEDURE — 3288F FALL RISK ASSESSMENT DOCD: CPT | Mod: HCNC,CPTII,S$GLB, | Performed by: PHYSICIAN ASSISTANT

## 2025-03-12 PROCEDURE — 1125F AMNT PAIN NOTED PAIN PRSNT: CPT | Mod: HCNC,CPTII,S$GLB, | Performed by: PHYSICIAN ASSISTANT

## 2025-03-12 PROCEDURE — 99999 PR PBB SHADOW E&M-EST. PATIENT-LVL IV: CPT | Mod: PBBFAC,HCNC,, | Performed by: PHYSICIAN ASSISTANT

## 2025-03-12 PROCEDURE — 99204 OFFICE O/P NEW MOD 45 MIN: CPT | Mod: HCNC,S$GLB,, | Performed by: PHYSICIAN ASSISTANT

## 2025-03-12 PROCEDURE — 1101F PT FALLS ASSESS-DOCD LE1/YR: CPT | Mod: HCNC,CPTII,S$GLB, | Performed by: PHYSICIAN ASSISTANT

## 2025-03-12 PROCEDURE — 73564 X-RAY EXAM KNEE 4 OR MORE: CPT | Mod: 26,50,HCNC, | Performed by: STUDENT IN AN ORGANIZED HEALTH CARE EDUCATION/TRAINING PROGRAM

## 2025-03-12 PROCEDURE — 1159F MED LIST DOCD IN RCRD: CPT | Mod: HCNC,CPTII,S$GLB, | Performed by: PHYSICIAN ASSISTANT

## 2025-03-12 PROCEDURE — 1160F RVW MEDS BY RX/DR IN RCRD: CPT | Mod: HCNC,CPTII,S$GLB, | Performed by: PHYSICIAN ASSISTANT

## 2025-03-12 PROCEDURE — 3078F DIAST BP <80 MM HG: CPT | Mod: HCNC,CPTII,S$GLB, | Performed by: PHYSICIAN ASSISTANT

## 2025-03-12 PROCEDURE — 3077F SYST BP >= 140 MM HG: CPT | Mod: HCNC,CPTII,S$GLB, | Performed by: PHYSICIAN ASSISTANT

## 2025-03-12 PROCEDURE — 73564 X-RAY EXAM KNEE 4 OR MORE: CPT | Mod: TC,50,HCNC

## 2025-03-12 NOTE — PROGRESS NOTES
"Subjective:      Patient ID: Nathalie Membreno is a 79 y.o. female.    History of Present Illness    CHIEF COMPLAINT:  - Bilateral knee pain.    HPI:  Nathalie presents with bilateral knee pain, right worse than left. She reports instability when walking, describing a sensation of "slipping." She indicates a tender spot along her knee. She wears a sleeve-type brace on her knee for support. She received a steroid injection in her knee approximately 10 years ago. She denies taking any anti-inflammatory medications such as Advil or Aleve. Nathalie confirms she is currently on Xarelto.       Past Medical History:   Diagnosis Date    Allergy     Anemia     Arthritis     knees, hands, back    Back pain     pt reports "mild"    Chronic diastolic heart failure 7/12/2023    Chronic kidney disease, stage 3a 11/2/2023    Depression, recurrent 7/12/2023    Dysphagia, unspecified(787.20)     GERD (gastroesophageal reflux disease)     History of carpal tunnel release of both wrists 03/10/2016    Hyperlipidemia     Hypertension     LGSIL (low grade squamous intraepithelial dysplasia)     HPV +    Mild intermittent asthma without complication 07/28/2020    Obesity     Pneumonia of left lower lobe due to infectious organism 6/26/2023    Pulmonary embolism, bilateral 7/5/2023    Stroke 02/08/2017   Current Medications[1]    Review of patient's allergies indicates:   Allergen Reactions    Penicillins Swelling     Generalized swelling  Other reaction(s): Swelling  Generalized swelling  Other reaction(s): Swelling  Generalized swelling       BP (!) 141/68 (BP Location: Right arm, Patient Position: Sitting)   Pulse (!) 56   Ht 5' 4" (1.626 m)   Wt 99.3 kg (219 lb)   BMI 37.59 kg/m²       Objective:    Ortho Exam   Bilateral knees:   Skin is intact   No edema   Moderate to severe TTP of the medial joint lines   ROM: Extension 0°, flexion 105°   Calf and compartments are soft and compressible   Motor exam normal   Sensation and pulses " intact   Cap refill brisk    GEN: Well developed, well nourished female. AAOX3. No acute distress.   Head: Normocephalic, atraumatic.   Eyes: DOLORES  Neck: Trachea is midline, no adenopathy  Resp: Breathing unlabored.  Neuro: Motor function normal, Cranial nerves intact  Psych: Mood and affect appropriate.    Assessment:     Imaging:  X-ray bilateral knees obtained today was reviewed and shows severe bilateral tricompartmental degenerative changes.  No acute findings.      1. Chronic pain of right knee        Plan:     Reviewed the radiographs with the patient.    Discussed the natural course of arthritis with the patient.    Discussed treatment options including activity modification, bracing, anti-inflammatories, joint injections, and joint replacement.    We had a lengthy discussion about the safety of joint injection due to the patient being on Xarelto and ultimately she decided to hold off at this time.    Patient was given a hinged knee brace and instructed to take Tylenol as needed for pain.    She can not tolerate NSAIDs secondary to Xarelto use.    We will see her back in this clinic as needed for knee pain.     Follow up if symptoms worsen or fail to improve.      Patient note was created using MModal Dictation.  Any errors in syntax or even information may not have been identified and edited on initial review prior to signing this note.    This note was generated with the assistance of ambient listening technology. Verbal consent was obtained by the patient and accompanying visitor(s) for the recording of patient appointment to facilitate this note. I attest to having reviewed and edited the generated note for accuracy, though some syntax or spelling errors may persist. Please contact the author of this note for any clarification.         [1]   Current Outpatient Medications:     acetaminophen (TYLENOL) 325 MG tablet, Take 650 mg by mouth every 6 (six) hours as needed for Pain., Disp: , Rfl:     albuterol  90 mcg/actuation inhaler, Inhale 2 puffs into the lungs every 4 (four) hours as needed for Wheezing., Disp: 1 each, Rfl: 6    amLODIPine (NORVASC) 5 MG tablet, Take 1 tablet (5 mg total) by mouth 2 (two) times daily., Disp: 180 tablet, Rfl: 3    cholecalciferol, vitamin D3, (VITAMIN D3) 50 mcg (2,000 unit) Cap, Take 1 capsule by mouth once daily., Disp: , Rfl:     digoxin (LANOXIN) 125 mcg tablet, TAKE 1 TABLET EVERY DAY, Disp: 90 tablet, Rfl: 2    empagliflozin (JARDIANCE) 10 mg tablet, Take 1 tablet (10 mg total) by mouth once daily., Disp: 90 tablet, Rfl: 3    fluticasone propionate (FLONASE) 50 mcg/actuation nasal spray, 2 sprays (100 mcg total) by Each Nostril route once daily., Disp: 16 g, Rfl: 6    losartan (COZAAR) 100 MG tablet, TAKE 1 TABLET ONE TIME DAILY, Disp: 90 tablet, Rfl: 3    metoprolol succinate (TOPROL-XL) 100 MG 24 hr tablet, TAKE 1 TABLET EVERY DAY, Disp: 90 tablet, Rfl: 3    mupirocin (BACTROBAN) 2 % ointment, Apply topically 3 (three) times daily. For broken skin., Disp: 22 g, Rfl: 1    polyethylene glycol (GLYCOLAX) 17 gram PwPk, Take by mouth once daily., Disp: , Rfl:     REPATHA SURECLICK 140 mg/mL PnIj, INJECT 140MG UNDER THE SKIN EVERY 2 WEEKS FOR HIGH CHOLESTEROL, Disp: 6 mL, Rfl: 3    rivaroxaban (XARELTO) 20 mg Tab, TAKE 1 TABLET BY MOUTH EVERY DAY WITH DINNER, Disp: , Rfl:     betamethasone valerate 0.1% (VALISONE) 0.1 % Oint, Apply topically 2 (two) times daily., Disp: 15 g, Rfl: 1

## 2025-03-13 ENCOUNTER — PATIENT MESSAGE (OUTPATIENT)
Dept: PRIMARY CARE CLINIC | Facility: CLINIC | Age: 80
End: 2025-03-13
Payer: MEDICARE

## 2025-04-03 DIAGNOSIS — Z78.9 STATIN INTOLERANCE: ICD-10-CM

## 2025-04-03 DIAGNOSIS — E78.49 OTHER HYPERLIPIDEMIA: ICD-10-CM

## 2025-04-03 RX ORDER — EVOLOCUMAB 140 MG/ML
INJECTION, SOLUTION SUBCUTANEOUS
Qty: 6 ML | Refills: 3 | Status: SHIPPED | OUTPATIENT
Start: 2025-04-03

## 2025-05-05 RX ORDER — LOSARTAN POTASSIUM 100 MG/1
100 TABLET ORAL
Qty: 90 TABLET | Refills: 3 | Status: SHIPPED | OUTPATIENT
Start: 2025-05-05

## 2025-06-03 ENCOUNTER — OFFICE VISIT (OUTPATIENT)
Dept: PRIMARY CARE CLINIC | Facility: CLINIC | Age: 80
End: 2025-06-03
Payer: MEDICARE

## 2025-06-03 VITALS
HEART RATE: 72 BPM | TEMPERATURE: 99 F | HEIGHT: 64 IN | DIASTOLIC BLOOD PRESSURE: 62 MMHG | OXYGEN SATURATION: 96 % | WEIGHT: 220.13 LBS | BODY MASS INDEX: 37.58 KG/M2 | SYSTOLIC BLOOD PRESSURE: 122 MMHG

## 2025-06-03 DIAGNOSIS — E66.01 SEVERE OBESITY WITH BODY MASS INDEX (BMI) OF 35.0 TO 39.9 WITH COMORBIDITY: ICD-10-CM

## 2025-06-03 DIAGNOSIS — I10 ESSENTIAL HYPERTENSION: ICD-10-CM

## 2025-06-03 DIAGNOSIS — I48.92 ATRIAL FLUTTER WITH RAPID VENTRICULAR RESPONSE: ICD-10-CM

## 2025-06-03 DIAGNOSIS — J45.20 MILD INTERMITTENT ASTHMA WITHOUT COMPLICATION: Primary | ICD-10-CM

## 2025-06-03 DIAGNOSIS — I50.32 CHRONIC DIASTOLIC HEART FAILURE: ICD-10-CM

## 2025-06-03 DIAGNOSIS — N18.31 CHRONIC KIDNEY DISEASE, STAGE 3A: ICD-10-CM

## 2025-06-03 DIAGNOSIS — E78.49 OTHER HYPERLIPIDEMIA: ICD-10-CM

## 2025-06-03 DIAGNOSIS — M17.11 PRIMARY OSTEOARTHRITIS OF RIGHT KNEE: ICD-10-CM

## 2025-06-03 PROCEDURE — 1126F AMNT PAIN NOTED NONE PRSNT: CPT | Mod: CPTII,S$GLB,, | Performed by: NURSE PRACTITIONER

## 2025-06-03 PROCEDURE — 3074F SYST BP LT 130 MM HG: CPT | Mod: CPTII,S$GLB,, | Performed by: NURSE PRACTITIONER

## 2025-06-03 PROCEDURE — 3078F DIAST BP <80 MM HG: CPT | Mod: CPTII,S$GLB,, | Performed by: NURSE PRACTITIONER

## 2025-06-03 PROCEDURE — 99212 OFFICE O/P EST SF 10 MIN: CPT | Mod: S$GLB,,, | Performed by: NURSE PRACTITIONER

## 2025-06-03 PROCEDURE — 3288F FALL RISK ASSESSMENT DOCD: CPT | Mod: CPTII,S$GLB,, | Performed by: NURSE PRACTITIONER

## 2025-06-03 PROCEDURE — 1159F MED LIST DOCD IN RCRD: CPT | Mod: CPTII,S$GLB,, | Performed by: NURSE PRACTITIONER

## 2025-06-03 PROCEDURE — 1101F PT FALLS ASSESS-DOCD LE1/YR: CPT | Mod: CPTII,S$GLB,, | Performed by: NURSE PRACTITIONER

## 2025-06-03 PROCEDURE — 99999 PR PBB SHADOW E&M-EST. PATIENT-LVL IV: CPT | Mod: PBBFAC,,, | Performed by: NURSE PRACTITIONER

## 2025-08-11 ENCOUNTER — OFFICE VISIT (OUTPATIENT)
Dept: PRIMARY CARE CLINIC | Facility: CLINIC | Age: 80
End: 2025-08-11
Payer: MEDICARE

## 2025-08-11 ENCOUNTER — NURSE TRIAGE (OUTPATIENT)
Dept: ADMINISTRATIVE | Facility: CLINIC | Age: 80
End: 2025-08-11
Payer: MEDICARE

## 2025-08-11 VITALS
HEART RATE: 59 BPM | TEMPERATURE: 97 F | OXYGEN SATURATION: 98 % | DIASTOLIC BLOOD PRESSURE: 70 MMHG | WEIGHT: 217.06 LBS | SYSTOLIC BLOOD PRESSURE: 152 MMHG | BODY MASS INDEX: 37.06 KG/M2 | HEIGHT: 64 IN | RESPIRATION RATE: 18 BRPM

## 2025-08-11 DIAGNOSIS — R21 RASH: Primary | ICD-10-CM

## 2025-08-11 PROCEDURE — 1159F MED LIST DOCD IN RCRD: CPT | Mod: CPTII,HCNC,S$GLB, | Performed by: FAMILY MEDICINE

## 2025-08-11 PROCEDURE — 3078F DIAST BP <80 MM HG: CPT | Mod: CPTII,HCNC,S$GLB, | Performed by: FAMILY MEDICINE

## 2025-08-11 PROCEDURE — 3288F FALL RISK ASSESSMENT DOCD: CPT | Mod: CPTII,HCNC,S$GLB, | Performed by: FAMILY MEDICINE

## 2025-08-11 PROCEDURE — 99999 PR PBB SHADOW E&M-EST. PATIENT-LVL III: CPT | Mod: PBBFAC,HCNC,, | Performed by: FAMILY MEDICINE

## 2025-08-11 PROCEDURE — 99214 OFFICE O/P EST MOD 30 MIN: CPT | Mod: HCNC,S$GLB,, | Performed by: FAMILY MEDICINE

## 2025-08-11 PROCEDURE — 1160F RVW MEDS BY RX/DR IN RCRD: CPT | Mod: CPTII,HCNC,S$GLB, | Performed by: FAMILY MEDICINE

## 2025-08-11 PROCEDURE — 3077F SYST BP >= 140 MM HG: CPT | Mod: CPTII,HCNC,S$GLB, | Performed by: FAMILY MEDICINE

## 2025-08-11 PROCEDURE — 1101F PT FALLS ASSESS-DOCD LE1/YR: CPT | Mod: CPTII,HCNC,S$GLB, | Performed by: FAMILY MEDICINE

## 2025-08-11 PROCEDURE — 1126F AMNT PAIN NOTED NONE PRSNT: CPT | Mod: CPTII,HCNC,S$GLB, | Performed by: FAMILY MEDICINE

## 2025-08-11 RX ORDER — CLOTRIMAZOLE AND BETAMETHASONE DIPROPIONATE 10; .64 MG/G; MG/G
CREAM TOPICAL 2 TIMES DAILY
Qty: 15 G | Refills: 0 | Status: SHIPPED | OUTPATIENT
Start: 2025-08-11 | End: 2025-08-25

## 2025-08-20 ENCOUNTER — TELEPHONE (OUTPATIENT)
Dept: OPHTHALMOLOGY | Facility: CLINIC | Age: 80
End: 2025-08-20
Payer: MEDICARE

## 2025-08-20 RX ORDER — METOPROLOL SUCCINATE 100 MG/1
100 TABLET, EXTENDED RELEASE ORAL
Qty: 90 TABLET | Refills: 3 | Status: SHIPPED | OUTPATIENT
Start: 2025-08-20

## 2025-09-03 ENCOUNTER — OFFICE VISIT (OUTPATIENT)
Dept: PRIMARY CARE CLINIC | Facility: CLINIC | Age: 80
End: 2025-09-03
Payer: MEDICARE

## 2025-09-03 VITALS
DIASTOLIC BLOOD PRESSURE: 68 MMHG | TEMPERATURE: 98 F | HEART RATE: 60 BPM | HEIGHT: 64 IN | OXYGEN SATURATION: 96 % | SYSTOLIC BLOOD PRESSURE: 118 MMHG | BODY MASS INDEX: 36.98 KG/M2 | WEIGHT: 216.63 LBS

## 2025-09-03 DIAGNOSIS — I10 ESSENTIAL HYPERTENSION: Primary | ICD-10-CM

## 2025-09-03 DIAGNOSIS — J45.20 MILD INTERMITTENT ASTHMA WITHOUT COMPLICATION: ICD-10-CM

## 2025-09-03 DIAGNOSIS — N18.31 CHRONIC KIDNEY DISEASE, STAGE 3A: ICD-10-CM

## 2025-09-03 DIAGNOSIS — I50.32 CHRONIC DIASTOLIC HEART FAILURE: ICD-10-CM

## 2025-09-03 DIAGNOSIS — I48.92 ATRIAL FLUTTER WITH RAPID VENTRICULAR RESPONSE: ICD-10-CM

## 2025-09-03 DIAGNOSIS — E78.49 OTHER HYPERLIPIDEMIA: ICD-10-CM

## 2025-09-03 DIAGNOSIS — I77.1 TORTUOUS AORTA: ICD-10-CM

## 2025-09-03 DIAGNOSIS — K59.01 SLOW TRANSIT CONSTIPATION: ICD-10-CM

## 2025-09-03 DIAGNOSIS — Z86.711 HISTORY OF PULMONARY EMBOLUS (PE): ICD-10-CM

## 2025-09-03 LAB
ANION GAP (OHS): 12 MMOL/L (ref 8–16)
BUN SERPL-MCNC: 13 MG/DL (ref 8–23)
CALCIUM SERPL-MCNC: 9.9 MG/DL (ref 8.7–10.5)
CHLORIDE SERPL-SCNC: 105 MMOL/L (ref 95–110)
CO2 SERPL-SCNC: 25 MMOL/L (ref 23–29)
CREAT SERPL-MCNC: 1.1 MG/DL (ref 0.5–1.4)
GFR SERPLBLD CREATININE-BSD FMLA CKD-EPI: 51 ML/MIN/1.73/M2
GLUCOSE SERPL-MCNC: 82 MG/DL (ref 70–110)
POTASSIUM SERPL-SCNC: 5.1 MMOL/L (ref 3.5–5.1)
SODIUM SERPL-SCNC: 142 MMOL/L (ref 136–145)

## 2025-09-03 PROCEDURE — 99214 OFFICE O/P EST MOD 30 MIN: CPT | Mod: HCNC,S$GLB,, | Performed by: INTERNAL MEDICINE

## 2025-09-03 PROCEDURE — 80048 BASIC METABOLIC PNL TOTAL CA: CPT | Mod: HCNC | Performed by: INTERNAL MEDICINE

## 2025-09-03 PROCEDURE — 3288F FALL RISK ASSESSMENT DOCD: CPT | Mod: CPTII,HCNC,S$GLB, | Performed by: INTERNAL MEDICINE

## 2025-09-03 PROCEDURE — 1101F PT FALLS ASSESS-DOCD LE1/YR: CPT | Mod: CPTII,HCNC,S$GLB, | Performed by: INTERNAL MEDICINE

## 2025-09-03 PROCEDURE — 3078F DIAST BP <80 MM HG: CPT | Mod: CPTII,HCNC,S$GLB, | Performed by: INTERNAL MEDICINE

## 2025-09-03 PROCEDURE — 3074F SYST BP LT 130 MM HG: CPT | Mod: CPTII,HCNC,S$GLB, | Performed by: INTERNAL MEDICINE

## 2025-09-03 PROCEDURE — G2211 COMPLEX E/M VISIT ADD ON: HCPCS | Mod: HCNC,S$GLB,, | Performed by: INTERNAL MEDICINE

## 2025-09-03 PROCEDURE — 1126F AMNT PAIN NOTED NONE PRSNT: CPT | Mod: CPTII,HCNC,S$GLB, | Performed by: INTERNAL MEDICINE

## 2025-09-03 PROCEDURE — 99999 PR PBB SHADOW E&M-EST. PATIENT-LVL III: CPT | Mod: PBBFAC,HCNC,, | Performed by: INTERNAL MEDICINE

## 2025-09-03 PROCEDURE — 1159F MED LIST DOCD IN RCRD: CPT | Mod: CPTII,HCNC,S$GLB, | Performed by: INTERNAL MEDICINE

## 2025-09-03 RX ORDER — POLYETHYLENE GLYCOL 3350 17 G/17G
17 POWDER, FOR SOLUTION ORAL DAILY
Qty: 1530 G | Refills: 6 | Status: SHIPPED | OUTPATIENT
Start: 2025-09-03

## 2025-09-05 ENCOUNTER — PATIENT MESSAGE (OUTPATIENT)
Dept: PRIMARY CARE CLINIC | Facility: CLINIC | Age: 80
End: 2025-09-05
Payer: MEDICARE

## (undated) DEVICE — SOL POVIDONE PREP IODINE 4 OZ

## (undated) DEVICE — SPONGE COTTON TRAY 4X4IN

## (undated) DEVICE — ELECTRODE REM PLYHSV RETURN 9

## (undated) DEVICE — TUBING MEDI-VAC 20FT .25IN

## (undated) DEVICE — COVER LIGHT HANDLE 80/CA

## (undated) DEVICE — GOWN POLY REINF BRTH SLV XL

## (undated) DEVICE — SUT PDSII 3-0 SH 27IN CLEAR

## (undated) DEVICE — SUT PDS II 2-0 CT-2 VIL

## (undated) DEVICE — EVACUATOR PENCIL SMOKE NEPTUNE

## (undated) DEVICE — SYR 10CC LUER LOCK

## (undated) DEVICE — TOWEL OR DISP STRL BLUE 4/PK

## (undated) DEVICE — GLOVE SIGNATURE ESSNTL LTX 8

## (undated) DEVICE — SUT VICRYL 3-0 27 SH

## (undated) DEVICE — UNDERGLOVE BIOGEL PI SZ 6.5 LF

## (undated) DEVICE — DRAPE UNDER BUTTOCKS SUC PORT

## (undated) DEVICE — DRESSING TELFA STRL 4X3 LF

## (undated) DEVICE — PACK BASIC SETUP SC BR

## (undated) DEVICE — ADHESIVE MASTISOL VIAL 48/BX

## (undated) DEVICE — PACK SURG LITHOTOMY 3 SIRUS

## (undated) DEVICE — TAPE SILK 3IN

## (undated) DEVICE — SOL NORMAL USPCA 0.9%

## (undated) DEVICE — NDL SAFETY 21G X 1 1/2 ECLPSE

## (undated) DEVICE — PAD ABDOMINAL STERILE 8X10IN

## (undated) DEVICE — APPLICATOR CHLORAPREP ORN 26ML

## (undated) DEVICE — SYR ONLY LUER LOCK 20CC

## (undated) DEVICE — DRAPE LITHOTOMY SHEET

## (undated) DEVICE — SUT PROLENE 5-0 PS-3 18 IN

## (undated) DEVICE — HEMOSTAT SURGICEL 4X8IN

## (undated) DEVICE — JELLY SURGILUBE LUBE PKT 3GM

## (undated) DEVICE — MANIFOLD 4 PORT

## (undated) DEVICE — BRIEF MESH LARGE

## (undated) DEVICE — ELECTRODE BLADE INSULATED 1 IN

## (undated) DEVICE — GLOVE SENSICARE PI GRN 8

## (undated) DEVICE — UNDERGLOVES BIOGEL PI SIZE 7.5

## (undated) DEVICE — TAPE MEDIPORE 4IN X 2YDS

## (undated) DEVICE — SUT PDS II O CT-2 VIL MONO

## (undated) DEVICE — SUT PROLENE 4-0 PS2 18 BLUE

## (undated) DEVICE — DRAPE UINDERBUT GRAD PCH

## (undated) DEVICE — GLOVE SURGICAL LATEX SZ 6.5

## (undated) DEVICE — NDL ECLIPSE SAFETY 23G 1.5IN